# Patient Record
Sex: MALE | Race: WHITE | Employment: OTHER | URBAN - METROPOLITAN AREA
[De-identification: names, ages, dates, MRNs, and addresses within clinical notes are randomized per-mention and may not be internally consistent; named-entity substitution may affect disease eponyms.]

---

## 2017-01-05 ENCOUNTER — HOSPITAL ENCOUNTER (EMERGENCY)
Age: 77
Discharge: HOME | End: 2017-01-05
Attending: EMERGENCY MEDICINE
Payer: MEDICARE

## 2017-01-05 ENCOUNTER — APPOINTMENT (OUTPATIENT)
Dept: ULTRASOUND IMAGING | Age: 77
End: 2017-01-05
Attending: EMERGENCY MEDICINE
Payer: MEDICARE

## 2017-01-05 VITALS
DIASTOLIC BLOOD PRESSURE: 61 MMHG | BODY MASS INDEX: 33.42 KG/M2 | WEIGHT: 233.47 LBS | RESPIRATION RATE: 18 BRPM | TEMPERATURE: 97.8 F | SYSTOLIC BLOOD PRESSURE: 152 MMHG | HEART RATE: 79 BPM | HEIGHT: 70 IN | OXYGEN SATURATION: 97 %

## 2017-01-05 DIAGNOSIS — E86.0 DEHYDRATION: ICD-10-CM

## 2017-01-05 DIAGNOSIS — K80.20 CALCULUS OF GALLBLADDER WITHOUT CHOLECYSTITIS WITHOUT OBSTRUCTION: Primary | ICD-10-CM

## 2017-01-05 LAB
ALBUMIN SERPL BCP-MCNC: 3.5 G/DL (ref 3.5–5)
ALBUMIN/GLOB SERPL: 0.8 {RATIO} (ref 1.1–2.2)
ALP SERPL-CCNC: 78 U/L (ref 45–117)
ALT SERPL-CCNC: 46 U/L (ref 12–78)
ANION GAP BLD CALC-SCNC: 5 MMOL/L (ref 5–15)
AST SERPL W P-5'-P-CCNC: 35 U/L (ref 15–37)
ATRIAL RATE: 70 BPM
BASOPHILS # BLD AUTO: 0 K/UL (ref 0–0.1)
BASOPHILS # BLD: 1 % (ref 0–1)
BILIRUB SERPL-MCNC: 0.8 MG/DL (ref 0.2–1)
BUN SERPL-MCNC: 14 MG/DL (ref 6–20)
BUN/CREAT SERPL: 15 (ref 12–20)
CALCIUM SERPL-MCNC: 8.8 MG/DL (ref 8.5–10.1)
CALCULATED P AXIS, ECG09: 33 DEGREES
CALCULATED R AXIS, ECG10: -3 DEGREES
CALCULATED T AXIS, ECG11: 39 DEGREES
CHLORIDE SERPL-SCNC: 104 MMOL/L (ref 97–108)
CO2 SERPL-SCNC: 33 MMOL/L (ref 21–32)
CREAT SERPL-MCNC: 0.91 MG/DL (ref 0.7–1.3)
DIAGNOSIS, 93000: NORMAL
EOSINOPHIL # BLD: 0.2 K/UL (ref 0–0.4)
EOSINOPHIL NFR BLD: 2 % (ref 0–7)
ERYTHROCYTE [DISTWIDTH] IN BLOOD BY AUTOMATED COUNT: 17.7 % (ref 11.5–14.5)
GLOBULIN SER CALC-MCNC: 4.6 G/DL (ref 2–4)
GLUCOSE SERPL-MCNC: 98 MG/DL (ref 65–100)
HCT VFR BLD AUTO: 36.9 % (ref 36.6–50.3)
HGB BLD-MCNC: 11.9 G/DL (ref 12.1–17)
LACTATE SERPL-SCNC: 1.5 MMOL/L (ref 0.4–2)
LACTATE SERPL-SCNC: 2.8 MMOL/L (ref 0.4–2)
LIPASE SERPL-CCNC: 65 U/L (ref 73–393)
LYMPHOCYTES # BLD AUTO: 28 % (ref 12–49)
LYMPHOCYTES # BLD: 2.4 K/UL (ref 0.8–3.5)
MCH RBC QN AUTO: 32.8 PG (ref 26–34)
MCHC RBC AUTO-ENTMCNC: 32.2 G/DL (ref 30–36.5)
MCV RBC AUTO: 101.7 FL (ref 80–99)
MONOCYTES # BLD: 0.8 K/UL (ref 0–1)
MONOCYTES NFR BLD AUTO: 9 % (ref 5–13)
NEUTS SEG # BLD: 5.1 K/UL (ref 1.8–8)
NEUTS SEG NFR BLD AUTO: 60 % (ref 32–75)
NRBC # BLD: 0.04 K/UL (ref 0–0.01)
NRBC BLD-RTO: 0.5 PER 100 WBC
P-R INTERVAL, ECG05: 170 MS
PLATELET # BLD AUTO: 305 K/UL (ref 150–400)
POTASSIUM SERPL-SCNC: 4.2 MMOL/L (ref 3.5–5.1)
PROT SERPL-MCNC: 8.1 G/DL (ref 6.4–8.2)
Q-T INTERVAL, ECG07: 412 MS
QRS DURATION, ECG06: 92 MS
QTC CALCULATION (BEZET), ECG08: 444 MS
RBC # BLD AUTO: 3.63 M/UL (ref 4.1–5.7)
SODIUM SERPL-SCNC: 142 MMOL/L (ref 136–145)
VENTRICULAR RATE, ECG03: 70 BPM
WBC # BLD AUTO: 8.8 K/UL (ref 4.1–11.1)
WBC NRBC COR # BLD: ABNORMAL 10*3/UL

## 2017-01-05 PROCEDURE — 99284 EMERGENCY DEPT VISIT MOD MDM: CPT

## 2017-01-05 PROCEDURE — 83690 ASSAY OF LIPASE: CPT | Performed by: EMERGENCY MEDICINE

## 2017-01-05 PROCEDURE — 76705 ECHO EXAM OF ABDOMEN: CPT

## 2017-01-05 PROCEDURE — 96360 HYDRATION IV INFUSION INIT: CPT

## 2017-01-05 PROCEDURE — 96361 HYDRATE IV INFUSION ADD-ON: CPT

## 2017-01-05 PROCEDURE — 85025 COMPLETE CBC W/AUTO DIFF WBC: CPT | Performed by: EMERGENCY MEDICINE

## 2017-01-05 PROCEDURE — 74011250636 HC RX REV CODE- 250/636

## 2017-01-05 PROCEDURE — 83605 ASSAY OF LACTIC ACID: CPT | Performed by: EMERGENCY MEDICINE

## 2017-01-05 PROCEDURE — 36415 COLL VENOUS BLD VENIPUNCTURE: CPT | Performed by: EMERGENCY MEDICINE

## 2017-01-05 PROCEDURE — 93005 ELECTROCARDIOGRAM TRACING: CPT

## 2017-01-05 PROCEDURE — 74011250636 HC RX REV CODE- 250/636: Performed by: EMERGENCY MEDICINE

## 2017-01-05 PROCEDURE — 80053 COMPREHEN METABOLIC PANEL: CPT | Performed by: EMERGENCY MEDICINE

## 2017-01-05 RX ORDER — SODIUM CHLORIDE 9 MG/ML
2000 INJECTION, SOLUTION INTRAVENOUS ONCE
Status: COMPLETED | OUTPATIENT
Start: 2017-01-05 | End: 2017-01-05

## 2017-01-05 RX ORDER — HYDROCODONE BITARTRATE AND ACETAMINOPHEN 5; 325 MG/1; MG/1
1 TABLET ORAL
Qty: 10 TAB | Refills: 0 | Status: ON HOLD | OUTPATIENT
Start: 2017-01-05 | End: 2017-01-11

## 2017-01-05 RX ORDER — ONDANSETRON 4 MG/1
4 TABLET, ORALLY DISINTEGRATING ORAL
Qty: 6 TAB | Refills: 0 | Status: SHIPPED | OUTPATIENT
Start: 2017-01-05 | End: 2017-01-07

## 2017-01-05 RX ADMIN — SODIUM CHLORIDE 2000 ML: 900 INJECTION, SOLUTION INTRAVENOUS at 13:58

## 2017-01-05 NOTE — ED NOTES
Bedside and Verbal shift change report given to Sarthak Atkinson (oncoming nurse) by Maria L Barrow (offgoing nurse). Report included the following information SBAR and ED Summary.

## 2017-01-05 NOTE — ED NOTES
Unable to obtain vital signs prior to discharge. Dr. Helen Arenas has reviewed discharge instructions with the patient. The patient verbalized understanding. Patient ambulatory to car with family.

## 2017-01-05 NOTE — ED NOTES
Assumed care of pt from triage. Pt c/o RUQ abdominal pain onset last night. Pt denies n/v/d and fever. Pt placed on monitor x2. VSS. Pt in position of comfort with call bell within reach. VSS.

## 2017-01-05 NOTE — ED PROVIDER NOTES
HPI Comments: Malinda Cullen, 78 y.o. Male with PMHx of HTN and GERD presents ambulatory to ED [facility] with cc of intermittent severe flank pain radiating to RUQ and LUQ x 1 month with 5 episodes. Pt was seen by Dr. Arjun Alejandra this morning and the PCP believes he has gallstones and instructed him to be seen at the ED. Pt vomited with relief after eating last night and notes he has vomited 4 times total since onset of pain last night. The episodes of pain last 3-4 hours and begin 4 hours after eating. Pt reports normal BM's. Pt also c/o of cough and \"lung congestion\" which is normal. He denies a hx of abdominal surgeries or cardiac issues. He denies any diarrhea, melena, BIS, CP, fevers, chills, dysuria or hematuria. PCP: Dread Lucero MD    Social history significant for: - Tobacco, - EtOH, - Illicit Drug Use    There are no other complaints, changes, or physical findings at this time. Written by KAEL Rivera, as dictated by Howard Clark. Maury Mock MD.      The history is provided by the patient. No  was used. Past Medical History:   Diagnosis Date    Basal cell cancer     Benign prostatic hypertrophy     Frequency of urination     GERD (gastroesophageal reflux disease)     Hypertension        Past Surgical History:   Procedure Laterality Date    Hx other surgical       jaw surgery; \"shortened it\" per pt    Hx shoulder arthroscopy       Rt shoulder ligament repair    Hx mohs procedure  7/11     Rt    Hx heent       tonsillectomy    Hx cataract removal      Hx other surgical  12/08/14     basal cell removed from left side of forehead         History reviewed. No pertinent family history. Social History     Social History    Marital status: SINGLE     Spouse name: N/A    Number of children: N/A    Years of education: N/A     Occupational History    Not on file.      Social History Main Topics    Smoking status: Never Smoker    Smokeless tobacco: Never Used   Bernard Benitez Alcohol use No    Drug use: No    Sexual activity: Not on file     Other Topics Concern    Not on file     Social History Narrative         ALLERGIES: Review of patient's allergies indicates no known allergies. Review of Systems   Constitutional: Negative for chills and fever. HENT: Negative for congestion. Eyes: Negative for visual disturbance. Respiratory: Positive for cough. Negative for chest tightness (\"congestion\"). Cardiovascular: Negative for chest pain and leg swelling. Gastrointestinal: Positive for abdominal pain, nausea and vomiting. Negative for blood in stool and diarrhea. Endocrine: Negative for polyuria. Genitourinary: Negative for dysuria, flank pain, frequency and hematuria. Musculoskeletal: Negative for myalgias. Skin: Negative for color change. Allergic/Immunologic: Negative for immunocompromised state. Neurological: Negative for numbness. All other systems reviewed and are negative. Patient Vitals for the past 12 hrs:   Temp Pulse Resp BP SpO2   01/05/17 1516 - - - - 97 %   01/05/17 1513 - 79 18 152/61 96 %   01/05/17 1512 - - - 152/61 -   01/05/17 1359 - 72 18 140/66 96 %   01/05/17 1200 - - - (!) 161/102 95 %   01/05/17 1102 - - - - 95 %   01/05/17 1100 - - - 156/89 93 %   01/05/17 1047 - 71 18 (!) 151/92 95 %   01/05/17 0915 97.8 °F (36.6 °C) 70 18 (!) 156/96 100 %         Physical Exam   Nursing note and vitals reviewed.   General appearance: non-toxic, NAD  Eyes: PERRL, EOMI, conjunctiva normal, anicteric sclera  HENT: mucous membranes moist, oropharynx is clear  Pulmonary: clear to auscultation bilaterally  Cardiac: normal rate and regular rhythm, no murmurs, gallops, or rubs, 2+ DP and radial pulses  Abdomen: soft, nondistended, bowel sounds present, TTP mostly in RUQ suggestive of Bryson's sign   : no CVA tenderness bilaterally  MSK: trace pretibial edema  Neuro: Alert, answers questions appropriately      MDM  Number of Diagnoses or Management Options  Calculus of gallbladder without cholecystitis without obstruction:   Dehydration:   Diagnosis management comments: DDx: cholecystitis, cholelithiasis, gastritis, hepatitis. Low suspicion of ACS. US shows cholelithiasis. Labs reassuring. Denies any abdominal pain. Suspect colicky pain 2/2 gallstones. Low suspicion for other intra-abdominal pathology. No CT indicated at this time. EKG non-ischemic. F/u with PMD & general surgery. Amount and/or Complexity of Data Reviewed  Clinical lab tests: ordered and reviewed  Tests in the radiology section of CPT®: ordered and reviewed  Tests in the medicine section of CPT®: ordered and reviewed  Review and summarize past medical records: yes  Independent visualization of images, tracings, or specimens: yes    Patient Progress  Patient progress: stable    ED Course       Procedures    EKG interpretation: (Preliminary) 11:05  Rhythm: sinus rhythm with premature supraventricular complexes; and regular . Rate (approx.): 70; Axis: normal; NC interval: 170; QRS interval: 923; ST/T wave: normal; Other findings: normal.  Written by KAEL Daly, as dictated by Greyson Jules. Carie Eubanks MD.    Progress Note  4:10 PM  Pt is in no pain (no pain during entire ED course) and was able to tolerate PO without difficulty, nausea, or vomiting. I have updated his family on results and will have him follow up with general surgery  Written by KAEL Daly, as dictated by Greyson Jules.  Carie Eubanks MD.    LABORATORY TESTS:  Recent Results (from the past 12 hour(s))   CBC WITH AUTOMATED DIFF    Collection Time: 01/05/17 10:36 AM   Result Value Ref Range    WBC 8.8 4.1 - 11.1 K/uL    RBC 3.63 (L) 4.10 - 5.70 M/uL    HGB 11.9 (L) 12.1 - 17.0 g/dL    HCT 36.9 36.6 - 50.3 %    .7 (H) 80.0 - 99.0 FL    MCH 32.8 26.0 - 34.0 PG    MCHC 32.2 30.0 - 36.5 g/dL    RDW 17.7 (H) 11.5 - 14.5 %    PLATELET 468 515 - 749 K/uL    NEUTROPHILS 60 32 - 75 %    LYMPHOCYTES 28 12 - 49 % MONOCYTES 9 5 - 13 %    EOSINOPHILS 2 0 - 7 %    BASOPHILS 1 0 - 1 %    ABS. NEUTROPHILS 5.1 1.8 - 8.0 K/UL    ABS. LYMPHOCYTES 2.4 0.8 - 3.5 K/UL    ABS. MONOCYTES 0.8 0.0 - 1.0 K/UL    ABS. EOSINOPHILS 0.2 0.0 - 0.4 K/UL    ABS. BASOPHILS 0.0 0.0 - 0.1 K/UL   METABOLIC PANEL, COMPREHENSIVE    Collection Time: 01/05/17 10:36 AM   Result Value Ref Range    Sodium 142 136 - 145 mmol/L    Potassium 4.2 3.5 - 5.1 mmol/L    Chloride 104 97 - 108 mmol/L    CO2 33 (H) 21 - 32 mmol/L    Anion gap 5 5 - 15 mmol/L    Glucose 98 65 - 100 mg/dL    BUN 14 6 - 20 MG/DL    Creatinine 0.91 0.70 - 1.30 MG/DL    BUN/Creatinine ratio 15 12 - 20      GFR est AA >60 >60 ml/min/1.73m2    GFR est non-AA >60 >60 ml/min/1.73m2    Calcium 8.8 8.5 - 10.1 MG/DL    Bilirubin, total 0.8 0.2 - 1.0 MG/DL    ALT 46 12 - 78 U/L    AST 35 15 - 37 U/L    Alk.  phosphatase 78 45 - 117 U/L    Protein, total 8.1 6.4 - 8.2 g/dL    Albumin 3.5 3.5 - 5.0 g/dL    Globulin 4.6 (H) 2.0 - 4.0 g/dL    A-G Ratio 0.8 (L) 1.1 - 2.2     LACTIC ACID, PLASMA    Collection Time: 01/05/17 10:36 AM   Result Value Ref Range    Lactic acid 2.8 (HH) 0.4 - 2.0 MMOL/L   LIPASE    Collection Time: 01/05/17 10:36 AM   Result Value Ref Range    Lipase 65 (L) 73 - 393 U/L   NUCLEATED RBC    Collection Time: 01/05/17 10:36 AM   Result Value Ref Range    NRBC 0.5 (H) 0  WBC    ABSOLUTE NRBC 0.04 (H) 0.00 - 0.01 K/uL    WBC CORRECTED FOR NR ADJUSTED FOR NUCLEATED RBC'S     EKG, 12 LEAD, INITIAL    Collection Time: 01/05/17 11:05 AM   Result Value Ref Range    Ventricular Rate 70 BPM    Atrial Rate 70 BPM    P-R Interval 170 ms    QRS Duration 92 ms    Q-T Interval 412 ms    QTC Calculation (Bezet) 444 ms    Calculated P Axis 33 degrees    Calculated R Axis -3 degrees    Calculated T Axis 39 degrees    Diagnosis       Sinus rhythm with premature supraventricular complexes  When compared with ECG of 19-MAR-2014 07:51,  premature supraventricular complexes are now present LACTIC ACID, PLASMA    Collection Time: 01/05/17  1:46 PM   Result Value Ref Range    Lactic acid 1.5 0.4 - 2.0 MMOL/L       IMAGING RESULTS:  US ABD LTD   Final Result   Indication: Abdominal pain     Comparison to 8/6/2014     Real-time sonographic imaging of the right upper quadrant was performed. The  liver is normal in appearance without evidence of mass lesion or biliary  dilatation. Gallstones are noted. There is no gallbladder wall thickening or  sonographic Bryson sign. Common bile duct is normal in size. The right kidney  is normal in size and appearance without evidence of mass lesion,  hydronephrosis, or calcification. The pancreas is not well-visualized due to  bowel gas. No free fluid.     IMPRESSION  Impression: Cholelithiasis without evidence to suggest acute cholecystitis. MEDICATIONS GIVEN:  Medications   0.9% sodium chloride infusion 2,000 mL (0 mL IntraVENous IV Completed 1/5/17 4177)       IMPRESSION:  1. Calculus of gallbladder without cholecystitis without obstruction    2. Dehydration        PLAN:  1. Discharge Medication List as of 1/5/2017  4:23 PM      START taking these medications    Details   ondansetron (ZOFRAN ODT) 4 mg disintegrating tablet Take 1 Tab by mouth every eight (8) hours as needed for Nausea (or vomiting) for up to 2 days. , Normal, Disp-6 Tab, R-0      HYDROcodone-acetaminophen (LORTAB 5-325) 5-325 mg per tablet Take 1 Tab by mouth every six (6) hours as needed for Pain. Max Daily Amount: 4 Tabs. Indications: causes drowsiness;do not drink,drive, or use machinery while taking; take with a stool softener, Print, Disp-10 Tab, R-0         CONTINUE these medications which have NOT CHANGED    Details   Glucosamine &Chondroit-MV-Min3 [id number] mg tab Take  by mouth two (2) times a day., Historical Med      finasteride (PROSCAR) 5 mg tablet Take 5 mg by mouth every morning.  Indications: BENIGN PROSTATIC HYPERTROPHY, Historical Med      lisinopril (PRINIVIL, ZESTRIL) 40 mg tablet Take 40 mg by mouth every morning. Indications: HYPERTENSION, Historical Med      Omeprazole delayed release (PRILOSEC D/R) 20 mg tablet Take 20 mg by mouth every morning., Historical Med      Hydrochlorothiazide 12.5 mg tablet Take 12.5 mg by mouth every morning. Indications: HYPERTENSION, Historical Med           2. Follow-up Information     Follow up With Details Comments Contact Info    Letitia Davies MD Schedule an appointment as soon as possible for a visit in 1 day FOR RE-EVALUATION, REFERRAL TO 1300  [address]  [phone]      [facility] EMERGENCY DEPT Go in 1 day If symptoms worsen [facility] [address] Severo Sloan, MD Schedule an appointment as soon as possible for a visit in 4 days [address] [place] [address] [address]  [facility]  [phone]          Return to ED if worse     Discharge Note:  4:58 PM  The pt is ready for discharge. The pt's signs, symptoms, diagnosis, and discharge instructions have been discussed and pt has conveyed their understanding. The pt is to follow up as recommended or return to ER should their symptoms worsen. Plan has been discussed and pt is in agreement. This note is prepared by Cale Ma, acting as a Scribe for [employer]. Pallavi Vail MD.    [employer]. Pallavi Vail MD: The scribe's documentation has been prepared under my direction and personally reviewed by me in its entirety. I confirm that the notes above accurately reflects all work, treatment, procedures, and medical decision making performed by me.

## 2017-01-05 NOTE — ED NOTES
Bedside and Verbal shift change report given to Genna Valle RN (oncoming nurse) by Katia Veras RN (offgoing nurse). Report included the following information SBAR, ED Summary, MAR and Recent Results.

## 2017-01-05 NOTE — DISCHARGE INSTRUCTIONS
Dehydration: Care Instructions  Your Care Instructions  Dehydration happens when your body loses too much fluid. This might happen when you do not drink enough water or you lose large amounts of fluids from your body because of diarrhea, vomiting, or sweating. Severe dehydration can be life-threatening. Water and minerals called electrolytes help put your body fluids back in balance. Learn the early signs of fluid loss, and drink more fluids to prevent dehydration. Follow-up care is a key part of your treatment and safety. Be sure to make and go to all appointments, and call your doctor if you are having problems. It's also a good idea to know your test results and keep a list of the medicines you take. How can you care for yourself at home? · To prevent dehydration, drink plenty of fluids, enough so that your urine is light yellow or clear like water. Choose water and other caffeine-free clear liquids until you feel better. If you have kidney, heart, or liver disease and have to limit fluids, talk with your doctor before you increase the amount of fluids you drink. · If you do not feel like eating or drinking, try taking small sips of water, sports drinks, or other rehydration drinks. · Get plenty of rest.  To prevent dehydration  · Add more fluids to your diet and daily routine, unless your doctor has told you not to. · During hot weather, drink more fluids. Drink even more fluids if you exercise a lot. Stay away from drinks with alcohol or caffeine. · Watch for the symptoms of dehydration. These include:  ¨ A dry, sticky mouth. ¨ Dark yellow urine, and not much of it. ¨ Dry and sunken eyes. ¨ Feeling very tired. · Learn what problems can lead to dehydration. These include:  ¨ Diarrhea, fever, and vomiting. ¨ Any illness with a fever, such as pneumonia or the flu. ¨ Activities that cause heavy sweating, such as endurance races and heavy outdoor work in hot or humid weather.   ¨ Alcohol or drug abuse or withdrawal.  ¨ Certain medicines, such as cold and allergy pills (antihistamines), diet pills (diuretics), and laxatives. ¨ Certain diseases, such as diabetes, cancer, and heart or kidney disease. When should you call for help? Call 911 anytime you think you may need emergency care. For example, call if:  · You passed out (lost consciousness). Call your doctor now or seek immediate medical care if:  · You are confused and cannot think clearly. · You are dizzy or lightheaded, or you feel like you may faint. · You have signs of needing more fluids. You have sunken eyes and a dry mouth, and you pass only a little dark urine. · You cannot keep fluids down. Watch closely for changes in your health, and be sure to contact your doctor if:  · You are not making tears. · Your skin is very dry and sags slowly back into place after you pinch it. · Your mouth and eyes are very dry. Where can you learn more? Go to http://floyd-lisa.info/. Enter J448 in the search box to learn more about \"Dehydration: Care Instructions. \"  Current as of: May 27, 2016  Content Version: 11.1  © 9558-3891 [employer]. Care instructions adapted under license by [employer] (which disclaims liability or warranty for this information). If you have questions about a medical condition or this instruction, always ask your healthcare professional. [place] [address] any warranty or liability for your use of this information. Low-Fat Diet for Gallbladder Disease: Care Instructions  Your Care Instructions  When you eat, the gallbladder releases bile, which helps you digest the fat in food. If you have an inflamed gallbladder, this may cause pain. A low-fat diet may give your gallbladder a rest so you can start to heal. Your doctor and dietitian can help you make an eating plan that does not irritate your digestive system.  Always talk with your doctor or dietitian before you make changes in your diet. Follow-up care is a key part of your treatment and safety. Be sure to make and go to all appointments, and call your doctor if you are having problems. It's also a good idea to know your test results and keep a list of the medicines you take. How can you care for yourself at home? · Eat many small meals and snacks each day instead of three large meals. · Choose lean meats. ¨ Eat no more than 5 to 6½ ounces of meat a day. ¨ Cut off all fat you can see. ¨ Eat chicken and turkey without the skin. ¨ Many types of fish, such as salmon, lake trout, tuna, and herring, provide healthy omega-3 fat. But, avoid fish canned in oil, such as sardines in olive oil. ¨ Bake, broil, or grill meats, poultry, or fish instead of frying them in butter or fat. · Drink or eat nonfat or low-fat milk, yogurt, cheese, or other milk products each day. ¨ Read the labels on cheeses, and choose those with less than 5 grams of fat an ounce. ¨ Try fat-free sour cream, cream cheese, or yogurt. ¨ Avoid cream soups and cream sauces on pasta. ¨ Eat low-fat ice cream, frozen yogurt, or sorbet. Avoid regular ice cream.  · Eat whole-grain cereals, breads, crackers, rice, or pasta. Avoid high-fat foods such as croissants, scones, biscuits, waffles, doughnuts, muffins, granola, and high-fat breads. · Flavor your foods with herbs and spices (such as basil, tarragon, or mint), fat-free sauces, or lemon juice instead of butter. You can also use butter substitutes, fat-free mayonnaise, or fat-free dressing. · Try applesauce, prune puree, or mashed bananas to replace some or all of the fat when you bake. · Limit fats and oils, such as butter, margarine, mayonnaise, and salad dressing, to no more than 1 tablespoon a meal.  · Avoid high-fat foods, such as:  ¨ Chocolate, whole milk, ice cream, and processed cheese. ¨ Fried or buttered foods. ¨ Sausage, salami, and houser.   ¨ Cinnamon rolls, cakes, pies, cookies, and other pastries. ¨ Prepared snack foods, such as potato chips, nut and granola bars, and mixed nuts. ¨ Coconut and avocado. · Learn how to read food labels for serving sizes and ingredients. Fast-food and convenience-food meals often have lots of fat. Where can you learn more? Go to http://floyd-lisa.info/. Enter E958 in the search box to learn more about \"Low-Fat Diet for Gallbladder Disease: Care Instructions. \"  Current as of: July 26, 2016  Content Version: 11.1  © 2280-4826 [employer]. Care instructions adapted under license by [employer] (which disclaims liability or warranty for this information). If you have questions about a medical condition or this instruction, always ask your healthcare professional. [place] [address] any warranty or liability for your use of this information. Biliary Colic: Care Instructions  Your Care Instructions    Biliary (say \"BILL-ee-air-ee\") colic is belly pain caused by gallbladder problems. It is usually caused by a gallstone moving through or blocking the common bile duct or cystic duct. Gallstones are stones that form in the gallbladder. They are made of cholesterol and other substances. The gallbladder is a small sac located just under the liver. It stores bile released by the liver. Bile helps you digest fats. Gallstones also can form in the common bile duct or cystic duct. These ducts carry bile from the gallbladder and the liver to the small intestine. Gallstones may be as small as a grain of sand or as large as a golf ball. Gallstones that cause severe symptoms usually are treated with surgery to remove the gallbladder. If the first attack of biliary colic is mild, it is often safe to wait until you have had another attack before you think about having surgery. The doctor has checked you carefully, but problems can develop later.  If you notice any problems or new symptoms, get medical treatment right away. Follow-up care is a key part of your treatment and safety. Be sure to make and go to all appointments, and call your doctor if you are having problems. It's also a good idea to know your test results and keep a list of the medicines you take. How can you care for yourself at home? · Take pain medicines exactly as directed. ¨ If the doctor gave you a prescription medicine for pain, take it as prescribed. ¨ If you are not taking a prescription pain medicine, ask your doctor if you can take an over-the-counter medicine. Read and follow all instructions on the label. · Avoid foods that cause symptoms, especially fatty foods. These can cause biliary colic. · You may need more tests to look at your gallbladder. When should you call for help? Call your doctor now or seek immediate medical care if:  · You have a fever. · You have new belly pain, or your pain gets worse. · There is a new or increasing yellow tint to your skin or the whites of your eyes. · Your urine is dark yellow-brown, or your stools are light-colored or white. · You cannot keep down fluids. Watch closely for changes in your health, and be sure to contact your doctor if:  · You do not get better as expected. · You are not getting better after 1 day (24 hours). Where can you learn more? Go to http://floyd-lisa.info/. Enter X248 in the search box to learn more about \"Biliary Colic: Care Instructions. \"  Current as of: August 9, 2016  Content Version: 11.1  © 5392-6963 [employer]. Care instructions adapted under license by [employer] (which disclaims liability or warranty for this information). If you have questions about a medical condition or this instruction, always ask your healthcare professional. [address] any warranty or liability for your use of this information.

## 2017-01-09 ENCOUNTER — OFFICE VISIT (OUTPATIENT)
Dept: SURGERY | Age: 77
End: 2017-01-09

## 2017-01-09 VITALS
WEIGHT: 234.2 LBS | DIASTOLIC BLOOD PRESSURE: 90 MMHG | BODY MASS INDEX: 33.53 KG/M2 | HEART RATE: 66 BPM | SYSTOLIC BLOOD PRESSURE: 168 MMHG | OXYGEN SATURATION: 96 % | HEIGHT: 70 IN | RESPIRATION RATE: 24 BRPM

## 2017-01-09 DIAGNOSIS — K80.20 GALLSTONES: Primary | ICD-10-CM

## 2017-01-09 NOTE — PERIOP NOTES
[facility]  Preoperative Instructions        Surgery Date 1/10/17         Time of Arrival 11:00am    1. On the day of your surgery, please report to the Surgical Services Registration Desk and sign in at your designated time. The Surgery Center is located to the right of the Emergency Room. 2. You must have someone with you to drive you home. You should not drive a car for 24 hours following surgery. Please make arrangements for a friend or family member to stay with you for the first 24 hours after your surgery. 3. Do not have anything to eat or drink (including water, gum, mints, coffee, juice) after midnight 1/9/17             . This may not apply to medications prescribed by your physician. Please note special instructions, if applicable. If you are currently taking Plavix, Coumadin, or other blood-thinning agents, contact your surgeon for instructions. 4. We recommend you do not drink any alcoholic beverages for 24 hours before and after your surgery. 5. Have a list of all current medications, including vitamins, herbal supplements and any other over the counter medications. Stop all Aspirin and non-steroidal anti-inflammatory drugs (I.e. Advil, Aleve), as directed by your surgeon's office. Stop all vitamins and herbal supplements seven days prior to your surgery. 6. Wear comfortable clothes. Wear glasses instead of contacts. Do not bring any money or jewelry. Please bring picture ID, insurance card, and any prearranged co-payment or hospital payment. Do not wear make-up, particularly mascara the morning of your surgery. Do not wear nail polish, particularly if you are having foot /hand surgery. Wear your hair loose or down, no ponytails, buns, monty pins or clips. All body piercings must be removed.   Please shower with antibacterial soap for three consecutive days before and on the morning of surgery, but do not apply any lotions, powders or deodorants after the shower on the day of surgery. Please use a fresh towels after each shower. Please sleep in clean clothes and change bed linens the night before surgery. Please do not shave for 48 hours prior to surgery. Shaving of the face is acceptable. 7. You should understand that if you do not follow these instructions your surgery may be cancelled. If your physical condition changes (I.e. fever, cold or flu) please contact your surgeon as soon as possible. 8. It is important that you be on time. If a situation occurs where you may be late, please call [phone] (OR Holding Area). 9. If you have any questions and or problems, please call [phone] (Pre-admission Testing). 10. Your surgery time may be subject to change. You will receive a phone call the evening prior if your time changes. 11.  If having outpatient surgery, you must have someone to drive you here, stay with you during the duration of your stay, and to drive you home at time of discharge. Special Instructions:    MEDICATIONS TO TAKE THE MORNING OF SURGERY WITH A SIP OF WATER:Proscar, Prilosec if needed    I understand a pre-operative phone call will be made to verify my surgery time. In the event that I am not available, I give permission for a message to be left on my answering service and/or with another person?  Yes          ___________________      __________   _________    (Signature of Patient)             (Witness)                (Date and Time)

## 2017-01-09 NOTE — MR AVS SNAPSHOT
Visit Information Date & Time Provider Department Dept. Phone Encounter #  
 1/9/2017 10:20 AM Wan Erazo MD [facility] [address] [facility] [phone] [id number] Your Appointments 1/9/2017 10:20 AM  
New Patient with Wan Erazo MD  
[facility] [address] [place] ([address]) Appt Note: eval gallstones/ref by [facility] ER/Notes and U/s in CC/Medicare and Medicare Supp/CP$0/Pt arriving early for paperwork [address] [facility] [address], [address] [place] [address]  
[address] [facility], [address] [place] [address] [place] [phone] 4/17/2017  9:00 AM  
Any with Kalyani Franklin MD  
[address] Oncology at [facility]) Appt Note: follow up  
 [address] [facility] [address] [place] [address]  
[address] [place] [address] [address] [facility] [address] John Taylor Upcoming Health Maintenance Date Due DTaP/Tdap/Td series (1 - Tdap) 8/23/1963 FOBT Q 1 YEAR AGE 50-75 8/23/1992 ZOSTER VACCINE AGE 60> 8/23/2002 GLAUCOMA SCREENING Q2Y 8/23/2007 Pneumococcal 65+ High/Highest Risk (1 of 2 - PCV13) 8/23/2007 MEDICARE YEARLY EXAM 8/23/2007 INFLUENZA AGE 9 TO ADULT 8/1/2016 Allergies as of 1/9/2017  Review Complete On: 1/9/2017 By: Willian Bennett No Known Allergies Current Immunizations  Reviewed on 12/19/2016 Name Date Influenza Vaccine 10/1/2013 Not reviewed this visit Vitals BP Pulse Resp Height(growth percentile) Weight(growth percentile) SpO2  
 168/90 (BP 1 Location: Right arm, BP Patient Position: Sitting) 66 24 5' 10\" (1.778 m) 234 lb 3.2 oz (106.2 kg) 96% BMI Smoking Status 33.6 kg/m2 Never Smoker BMI and BSA Data Body Mass Index Body Surface Area  
 33.6 kg/m 2 2.29 m 2 Preferred Pharmacy Pharmacy Name Phone [facility] [address] [place], [place] - [address] [place] [address] Milady Bowman [phone] Your Updated Medication List  
  
   
This list is accurate as of: 1/9/17 10:12 AM.  Always use your most recent med list.  
  
  
  
  
 finasteride 5 mg tablet Commonly known as:  PROSCAR Take 5 mg by mouth every morning. Indications: BENIGN PROSTATIC HYPERTROPHY Glucosamine &Chondroit-MV-Min3 460-875-84-0.5 mg Tab Take  by mouth two (2) times a day. hydroCHLOROthiazide 12.5 mg tablet Commonly known as:  HYDRODIURIL Take 12.5 mg by mouth every morning. Indications: HYPERTENSION  
  
 HYDROcodone-acetaminophen 5-325 mg per tablet Commonly known as:  LORTAB 5-325 Take 1 Tab by mouth every six (6) hours as needed for Pain. Max Daily Amount: 4 Tabs. Indications: causes drowsiness;do not drink,drive, or use machinery while taking; take with a stool softener  
  
 lisinopril 40 mg tablet Commonly known as:  Ellen Reasons Take 40 mg by mouth every morning. Indications: HYPERTENSION Omeprazole delayed release 20 mg tablet Commonly known as:  PRILOSEC D/R Take 20 mg by mouth every morning. To-Do List   
 02/13/2017 9:00 AM  
  [facility] ([phone])  
  
 04/10/2017 9:00 AM  
  Appointment with [place] FAST TRACK/FLOAT NURSE at [facility] ([phone]) Introducing [facility] & HEALTH SERVICES! Yusuf Worthy introduces Per Vices patient portal. Now you can access parts of your medical record, email your doctor's office, and request medication refills online. 1. In your internet browser, go to [email or web address] Art 2. Click on the First Time User? Click Here link in the Sign In box. You will see the New Member Sign Up page. 3. Enter your Per Vices Access Code exactly as it appears below. You will not need to use this code after youve completed the sign-up process.  If you do not sign up before the expiration date, you must request a new code. · LightCyber Access Code: [email or web address] Expires: 1/18/2017  1:25 PM 
 
4. Enter the last four digits of your Social Security Number (xxxx) and Date of Birth (mm/dd/yyyy) as indicated and click Submit. You will be taken to the next sign-up page. 5. Create a LightCyber ID. This will be your LightCyber login ID and cannot be changed, so think of one that is secure and easy to remember. 6. Create a LightCyber password. You can change your password at any time. 7. Enter your Password Reset Question and Answer. This can be used at a later time if you forget your password. 8. Enter your e-mail address. You will receive e-mail notification when new information is available in [address] 9. Click Sign Up. You can now view and download portions of your medical record. 10. Click the Download Summary menu link to download a portable copy of your medical information. If you have questions, please visit the Frequently Asked Questions section of the LightCyber website. Remember, LightCyber is NOT to be used for urgent needs. For medical emergencies, dial 911. Now available from your iPhone and Android! Please provide this summary of care documentation to your next provider. Your primary care clinician is listed as Cynthia Ordaz. If you have any questions after today's visit, please call [phone].

## 2017-01-09 NOTE — PROGRESS NOTES
History of Present Illness: The patient is a 78 y.o. man referred by Na Shelton MD and referred from the emergency department regarding right upper quadrant pain. The patient reports that for about a month he has had episodes of epigastric and right upper quadrant pain with radiation through to the back. At times this seems related to eating, but not always. He did have episodes of nausea and vomiting associated with some of the earlier attacks, but none over the last week or so. He went to the emergency department with these symptoms and had seen Dr. Pj Pruitt last week. Evaluation there included ultrasound showing gallstones. The patient reports that since that time he has had several mild episodes, but none as severe. He is able to tolerate solid food although his appetite is decreased. He has had no problem with bowel movements. The patient has no history of previous abdominal surgery. He reports a history of gastroesophageal reflux disease but no other GI illness. The patient has a history of hypertension, BPH, myelodysplastic syndrome. He is followed by Dr. Anthony Matthews. The patient has no history of diabetes mellitus. The patient denies history of heart disease, anginal chest pain, MI or coronary intervention. The patient denies shortness of breath at rest or with exertion. He reports he can walk 1/4 mile without stopping. Physical Examination:   GENERAL:  Alert, no acute distress. VITAL SIGNS:  BP:  168/90. P:  66.  O2 saturation:  96% on room air. WT:  234 lb. HEAD/NECK:  No jaundice, mass or thyromegaly. LUNGS:  Clear bilaterally without wheeze, rhonchi or rales. Cheryl Benjamin HEART:  Regular without murmur, gallop or rub. ABDOMEN: Soft. There is mild tenderness in the right upper quadrant. No mass is palpated. No hernia is noted. EXTREMITIES:  No edema. NEURO:  Patient is alert and oriented x 3 and moves all extremities equally.   Facial movement is symmetrical. Speech was normal.      Review Data:  I reviewed the CBC and complete metabolic profile done in the emergency department. Hemoglobin was 11.9 on 1/5/17. Liver function tests were normal.      Ultrasound images were reviewed. He had evidence of gallstones without inflammatory signs. Impression:  The patient's symptoms do sound consistent with gallbladder disease. I have recommended  laparoscopic cholecystectomy, possible open cholecystectomy, possible cholangiogram.  Risks versus benefits were reviewed with the patient. Risks would include bleeding, infection, injury to abdominal organs, injury to common bile duct, bile leak, failure to resolve symptoms, potential need for open surgery, risk of general anesthesia, etc.  The patient understands and wishes to proceed. Final Diagnosis:  Gallstones. MedDATA/onesimo     cc:  Nickie Graves MD

## 2017-01-10 ENCOUNTER — APPOINTMENT (OUTPATIENT)
Dept: GENERAL RADIOLOGY | Age: 77
End: 2017-01-10
Attending: SURGERY
Payer: MEDICARE

## 2017-01-10 ENCOUNTER — SURGERY (OUTPATIENT)
Age: 77
End: 2017-01-10

## 2017-01-10 ENCOUNTER — ANESTHESIA EVENT (OUTPATIENT)
Dept: SURGERY | Age: 77
End: 2017-01-10
Payer: MEDICARE

## 2017-01-10 ENCOUNTER — HOSPITAL ENCOUNTER (OUTPATIENT)
Age: 77
Setting detail: OBSERVATION
Discharge: HOME | End: 2017-01-11
Attending: SURGERY | Admitting: SURGERY
Payer: MEDICARE

## 2017-01-10 ENCOUNTER — ANESTHESIA (OUTPATIENT)
Dept: SURGERY | Age: 77
End: 2017-01-10
Payer: MEDICARE

## 2017-01-10 PROCEDURE — 77030010837 HC CATH CHOL INTRO TELE -B: Performed by: SURGERY

## 2017-01-10 PROCEDURE — 76060000035 HC ANESTHESIA 2 TO 2.5 HR: Performed by: SURGERY

## 2017-01-10 PROCEDURE — 74011250636 HC RX REV CODE- 250/636

## 2017-01-10 PROCEDURE — 77030008566 HC TBNG SUC IRR J&J -B: Performed by: SURGERY

## 2017-01-10 PROCEDURE — 77030031139 HC SUT VCRL2 J&J -A: Performed by: SURGERY

## 2017-01-10 PROCEDURE — 77030008771 HC TU NG SALEM SUMP -A: Performed by: NURSE ANESTHETIST, CERTIFIED REGISTERED

## 2017-01-10 PROCEDURE — 77030032490 HC SLV COMPR SCD KNE COVD -B: Performed by: SURGERY

## 2017-01-10 PROCEDURE — 74011636320 HC RX REV CODE- 636/320: Performed by: SURGERY

## 2017-01-10 PROCEDURE — 77030010507 HC ADH SKN DERMBND J&J -B: Performed by: SURGERY

## 2017-01-10 PROCEDURE — 74011250636 HC RX REV CODE- 250/636: Performed by: SURGERY

## 2017-01-10 PROCEDURE — 77030035045 HC TRCR ENDOSC VRSPRT BLDLSS COVD -B: Performed by: SURGERY

## 2017-01-10 PROCEDURE — 76210000016 HC OR PH I REC 1 TO 1.5 HR: Performed by: SURGERY

## 2017-01-10 PROCEDURE — 74011000250 HC RX REV CODE- 250: Performed by: SURGERY

## 2017-01-10 PROCEDURE — 77030011241 HC DRN WND FLL CARD -B: Performed by: SURGERY

## 2017-01-10 PROCEDURE — 77030018836 HC SOL IRR NACL ICUM -A: Performed by: SURGERY

## 2017-01-10 PROCEDURE — P9045 ALBUMIN (HUMAN), 5%, 250 ML: HCPCS

## 2017-01-10 PROCEDURE — 99218 HC RM OBSERVATION: CPT

## 2017-01-10 PROCEDURE — 77030002916 HC SUT ETHLN J&J -A: Performed by: SURGERY

## 2017-01-10 PROCEDURE — 77030012022 HC APPL CLP ENDOSC COVD -C: Performed by: SURGERY

## 2017-01-10 PROCEDURE — 74011000250 HC RX REV CODE- 250

## 2017-01-10 PROCEDURE — 77030009932 HC PRB FT CTRL J&J -B: Performed by: SURGERY

## 2017-01-10 PROCEDURE — 77030002967 HC SUT PDS J&J -B: Performed by: SURGERY

## 2017-01-10 PROCEDURE — 77030035048 HC TRCR ENDOSC OPTCL COVD -B: Performed by: SURGERY

## 2017-01-10 PROCEDURE — 77030035051: Performed by: SURGERY

## 2017-01-10 PROCEDURE — 77030009402: Performed by: SURGERY

## 2017-01-10 PROCEDURE — 74300 X-RAY BILE DUCTS/PANCREAS: CPT

## 2017-01-10 PROCEDURE — 77030011640 HC PAD GRND REM COVD -A: Performed by: SURGERY

## 2017-01-10 PROCEDURE — 77030013567 HC DRN WND RESERV BARD -A: Performed by: SURGERY

## 2017-01-10 PROCEDURE — 76010000131 HC OR TIME 2 TO 2.5 HR: Performed by: SURGERY

## 2017-01-10 PROCEDURE — 77030020053 HC ELECTRD LAPSCP COVD -B: Performed by: SURGERY

## 2017-01-10 PROCEDURE — 74011250637 HC RX REV CODE- 250/637: Performed by: SURGERY

## 2017-01-10 PROCEDURE — 88304 TISSUE EXAM BY PATHOLOGIST: CPT | Performed by: SURGERY

## 2017-01-10 PROCEDURE — 77030008684 HC TU ET CUF COVD -B: Performed by: NURSE ANESTHETIST, CERTIFIED REGISTERED

## 2017-01-10 PROCEDURE — 77030009852 HC PCH RTVR ENDOSC COVD -B: Performed by: SURGERY

## 2017-01-10 PROCEDURE — 77030020782 HC GWN BAIR PAWS FLX 3M -B

## 2017-01-10 PROCEDURE — 77010033678 HC OXYGEN DAILY

## 2017-01-10 PROCEDURE — 74011250636 HC RX REV CODE- 250/636: Performed by: ANESTHESIOLOGY

## 2017-01-10 PROCEDURE — 77030020263 HC SOL INJ SOD CL0.9% LFCR 1000ML: Performed by: SURGERY

## 2017-01-10 RX ORDER — FENTANYL CITRATE 50 UG/ML
INJECTION, SOLUTION INTRAMUSCULAR; INTRAVENOUS
Status: COMPLETED
Start: 2017-01-10 | End: 2017-01-10

## 2017-01-10 RX ORDER — SODIUM CHLORIDE, SODIUM LACTATE, POTASSIUM CHLORIDE, CALCIUM CHLORIDE 600; 310; 30; 20 MG/100ML; MG/100ML; MG/100ML; MG/100ML
25 INJECTION, SOLUTION INTRAVENOUS CONTINUOUS
Status: DISCONTINUED | OUTPATIENT
Start: 2017-01-10 | End: 2017-01-10

## 2017-01-10 RX ORDER — FINASTERIDE 5 MG/1
5 TABLET, FILM COATED ORAL
Status: DISCONTINUED | OUTPATIENT
Start: 2017-01-11 | End: 2017-01-11 | Stop reason: HOSPADM

## 2017-01-10 RX ORDER — SODIUM CHLORIDE, SODIUM LACTATE, POTASSIUM CHLORIDE, CALCIUM CHLORIDE 600; 310; 30; 20 MG/100ML; MG/100ML; MG/100ML; MG/100ML
INJECTION, SOLUTION INTRAVENOUS
Status: DISCONTINUED | OUTPATIENT
Start: 2017-01-10 | End: 2017-01-10 | Stop reason: HOSPADM

## 2017-01-10 RX ORDER — FAMOTIDINE 20 MG/1
20 TABLET, FILM COATED ORAL 2 TIMES DAILY
Status: DISCONTINUED | OUTPATIENT
Start: 2017-01-10 | End: 2017-01-11 | Stop reason: HOSPADM

## 2017-01-10 RX ORDER — SODIUM CHLORIDE 0.9 % (FLUSH) 0.9 %
5-10 SYRINGE (ML) INJECTION EVERY 8 HOURS
Status: DISCONTINUED | OUTPATIENT
Start: 2017-01-10 | End: 2017-01-10 | Stop reason: HOSPADM

## 2017-01-10 RX ORDER — HYDROMORPHONE HYDROCHLORIDE 1 MG/ML
.3-.5 INJECTION, SOLUTION INTRAMUSCULAR; INTRAVENOUS; SUBCUTANEOUS
Status: DISCONTINUED | OUTPATIENT
Start: 2017-01-10 | End: 2017-01-11 | Stop reason: HOSPADM

## 2017-01-10 RX ORDER — ALBUMIN HUMAN 50 G/1000ML
SOLUTION INTRAVENOUS AS NEEDED
Status: DISCONTINUED | OUTPATIENT
Start: 2017-01-10 | End: 2017-01-10 | Stop reason: HOSPADM

## 2017-01-10 RX ORDER — LIDOCAINE HYDROCHLORIDE 10 MG/ML
0.1 INJECTION, SOLUTION EPIDURAL; INFILTRATION; INTRACAUDAL; PERINEURAL AS NEEDED
Status: DISCONTINUED | OUTPATIENT
Start: 2017-01-10 | End: 2017-01-10 | Stop reason: HOSPADM

## 2017-01-10 RX ORDER — HYDROMORPHONE HYDROCHLORIDE 1 MG/ML
0.2 INJECTION, SOLUTION INTRAMUSCULAR; INTRAVENOUS; SUBCUTANEOUS
Status: DISCONTINUED | OUTPATIENT
Start: 2017-01-10 | End: 2017-01-10 | Stop reason: HOSPADM

## 2017-01-10 RX ORDER — DIPHENHYDRAMINE HYDROCHLORIDE 50 MG/ML
12.5 INJECTION, SOLUTION INTRAMUSCULAR; INTRAVENOUS AS NEEDED
Status: DISCONTINUED | OUTPATIENT
Start: 2017-01-10 | End: 2017-01-10 | Stop reason: HOSPADM

## 2017-01-10 RX ORDER — NALOXONE HYDROCHLORIDE 0.4 MG/ML
0.4 INJECTION, SOLUTION INTRAMUSCULAR; INTRAVENOUS; SUBCUTANEOUS
Status: DISCONTINUED | OUTPATIENT
Start: 2017-01-10 | End: 2017-01-11 | Stop reason: HOSPADM

## 2017-01-10 RX ORDER — CEFAZOLIN SODIUM IN 0.9 % NACL 2 G/100 ML
2 PLASTIC BAG, INJECTION (ML) INTRAVENOUS ONCE
Status: COMPLETED | OUTPATIENT
Start: 2017-01-10 | End: 2017-01-10

## 2017-01-10 RX ORDER — SODIUM CHLORIDE, SODIUM LACTATE, POTASSIUM CHLORIDE, CALCIUM CHLORIDE 600; 310; 30; 20 MG/100ML; MG/100ML; MG/100ML; MG/100ML
25 INJECTION, SOLUTION INTRAVENOUS CONTINUOUS
Status: DISCONTINUED | OUTPATIENT
Start: 2017-01-10 | End: 2017-01-10 | Stop reason: HOSPADM

## 2017-01-10 RX ORDER — SUCCINYLCHOLINE CHLORIDE 20 MG/ML
INJECTION INTRAMUSCULAR; INTRAVENOUS AS NEEDED
Status: DISCONTINUED | OUTPATIENT
Start: 2017-01-10 | End: 2017-01-10 | Stop reason: HOSPADM

## 2017-01-10 RX ORDER — IPRATROPIUM BROMIDE AND ALBUTEROL SULFATE 2.5; .5 MG/3ML; MG/3ML
3 SOLUTION RESPIRATORY (INHALATION)
Status: DISCONTINUED | OUTPATIENT
Start: 2017-01-10 | End: 2017-01-11 | Stop reason: HOSPADM

## 2017-01-10 RX ORDER — FENTANYL CITRATE 50 UG/ML
INJECTION, SOLUTION INTRAMUSCULAR; INTRAVENOUS AS NEEDED
Status: DISCONTINUED | OUTPATIENT
Start: 2017-01-10 | End: 2017-01-10 | Stop reason: HOSPADM

## 2017-01-10 RX ORDER — ONDANSETRON 2 MG/ML
4 INJECTION INTRAMUSCULAR; INTRAVENOUS
Status: DISCONTINUED | OUTPATIENT
Start: 2017-01-10 | End: 2017-01-11 | Stop reason: HOSPADM

## 2017-01-10 RX ORDER — SODIUM CHLORIDE 0.9 % (FLUSH) 0.9 %
5-10 SYRINGE (ML) INJECTION AS NEEDED
Status: DISCONTINUED | OUTPATIENT
Start: 2017-01-10 | End: 2017-01-10 | Stop reason: HOSPADM

## 2017-01-10 RX ORDER — ROCURONIUM BROMIDE 10 MG/ML
INJECTION, SOLUTION INTRAVENOUS AS NEEDED
Status: DISCONTINUED | OUTPATIENT
Start: 2017-01-10 | End: 2017-01-10 | Stop reason: HOSPADM

## 2017-01-10 RX ORDER — BUPIVACAINE HYDROCHLORIDE AND EPINEPHRINE 2.5; 5 MG/ML; UG/ML
INJECTION, SOLUTION EPIDURAL; INFILTRATION; INTRACAUDAL; PERINEURAL AS NEEDED
Status: DISCONTINUED | OUTPATIENT
Start: 2017-01-10 | End: 2017-01-10 | Stop reason: HOSPADM

## 2017-01-10 RX ORDER — ONDANSETRON 2 MG/ML
INJECTION INTRAMUSCULAR; INTRAVENOUS AS NEEDED
Status: DISCONTINUED | OUTPATIENT
Start: 2017-01-10 | End: 2017-01-10 | Stop reason: HOSPADM

## 2017-01-10 RX ORDER — HYDROCHLOROTHIAZIDE 25 MG/1
12.5 TABLET ORAL
Status: DISCONTINUED | OUTPATIENT
Start: 2017-01-11 | End: 2017-01-11 | Stop reason: HOSPADM

## 2017-01-10 RX ORDER — ONDANSETRON 4 MG/1
4 TABLET, ORALLY DISINTEGRATING ORAL
COMMUNITY
End: 2017-11-08

## 2017-01-10 RX ORDER — SODIUM CHLORIDE 0.9 % (FLUSH) 0.9 %
5-10 SYRINGE (ML) INJECTION AS NEEDED
Status: DISCONTINUED | OUTPATIENT
Start: 2017-01-10 | End: 2017-01-11 | Stop reason: HOSPADM

## 2017-01-10 RX ORDER — LIDOCAINE HYDROCHLORIDE 20 MG/ML
INJECTION, SOLUTION EPIDURAL; INFILTRATION; INTRACAUDAL; PERINEURAL AS NEEDED
Status: DISCONTINUED | OUTPATIENT
Start: 2017-01-10 | End: 2017-01-10 | Stop reason: HOSPADM

## 2017-01-10 RX ORDER — PHENYLEPHRINE HCL IN 0.9% NACL 0.4MG/10ML
SYRINGE (ML) INTRAVENOUS AS NEEDED
Status: DISCONTINUED | OUTPATIENT
Start: 2017-01-10 | End: 2017-01-10 | Stop reason: HOSPADM

## 2017-01-10 RX ORDER — MIDAZOLAM HYDROCHLORIDE 1 MG/ML
INJECTION, SOLUTION INTRAMUSCULAR; INTRAVENOUS AS NEEDED
Status: DISCONTINUED | OUTPATIENT
Start: 2017-01-10 | End: 2017-01-10 | Stop reason: HOSPADM

## 2017-01-10 RX ORDER — PROPOFOL 10 MG/ML
INJECTION, EMULSION INTRAVENOUS AS NEEDED
Status: DISCONTINUED | OUTPATIENT
Start: 2017-01-10 | End: 2017-01-10 | Stop reason: HOSPADM

## 2017-01-10 RX ORDER — SODIUM CHLORIDE 0.9 % (FLUSH) 0.9 %
5-10 SYRINGE (ML) INJECTION EVERY 8 HOURS
Status: DISCONTINUED | OUTPATIENT
Start: 2017-01-10 | End: 2017-01-11 | Stop reason: HOSPADM

## 2017-01-10 RX ORDER — DEXAMETHASONE SODIUM PHOSPHATE 4 MG/ML
INJECTION, SOLUTION INTRA-ARTICULAR; INTRALESIONAL; INTRAMUSCULAR; INTRAVENOUS; SOFT TISSUE AS NEEDED
Status: DISCONTINUED | OUTPATIENT
Start: 2017-01-10 | End: 2017-01-10 | Stop reason: HOSPADM

## 2017-01-10 RX ORDER — SODIUM CHLORIDE 9 MG/ML
50 INJECTION, SOLUTION INTRAVENOUS CONTINUOUS
Status: DISCONTINUED | OUTPATIENT
Start: 2017-01-10 | End: 2017-01-11

## 2017-01-10 RX ORDER — HEPARIN SODIUM 5000 [USP'U]/ML
5000 INJECTION, SOLUTION INTRAVENOUS; SUBCUTANEOUS EVERY 8 HOURS
Status: DISCONTINUED | OUTPATIENT
Start: 2017-01-10 | End: 2017-01-11

## 2017-01-10 RX ORDER — DIPHENHYDRAMINE HYDROCHLORIDE 50 MG/ML
12.5 INJECTION, SOLUTION INTRAMUSCULAR; INTRAVENOUS
Status: DISCONTINUED | OUTPATIENT
Start: 2017-01-10 | End: 2017-01-11 | Stop reason: HOSPADM

## 2017-01-10 RX ORDER — LISINOPRIL 20 MG/1
40 TABLET ORAL
Status: DISCONTINUED | OUTPATIENT
Start: 2017-01-11 | End: 2017-01-11 | Stop reason: HOSPADM

## 2017-01-10 RX ORDER — OXYCODONE AND ACETAMINOPHEN 5; 325 MG/1; MG/1
1-2 TABLET ORAL
Status: DISCONTINUED | OUTPATIENT
Start: 2017-01-10 | End: 2017-01-11 | Stop reason: HOSPADM

## 2017-01-10 RX ORDER — FENTANYL CITRATE 50 UG/ML
25 INJECTION, SOLUTION INTRAMUSCULAR; INTRAVENOUS
Status: DISCONTINUED | OUTPATIENT
Start: 2017-01-10 | End: 2017-01-10 | Stop reason: HOSPADM

## 2017-01-10 RX ADMIN — ALBUMIN HUMAN 250 ML: 50 SOLUTION INTRAVENOUS at 11:42

## 2017-01-10 RX ADMIN — Medication 80 MCG: at 12:10

## 2017-01-10 RX ADMIN — FENTANYL CITRATE 25 MCG: 50 INJECTION, SOLUTION INTRAMUSCULAR; INTRAVENOUS at 13:55

## 2017-01-10 RX ADMIN — CEFAZOLIN 2 G: 10 INJECTION, POWDER, FOR SOLUTION INTRAVENOUS; PARENTERAL at 11:05

## 2017-01-10 RX ADMIN — Medication 80 MCG: at 12:40

## 2017-01-10 RX ADMIN — Medication 80 MCG: at 11:15

## 2017-01-10 RX ADMIN — SODIUM CHLORIDE, SODIUM LACTATE, POTASSIUM CHLORIDE, CALCIUM CHLORIDE: 600; 310; 30; 20 INJECTION, SOLUTION INTRAVENOUS at 10:54

## 2017-01-10 RX ADMIN — Medication 80 MCG: at 12:50

## 2017-01-10 RX ADMIN — FAMOTIDINE 20 MG: 20 TABLET ORAL at 18:34

## 2017-01-10 RX ADMIN — LIDOCAINE HYDROCHLORIDE 100 MG: 20 INJECTION, SOLUTION EPIDURAL; INFILTRATION; INTRACAUDAL; PERINEURAL at 10:59

## 2017-01-10 RX ADMIN — Medication 80 MCG: at 12:25

## 2017-01-10 RX ADMIN — ROCURONIUM BROMIDE 10 MG: 10 INJECTION, SOLUTION INTRAVENOUS at 10:59

## 2017-01-10 RX ADMIN — ROCURONIUM BROMIDE 10 MG: 10 INJECTION, SOLUTION INTRAVENOUS at 11:28

## 2017-01-10 RX ADMIN — ROCURONIUM BROMIDE 20 MG: 10 INJECTION, SOLUTION INTRAVENOUS at 11:05

## 2017-01-10 RX ADMIN — ONDANSETRON 4 MG: 2 INJECTION INTRAMUSCULAR; INTRAVENOUS at 11:05

## 2017-01-10 RX ADMIN — MIDAZOLAM HYDROCHLORIDE 2 MG: 1 INJECTION, SOLUTION INTRAMUSCULAR; INTRAVENOUS at 10:54

## 2017-01-10 RX ADMIN — HYDROMORPHONE HYDROCHLORIDE 0.5 MG: 1 INJECTION, SOLUTION INTRAMUSCULAR; INTRAVENOUS; SUBCUTANEOUS at 22:53

## 2017-01-10 RX ADMIN — HYDROMORPHONE HYDROCHLORIDE 0.5 MG: 1 INJECTION, SOLUTION INTRAMUSCULAR; INTRAVENOUS; SUBCUTANEOUS at 15:43

## 2017-01-10 RX ADMIN — HEPARIN SODIUM 5000 UNITS: 5000 INJECTION, SOLUTION INTRAVENOUS; SUBCUTANEOUS at 22:53

## 2017-01-10 RX ADMIN — SUCCINYLCHOLINE CHLORIDE 160 MG: 20 INJECTION INTRAMUSCULAR; INTRAVENOUS at 10:59

## 2017-01-10 RX ADMIN — IOPAMIDOL 25 ML: 755 INJECTION, SOLUTION INTRAVENOUS at 12:13

## 2017-01-10 RX ADMIN — FENTANYL CITRATE 100 MCG: 50 INJECTION, SOLUTION INTRAMUSCULAR; INTRAVENOUS at 10:59

## 2017-01-10 RX ADMIN — PROPOFOL 150 MG: 10 INJECTION, EMULSION INTRAVENOUS at 10:59

## 2017-01-10 RX ADMIN — DEXAMETHASONE SODIUM PHOSPHATE 8 MG: 4 INJECTION, SOLUTION INTRA-ARTICULAR; INTRALESIONAL; INTRAMUSCULAR; INTRAVENOUS; SOFT TISSUE at 11:05

## 2017-01-10 RX ADMIN — SODIUM CHLORIDE, SODIUM LACTATE, POTASSIUM CHLORIDE, AND CALCIUM CHLORIDE 25 ML/HR: 600; 310; 30; 20 INJECTION, SOLUTION INTRAVENOUS at 09:19

## 2017-01-10 RX ADMIN — Medication 80 MCG: at 11:10

## 2017-01-10 RX ADMIN — Medication 80 MCG: at 11:50

## 2017-01-10 RX ADMIN — BUPIVACAINE HYDROCHLORIDE AND EPINEPHRINE 8 ML: 2.5; 5 INJECTION, SOLUTION EPIDURAL; INFILTRATION; INTRACAUDAL; PERINEURAL at 12:54

## 2017-01-10 RX ADMIN — HYDROMORPHONE HYDROCHLORIDE 0.5 MG: 1 INJECTION, SOLUTION INTRAMUSCULAR; INTRAVENOUS; SUBCUTANEOUS at 18:33

## 2017-01-10 RX ADMIN — Medication 10 ML: at 15:46

## 2017-01-10 NOTE — IP AVS SNAPSHOT
[address] [facility] 
[phone] Patient: Jose Gardner MRN: [id number] UFP:3/67/2023 You are allergic to the following No active allergies Recent Documentation Height Weight BMI Smoking Status 1.778 m 105.2 kg 33.28 kg/m2 Never Smoker Emergency Contacts Name Discharge Info Relation Home Work Mobile [place]  Spouse [3] [phone] About your hospitalization You were admitted on:  January 10, 2017 You last received care in the:  [facility] 2 GENERAL SURGERY You were discharged on:  January 11, 2017 Unit phone number:  [phone] Why you were hospitalized Your primary diagnosis was:  Gallstones Providers Seen During Your Hospitalizations Provider Role Specialty Primary office phone Alejandro Brooks MD Attending Provider General Surgery [phone] Your Primary Care Physician (PCP) Primary Care Physician Office Phone Office Fax David Etta [phone] Follow-up Information Follow up With Details Comments Contact Info Benito Reynolds MD   [address] [place] [address] [phone] Your Appointments Wednesday January 25, 2017  8:40 AM EST  
POST OP with Alejandro Brooks MD  
[facility] [address] [facility] ([facility]) [address] [place] [address], [address] [facility]  
[phone] Monday February 13, 2017  9:00 AM EST INFUSION 30 with [place] FAST TRACK/FLOAT NURSE  
[facility] [address] ([address]) [address]  
[phone] Go to [facility], [address] [facility], [place], [address] [place] Current Discharge Medication List  
  
CONTINUE these medications which have CHANGED Dose & Instructions Dispensing Information Comments Morning Noon Evening Bedtime HYDROcodone-acetaminophen 5-325 mg per tablet Commonly known as:  LORTAB 5-325 What changed:   
- how much to take - when to take this Your next dose is: Today, Tomorrow Other:  _________ Dose:  1-2 Tab Take 1-2 Tabs by mouth every four (4) hours as needed for Pain. Max Daily Amount: 12 Tabs. Indications: causes drowsiness;do not drink,drive, or use machinery while taking; take with a stool softener Quantity:  30 Tab Refills:  0 CONTINUE these medications which have NOT CHANGED Dose & Instructions Dispensing Information Comments Morning Noon Evening Bedtime  
 finasteride 5 mg tablet Commonly known as:  PROSCAR Your next dose is: Today, Tomorrow Other:  _________ Dose:  5 mg Take 5 mg by mouth every morning. Indications: BENIGN PROSTATIC HYPERTROPHY Refills:  0 Glucosamine &Chondroit-MV-Min3 078-539-75-0.5 mg Tab Your next dose is: Today, Tomorrow Other:  _________ Take  by mouth two (2) times a day. Refills:  0  
     
   
   
   
  
 hydroCHLOROthiazide 12.5 mg tablet Commonly known as:  HYDRODIURIL Your next dose is: Today, Tomorrow Other:  _________ Dose:  12.5 mg Take 12.5 mg by mouth every morning. Indications: HYPERTENSION Refills:  0  
     
   
   
   
  
 lisinopril 40 mg tablet Commonly known as:  Denys Casanova Your next dose is: Today, Tomorrow Other:  _________ Dose:  40 mg Take 40 mg by mouth every morning. Indications: HYPERTENSION Refills:  0 Omeprazole delayed release 20 mg tablet Commonly known as:  PRILOSEC D/R Your next dose is: Today, Tomorrow Other:  _________ Dose:  20 mg Take 20 mg by mouth every morning. Refills:  0 ondansetron 4 mg disintegrating tablet Commonly known as:  ZOFRAN ODT Your next dose is: Today, Tomorrow Other:  _________ Dose:  4 mg Take 4 mg by mouth every eight (8) hours as needed for Nausea. Indications: nausea Refills:  0 Where to Get Your Medications Information on where to get these meds will be given to you by the nurse or doctor. ! Ask your nurse or doctor about these medications HYDROcodone-acetaminophen 5-325 mg per tablet Discharge Instructions Discharge Instructions After Removal of Gallbladder It is normal to tire easily for a while. No lifting over 15 pounds for one month. It is OK to walk up stairs or bend over as needed. Do not drive for 3 days and do not drive while taking narcotic pain medication. Remove any gauze dressings tomorrow. Let Dermabond (plastic coating on incisions) wear off on its own. It is OK to shower. Follow your normal diet. You may use stool softeners such as colace or a laxative such as Miralax as needed for constipation. Take pain medicines as prescribed as needed. Call for follow up appointment in 2 weeks  - [phone] If you have any problems, call Dr Helen Zelaya at [phone] or [phone] (after hours) G. Chyrel Goltz, MD 
 
 
 
 
 
 
 
Discharge Orders None Introducing [facility] SERVICES! Radha Escamilla introduces OzVision patient portal. Now you can access parts of your medical record, email your doctor's office, and request medication refills online. 1. In your internet browser, go to [email or web address] 2. Click on the First Time User? Click Here link in the Sign In box. You will see the New Member Sign Up page. 3. Enter your OzVision Access Code exactly as it appears below. You will not need to use this code after youve completed the sign-up process.  If you do not sign up before the expiration date, you must request a new code. · MagTag Access Code: [email or web address] Expires: 1/18/2017  1:25 PM 
 
4. Enter the last four digits of your Social Security Number (xxxx) and Date of Birth (mm/dd/yyyy) as indicated and click Submit. You will be taken to the next sign-up page. 5. Create a MagTag ID. This will be your MagTag login ID and cannot be changed, so think of one that is secure and easy to remember. 6. Create a MagTag password. You can change your password at any time. 7. Enter your Password Reset Question and Answer. This can be used at a later time if you forget your password. 8. Enter your e-mail address. You will receive e-mail notification when new information is available in [address] 9. Click Sign Up. You can now view and download portions of your medical record. 10. Click the Download Summary menu link to download a portable copy of your medical information. If you have questions, please visit the Frequently Asked Questions section of the MagTag website. Remember, MagTag is NOT to be used for urgent needs. For medical emergencies, dial 911. Now available from your iPhone and Android! General Information Please provide this summary of care documentation to your next provider. Patient Signature:  ____________________________________________________________ Date:  ____________________________________________________________  
  
Joseph Valente Provider Signature:  ____________________________________________________________ Date:  ____________________________________________________________

## 2017-01-10 NOTE — PROGRESS NOTES
TRANSFER - IN REPORT:    Verbal report received from Laney(name) on Rubia Sawant  being received from [facility]) for routine post - op      Report consisted of patients Situation, Background, Assessment and   Recommendations(SBAR). Information from the following report(s) SBAR, ED Summary, Intake/Output, MAR and Recent Results was reviewed with the receiving nurse. Opportunity for questions and clarification was provided. Assessment completed upon patients arrival to unit and care assumed.

## 2017-01-10 NOTE — BRIEF OP NOTE
BRIEF OPERATIVE NOTE    Date of Procedure: 1/10/2017   Preoperative Diagnosis: GALLSTONES  Postoperative Diagnosis: GALLSTONES    Procedure(s):  LAPAROSCOPIC CHOLECYSTECTOMY, cholangiogram  Surgeon(s) and Role:     * Robby Pardo MD - Primary            Surgical Staff:  Circ-1: Negrita Rivera RN  Circ-Relief: Jorge Quintero RN  Circ-Intern: Merlin Hendrix RN  Radiology Technician: Susanna Gaxiola, RT  Scrub Tech-1: Jesse Jarquin  Scrub RN-Relief: Javier Soriano RN  Surg Asst-1: Awilda Wills  Surg Asst-Relief: Yvonne Elizalde RN  Event Time In   Incision Start 1119   Incision Close      Anesthesia: General   Estimated Blood Loss: 75 ml  Specimens:   ID Type Source Tests Collected by Time Destination   1 : Gall Bladder Preservative Gallbladder  Robby Pardo MD 1/10/2017 1238 Pathology      Findings: Distended thickened gallbladder, thickened cystic duct.   Cholangiogram normal.   Complications: none  Implants: * No implants in log *   Drain - RUCHI

## 2017-01-10 NOTE — IP AVS SNAPSHOT
[address] [facility]. 
[phone] Patient: Augustine Salinas MRN: [id number] GEX:[id number] You are allergic to the following No active allergies Recent Documentation Height Weight BMI Smoking Status 1.778 m 105.2 kg 33.28 kg/m2 Never Smoker Emergency Contacts Name Discharge Info Relation Home Work Mobile [place]  Spouse [3] [phone] About your hospitalization You were admitted on:  January 10, 2017 You last received care in the:  [facility] 2 GENERAL SURGERY You were discharged on:  January 11, 2017 Why you were hospitalized Your primary diagnosis was:  Gallstones Providers Seen During Your Hospitalizations Provider Role Specialty Primary office phone Dawn Wright MD Attending Provider General Surgery [phone] Your Primary Care Physician (PCP) Primary Care Physician Office Phone Office Fax Jaclyn Pizarro [phone] Follow-up Information Follow up With Details Comments Contact Info Daron Ellis MD   [address] [address] [phone] Your Appointments Wednesday January 25, 2017  8:40 AM EST  
POST OP with Dawn Wright MD  
[facility] [address] ([address]) [address] [place] [address], [address] [facility]  
[phone] Monday February 13, 2017  9:00 AM EST INFUSION 30 with ANTOINETTE FAST TRACK/FLOAT NURSE  
[facility] ANTOINETTE ([address]) [address]  
[phone] Go to [facility], [facility], [address] [facility], [place] [address] [address] [facility] Current Discharge Medication List  
  
CONTINUE these medications which have CHANGED Dose & Instructions Dispensing Information Comments Morning Noon Evening Bedtime HYDROcodone-acetaminophen 5-325 mg per tablet Commonly known as:  LORTAB 5-325 What changed:   
- how much to take - when to take this Your next dose is: Today, Tomorrow Other:  _________ Dose:  1-2 Tab Take 1-2 Tabs by mouth every four (4) hours as needed for Pain. Max Daily Amount: 12 Tabs. Indications: causes drowsiness;do not drink,drive, or use machinery while taking; take with a stool softener Quantity:  30 Tab Refills:  0 CONTINUE these medications which have NOT CHANGED Dose & Instructions Dispensing Information Comments Morning Noon Evening Bedtime  
 finasteride 5 mg tablet Commonly known as:  PROSCAR Your next dose is: Today, Tomorrow Other:  _________ Dose:  5 mg Take 5 mg by mouth every morning. Indications: BENIGN PROSTATIC HYPERTROPHY Refills:  0 Glucosamine &Chondroit-MV-Min3 411-598-99-0.5 mg Tab Your next dose is: Today, Tomorrow Other:  _________ Take  by mouth two (2) times a day. Refills:  0  
     
   
   
   
  
 hydroCHLOROthiazide 12.5 mg tablet Commonly known as:  HYDRODIURIL Your next dose is: Today, Tomorrow Other:  _________ Dose:  12.5 mg Take 12.5 mg by mouth every morning. Indications: HYPERTENSION Refills:  0  
     
   
   
   
  
 lisinopril 40 mg tablet Commonly known as:  Robertha Roup Your next dose is: Today, Tomorrow Other:  _________ Dose:  40 mg Take 40 mg by mouth every morning. Indications: HYPERTENSION Refills:  0 Omeprazole delayed release 20 mg tablet Commonly known as:  PRILOSEC D/R Your next dose is: Today, Tomorrow Other:  _________ Dose:  20 mg Take 20 mg by mouth every morning. Refills:  0 ondansetron 4 mg disintegrating tablet Commonly known as:  ZOFRAN ODT Your next dose is: Today, Tomorrow Other:  _________ Dose:  4 mg Take 4 mg by mouth every eight (8) hours as needed for Nausea. Indications: nausea Refills:  0 Where to Get Your Medications Information on where to get these meds will be given to you by the nurse or doctor. ! Ask your nurse or doctor about these medications HYDROcodone-acetaminophen 5-325 mg per tablet Discharge Instructions Discharge Instructions After Removal of Gallbladder It is normal to tire easily for a while. No lifting over 15 pounds for one month. It is OK to walk up stairs or bend over as needed. Do not drive for 3 days and do not drive while taking narcotic pain medication. Remove any gauze dressings tomorrow. Let Dermabond (plastic coating on incisions) wear off on its own. It is OK to shower. Follow your normal diet. You may use stool softeners such as colace or a laxative such as Miralax as needed for constipation. Take pain medicines as prescribed as needed. Call for follow up appointment in 2 weeks  - [phone] If you have any problems, call Dr Ja Baker at [phone] or [phone] (after hours) CHARLIE Henriquez MD 
 
 
 
 
 
 
 
Discharge Orders None General Information Please provide this summary of care documentation to your next provider. Introducing [facility]! Christa Mcginnis introduces [employer] patient portal. Now you can access parts of your medical record, email your doctor's office, and request medication refills online. 1. In your internet browser, go to [email or web address]. [email or web address] 2. Click on the First Time User? Click Here link in the Sign In box. You will see the New Member Sign Up page. 3. Enter your [employer] Access Code exactly as it appears below.  You will not need to use this code after youve completed the sign-up process. If you do not sign up before the expiration date, you must request a new code. · [employer] Access Code: [email or web address] Expires: 1/18/2017  1:25 PM 
 
4. Enter the last four digits of your Social Security Number (xxxx) and Date of Birth (mm/dd/yyyy) as indicated and click Submit. You will be taken to the next sign-up page. 5. Create a [employer] ID. This will be your [employer] login ID and cannot be changed, so think of one that is secure and easy to remember. 6. Create a [employer] password. You can change your password at any time. 7. Enter your Password Reset Question and Answer. This can be used at a later time if you forget your password. 8. Enter your e-mail address. You will receive e-mail notification when new information is available in [address] 9. Click Sign Up. You can now view and download portions of your medical record. 10. Click the Download Summary menu link to download a portable copy of your medical information. If you have questions, please visit the Frequently Asked Questions section of the [employer] website. Remember, [employer] is NOT to be used for urgent needs. For medical emergencies, dial 911. Now available from your iPhone and Android! Patient Signature:  ____________________________________________________________ Date:  ____________________________________________________________  
  
Estela Charter Provider Signature:  ____________________________________________________________ Date:  ____________________________________________________________

## 2017-01-10 NOTE — OP NOTES
Operative Report        Laparoscopic Cholecystectomy With Cholangiogram    CPT 07069    Patient:  Bo Robles    Date of Surgery:  1/10/2017    Preoperative diagnosis: Gallstones    Postoperative Diagnosis: Same    Anesthesia:  General    Surgeon:  Jose Snyder     Procedure :  Laparoscopic Cholecystectomy with Cholangiogram     Operative Summary:  The patient was taken to the operating room and placed on the operating table in the supine position. General anesthesia was induced. The patient's abdomen was prepared and draped. An incision was made in the skin at the superior border of the umbilicus and carried down through the fascia and peritoneum. The peritoneal cavity was entered. A blunt tipped 12 mm trochar and sheath were introduced and the abdomen was inflated with CO2. The laparoscope was introduced. The abdominal contents deep to the entry point appeared normal.  The liver appeared normal.  The gallbladder was distended and thickened. Under visualization, a 5mm port was placed high in the midline epigastrium and two 5mm ports were placed in the right upper quadrant. An additional 5 mm port was placed in the LUQ for retraction. The gallbladder was punctured and drained of 40 ml of pale bile. The fundus of the gallbladder was grasped and lifted upward. Adhesions to the gallbladder were extensive and were stripped away. The infundibulum of the gallbladder was grasped and lifted upward and to the right. The peritoneum at the lower end of the gallbladder was stripped away and the cystic duct was exposed for about 2.5 centimeters. The cystic duct was quite thickened. The cystic duct was observed without tension to avoid tenting of the common bile duct. A grasper was placed on the lower gallbladder next to the origin of the cystic duct. An incision was made there. A balloon tipped cholangiogram catheter was inserted into the cystic duct and inflated.   Using fluoroscopy and still images, with supervision of radiologic personel and interpretation of images, contrast was injected for an operative cholangiogram. This showed no evidence of CBD stones. The catheter was then removed. The cystic duct was controlled with a PDS endoloop. The cystic artery was dissected out  for about 2.5 centimeters and was seen going up onto the wall of the gallbladder. The cystic artery was doubly clipped on the patient's side and singly clipped on the gallbladder side and divided. Attachments of the infundibulum to the liver were stripped away. Small vessels were clipped and divided. The body of the gallbladder was then  from its bed on the liver with electrocautery. There were a good deal of inflamatory changes in the gallbladder bed. Once the gallbladder was freed, it was placed in a polyurethane sack and brought out through the umbilical port site. The umbilical port was then replaced and the abdomen reinflated. The operative site was inspected and there was good hemostasis. There was no evidence of bile leak. Under visualization a 7 mm Reji-Padron drain was placed in the bed of the mobilized gallbladder and brought out through one of the right upper quadrant port sites. The upper ports were removed under laparoscopic visualization and the abdomen deflated. The umbilical port was removed. The fascia at the umbilicus was closed with 0 Vicryl. The skin at all the sites was closed with interuppted inverted intracuticular 4-0 Vicryl. The drain was sewn in place with 3-0 nylon. Dermabond was then applied to the skin incisions. The patient tolerated the surgery well and was taken to the PACU in stable condition.     Specimen - Gallbladder     Drain - Reji-Padron    Estimate Blood Loss - 75 ml     Complications - none      EUGENE Alfred MD

## 2017-01-10 NOTE — PERIOP NOTES
Uziel Esqueda RN on ortho floor aware no IV pump available in PACU & will start [email or web address] when pt arrives.

## 2017-01-10 NOTE — ANESTHESIA POSTPROCEDURE EVALUATION
Post-Anesthesia Evaluation and Assessment    Patient: Usha Palomino MRN: [id number]  SSN: [id number]    YOB: 1940  Age: 78 y.o. Sex: male       Cardiovascular Function/Vital Signs  Visit Vitals    /62    Pulse 78    Temp 37.1 °C (98.7 °F)    Resp 20    Ht 5' 10\" (1.778 m)    Wt 105.2 kg (231 lb 14.8 oz)    SpO2 95%    BMI 33.28 kg/m2       Patient is status post general anesthesia for Procedure(s):  LAPAROSCOPIC CHOLECYSTECTOMY POSSIBLE OPEN POSSIBLE GRAMS. Nausea/Vomiting: None    Postoperative hydration reviewed and adequate. Pain:  Pain Scale 1: Numeric (0 - 10) (01/10/17 1400)  Pain Intensity 1: 5 (01/10/17 1400)   Managed    Neurological Status:   Neuro (WDL): Exceptions to WDL (01/10/17 1319)  Neuro  Neurologic State: Drowsy; Eyes open to stimulus (01/10/17 1319)  Orientation Level: Oriented to person;Oriented to place;Oriented to situation (01/10/17 1319)  Cognition: Follows commands (01/10/17 1355)  Speech: Clear (01/10/17 1319)  LUE Motor Response: Purposeful (01/10/17 1319)  LLE Motor Response: Purposeful (01/10/17 1319)  RUE Motor Response: Purposeful (01/10/17 1319)  RLE Motor Response: Purposeful (01/10/17 1319)   At baseline    Mental Status and Level of Consciousness: Arousable    Pulmonary Status:   O2 Device: Nasal cannula (01/10/17 1319)   Adequate oxygenation and airway patent    Complications related to anesthesia: None    Post-anesthesia assessment completed.  No concerns    Signed By: Reina Lee MD     January 10, 2017

## 2017-01-10 NOTE — PROGRESS NOTES
Pt up to BR with 1 assist, steady gait. BM x 1 and voided. Assisted back to bed. Medicated with 0.5 mg IV Dilaudid for surgical site pain. Pt noted to have slight exp wheeze and coarse breath sounds, clears with cough. Sat on RA 90%. Placed on 2 L NC. Paged Dr. Joaquin Unger to request PRN neb in case pt needs later. Pt states he has had a cough for the past few days and can \"feel it in my chest\".

## 2017-01-10 NOTE — IP AVS SNAPSHOT
Current Discharge Medication List  
  
Take these medications at their scheduled times Dose & Instructions Dispensing Information Comments Morning Noon Evening Bedtime  
 finasteride 5 mg tablet Commonly known as:  PROSCAR Your next dose is: Today, Tomorrow Other:  ____________ Dose:  5 mg Take 5 mg by mouth every morning. Indications: BENIGN PROSTATIC HYPERTROPHY Refills:  0 Glucosamine &Chondroit-MV-Min3 741-016-22-0.5 mg Tab Your next dose is: Today, Tomorrow Other:  ____________ Take  by mouth two (2) times a day. Refills:  0  
     
   
   
   
  
 hydroCHLOROthiazide 12.5 mg tablet Commonly known as:  HYDRODIURIL Your next dose is: Today, Tomorrow Other:  ____________ Dose:  12.5 mg Take 12.5 mg by mouth every morning. Indications: HYPERTENSION Refills:  0  
     
   
   
   
  
 lisinopril 40 mg tablet Commonly known as:  Ekta Shames Your next dose is: Today, Tomorrow Other:  ____________ Dose:  40 mg Take 40 mg by mouth every morning. Indications: HYPERTENSION Refills:  0 Omeprazole delayed release 20 mg tablet Commonly known as:  PRILOSEC D/R Your next dose is: Today, Tomorrow Other:  ____________ Dose:  20 mg Take 20 mg by mouth every morning. Refills:  0 Take these medications as needed Dose & Instructions Dispensing Information Comments Morning Noon Evening Bedtime HYDROcodone-acetaminophen 5-325 mg per tablet Commonly known as:  LORTAB 5-325 Your next dose is: Today, Tomorrow Other:  ____________ Dose:  1-2 Tab Take 1-2 Tabs by mouth every four (4) hours as needed for Pain. Max Daily Amount: 12 Tabs. Indications: causes drowsiness;do not drink,drive, or use machinery while taking; take with a stool softener Quantity:  30 Tab Refills:  0  
     
   
   
   
  
 ondansetron 4 mg disintegrating tablet Commonly known as:  ZOFRAN ODT Your next dose is: Today, Tomorrow Other:  ____________ Dose:  4 mg Take 4 mg by mouth every eight (8) hours as needed for Nausea. Indications: nausea Refills:  0 Where to Get Your Medications Information about where to get these medications is not yet available ! Ask your nurse or doctor about these medications HYDROcodone-acetaminophen 5-325 mg per tablet

## 2017-01-10 NOTE — PERIOP NOTES
TRANSFER - OUT REPORT:    Verbal report given to Worthy Hashimoto RN(name) on [facility]  being transferred to [facility](unit) for routine post - op       Report consisted of patients Situation, Background, Assessment and   Recommendations(SBAR). Information from the following report(s) OR Summary, Intake/Output and MAR was reviewed with the receiving nurse. Opportunity for questions and clarification was provided.       Patient transported with:   O2 @ 4 liters  Tech

## 2017-01-10 NOTE — PERIOP NOTES
Handoff Report from Operating Room to PACU    Report received from gagan Fountain Chi, RN and HAYDEN Chaves CRNA regarding [address]. Surgeon(s):  Lanning Krabbe, MD  And Procedure(s) (LRB):  LAPAROSCOPIC CHOLECYSTECTOMY POSSIBLE OPEN POSSIBLE GRAMS (N/A)  confirmed   with allergies and dressings discussed. Anesthesia type, drugs, patient history, complications, estimated blood loss, vital signs, intake and output, and last pain medication, lines and temperature were reviewed.

## 2017-01-10 NOTE — ANESTHESIA PREPROCEDURE EVALUATION
Anesthetic History   No history of anesthetic complications            Review of Systems / Medical History  Patient summary reviewed, nursing notes reviewed and pertinent labs reviewed    Pulmonary  Within defined limits                 Neuro/Psych   Within defined limits           Cardiovascular    Hypertension              Exercise tolerance: >4 METS     GI/Hepatic/Renal     GERD           Endo/Other  Within defined limits           Other Findings              Physical Exam    Airway  Mallampati: III  TM Distance: 4 - 6 cm  Neck ROM: normal range of motion   Mouth opening: Normal     Cardiovascular  Regular rate and rhythm,  S1 and S2 normal,  no murmur, click, rub, or gallop             Dental  No notable dental hx       Pulmonary  Breath sounds clear to auscultation               Abdominal  GI exam deferred       Other Findings            Anesthetic Plan    ASA: 2  Anesthesia type: general    Monitoring Plan: BIS      Induction: Intravenous  Anesthetic plan and risks discussed with: Patient

## 2017-01-10 NOTE — PROGRESS NOTES
End of Shift Nursing Note    Bedside shift change report given to Chad  (oncoming nurse) by Jen Franks (offgoing nurse). Report included the following information SBAR, Kardex, Intake/Output, MAR and Recent Results. Zone Phone:   0    Significant changes during shift:    0   Non-emergent issues for physician to address:   0     Number times ambulated in hallway past shift: 0      Number of times OOB to chair past shift: 0    POD #: 0     Vital Signs:    Temp: 98.5 °F (36.9 °C)     Pulse (Heart Rate): 90     BP: 152/70     Resp Rate: 16     O2 Sat (%): 96 %    Lines & Drains:     Urinary Catheter? NO   Placement Date: 0   Medical Necessity: 0  Central Line? NO   Placement Date: 0   Medical Necessity: 0  PICC Line? NO   Placement Date: 0   Medical Necessity: 0    NG tube [] in [] removed [x] not applicable   Drains [] in [] removed [x] not applicable     Skin Integrity:      Wounds: no   Dressings Present: no    Wound Concerns: no      GI:    Current diet:  DIET CLEAR LIQUID  DIET GI LITE (POST SURGICAL)    Nausea: NO  Vomiting: NO  Bowel Sounds: YES  Flatus: NO  Last Bowel Movement: yesterday   Appearance: 0    Respiratory:  Supplemental O2: YES      Device: 0   via 0 Liters/min     Incentive Spirometer: YES  Volume: 0  Coughing and Deep Breathing: YES  Oral Care: YES  Understanding (patient/family education): YES   Getting out of bed: YES  Head of bed elevation: YES    Patient Safety:    Falls Score: 1  Mobility Score: 1  Bed Alarm On? NO  Sitter? NO      Opportunity for questions and clarification was given to oncoming nurse. Patient bed is in low position, side rails are up x 2, door & observation blinds open as needed, call bell within reach and patient not in distress.     Kisha Mccauley RN

## 2017-01-10 NOTE — H&P
Surgery History and Physical    Subjective:      Doyal Sedonia Pallas is a 78 y.o. male with episodes of RUQ pain. Recent evaluation with US shows gallstones. No prior abdominal surgery. No history of anginal chest pain or dyspnea. Past Medical History   Diagnosis Date    Basal cell cancer     Benign prostatic hypertrophy     Frequency of urination     GERD (gastroesophageal reflux disease)     Hypertension      Past Surgical History   Procedure Laterality Date    Hx mohs procedure  7/11     Rt    Hx heent       tonsillectomy    Hx cataract removal      Hx other surgical       jaw surgery; \"shortened it\" per pt    Hx other surgical  12/08/14     basal cell removed from left side of forehead    Hx shoulder arthroscopy       Rt shoulder ligament repair      Family History   Problem Relation Age of Onset    Hypertension Father      Social History   Substance Use Topics    Smoking status: Never Smoker    Smokeless tobacco: Never Used    Alcohol use No      Prior to Admission medications    Medication Sig Start Date End Date Taking? Authorizing Provider   Glucosamine &Chondroit-MV-Min3 886-727-46-0.5 mg tab Take  by mouth two (2) times a day. Yes Historical Provider   finasteride (PROSCAR) 5 mg tablet Take 5 mg by mouth every morning. Indications: BENIGN PROSTATIC HYPERTROPHY   Yes Historical Provider   lisinopril (PRINIVIL, ZESTRIL) 40 mg tablet Take 40 mg by mouth every morning. Indications: HYPERTENSION   Yes Historical Provider   Omeprazole delayed release (PRILOSEC D/R) 20 mg tablet Take 20 mg by mouth every morning. Yes Historical Provider   Hydrochlorothiazide 12.5 mg tablet Take 12.5 mg by mouth every morning. Indications: HYPERTENSION   Yes Historical Provider   ondansetron (ZOFRAN ODT) 4 mg disintegrating tablet Take 4 mg by mouth every eight (8) hours as needed for Nausea.  Indications: nausea    Historical Provider   HYDROcodone-acetaminophen (LORTAB 5-325) 5-325 mg per tablet Take 1 Tab by mouth every six (6) hours as needed for Pain. Max Daily Amount: 4 Tabs. Indications: causes drowsiness;do not drink,drive, or use machinery while taking; take with a stool softener 17   Antonio Tamayo. Keny Angela MD      No Known Allergies    Review of Systems:  Pertinent items are noted in the History of Present Illness. Objective:      Patient Vitals for the past 8 hrs:   BP Temp Pulse Resp SpO2 Height Weight   01/10/17 0906 (!) 195/97 97.8 °F (36.6 °C) 78 22 97 % - -   01/10/17 0844 - - - - - 5' 10\" (1.778 m) 105.2 kg (231 lb 14.8 oz)       Temp (24hrs), Av.8 °F (36.6 °C), Min:97.8 °F (36.6 °C), Max:97.8 °F (36.6 °C)      Physical Exam:  WD man,  NAD  HEENT - No jaundice. Lungs - Clear right and left  Heart - Regular without murmur  Abd - Soft, tender RUQ, no mass. No hernia. Ext - no edema. Neuro - Alert, O x 3, moves all ext, speech normal        Assessment:     Gallstones    Plan:     Laparoscopic cholecystectomy, possible open cholecystectomy, possible cholangiogram    I reviewed in the office yesterday with the patient the purpose, alternatives, risks, and benefits of the planned surgery (laparoscopic cholecystectomy, possible open cholecystectomy, possible cholangiogram). Risks include bleeding, infection, injury to abdominal organs, injury to the common bile duct, bile leak, need for open surgery, retained common bile duct stone, failure to resolve symptoms, risks of general anesthesia, etc.  The patient understands and wishes to proceed.      Signed By: Isabel Leon MD     January 10, 2017

## 2017-01-11 VITALS
HEIGHT: 70 IN | HEART RATE: 65 BPM | TEMPERATURE: 98.6 F | DIASTOLIC BLOOD PRESSURE: 78 MMHG | BODY MASS INDEX: 33.2 KG/M2 | WEIGHT: 231.92 LBS | OXYGEN SATURATION: 95 % | RESPIRATION RATE: 18 BRPM | SYSTOLIC BLOOD PRESSURE: 152 MMHG

## 2017-01-11 PROBLEM — K80.20 GALLSTONES: Status: ACTIVE | Noted: 2017-01-11

## 2017-01-11 LAB
ALBUMIN SERPL BCP-MCNC: 3.3 G/DL (ref 3.5–5)
ALBUMIN/GLOB SERPL: 0.8 {RATIO} (ref 1.1–2.2)
ALP SERPL-CCNC: 70 U/L (ref 45–117)
ALT SERPL-CCNC: 29 U/L (ref 12–78)
ANION GAP BLD CALC-SCNC: 10 MMOL/L (ref 5–15)
AST SERPL W P-5'-P-CCNC: 20 U/L (ref 15–37)
BASOPHILS # BLD AUTO: 0 K/UL (ref 0–0.1)
BASOPHILS # BLD: 0 % (ref 0–1)
BILIRUB SERPL-MCNC: 0.6 MG/DL (ref 0.2–1)
BUN SERPL-MCNC: 14 MG/DL (ref 6–20)
BUN/CREAT SERPL: 15 (ref 12–20)
CALCIUM SERPL-MCNC: 8 MG/DL (ref 8.5–10.1)
CHLORIDE SERPL-SCNC: 103 MMOL/L (ref 97–108)
CO2 SERPL-SCNC: 27 MMOL/L (ref 21–32)
CREAT SERPL-MCNC: 0.93 MG/DL (ref 0.7–1.3)
EOSINOPHIL # BLD: 0 K/UL (ref 0–0.4)
EOSINOPHIL NFR BLD: 0 % (ref 0–7)
ERYTHROCYTE [DISTWIDTH] IN BLOOD BY AUTOMATED COUNT: 17.8 % (ref 11.5–14.5)
GLOBULIN SER CALC-MCNC: 4.2 G/DL (ref 2–4)
GLUCOSE SERPL-MCNC: 131 MG/DL (ref 65–100)
HCT VFR BLD AUTO: 34.1 % (ref 36.6–50.3)
HGB BLD-MCNC: 10.9 G/DL (ref 12.1–17)
LYMPHOCYTES # BLD AUTO: 32 % (ref 12–49)
LYMPHOCYTES # BLD: 1.6 K/UL (ref 0.8–3.5)
MCH RBC QN AUTO: 33.2 PG (ref 26–34)
MCHC RBC AUTO-ENTMCNC: 32 G/DL (ref 30–36.5)
MCV RBC AUTO: 104 FL (ref 80–99)
MONOCYTES # BLD: 0.5 K/UL (ref 0–1)
MONOCYTES NFR BLD AUTO: 11 % (ref 5–13)
NEUTS SEG # BLD: 2.7 K/UL (ref 1.8–8)
NEUTS SEG NFR BLD AUTO: 57 % (ref 32–75)
PLATELET # BLD AUTO: 292 K/UL (ref 150–400)
POTASSIUM SERPL-SCNC: 3.8 MMOL/L (ref 3.5–5.1)
PROT SERPL-MCNC: 7.5 G/DL (ref 6.4–8.2)
RBC # BLD AUTO: 3.28 M/UL (ref 4.1–5.7)
SODIUM SERPL-SCNC: 140 MMOL/L (ref 136–145)
WBC # BLD AUTO: 4.8 K/UL (ref 4.1–11.1)

## 2017-01-11 PROCEDURE — 99218 HC RM OBSERVATION: CPT

## 2017-01-11 PROCEDURE — 85025 COMPLETE CBC W/AUTO DIFF WBC: CPT | Performed by: SURGERY

## 2017-01-11 PROCEDURE — 36415 COLL VENOUS BLD VENIPUNCTURE: CPT | Performed by: SURGERY

## 2017-01-11 PROCEDURE — 80053 COMPREHEN METABOLIC PANEL: CPT | Performed by: SURGERY

## 2017-01-11 PROCEDURE — 77010033678 HC OXYGEN DAILY

## 2017-01-11 PROCEDURE — 74011250637 HC RX REV CODE- 250/637: Performed by: SURGERY

## 2017-01-11 RX ORDER — HYDROCODONE BITARTRATE AND ACETAMINOPHEN 5; 325 MG/1; MG/1
1-2 TABLET ORAL
Qty: 30 TAB | Refills: 0 | Status: SHIPPED | OUTPATIENT
Start: 2017-01-11 | End: 2017-11-08

## 2017-01-11 RX ADMIN — FAMOTIDINE 20 MG: 20 TABLET ORAL at 09:48

## 2017-01-11 RX ADMIN — HYDROCHLOROTHIAZIDE 12.5 MG: 25 TABLET ORAL at 09:48

## 2017-01-11 RX ADMIN — LISINOPRIL 40 MG: 20 TABLET ORAL at 09:48

## 2017-01-11 RX ADMIN — FINASTERIDE 5 MG: 5 TABLET, FILM COATED ORAL at 09:48

## 2017-01-11 NOTE — PROGRESS NOTES
Surgery    No complaint. Afebrile, VSS    Alert. Abdomen soft. Incisions OK. RUCHI output serosanguinous. AM lab OK. Cholangiogram report no evidence of CBD stones. OK for discharge. CARLINE HOPPER drain.     Pankaj Yates MD

## 2017-01-11 NOTE — PROGRESS NOTES
Bedside shift change report given to Woodrow Anna RN  (oncoming nurse) by Linh Schwartz RN  (offgoing nurse). Report included the following information SBAR, Kardex, OR Summary, Procedure Summary, Intake/Output, MAR and Recent Results.

## 2017-01-11 NOTE — PROGRESS NOTES
Pt discharged to home, instructions reviewed with pt and copies given along with prescription for pain medication. Pt's IV and RUCHI drain removed, clean, dry dressing placed. Opportunity for questions provided.

## 2017-01-11 NOTE — DISCHARGE INSTRUCTIONS
Discharge Instructions After Removal of Gallbladder          It is normal to tire easily for a while. No lifting over 15 pounds for one month. It is OK to walk up stairs or bend over as needed. Do not drive for 3 days and do not drive while taking narcotic pain medication. Remove any gauze dressings tomorrow. Let Dermabond (plastic coating on incisions) wear off on its own. It is OK to shower. Follow your normal diet. You may use stool softeners such as colace or a laxative such as Miralax as needed for constipation. Take pain medicines as prescribed as needed. Call for follow up appointment in 2 weeks  - [phone]    If you have any problems, call Dr Katie Croft at [phone] or [phone] (after hours)        CHARLIE Benites MD

## 2017-01-25 ENCOUNTER — OFFICE VISIT (OUTPATIENT)
Dept: SURGERY | Age: 77
End: 2017-01-25

## 2017-01-25 VITALS
HEIGHT: 70 IN | OXYGEN SATURATION: 96 % | SYSTOLIC BLOOD PRESSURE: 169 MMHG | DIASTOLIC BLOOD PRESSURE: 98 MMHG | HEART RATE: 78 BPM | BODY MASS INDEX: 33.36 KG/M2 | WEIGHT: 233 LBS

## 2017-01-25 DIAGNOSIS — Z09 POSTOPERATIVE EXAMINATION: Primary | ICD-10-CM

## 2017-01-25 NOTE — MR AVS SNAPSHOT
Visit Information Date & Time Provider Department Dept. Phone Encounter #  
 1/25/2017  8:40 AM Vic Rebollar MD [facility]  [address] [phone] [id number] Your Appointments 4/17/2017  9:00 AM  
Any with Pushpa Fisher MD  
[address] Oncology at [facility]) Appt Note: follow up  
 [address] [place] [address] [phone]  
[address] [place] [address] 600 [facility] [address] John Taylor Upcoming Health Maintenance Date Due DTaP/Tdap/Td series (1 - Tdap) 8/23/1963 FOBT Q 1 YEAR AGE 50-75 8/23/1992 ZOSTER VACCINE AGE 60> 8/23/2002 GLAUCOMA SCREENING Q2Y 8/23/2007 Pneumococcal 65+ High/Highest Risk (1 of 2 - PCV13) 8/23/2007 MEDICARE YEARLY EXAM 8/23/2007 INFLUENZA AGE 9 TO ADULT 8/1/2016 Allergies as of 1/25/2017  Review Complete On: 1/25/2017 By: Vic Rebollar MD  
 No Known Allergies Current Immunizations  Reviewed on 12/19/2016 Name Date Influenza Vaccine 10/1/2013 Not reviewed this visit You Were Diagnosed With   
  
 Codes Comments Postoperative examination    -  Primary ICD-10-CM: L39 ICD-9-CM: V67.00 Vitals BP Pulse Height(growth percentile) Weight(growth percentile) SpO2 BMI  
 (!) 169/98 78 5' 10\" (1.778 m) 233 lb (105.7 kg) 96% 33.43 kg/m2 Smoking Status Never Smoker BMI and BSA Data Body Mass Index Body Surface Area  
 33.43 kg/m 2 2.28 m 2 Preferred Pharmacy Pharmacy Name Phone [facility] [address] [phone] Your Updated Medication List  
  
   
This list is accurate as of: 1/25/17 10:14 AM.  Always use your most recent med list.  
  
  
  
  
 finasteride 5 mg tablet Commonly known as:  PROSCAR Take 5 mg by mouth every morning. Indications: BENIGN PROSTATIC HYPERTROPHY Glucosamine &Chondroit-MV-Min3 091-458-01-0.5 mg Tab Take  by mouth two (2) times a day. hydroCHLOROthiazide 12.5 mg tablet Commonly known as:  HYDRODIURIL Take 12.5 mg by mouth every morning. Indications: HYPERTENSION  
  
 HYDROcodone-acetaminophen 5-325 mg per tablet Commonly known as:  LORTAB 5-325 Take 1-2 Tabs by mouth every four (4) hours as needed for Pain. Max Daily Amount: 12 Tabs. Indications: causes drowsiness;do not drink,drive, or use machinery while taking; take with a stool softener  
  
 lisinopril 40 mg tablet Commonly known as:  [place]ora Escort Take 40 mg by mouth every morning. Indications: HYPERTENSION Omeprazole delayed release 20 mg tablet Commonly known as:  PRILOSEC D/R Take 20 mg by mouth every morning. ondansetron 4 mg disintegrating tablet Commonly known as:  ZOFRAN ODT Take 4 mg by mouth every eight (8) hours as needed for Nausea. Indications: nausea To-Do List   
 02/13/2017 9:00 AM  
  Appointment with Sheridan FAST TRACK/FLOAT NURSE at [facility] ([phone])  
  
 04/10/2017 9:00 AM  
  Appointment with Sheridan FAST TRACK/FLOAT NURSE at [facility] ([phone]) Introducing [facility] SERVICES! Romayne Duster introduces [employer] patient portal. Now you can access parts of your medical record, email your doctor's office, and request medication refills online. 1. In your internet browser, go to [email or web address] 2. Click on the First Time User? Click Here link in the Sign In box. You will see the New Member Sign Up page. 3. Enter your [employer] Access Code exactly as it appears below. You will not need to use this code after youve completed the sign-up process. If you do not sign up before the expiration date, you must request a new code. · [employer] Access Code: [email or web address] Expires: 4/25/2017  8:23 AM 
 
4.  Enter the last four digits of your Social Security Number (xxxx) and Date of Birth (mm/dd/yyyy) as indicated and click Submit. You will be taken to the next sign-up page. 5. Create a [employer] ID. This will be your [employer] login ID and cannot be changed, so think of one that is secure and easy to remember. 6. Create a [employer] password. You can change your password at any time. 7. Enter your Password Reset Question and Answer. This can be used at a later time if you forget your password. 8. Enter your e-mail address. You will receive e-mail notification when new information is available in [address] 9. Click Sign Up. You can now view and download portions of your medical record. 10. Click the Download Summary menu link to download a portable copy of your medical information. If you have questions, please visit the Frequently Asked Questions section of the [employer] website. Remember, [employer] is NOT to be used for urgent needs. For medical emergencies, dial 911. Now available from your iPhone and Android! Please provide this summary of care documentation to your next provider. Your primary care clinician is listed as Kailey Dodge. If you have any questions after today's visit, please call [phone].

## 2017-01-25 NOTE — PROGRESS NOTES
Surgery    The patient is s/p laparoscopic cholecystectomy and cholangiogram on 1/10/2017. The patient reports doing well. On examination, the patient is in no distress. The patient's abdomen is soft. Incision sites are healing well. The pathology report showed gallstones. The patient is doing well and can follow up prn.     Lupe Belle MD    CC:  Stephani Camp MD

## 2017-02-13 ENCOUNTER — HOSPITAL ENCOUNTER (OUTPATIENT)
Dept: INFUSION THERAPY | Age: 77
Discharge: HOME | End: 2017-02-13
Payer: MEDICARE

## 2017-02-13 VITALS
SYSTOLIC BLOOD PRESSURE: 145 MMHG | HEART RATE: 68 BPM | RESPIRATION RATE: 18 BRPM | TEMPERATURE: 98.9 F | OXYGEN SATURATION: 96 % | DIASTOLIC BLOOD PRESSURE: 74 MMHG

## 2017-02-13 LAB
BASOPHILS # BLD AUTO: 0.1 K/UL (ref 0–0.1)
BASOPHILS # BLD: 1 % (ref 0–1)
EOSINOPHIL # BLD: 0.3 K/UL (ref 0–0.4)
EOSINOPHIL NFR BLD: 4 % (ref 0–7)
ERYTHROCYTE [DISTWIDTH] IN BLOOD BY AUTOMATED COUNT: 17.9 % (ref 11.5–14.5)
HCT VFR BLD AUTO: 34.5 % (ref 36.6–50.3)
HGB BLD-MCNC: 11.2 G/DL (ref 12.1–17)
LYMPHOCYTES # BLD AUTO: 24 % (ref 12–49)
LYMPHOCYTES # BLD: 2.1 K/UL (ref 0.8–3.5)
MCH RBC QN AUTO: 32.7 PG (ref 26–34)
MCHC RBC AUTO-ENTMCNC: 32.5 G/DL (ref 30–36.5)
MCV RBC AUTO: 100.9 FL (ref 80–99)
MONOCYTES # BLD: 0.7 K/UL (ref 0–1)
MONOCYTES NFR BLD AUTO: 8 % (ref 5–13)
NEUTS SEG # BLD: 5.4 K/UL (ref 1.8–8)
NEUTS SEG NFR BLD AUTO: 63 % (ref 32–75)
NRBC # BLD: 0.03 K/UL (ref 0–0.01)
NRBC BLD-RTO: 0.4 PER 100 WBC
PLATELET # BLD AUTO: 294 K/UL (ref 150–400)
RBC # BLD AUTO: 3.42 M/UL (ref 4.1–5.7)
WBC # BLD AUTO: 8.6 K/UL (ref 4.1–11.1)
WBC NRBC COR # BLD: ABNORMAL 10*3/UL

## 2017-02-13 PROCEDURE — 85025 COMPLETE CBC W/AUTO DIFF WBC: CPT | Performed by: INTERNAL MEDICINE

## 2017-02-13 PROCEDURE — 36415 COLL VENOUS BLD VENIPUNCTURE: CPT | Performed by: INTERNAL MEDICINE

## 2017-02-13 NOTE — PROGRESS NOTES
3851 Pt arrived at [facility] ambulatory and in no distress for cbc/aranesp. Aranesp HELD. Assessment completed, no new complaints voiced. Pt reports he had his gall bladder out a few weeks ago, and is having no pain since procedure. Labs obtained by Virgilio KERR via left arm    Visit Vitals    /74 (BP 1 Location: Left arm, BP Patient Position: Sitting)    Pulse 68    Temp 98.9 °F (37.2 °C)    Resp 18    SpO2 96%     Aranesp HELD per order, hgb 11.2    0905 Tolerated treatment well, no adverse reaction noted. D/Cd from [facility] ambulatory and in no distress. Next appt 8 weeks, pt has card    Recent Results (from the past 8 hour(s))   CBC WITH AUTOMATED DIFF    Collection Time: 02/13/17  8:43 AM   Result Value Ref Range    WBC 8.6 4.1 - 11.1 K/uL    RBC 3.42 (L) 4.10 - 5.70 M/uL    HGB 11.2 (L) 12.1 - 17.0 g/dL    HCT 34.5 (L) 36.6 - 50.3 %    .9 (H) 80.0 - 99.0 FL    MCH 32.7 26.0 - 34.0 PG    MCHC 32.5 30.0 - 36.5 g/dL    RDW 17.9 (H) 11.5 - 14.5 %    PLATELET 335 433 - 885 K/uL    NEUTROPHILS 63 32 - 75 %    LYMPHOCYTES 24 12 - 49 %    MONOCYTES 8 5 - 13 %    EOSINOPHILS 4 0 - 7 %    BASOPHILS 1 0 - 1 %    ABS. NEUTROPHILS 5.4 1.8 - 8.0 K/UL    ABS. LYMPHOCYTES 2.1 0.8 - 3.5 K/UL    ABS. MONOCYTES 0.7 0.0 - 1.0 K/UL    ABS. EOSINOPHILS 0.3 0.0 - 0.4 K/UL    ABS.  BASOPHILS 0.1 0.0 - 0.1 K/UL   NUCLEATED RBC    Collection Time: 02/13/17  8:43 AM   Result Value Ref Range    NRBC 0.4 (H) 0  WBC    ABSOLUTE NRBC 0.03 (H) 0.00 - 0.01 K/uL    WBC CORRECTED FOR NR ADJUSTED FOR NUCLEATED RBC'S

## 2017-04-10 ENCOUNTER — HOSPITAL ENCOUNTER (OUTPATIENT)
Dept: INFUSION THERAPY | Age: 77
Discharge: HOME | End: 2017-04-10
Payer: MEDICARE

## 2017-04-10 ENCOUNTER — OFFICE VISIT (OUTPATIENT)
Dept: ONCOLOGY | Age: 77
End: 2017-04-10

## 2017-04-10 VITALS
SYSTOLIC BLOOD PRESSURE: 154 MMHG | WEIGHT: 242 LBS | HEIGHT: 70 IN | DIASTOLIC BLOOD PRESSURE: 69 MMHG | RESPIRATION RATE: 18 BRPM | HEART RATE: 73 BPM | TEMPERATURE: 98.4 F | BODY MASS INDEX: 34.65 KG/M2 | OXYGEN SATURATION: 94 %

## 2017-04-10 VITALS
TEMPERATURE: 98 F | RESPIRATION RATE: 18 BRPM | DIASTOLIC BLOOD PRESSURE: 75 MMHG | SYSTOLIC BLOOD PRESSURE: 141 MMHG | HEART RATE: 63 BPM

## 2017-04-10 DIAGNOSIS — D46.20 MYELODYSPLASTIC SYNDROME, LOW GRADE (HCC): Primary | ICD-10-CM

## 2017-04-10 LAB
BASOPHILS # BLD AUTO: 0 K/UL (ref 0–0.1)
BASOPHILS # BLD: 0 % (ref 0–1)
EOSINOPHIL # BLD: 0.2 K/UL (ref 0–0.4)
EOSINOPHIL NFR BLD: 3 % (ref 0–7)
ERYTHROCYTE [DISTWIDTH] IN BLOOD BY AUTOMATED COUNT: 18.5 % (ref 11.5–14.5)
HCT VFR BLD AUTO: 33.2 % (ref 36.6–50.3)
HGB BLD-MCNC: 11 G/DL (ref 12.1–17)
LYMPHOCYTES # BLD AUTO: 23 % (ref 12–49)
LYMPHOCYTES # BLD: 1.6 K/UL (ref 0.8–3.5)
MCH RBC QN AUTO: 33.7 PG (ref 26–34)
MCHC RBC AUTO-ENTMCNC: 33.1 G/DL (ref 30–36.5)
MCV RBC AUTO: 101.8 FL (ref 80–99)
MONOCYTES # BLD: 0.6 K/UL (ref 0–1)
MONOCYTES NFR BLD AUTO: 8 % (ref 5–13)
NEUTS SEG # BLD: 4.7 K/UL (ref 1.8–8)
NEUTS SEG NFR BLD AUTO: 66 % (ref 32–75)
PLATELET # BLD AUTO: 287 K/UL (ref 150–400)
RBC # BLD AUTO: 3.26 M/UL (ref 4.1–5.7)
WBC # BLD AUTO: 7.2 K/UL (ref 4.1–11.1)

## 2017-04-10 PROCEDURE — 85025 COMPLETE CBC W/AUTO DIFF WBC: CPT | Performed by: INTERNAL MEDICINE

## 2017-04-10 PROCEDURE — 36415 COLL VENOUS BLD VENIPUNCTURE: CPT | Performed by: INTERNAL MEDICINE

## 2017-04-10 NOTE — PROGRESS NOTES
Follow up Note        Patient: Curtis Watkins MRN: [id number]  SSN: [id number]    YOB: 1940  Age: 78 y.o. Sex: male      Subjective:      Curtis Watkins is a 78 y.o. 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 He receiv Aranesp up until March 2016. His hemoglobin remains normal and his energy has improved. He recently had a cholecystectomy and has recovered without any difficulty. Review of Systems:    Constitutional: negative  Eyes: negative  Ears, Nose, Mouth, Throat, and Face: negative  Respiratory: negative  Cardiovascular: negative  Gastrointestinal: negative  Integument/Breast: negative  Hematologic/Lymphatic: negative  Musculoskeletal:negative  Neurological: negative        Past Medical History:   Diagnosis Date    Basal cell cancer     Benign prostatic hypertrophy     Frequency of urination     GERD (gastroesophageal reflux disease)     Hypertension      Past Surgical History:   Procedure Laterality Date    HX CATARACT REMOVAL      HX CHOLECYSTECTOMY      HX HEENT      tonsillectomy    HX MOHS PROCEDURES  7/11    Rt    HX OTHER SURGICAL      jaw surgery; \"shortened it\" per pt    HX OTHER SURGICAL  12/08/14    basal cell removed from left side of forehead    HX SHOULDER ARTHROSCOPY      Rt shoulder ligament repair      Family History   Problem Relation Age of Onset    Hypertension Father      Social History   Substance Use Topics    Smoking status: Never Smoker    Smokeless tobacco: Never Used    Alcohol use No      Prior to Admission medications    Medication Sig Start Date End Date Taking? Authorizing Provider   HYDROcodone-acetaminophen (LORTAB 5-325) 5-325 mg per tablet Take 1-2 Tabs by mouth every four (4) hours as needed for Pain. Max Daily Amount: 12 Tabs.  Indications: causes drowsiness;do not drink,drive, or use machinery while taking; take with a stool softener 1/11/17  Yes Robyn Greenberg Checo De Los Santos MD   ondansetron (ZOFRAN ODT) 4 mg disintegrating tablet Take 4 mg by mouth every eight (8) hours as needed for Nausea. Indications: nausea   Yes Historical Provider   Glucosamine &Chondroit-MV-Min3 597-192-91-0.5 mg tab Take  by mouth two (2) times a day. Yes Historical Provider   finasteride (PROSCAR) 5 mg tablet Take 5 mg by mouth every morning. Indications: BENIGN PROSTATIC HYPERTROPHY   Yes Historical Provider   lisinopril (PRINIVIL, ZESTRIL) 40 mg tablet Take 40 mg by mouth every morning. Indications: HYPERTENSION   Yes Historical Provider   Omeprazole delayed release (PRILOSEC D/R) 20 mg tablet Take 20 mg by mouth every morning. Yes Historical Provider   Hydrochlorothiazide 12.5 mg tablet Take 12.5 mg by mouth every morning. Indications: HYPERTENSION   Yes Historical Provider          No Known Allergies        Objective:     Vitals:    04/10/17 0912   BP: 154/69   Pulse: 73   Resp: 18   Temp: 98.4 °F (36.9 °C)   SpO2: 94%   Weight: 242 lb (109.8 kg)   Height: 5' 10\" (1.778 m)        Physical Exam:    GENERAL: alert, cooperative, obese  EYE: negative  LYMPHATIC: Cervical, supraclavicular, and axillary nodes normal.   THROAT & NECK: normal and no erythema or exudates noted. LUNG: clear to auscultation bilaterally  HEART: regular rate and rhythm  ABDOMEN: soft, non-tender  EXTREMITIES: no cyanosis or edema  SKIN: Normal.  NEUROLOGIC: negative        LABS:     Hgb: 11.0      Assessment:     1. Myelodysplastic Syndrome   Refractory anemia with ringed sideroblasts   IPSS-R score = 2, low risk   IPSS-R, age adjusted, score = 2.08.  Low risk      ECOG PS 0  Intent of therapy - palliative  Median survival - 5.3 years    > Received Aranesp between 10/2014 to 03/2016.   > Hgb remains normal without TANGELA support  > Feels well.   > Continue CBC in 2 months and 6 months  > Aranesp when Hgb < 11.         Plan:       > Continue Observation  > F/U in 6 months        Signed by: Jaiden Westfall MD April 10, 2017        CC.  Vinh Monterroso MD

## 2017-04-10 NOTE — PROGRESS NOTES
Pt arrived to [facility] ambulatory in no acute distress at 0815 for Aranesp.  Assessment unremarkable- no new complaints voiced. Labs obtained- CBC w/ diff. Visit Vitals    /75 (BP 1 Location: Left arm, BP Patient Position: Sitting)    Pulse 63    Temp 98 °F (36.7 °C)    Resp 18       Recent Results (from the past 12 hour(s))   CBC WITH AUTOMATED DIFF    Collection Time: 04/10/17  8:22 AM   Result Value Ref Range    WBC 7.2 4.1 - 11.1 K/uL    RBC 3.26 (L) 4.10 - 5.70 M/uL    HGB 11.0 (L) 12.1 - 17.0 g/dL    HCT 33.2 (L) 36.6 - 50.3 %    .8 (H) 80.0 - 99.0 FL    MCH 33.7 26.0 - 34.0 PG    MCHC 33.1 30.0 - 36.5 g/dL    RDW 18.5 (H) 11.5 - 14.5 %    PLATELET 859 411 - 841 K/uL    NEUTROPHILS 66 32 - 75 %    LYMPHOCYTES 23 12 - 49 %    MONOCYTES 8 5 - 13 %    EOSINOPHILS 3 0 - 7 %    BASOPHILS 0 0 - 1 %    ABS. NEUTROPHILS 4.7 1.8 - 8.0 K/UL    ABS. LYMPHOCYTES 1.6 0.8 - 3.5 K/UL    ABS. MONOCYTES 0.6 0.0 - 1.0 K/UL    ABS. EOSINOPHILS 0.2 0.0 - 0.4 K/UL    ABS. BASOPHILS 0.0 0.0 - 0.1 K/UL     Aranesp HELD--pt's hgb 11.0    Pt discharged ambulatory in no acute distress at 0850. Pt over to MD office for follow up appointment.  Next appointment 6/5 at 9 AM.

## 2017-04-10 NOTE — PROGRESS NOTES
Huber Wang is a 78 y.o. male here for follow up had concerns including Follow-up. HIPAA verified by two patient identifiers. Mr. Bharathi Benitez has a reminder for a \"due or due soon\" health maintenance. I have asked that he contact his primary care provider for follow-up on this health maintenance.

## 2017-06-05 ENCOUNTER — HOSPITAL ENCOUNTER (OUTPATIENT)
Dept: INFUSION THERAPY | Age: 77
Discharge: HOME | End: 2017-06-05
Payer: MEDICARE

## 2017-06-05 VITALS
DIASTOLIC BLOOD PRESSURE: 82 MMHG | RESPIRATION RATE: 18 BRPM | HEART RATE: 71 BPM | TEMPERATURE: 98.6 F | SYSTOLIC BLOOD PRESSURE: 146 MMHG

## 2017-06-05 LAB
BASOPHILS # BLD AUTO: 0.1 K/UL (ref 0–0.1)
BASOPHILS # BLD: 1 % (ref 0–1)
EOSINOPHIL # BLD: 0.2 K/UL (ref 0–0.4)
EOSINOPHIL NFR BLD: 3 % (ref 0–7)
ERYTHROCYTE [DISTWIDTH] IN BLOOD BY AUTOMATED COUNT: 18.4 % (ref 11.5–14.5)
HCT VFR BLD AUTO: 35.6 % (ref 36.6–50.3)
HGB BLD-MCNC: 11.4 G/DL (ref 12.1–17)
LYMPHOCYTES # BLD AUTO: 24 % (ref 12–49)
LYMPHOCYTES # BLD: 1.8 K/UL (ref 0.8–3.5)
MCH RBC QN AUTO: 31.9 PG (ref 26–34)
MCHC RBC AUTO-ENTMCNC: 32 G/DL (ref 30–36.5)
MCV RBC AUTO: 99.7 FL (ref 80–99)
MONOCYTES # BLD: 0.6 K/UL (ref 0–1)
MONOCYTES NFR BLD AUTO: 7 % (ref 5–13)
NEUTS SEG # BLD: 4.9 K/UL (ref 1.8–8)
NEUTS SEG NFR BLD AUTO: 65 % (ref 32–75)
PLATELET # BLD AUTO: 264 K/UL (ref 150–400)
RBC # BLD AUTO: 3.57 M/UL (ref 4.1–5.7)
WBC # BLD AUTO: 7.5 K/UL (ref 4.1–11.1)

## 2017-06-05 PROCEDURE — 85025 COMPLETE CBC W/AUTO DIFF WBC: CPT | Performed by: INTERNAL MEDICINE

## 2017-06-05 PROCEDURE — 36415 COLL VENOUS BLD VENIPUNCTURE: CPT | Performed by: INTERNAL MEDICINE

## 2017-06-05 NOTE — PROGRESS NOTES
0855 Pt arrived at [facility] ambulatory and in no distress for Aranesp. Assessment completed, no new complaints voiced. CBC drawn. Visit Vitals    /82    Pulse 71    Temp 98.6 °F (37 °C)    Resp 18     Recent Results (from the past 12 hour(s))   CBC WITH AUTOMATED DIFF    Collection Time: 06/05/17  9:04 AM   Result Value Ref Range    WBC 7.5 4.1 - 11.1 K/uL    RBC 3.57 (L) 4.10 - 5.70 M/uL    HGB 11.4 (L) 12.1 - 17.0 g/dL    HCT 35.6 (L) 36.6 - 50.3 %    MCV 99.7 (H) 80.0 - 99.0 FL    MCH 31.9 26.0 - 34.0 PG    MCHC 32.0 30.0 - 36.5 g/dL    RDW 18.4 (H) 11.5 - 14.5 %    PLATELET 135 353 - 518 K/uL    NEUTROPHILS 65 32 - 75 %    LYMPHOCYTES 24 12 - 49 %    MONOCYTES 7 5 - 13 %    EOSINOPHILS 3 0 - 7 %    BASOPHILS 1 0 - 1 %    ABS. NEUTROPHILS 4.9 1.8 - 8.0 K/UL    ABS. LYMPHOCYTES 1.8 0.8 - 3.5 K/UL    ABS. MONOCYTES 0.6 0.0 - 1.0 K/UL    ABS. EOSINOPHILS 0.2 0.0 - 0.4 K/UL    ABS. BASOPHILS 0.1 0.0 - 0.1 K/UL       Medications received:  Aranesp held    0925 Tolerated treatment well, no adverse reaction noted. D/Cd from [facility] ambulatory and in no distress accompanied by self. Next appt 7/31.

## 2017-07-31 ENCOUNTER — HOSPITAL ENCOUNTER (OUTPATIENT)
Dept: INFUSION THERAPY | Age: 77
Discharge: HOME | End: 2017-07-31
Payer: MEDICARE

## 2017-07-31 VITALS
SYSTOLIC BLOOD PRESSURE: 143 MMHG | HEART RATE: 68 BPM | OXYGEN SATURATION: 97 % | TEMPERATURE: 97.8 F | RESPIRATION RATE: 18 BRPM | DIASTOLIC BLOOD PRESSURE: 74 MMHG

## 2017-07-31 LAB
BASOPHILS # BLD AUTO: 0 K/UL (ref 0–0.1)
BASOPHILS # BLD: 1 % (ref 0–1)
EOSINOPHIL # BLD: 0.2 K/UL (ref 0–0.4)
EOSINOPHIL NFR BLD: 3 % (ref 0–7)
ERYTHROCYTE [DISTWIDTH] IN BLOOD BY AUTOMATED COUNT: 18.2 % (ref 11.5–14.5)
HCT VFR BLD AUTO: 33.8 % (ref 36.6–50.3)
HGB BLD-MCNC: 11.3 G/DL (ref 12.1–17)
LYMPHOCYTES # BLD AUTO: 22 % (ref 12–49)
LYMPHOCYTES # BLD: 1.4 K/UL (ref 0.8–3.5)
MCH RBC QN AUTO: 33.6 PG (ref 26–34)
MCHC RBC AUTO-ENTMCNC: 33.4 G/DL (ref 30–36.5)
MCV RBC AUTO: 100.6 FL (ref 80–99)
MONOCYTES # BLD: 0.6 K/UL (ref 0–1)
MONOCYTES NFR BLD AUTO: 9 % (ref 5–13)
NEUTS SEG # BLD: 4.1 K/UL (ref 1.8–8)
NEUTS SEG NFR BLD AUTO: 65 % (ref 32–75)
PLATELET # BLD AUTO: 267 K/UL (ref 150–400)
RBC # BLD AUTO: 3.36 M/UL (ref 4.1–5.7)
WBC # BLD AUTO: 6.4 K/UL (ref 4.1–11.1)

## 2017-07-31 PROCEDURE — 36415 COLL VENOUS BLD VENIPUNCTURE: CPT | Performed by: INTERNAL MEDICINE

## 2017-07-31 PROCEDURE — 85025 COMPLETE CBC W/AUTO DIFF WBC: CPT | Performed by: INTERNAL MEDICINE

## 2017-07-31 NOTE — PROGRESS NOTES
0830 Pt arrived at [facility] ambulatory and in no distress for Aranesp. Assessment completed, no new complaints voiced. Visit Vitals    /74 (BP 1 Location: Left arm, BP Patient Position: Sitting)    Pulse 68    Temp 97.8 °F (36.6 °C)    Resp 18    SpO2 97%     Aranesp held per order parameters for Hgb 11.3    Recent Results (from the past 12 hour(s))   CBC WITH AUTOMATED DIFF    Collection Time: 07/31/17  8:35 AM   Result Value Ref Range    WBC 6.4 4.1 - 11.1 K/uL    RBC 3.36 (L) 4.10 - 5.70 M/uL    HGB 11.3 (L) 12.1 - 17.0 g/dL    HCT 33.8 (L) 36.6 - 50.3 %    .6 (H) 80.0 - 99.0 FL    MCH 33.6 26.0 - 34.0 PG    MCHC 33.4 30.0 - 36.5 g/dL    RDW 18.2 (H) 11.5 - 14.5 %    PLATELET 326 248 - 224 K/uL    NEUTROPHILS 65 32 - 75 %    LYMPHOCYTES 22 12 - 49 %    MONOCYTES 9 5 - 13 %    EOSINOPHILS 3 0 - 7 %    BASOPHILS 1 0 - 1 %    ABS. NEUTROPHILS 4.1 1.8 - 8.0 K/UL    ABS. LYMPHOCYTES 1.4 0.8 - 3.5 K/UL    ABS. MONOCYTES 0.6 0.0 - 1.0 K/UL    ABS. EOSINOPHILS 0.2 0.0 - 0.4 K/UL    ABS. BASOPHILS 0.0 0.0 - 0.1 K/UL       0900 Tolerated treatment well, no adverse reaction noted. D/Cd from [facility] ambulatory and in no distress accompanied by self. Next appt 10/11/17 @ 0800.

## 2017-09-25 ENCOUNTER — APPOINTMENT (OUTPATIENT)
Dept: INFUSION THERAPY | Age: 77
End: 2017-09-25
Payer: MEDICARE

## 2017-10-11 ENCOUNTER — OFFICE VISIT (OUTPATIENT)
Dept: ONCOLOGY | Age: 77
End: 2017-10-11

## 2017-10-11 ENCOUNTER — HOSPITAL ENCOUNTER (OUTPATIENT)
Dept: INFUSION THERAPY | Age: 77
Discharge: HOME | End: 2017-10-11
Payer: MEDICARE

## 2017-10-11 VITALS
RESPIRATION RATE: 18 BRPM | TEMPERATURE: 98.1 F | DIASTOLIC BLOOD PRESSURE: 63 MMHG | HEART RATE: 75 BPM | OXYGEN SATURATION: 95 % | SYSTOLIC BLOOD PRESSURE: 128 MMHG

## 2017-10-11 VITALS
HEIGHT: 70 IN | BODY MASS INDEX: 32.81 KG/M2 | TEMPERATURE: 98.1 F | SYSTOLIC BLOOD PRESSURE: 137 MMHG | WEIGHT: 229.2 LBS | DIASTOLIC BLOOD PRESSURE: 78 MMHG | OXYGEN SATURATION: 96 % | HEART RATE: 71 BPM

## 2017-10-11 DIAGNOSIS — D46.20 MYELODYSPLASTIC SYNDROME, LOW GRADE (HCC): Primary | ICD-10-CM

## 2017-10-11 LAB
ERYTHROCYTE [DISTWIDTH] IN BLOOD BY AUTOMATED COUNT: 18.8 % (ref 11.5–14.5)
HCT VFR BLD AUTO: 33.9 % (ref 36.6–50.3)
HGB BLD-MCNC: 10.9 G/DL (ref 12.1–17)
MCH RBC QN AUTO: 32.5 PG (ref 26–34)
MCHC RBC AUTO-ENTMCNC: 32.2 G/DL (ref 30–36.5)
MCV RBC AUTO: 101.2 FL (ref 80–99)
PLATELET # BLD AUTO: 302 K/UL (ref 150–400)
RBC # BLD AUTO: 3.35 M/UL (ref 4.1–5.7)
WBC # BLD AUTO: 8.2 K/UL (ref 4.1–11.1)

## 2017-10-11 PROCEDURE — 96372 THER/PROPH/DIAG INJ SC/IM: CPT

## 2017-10-11 PROCEDURE — 36415 COLL VENOUS BLD VENIPUNCTURE: CPT | Performed by: INTERNAL MEDICINE

## 2017-10-11 PROCEDURE — 74011250636 HC RX REV CODE- 250/636: Performed by: INTERNAL MEDICINE

## 2017-10-11 PROCEDURE — 85027 COMPLETE CBC AUTOMATED: CPT | Performed by: INTERNAL MEDICINE

## 2017-10-11 RX ADMIN — DARBEPOETIN ALFA 500 MCG: 500 INJECTION, SOLUTION INTRAVENOUS; SUBCUTANEOUS at 08:41

## 2017-10-11 NOTE — PROGRESS NOTES
Follow up Note        Patient: Aristeo Hebert MRN: [id number]  SSN: [id number]    YOB: 1940  Age: 78 y.o. Sex: male      Subjective:      Aristeo Hebert is a 78 y.o. 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 He received Aranesp up until March 2016. His hemoglobin remains normal and his energy has improved. Today however his Hgb has dropped below 11 and he will receive the injection. He continues to feel well and does not verbalize any new complaints. Review of Systems:    Constitutional: negative  Eyes: negative  Ears, Nose, Mouth, Throat, and Face: negative  Respiratory: negative  Cardiovascular: negative  Gastrointestinal: negative  Integument/Breast: negative  Hematologic/Lymphatic: negative  Musculoskeletal:negative  Neurological: negative        Past Medical History:   Diagnosis Date    Basal cell cancer     Benign prostatic hypertrophy     Frequency of urination     GERD (gastroesophageal reflux disease)     Hypertension      Past Surgical History:   Procedure Laterality Date    HX CATARACT REMOVAL      HX CHOLECYSTECTOMY      HX HEENT      tonsillectomy    HX MOHS PROCEDURES  7/11    Rt    HX OTHER SURGICAL      jaw surgery; \"shortened it\" per pt    HX OTHER SURGICAL  12/08/14    basal cell removed from left side of forehead    HX SHOULDER ARTHROSCOPY      Rt shoulder ligament repair      Family History   Problem Relation Age of Onset    Hypertension Father      Social History   Substance Use Topics    Smoking status: Never Smoker    Smokeless tobacco: Never Used    Alcohol use No      Prior to Admission medications    Medication Sig Start Date End Date Taking? Authorizing Provider   Glucosamine &Chondroit-MV-Min3 455-922-92-0.5 mg tab Take  by mouth two (2) times a day. Yes Historical Provider   finasteride (PROSCAR) 5 mg tablet Take 5 mg by mouth every morning.  Indications: BENIGN PROSTATIC HYPERTROPHY   Yes Historical Provider   lisinopril (PRINIVIL, ZESTRIL) 40 mg tablet Take 40 mg by mouth every morning. Indications: HYPERTENSION   Yes Historical Provider   Omeprazole delayed release (PRILOSEC D/R) 20 mg tablet Take 20 mg by mouth every morning. Yes Historical Provider   Hydrochlorothiazide 12.5 mg tablet Take 12.5 mg by mouth every morning. Indications: HYPERTENSION   Yes Historical Provider   HYDROcodone-acetaminophen (LORTAB 5-325) 5-325 mg per tablet Take 1-2 Tabs by mouth every four (4) hours as needed for Pain. Max Daily Amount: 12 Tabs. Indications: causes drowsiness;do not drink,drive, or use machinery while taking; take with a stool softener 1/11/17   Arely Castellon MD   ondansetron [facility] ODT) 4 mg disintegrating tablet Take 4 mg by mouth every eight (8) hours as needed for Nausea. Indications: nausea    Historical Provider          No Known Allergies        Objective:     Vitals:    10/11/17 0902   BP: 137/78   Pulse: 71   Temp: 98.1 °F (36.7 °C)   SpO2: 96%   Weight: 229 lb 3.2 oz (104 kg)   Height: 5' 10\" (1.778 m)        Physical Exam:    GENERAL: alert, cooperative, obese  EYE: negative  LYMPHATIC: Cervical, supraclavicular, and axillary nodes normal.   THROAT & NECK: normal and no erythema or exudates noted. LUNG: clear to auscultation bilaterally  HEART: regular rate and rhythm  ABDOMEN: soft, non-tender  EXTREMITIES: no cyanosis or edema  SKIN: Normal.  NEUROLOGIC: negative        LABS:     Hgb: 10.9      Assessment:     1. Myelodysplastic Syndrome   Refractory anemia with ringed sideroblasts   IPSS-R score = 2, low risk   IPSS-R, age adjusted, score = 2.08. Low risk      ECOG PS 0  Intent of therapy - palliative  Median survival - 5.3 years    > Received Aranesp regularly between 10/2014 to 3/2016.   > Last Aranesp in March 2016.  Dropped to 10.9 today and will receive Aranesp  > Feels well.   > Continue CBC q4mo  > Aranesp when Hgb < 11.         Plan:       > Aranesp today  > F/U in 6 months      I saw the patient in conjunction with Burnard Pallas, FNP. Signed by: Cesia Angel MD                     October 11, 2017          CC.  Dread Lucero MD

## 2017-10-11 NOTE — PROGRESS NOTES
Pt is here for a routine follow up for MDS, Pt reports feeling well, no pain, no N/V/D, feels great. Pt had [facility] appointment this am, hgb 10.9 this am and did receive his Aranesp.

## 2017-11-08 ENCOUNTER — HOSPITAL ENCOUNTER (EMERGENCY)
Age: 77
Discharge: HOME | End: 2017-11-08
Attending: EMERGENCY MEDICINE
Payer: MEDICARE

## 2017-11-08 ENCOUNTER — APPOINTMENT (OUTPATIENT)
Dept: GENERAL RADIOLOGY | Age: 77
End: 2017-11-08
Attending: PHYSICIAN ASSISTANT
Payer: MEDICARE

## 2017-11-08 ENCOUNTER — APPOINTMENT (OUTPATIENT)
Dept: CT IMAGING | Age: 77
End: 2017-11-08
Attending: PHYSICIAN ASSISTANT
Payer: MEDICARE

## 2017-11-08 VITALS
WEIGHT: 228.62 LBS | SYSTOLIC BLOOD PRESSURE: 148 MMHG | BODY MASS INDEX: 32.73 KG/M2 | TEMPERATURE: 98.6 F | DIASTOLIC BLOOD PRESSURE: 73 MMHG | RESPIRATION RATE: 16 BRPM | OXYGEN SATURATION: 94 % | HEIGHT: 70 IN | HEART RATE: 70 BPM

## 2017-11-08 DIAGNOSIS — N20.0 RENAL STONES: ICD-10-CM

## 2017-11-08 DIAGNOSIS — R10.9 LEFT FLANK PAIN: Primary | ICD-10-CM

## 2017-11-08 DIAGNOSIS — R16.1 SPLENOMEGALY: ICD-10-CM

## 2017-11-08 LAB
ALBUMIN SERPL-MCNC: 3.4 G/DL (ref 3.5–5)
ALBUMIN/GLOB SERPL: 0.8 {RATIO} (ref 1.1–2.2)
ALP SERPL-CCNC: 81 U/L (ref 45–117)
ALT SERPL-CCNC: 22 U/L (ref 12–78)
ANION GAP SERPL CALC-SCNC: 5 MMOL/L (ref 5–15)
APPEARANCE UR: CLEAR
AST SERPL-CCNC: 10 U/L (ref 15–37)
BACTERIA URNS QL MICRO: NEGATIVE /HPF
BASOPHILS # BLD: 0 K/UL (ref 0–0.1)
BASOPHILS NFR BLD: 0 % (ref 0–1)
BILIRUB SERPL-MCNC: 0.9 MG/DL (ref 0.2–1)
BILIRUB UR QL: NEGATIVE
BUN SERPL-MCNC: 9 MG/DL (ref 6–20)
BUN/CREAT SERPL: 12 (ref 12–20)
CALCIUM SERPL-MCNC: 8.6 MG/DL (ref 8.5–10.1)
CHLORIDE SERPL-SCNC: 104 MMOL/L (ref 97–108)
CO2 SERPL-SCNC: 29 MMOL/L (ref 21–32)
COLOR UR: ABNORMAL
CREAT SERPL-MCNC: 0.76 MG/DL (ref 0.7–1.3)
EOSINOPHIL # BLD: 0.1 K/UL (ref 0–0.4)
EOSINOPHIL NFR BLD: 1 % (ref 0–7)
EPITH CASTS URNS QL MICRO: ABNORMAL /LPF
ERYTHROCYTE [DISTWIDTH] IN BLOOD BY AUTOMATED COUNT: 18.4 % (ref 11.5–14.5)
GLOBULIN SER CALC-MCNC: 4.5 G/DL (ref 2–4)
GLUCOSE SERPL-MCNC: 95 MG/DL (ref 65–100)
GLUCOSE UR STRIP.AUTO-MCNC: NEGATIVE MG/DL
HCT VFR BLD AUTO: 37.1 % (ref 36.6–50.3)
HGB BLD-MCNC: 11.7 G/DL (ref 12.1–17)
HGB UR QL STRIP: NEGATIVE
HYALINE CASTS URNS QL MICRO: ABNORMAL /LPF (ref 0–5)
KETONES UR QL STRIP.AUTO: NEGATIVE MG/DL
LEUKOCYTE ESTERASE UR QL STRIP.AUTO: NEGATIVE
LYMPHOCYTES # BLD: 1.8 K/UL (ref 0.8–3.5)
LYMPHOCYTES NFR BLD: 18 % (ref 12–49)
MCH RBC QN AUTO: 31.3 PG (ref 26–34)
MCHC RBC AUTO-ENTMCNC: 31.5 G/DL (ref 30–36.5)
MCV RBC AUTO: 99.2 FL (ref 80–99)
MONOCYTES # BLD: 0.9 K/UL (ref 0–1)
MONOCYTES NFR BLD: 9 % (ref 5–13)
NEUTS SEG # BLD: 6.9 K/UL (ref 1.8–8)
NEUTS SEG NFR BLD: 72 % (ref 32–75)
NITRITE UR QL STRIP.AUTO: NEGATIVE
PH UR STRIP: 6.5 [PH] (ref 5–8)
PLATELET # BLD AUTO: 272 K/UL (ref 150–400)
POTASSIUM SERPL-SCNC: 3.8 MMOL/L (ref 3.5–5.1)
PROT SERPL-MCNC: 7.9 G/DL (ref 6.4–8.2)
PROT UR STRIP-MCNC: ABNORMAL MG/DL
RBC # BLD AUTO: 3.74 M/UL (ref 4.1–5.7)
RBC #/AREA URNS HPF: ABNORMAL /HPF (ref 0–5)
SODIUM SERPL-SCNC: 138 MMOL/L (ref 136–145)
SP GR UR REFRACTOMETRY: 1.02 (ref 1–1.03)
UA: UC IF INDICATED,UAUC: ABNORMAL
UROBILINOGEN UR QL STRIP.AUTO: 1 EU/DL (ref 0.2–1)
WBC # BLD AUTO: 9.7 K/UL (ref 4.1–11.1)
WBC URNS QL MICRO: ABNORMAL /HPF (ref 0–4)

## 2017-11-08 PROCEDURE — 96361 HYDRATE IV INFUSION ADD-ON: CPT

## 2017-11-08 PROCEDURE — 80053 COMPREHEN METABOLIC PANEL: CPT

## 2017-11-08 PROCEDURE — 99283 EMERGENCY DEPT VISIT LOW MDM: CPT

## 2017-11-08 PROCEDURE — 71010 XR CHEST PORT: CPT

## 2017-11-08 PROCEDURE — 36415 COLL VENOUS BLD VENIPUNCTURE: CPT

## 2017-11-08 PROCEDURE — 74011250636 HC RX REV CODE- 250/636: Performed by: PHYSICIAN ASSISTANT

## 2017-11-08 PROCEDURE — 81001 URINALYSIS AUTO W/SCOPE: CPT

## 2017-11-08 PROCEDURE — 85025 COMPLETE CBC W/AUTO DIFF WBC: CPT

## 2017-11-08 PROCEDURE — 96374 THER/PROPH/DIAG INJ IV PUSH: CPT

## 2017-11-08 PROCEDURE — 74176 CT ABD & PELVIS W/O CONTRAST: CPT

## 2017-11-08 RX ORDER — DOXAZOSIN 1 MG/1
1 TABLET ORAL DAILY
COMMUNITY

## 2017-11-08 RX ORDER — PROPRANOLOL HYDROCHLORIDE 20 MG/1
20 TABLET ORAL DAILY
COMMUNITY

## 2017-11-08 RX ORDER — NAPROXEN 500 MG/1
500 TABLET ORAL 2 TIMES DAILY WITH MEALS
Qty: 20 TAB | Refills: 0 | Status: SHIPPED | OUTPATIENT
Start: 2017-11-08 | End: 2017-11-18

## 2017-11-08 RX ORDER — KETOROLAC TROMETHAMINE 30 MG/ML
15 INJECTION, SOLUTION INTRAMUSCULAR; INTRAVENOUS
Status: COMPLETED | OUTPATIENT
Start: 2017-11-08 | End: 2017-11-08

## 2017-11-08 RX ADMIN — KETOROLAC TROMETHAMINE 15 MG: 30 INJECTION, SOLUTION INTRAMUSCULAR at 10:19

## 2017-11-08 RX ADMIN — SODIUM CHLORIDE 1000 ML: 900 INJECTION, SOLUTION INTRAVENOUS at 10:18

## 2017-11-08 NOTE — ED NOTES
Patient discharged by PA [address]. Patient provided with discharge instructions Rx and instructions on follow up care. Patient out of ED ambulatory accompanied by self.

## 2017-11-08 NOTE — DISCHARGE INSTRUCTIONS
Thank you for allowing us to provide you with excellent care today. We hope we addressed all of your concerns and needs. We strive to provide excellent quality care in the Emergency Department. Please rate us as excellent, as anything less than excellent does not meet our expectations. If you feel that you have not received excellent quality care or timely care, please ask to speak to the nurse manager. Please choose us in the future for your continued health care needs. The exam and treatment you received in the Emergency Department were for an urgent problem and are not intended as complete care. It is important that you follow-up with a doctor, nurse practitioner, or physician assistant to:  (1) confirm your diagnosis,  (2) re-evaluation of changes in your illness and treatment, and  (3) for ongoing care. If your symptoms become worse or you do not improve as expected and you are unable to reach your usual health care provider, you should return to the Emergency Department. We are available 24 hours a day. Take this sheet with you when you go to your follow-up visit. If you have any problem arranging the follow-up visit, contact [address] [place] [address] 21 [phone])    Make an appointment with your Primary Care doctor for follow up of this visit. Return to the ER if you are unable to be seen in the time recommended on your discharge instructions.

## 2017-11-08 NOTE — ED PROVIDER NOTES
[facility] 78.  EMERGENCY DEPARTMENT HISTORY AND PHYSICAL EXAM         Date of Service: 11/8/2017   Patient Name: Daniel Merrill   YOB: 1940  Medical Record Number: [id number]    History of Presenting Illness     Chief Complaint   Patient presents with    Flank Pain        History Provided By:  patient    Additional History:   Daniel Merrill is a 78 y.o. male with a PMH of HTN, GERD, BPH presenting ambulatory to the ED from home C/O two days of constant L flank pain, rated moderately severe. Patient states his pain initially began last night in the left lower back, and states his pain moved to the L flank this morning. He describes his pain as \"like a knife\" with certain and fast movements, and exacerbated with deep inspiration. He has not tried any additional medications for relief. He also c/o associated urinary frequency, stating he voided 5 more times than his usual amount last night, and stooling in larger volumes. Patient denies any radiation of pain to his groin, dysuria, hematuria, changes in urine appearance, F/C, cough, cold sxs, CP, SOB, N/V/D,  blood in stool, or changes in rx. His PMHx is significant for cholecystectomy. He denies a h/o similar pain or h/o kidney stone. Patient additionally denies any leg swelling, recent travel or PMHx of DVT/PE. There are no other complaints, changes or physical findings at this time.   Social Hx: - Tobacco, - EtOH, - Illicit Drugs    Primary Care Provider: Elisabeth Garza MD   Specialist:    Past History     Past Medical History:   Past Medical History:   Diagnosis Date    Basal cell cancer     Benign prostatic hypertrophy     Frequency of urination     GERD (gastroesophageal reflux disease)     Hypertension         Past Surgical History:   Past Surgical History:   Procedure Laterality Date    HX CATARACT REMOVAL      HX CHOLECYSTECTOMY      HX HEENT      tonsillectomy    HX MOHS PROCEDURES  7/11    Rt  HX OTHER SURGICAL      jaw surgery; \"shortened it\" per pt    HX OTHER SURGICAL  12/08/14    basal cell removed from left side of forehead    HX SHOULDER ARTHROSCOPY      Rt shoulder ligament repair        Family History:   Family History   Problem Relation Age of Onset    Hypertension Father         Social History:   Social History   Substance Use Topics    Smoking status: Never Smoker    Smokeless tobacco: Never Used    Alcohol use No        Allergies:   No Known Allergies     Review of Systems   Review of Systems   Constitutional: Negative for chills, diaphoresis and fever. HENT: Negative for congestion, rhinorrhea and sore throat. Eyes: Negative for pain. Respiratory: Negative for cough, shortness of breath, wheezing and stridor. Cardiovascular: Negative for chest pain and leg swelling. Gastrointestinal: Negative for abdominal pain, blood in stool, constipation, diarrhea, nausea and vomiting. Genitourinary: Positive for flank pain and frequency. Negative for difficulty urinating, dysuria and hematuria. Musculoskeletal: Negative for back pain and neck stiffness. Skin: Negative for rash. Neurological: Negative for dizziness, seizures, syncope, weakness, light-headedness and headaches. Psychiatric/Behavioral: Negative for behavioral problems and confusion. Physical Exam  Physical Exam   Constitutional: He is oriented to person, place, and time. He appears well-developed and well-nourished. No distress. HENT:   Head: Normocephalic and atraumatic. Right Ear: External ear normal.   Left Ear: External ear normal.   Nose: Nose normal.   Eyes: Conjunctivae and EOM are normal. Right eye exhibits no discharge. Left eye exhibits no discharge. No scleral icterus. Neck: Normal range of motion. Neck supple. Cardiovascular: Normal rate, regular rhythm and normal heart sounds. No murmur heard. Pulmonary/Chest: Effort normal and breath sounds normal. No stridor.  No respiratory distress. He has no decreased breath sounds. He has no wheezes. He exhibits no tenderness. no TTP along the chest or ribcage   Abdominal: Soft. Bowel sounds are normal. He exhibits no distension. There is no tenderness. There is no rebound and no CVA tenderness (b/l). Musculoskeletal: Normal range of motion. He exhibits no edema or tenderness. Neurological: He is alert and oriented to person, place, and time. Skin: Skin is warm and dry. No rash noted. He is not diaphoretic. Psychiatric: He has a normal mood and affect. Judgment normal.   Nursing note and vitals reviewed. Medical Decision Making   I am the first provider for this patient. I reviewed the vital signs, available nursing notes, past medical history, past surgical history, family history and social history. Old Medical Records: Old medical records. Nursing notes. Provider Notes:   DDx: pyelonephritis, nephrolithiasis, cystitis, urinary tract infection, urinary retention, renal cell carcinoma, constipation, obstruction, ureter spasm, muscle strain, cystitis, gastroenteritis. Patient presents with R sided flank pain that began 2 days ago. Will assess with basic labs, urine, imaging and pain/nausea control. Labs and imaging without acute abnormality. Pain improved with NSAID. Will recommend close f/u with PCP for recheck. ED Course:  8:52 AM   Initial assessment performed. The patients presenting problems have been discussed, and they are in agreement with the care plan formulated and outlined with them. I have encouraged them to ask questions as they arise throughout their visit. Progress Notes:   8:54 AM  Patient ambulating in department without any difficulty. 11:10 AM  I have just reevaluated the patient. I have reviewed his vital signs and determined there is currently no worsening in their condition or physical exam. Results have been reviewed with them and their questions have been answered.  Patient reports resolution of his pain. 11:25 AM  I have reviewed discharge instructions with the patient and explained medications that he is being discharged with. The patient verbalized understanding and agrees with plan. Procedures: none    Diagnostic Study Results   Labs -      Recent Results (from the past 12 hour(s))   URINALYSIS W/ REFLEX CULTURE    Collection Time: 11/08/17  8:55 AM   Result Value Ref Range    Color DARK YELLOW      Appearance CLEAR CLEAR      Specific gravity 1.020 1.003 - 1.030      pH (UA) 6.5 5.0 - 8.0      Protein TRACE (A) NEG mg/dL    Glucose NEGATIVE  NEG mg/dL    Ketone NEGATIVE  NEG mg/dL    Bilirubin NEGATIVE  NEG      Blood NEGATIVE  NEG      Urobilinogen 1.0 0.2 - 1.0 EU/dL    Nitrites NEGATIVE  NEG      Leukocyte Esterase NEGATIVE  NEG      WBC 0-4 0 - 4 /hpf    RBC 0-5 0 - 5 /hpf    Epithelial cells FEW FEW /lpf    Bacteria NEGATIVE  NEG /hpf    UA:UC IF INDICATED CULTURE NOT INDICATED BY UA RESULT CNI      Hyaline cast 0-2 0 - 5 /lpf   CBC WITH AUTOMATED DIFF    Collection Time: 11/08/17 10:22 AM   Result Value Ref Range    WBC 9.7 4.1 - 11.1 K/uL    RBC 3.74 (L) 4.10 - 5.70 M/uL    HGB 11.7 (L) 12.1 - 17.0 g/dL    HCT 37.1 36.6 - 50.3 %    MCV 99.2 (H) 80.0 - 99.0 FL    MCH 31.3 26.0 - 34.0 PG    MCHC 31.5 30.0 - 36.5 g/dL    RDW 18.4 (H) 11.5 - 14.5 %    PLATELET 532 355 - 184 K/uL    NEUTROPHILS 72 32 - 75 %    LYMPHOCYTES 18 12 - 49 %    MONOCYTES 9 5 - 13 %    EOSINOPHILS 1 0 - 7 %    BASOPHILS 0 0 - 1 %    ABS. NEUTROPHILS 6.9 1.8 - 8.0 K/UL    ABS. LYMPHOCYTES 1.8 0.8 - 3.5 K/UL    ABS. MONOCYTES 0.9 0.0 - 1.0 K/UL    ABS. EOSINOPHILS 0.1 0.0 - 0.4 K/UL    ABS.  BASOPHILS 0.0 0.0 - 0.1 K/UL   METABOLIC PANEL, COMPREHENSIVE    Collection Time: 11/08/17 10:22 AM   Result Value Ref Range    Sodium 138 136 - 145 mmol/L    Potassium 3.8 3.5 - 5.1 mmol/L    Chloride 104 97 - 108 mmol/L    CO2 29 21 - 32 mmol/L    Anion gap 5 5 - 15 mmol/L    Glucose 95 65 - 100 mg/dL    BUN 9 6 - 20 MG/DL    Creatinine 0.76 0.70 - 1.30 MG/DL    BUN/Creatinine ratio 12 12 - 20      GFR est AA >60 >60 ml/min/1.73m2    GFR est non-AA >60 >60 ml/min/1.73m2    Calcium 8.6 8.5 - 10.1 MG/DL    Bilirubin, total 0.9 0.2 - 1.0 MG/DL    ALT (SGPT) 22 12 - 78 U/L    AST (SGOT) 10 (L) 15 - 37 U/L    Alk. phosphatase 81 45 - 117 U/L    Protein, total 7.9 6.4 - 8.2 g/dL    Albumin 3.4 (L) 3.5 - 5.0 g/dL    Globulin 4.5 (H) 2.0 - 4.0 g/dL    A-G Ratio 0.8 (L) 1.1 - 2.2         Radiologic Studies -  The following have been ordered and reviewed:    CT Results  (Last 48 hours)               11/08/17 0945  CT ABD PELV WO CONT Final result    Impression:  IMPRESSION:   1. No evidence of acute intra-abdominal process to explain the patient's left   flank pain. No left-sided nephrolithiasis. 2.  Right renal calcifications likely mostly represent vascular calcifications   although there may be a few small nonobstructing nephroliths on the right. 3.  Colonic diverticulosis without diverticulitis. 4.  Mild splenomegaly. 5.  Likely calcifications of the aortic valve which is incompletely imaged. Correlate for history and/or symptoms of aortic stenosis. Narrative:  EXAM:  CT ABD PELV WO CONT       INDICATION: left flank pain       COMPARISON: Abdominal ultrasound 8/6/2014 and 1/5/2017       CONTRAST:  None. TECHNIQUE:    Thin axial images were obtained through the abdomen and pelvis. Coronal and   sagittal reconstructions were generated. Oral contrast was not administered. CT   dose reduction was achieved through use of a standardized protocol tailored for   this examination and automatic exposure control for dose modulation. The absence of intravenous contrast material reduces the sensitivity for   evaluation of the solid parenchymal organs of the abdomen. FINDINGS:    LUNG BASES: Minimal atelectasis.    INCIDENTALLY IMAGED HEART AND MEDIASTINUM: Likely calcifications of the aortic   valve, incompletely imaged. LIVER: No mass or biliary dilatation. GALLBLADDER: Surgically absent. SPLEEN: No mass. Mildly enlarged measuring 15 cm in the craniocaudal dimension. PANCREAS: No mass or ductal dilatation. ADRENALS: Unremarkable. KIDNEYS/URETERS: Multiple right renal calcifications, majority of which are   favored to be vascular. There may be a few small nonobstructing nephroliths, the   largest of which measures 3 mm. Exophytic low-density 2.0 cm right renal mass   incompletely characterized on this exam. This likely correlates to a simple cyst   seen on prior ultrasound in 2014. No left-sided mass, calculus, or   hydronephrosis. STOMACH: Unremarkable. SMALL BOWEL: No dilatation or wall thickening. COLON: No dilatation or wall thickening. Stents in the sigmoid and descending   colon diverticulosis without diverticulitis. APPENDIX: Unremarkable. PERITONEUM: No ascites or pneumoperitoneum. RETROPERITONEUM: No lymphadenopathy or aortic aneurysm. Densely calcified   abdominal aorta. REPRODUCTIVE ORGANS: Remarkable. URINARY BLADDER: No mass or calculus. Left-sided diverticula. BONES: No destructive bone lesion. ADDITIONAL COMMENTS: N/A               CXR Results  (Last 48 hours)               11/08/17 1012  XR CHEST PORT Final result    Impression:  Impression: No acute process. Narrative: Indication: Left chest wall pain       Comparison: None       Portable exam of the chest obtained at 959 demonstrates normal heart size. There   is no acute process in the lung fields. The osseous structures are unremarkable. Vital Signs-Reviewed the patient's vital signs.    Patient Vitals for the past 12 hrs:   Temp Pulse Resp BP SpO2   11/08/17 1110 - 70 16 148/73 94 %   11/08/17 0841 98.6 °F (37 °C) 72 14 (!) 175/97 96 %       Medications Given in the ED:  Medications   sodium chloride 0.9 % bolus infusion 1,000 mL (0 mL IntraVENous IV Completed 11/8/17 1130) ketorolac (TORADOL) injection 15 mg (15 mg IntraVENous Given 11/8/17 1019)         Diagnosis:  Clinical Impression:   1. Left flank pain    2. Renal stones    3. Splenomegaly         Plan:  1: Discharge home  2. Medications as directed  3. Schedule f/u with PCP in 2-3 days  4. Return precautions reviewed    Follow-up Information     Follow up With Details Comments Contact Info    Stephani Camp MD Schedule an appointment as soon as possible for a visit in 3 days  [address] [place] [id number] [phone]      [facility] EMERGENCY DEPT  As needed, If symptoms worsen [address] [place] [address]  [phone]          2:   Discharge Medication List as of 11/8/2017 11:08 AM      START taking these medications    Details   naproxen (NAPROSYN) 500 mg tablet Take 1 Tab by mouth two (2) times daily (with meals) for 10 days. , Normal, Disp-20 Tab, R-0         CONTINUE these medications which have NOT CHANGED    Details   propranolol (INDERAL) 20 mg tablet Take 20 mg by mouth daily. , Historical Med      doxazosin (CARDURA) 1 mg tablet Take 1 mg by mouth daily. , Historical Med      Glucosamine &Chondroit-MV-Min3 710-889-27-0.5 mg tab Take  by mouth two (2) times a day., Historical Med      finasteride (PROSCAR) 5 mg tablet Take 5 mg by mouth every morning. Indications: BENIGN PROSTATIC HYPERTROPHY, Historical Med      lisinopril (PRINIVIL, ZESTRIL) 40 mg tablet Take 40 mg by mouth every morning. Indications: HYPERTENSION, Historical Med      Omeprazole delayed release (PRILOSEC D/R) 20 mg tablet Take 20 mg by mouth every morning., Historical Med      Hydrochlorothiazide 12.5 mg tablet Take 12.5 mg by mouth every morning. Indications: HYPERTENSION, Historical Med           Return to ED if worse. Discharge Note:  11:25 AM  The care plan has been outline with the patient and/or family, who verbally conveyed understanding and agreement. Available results have been reviewed. Patient and/or family understand the follow up plan as outlined and discharge instructions. Should their condition deterioration at any time after discharge the patient agrees to return, follow up sooner than outlined or seek medical assistance at the closest Emergency Room as soon as possible. Questions have been answered. Discharge instructions and educational information regarding the patient's diagnosis as well a list of reasons why the patient would want to seek immediate medical attention, should their condition change, were reviewed directly with the patient/family. _______________________________   Attestations: This note is prepared by Ben Pete, acting as Scribe for Hiral Mayfield PA-C. The scribe's documentation has been prepared under my direction and personally reviewed by me in its entirety. I confirm that the note above accurately reflects all work, treatment, procedures, and medical decision making performed by me.    6:32 AM  I was personally available for consultation in the emergency department. I have reviewed the chart and agree with the documentation recorded by the [facility], including the assessment, treatment plan, and disposition.   David Mayfield MD    _______________________________

## 2017-11-08 NOTE — ED TRIAGE NOTES
Patient presents to ED with c/o left flank pain that radiates to the ribs and abd. He reports that breathing increases the pain. He had a large BM yesterday that alleviated some of the pain, however it returned this morning. Patient denies urinary sx at this time.

## 2017-11-08 NOTE — ROUTINE PROCESS
[employer] PA reviewed discharge instructions with the patient. The patient verbalized understanding. Alert and stable to walk at discharge.

## 2017-12-05 ENCOUNTER — HOSPITAL ENCOUNTER (OUTPATIENT)
Dept: INFUSION THERAPY | Age: 77
Discharge: HOME | End: 2017-12-05
Payer: MEDICARE

## 2017-12-05 VITALS
SYSTOLIC BLOOD PRESSURE: 132 MMHG | TEMPERATURE: 97.4 F | RESPIRATION RATE: 18 BRPM | HEART RATE: 72 BPM | DIASTOLIC BLOOD PRESSURE: 75 MMHG | OXYGEN SATURATION: 95 %

## 2017-12-05 LAB
BASOPHILS # BLD: 0 K/UL (ref 0–0.1)
BASOPHILS NFR BLD: 1 % (ref 0–1)
EOSINOPHIL # BLD: 0.2 K/UL (ref 0–0.4)
EOSINOPHIL NFR BLD: 3 % (ref 0–7)
ERYTHROCYTE [DISTWIDTH] IN BLOOD BY AUTOMATED COUNT: 18.9 % (ref 11.5–14.5)
HCT VFR BLD AUTO: 34 % (ref 36.6–50.3)
HGB BLD-MCNC: 11.1 G/DL (ref 12.1–17)
LYMPHOCYTES # BLD: 1.5 K/UL (ref 0.8–3.5)
LYMPHOCYTES NFR BLD: 21 % (ref 12–49)
MCH RBC QN AUTO: 32.7 PG (ref 26–34)
MCHC RBC AUTO-ENTMCNC: 32.6 G/DL (ref 30–36.5)
MCV RBC AUTO: 100.3 FL (ref 80–99)
MONOCYTES # BLD: 0.6 K/UL (ref 0–1)
MONOCYTES NFR BLD: 8 % (ref 5–13)
NEUTS SEG # BLD: 4.9 K/UL (ref 1.8–8)
NEUTS SEG NFR BLD: 67 % (ref 32–75)
PLATELET # BLD AUTO: 298 K/UL (ref 150–400)
RBC # BLD AUTO: 3.39 M/UL (ref 4.1–5.7)
WBC # BLD AUTO: 7.3 K/UL (ref 4.1–11.1)

## 2017-12-05 PROCEDURE — 36415 COLL VENOUS BLD VENIPUNCTURE: CPT | Performed by: INTERNAL MEDICINE

## 2017-12-05 PROCEDURE — 85025 COMPLETE CBC W/AUTO DIFF WBC: CPT | Performed by: INTERNAL MEDICINE

## 2017-12-05 NOTE — PROGRESS NOTES
0810 Pt arrived at [facility] ambulatory and in no distress for cbc/aransep. Aranesp HELD. Assessment completed, no new complaints voiced. Denies fatigue/ SOB. CBC obtained via left arm. Visit Vitals    /75 (BP 1 Location: Left arm, BP Patient Position: Sitting)    Pulse 72    Temp 97.4 °F (36.3 °C)    Resp 18    SpO2 95%     Aranesp held, hgb 11.1     0835 Tolerated treatment well, no adverse reaction noted. D/Cd from [facility] ambulatory and in no distress. Next appt 1/30/18    Recent Results (from the past 8 hour(s))   CBC WITH AUTOMATED DIFF    Collection Time: 12/05/17  8:16 AM   Result Value Ref Range    WBC 7.3 4.1 - 11.1 K/uL    RBC 3.39 (L) 4.10 - 5.70 M/uL    HGB 11.1 (L) 12.1 - 17.0 g/dL    HCT 34.0 (L) 36.6 - 50.3 %    .3 (H) 80.0 - 99.0 FL    MCH 32.7 26.0 - 34.0 PG    MCHC 32.6 30.0 - 36.5 g/dL    RDW 18.9 (H) 11.5 - 14.5 %    PLATELET 951 551 - 213 K/uL    NEUTROPHILS 67 32 - 75 %    LYMPHOCYTES 21 12 - 49 %    MONOCYTES 8 5 - 13 %    EOSINOPHILS 3 0 - 7 %    BASOPHILS 1 0 - 1 %    ABS. NEUTROPHILS 4.9 1.8 - 8.0 K/UL    ABS. LYMPHOCYTES 1.5 0.8 - 3.5 K/UL    ABS. MONOCYTES 0.6 0.0 - 1.0 K/UL    ABS. EOSINOPHILS 0.2 0.0 - 0.4 K/UL    ABS.  BASOPHILS 0.0 0.0 - 0.1 K/UL

## 2018-01-30 ENCOUNTER — HOSPITAL ENCOUNTER (OUTPATIENT)
Dept: INFUSION THERAPY | Age: 78
Discharge: HOME | End: 2018-01-30
Payer: MEDICARE

## 2018-01-30 VITALS
HEART RATE: 64 BPM | SYSTOLIC BLOOD PRESSURE: 156 MMHG | OXYGEN SATURATION: 95 % | TEMPERATURE: 97.9 F | DIASTOLIC BLOOD PRESSURE: 75 MMHG | RESPIRATION RATE: 18 BRPM

## 2018-01-30 LAB
BASOPHILS # BLD: 0.1 K/UL (ref 0–0.1)
BASOPHILS NFR BLD: 1 % (ref 0–1)
DIFFERENTIAL METHOD BLD: ABNORMAL
EOSINOPHIL # BLD: 0.2 K/UL (ref 0–0.4)
EOSINOPHIL NFR BLD: 4 % (ref 0–7)
ERYTHROCYTE [DISTWIDTH] IN BLOOD BY AUTOMATED COUNT: 18.7 % (ref 11.5–14.5)
HCT VFR BLD AUTO: 36.4 % (ref 36.6–50.3)
HGB BLD-MCNC: 11.8 G/DL (ref 12.1–17)
IMM GRANULOCYTES # BLD: 0 K/UL (ref 0–0.04)
IMM GRANULOCYTES NFR BLD AUTO: 0 % (ref 0–0.5)
LYMPHOCYTES # BLD: 1.9 K/UL (ref 0.8–3.5)
LYMPHOCYTES NFR BLD: 29 % (ref 12–49)
MCH RBC QN AUTO: 32.2 PG (ref 26–34)
MCHC RBC AUTO-ENTMCNC: 32.4 G/DL (ref 30–36.5)
MCV RBC AUTO: 99.2 FL (ref 80–99)
MONOCYTES # BLD: 0.6 K/UL (ref 0–1)
MONOCYTES NFR BLD: 9 % (ref 5–13)
NEUTS SEG # BLD: 3.9 K/UL (ref 1.8–8)
NEUTS SEG NFR BLD: 58 % (ref 32–75)
NRBC # BLD: 0.03 K/UL (ref 0–0.01)
NRBC BLD-RTO: 0.4 PER 100 WBC
PLATELET # BLD AUTO: 303 K/UL (ref 150–400)
PMV BLD AUTO: 10.6 FL (ref 8.9–12.9)
RBC # BLD AUTO: 3.67 M/UL (ref 4.1–5.7)
WBC # BLD AUTO: 6.7 K/UL (ref 4.1–11.1)

## 2018-01-30 PROCEDURE — 36415 COLL VENOUS BLD VENIPUNCTURE: CPT | Performed by: INTERNAL MEDICINE

## 2018-01-30 PROCEDURE — 85025 COMPLETE CBC W/AUTO DIFF WBC: CPT | Performed by: INTERNAL MEDICINE

## 2018-01-30 NOTE — PROGRESS NOTES
Outpatient Infusion Center Progress Note    2181- Pt admit to [facility] for Aranesp ambulatory in stable condition. Assessment completed. No new concerns voiced. CBC with diff drawn and sent to lab. Visit Vitals    /75 (BP 1 Location: Left arm, BP Patient Position: Sitting)    Pulse 64    Temp 97.9 °F (36.6 °C)    Resp 18    SpO2 95%     Medications:  Aranesp held per order parameters for Hgb 11.8    Recent Results (from the past 12 hour(s))   CBC WITH AUTOMATED DIFF    Collection Time: 01/30/18  8:57 AM   Result Value Ref Range    WBC 6.7 4.1 - 11.1 K/uL    RBC 3.67 (L) 4.10 - 5.70 M/uL    HGB 11.8 (L) 12.1 - 17.0 g/dL    HCT 36.4 (L) 36.6 - 50.3 %    MCV 99.2 (H) 80.0 - 99.0 FL    MCH 32.2 26.0 - 34.0 PG    MCHC 32.4 30.0 - 36.5 g/dL    RDW 18.7 (H) 11.5 - 14.5 %    PLATELET 509 832 - 489 K/uL    MPV 10.6 8.9 - 12.9 FL    NRBC 0.4 (H) 0  WBC    ABSOLUTE NRBC 0.03 (H) 0.00 - 0.01 K/uL    NEUTROPHILS 58 32 - 75 %    LYMPHOCYTES 29 12 - 49 %    MONOCYTES 9 5 - 13 %    EOSINOPHILS 4 0 - 7 %    BASOPHILS 1 0 - 1 %    IMMATURE GRANULOCYTES 0 0.0 - 0.5 %    ABS. NEUTROPHILS 3.9 1.8 - 8.0 K/UL    ABS. LYMPHOCYTES 1.9 0.8 - 3.5 K/UL    ABS. MONOCYTES 0.6 0.0 - 1.0 K/UL    ABS. EOSINOPHILS 0.2 0.0 - 0.4 K/UL    ABS. BASOPHILS 0.1 0.0 - 0.1 K/UL    ABS. IMM. GRANS. 0.0 0.00 - 0.04 K/UL    DF AUTOMATED         0930- Pt tolerated treatment well. D/c home ambulatory in no distress.  Pt aware of next appointment scheduled for 3/27 at 9 AM.

## 2018-03-27 ENCOUNTER — HOSPITAL ENCOUNTER (OUTPATIENT)
Dept: INFUSION THERAPY | Age: 78
Discharge: HOME | End: 2018-03-27
Payer: MEDICARE

## 2018-03-27 VITALS
SYSTOLIC BLOOD PRESSURE: 138 MMHG | HEART RATE: 72 BPM | OXYGEN SATURATION: 96 % | DIASTOLIC BLOOD PRESSURE: 81 MMHG | TEMPERATURE: 97.9 F | RESPIRATION RATE: 18 BRPM

## 2018-03-27 LAB
BASOPHILS # BLD: 0.1 K/UL (ref 0–0.1)
BASOPHILS NFR BLD: 1 % (ref 0–1)
DIFFERENTIAL METHOD BLD: ABNORMAL
EOSINOPHIL # BLD: 0.3 K/UL (ref 0–0.4)
EOSINOPHIL NFR BLD: 4 % (ref 0–7)
ERYTHROCYTE [DISTWIDTH] IN BLOOD BY AUTOMATED COUNT: 18.8 % (ref 11.5–14.5)
HCT VFR BLD AUTO: 37.1 % (ref 36.6–50.3)
HGB BLD-MCNC: 12 G/DL (ref 12.1–17)
IMM GRANULOCYTES # BLD: 0 K/UL (ref 0–0.04)
IMM GRANULOCYTES NFR BLD AUTO: 0 % (ref 0–0.5)
LYMPHOCYTES # BLD: 1.9 K/UL (ref 0.8–3.5)
LYMPHOCYTES NFR BLD: 25 % (ref 12–49)
MCH RBC QN AUTO: 33.2 PG (ref 26–34)
MCHC RBC AUTO-ENTMCNC: 32.3 G/DL (ref 30–36.5)
MCV RBC AUTO: 102.8 FL (ref 80–99)
MONOCYTES # BLD: 0.6 K/UL (ref 0–1)
MONOCYTES NFR BLD: 8 % (ref 5–13)
NEUTS SEG # BLD: 4.7 K/UL (ref 1.8–8)
NEUTS SEG NFR BLD: 62 % (ref 32–75)
NRBC # BLD: 0.02 K/UL (ref 0–0.01)
NRBC BLD-RTO: 0.3 PER 100 WBC
PLATELET # BLD AUTO: 312 K/UL (ref 150–400)
PMV BLD AUTO: 11.3 FL (ref 8.9–12.9)
RBC # BLD AUTO: 3.61 M/UL (ref 4.1–5.7)
WBC # BLD AUTO: 7.5 K/UL (ref 4.1–11.1)

## 2018-03-27 PROCEDURE — 36415 COLL VENOUS BLD VENIPUNCTURE: CPT | Performed by: INTERNAL MEDICINE

## 2018-03-27 PROCEDURE — 85025 COMPLETE CBC W/AUTO DIFF WBC: CPT | Performed by: INTERNAL MEDICINE

## 2018-03-27 NOTE — PROGRESS NOTES
Outpatient Infusion Center Progress Note    6601- Pt admit to [address] [place] [address] for Aranesp ambulatory in stable condition. Assessment completed. No new concerns voiced. CBC w/ diff drawn and sent to lab for processing. Visit Vitals    /81 (BP 1 Location: Left arm, BP Patient Position: Sitting)    Pulse 72    Temp 97.9 °F (36.6 °C)    Resp 18    SpO2 96%     Recent Results (from the past 12 hour(s))   CBC WITH AUTOMATED DIFF    Collection Time: 03/27/18  8:38 AM   Result Value Ref Range    WBC 7.5 4.1 - 11.1 K/uL    RBC 3.61 (L) 4.10 - 5.70 M/uL    HGB 12.0 (L) 12.1 - 17.0 g/dL    HCT 37.1 36.6 - 50.3 %    .8 (H) 80.0 - 99.0 FL    MCH 33.2 26.0 - 34.0 PG    MCHC 32.3 30.0 - 36.5 g/dL    RDW 18.8 (H) 11.5 - 14.5 %    PLATELET 131 117 - 737 K/uL    MPV 11.3 8.9 - 12.9 FL    NRBC 0.3 (H) 0  WBC    ABSOLUTE NRBC 0.02 (H) 0.00 - 0.01 K/uL    NEUTROPHILS 62 32 - 75 %    LYMPHOCYTES 25 12 - 49 %    MONOCYTES 8 5 - 13 %    EOSINOPHILS 4 0 - 7 %    BASOPHILS 1 0 - 1 %    IMMATURE GRANULOCYTES 0 0.0 - 0.5 %    ABS. NEUTROPHILS 4.7 1.8 - 8.0 K/UL    ABS. LYMPHOCYTES 1.9 0.8 - 3.5 K/UL    ABS. MONOCYTES 0.6 0.0 - 1.0 K/UL    ABS. EOSINOPHILS 0.3 0.0 - 0.4 K/UL    ABS. BASOPHILS 0.1 0.0 - 0.1 K/UL    ABS. IMM. GRANS. 0.0 0.00 - 0.04 K/UL    DF AUTOMATED         Medications:  None- Aranesp held per order    0910- Pt tolerated treatment well. D/c home ambulatory in no distress.  Pt aware of next appointment scheduled for 5/22 at 9 AM.

## 2018-04-11 ENCOUNTER — OFFICE VISIT (OUTPATIENT)
Dept: ONCOLOGY | Age: 78
End: 2018-04-11

## 2018-04-11 VITALS
DIASTOLIC BLOOD PRESSURE: 80 MMHG | RESPIRATION RATE: 16 BRPM | BODY MASS INDEX: 34.93 KG/M2 | TEMPERATURE: 98.5 F | WEIGHT: 244 LBS | SYSTOLIC BLOOD PRESSURE: 167 MMHG | HEART RATE: 63 BPM | HEIGHT: 70 IN | OXYGEN SATURATION: 94 %

## 2018-04-11 DIAGNOSIS — D46.20 MYELODYSPLASTIC SYNDROME, LOW GRADE (HCC): Primary | ICD-10-CM

## 2018-04-11 PROBLEM — E66.01 SEVERE OBESITY (BMI 35.0-39.9) WITH COMORBIDITY (HCC): Status: ACTIVE | Noted: 2018-04-11

## 2018-04-11 NOTE — PROGRESS NOTES
Mario Clay is a 78 y.o. male here today for MDS f/u. On Aranesp; last dose 10/2017. VS stable. Patient denies pain. No complaints voiced. Visit Vitals    /80 (BP 1 Location: Left arm, BP Patient Position: Sitting)    Pulse 63    Temp 98.5 °F (36.9 °C) (Oral)    Resp 16    Ht 5' 10\" (1.778 m)    Wt 244 lb (110.7 kg)    SpO2 94%    BMI 35.01 kg/m2     Health Maintenance Review: Patient reminded of \"due or due soon\" health maintenance. I have asked the patient to contact his/her primary care provider (PCP) for follow-up on his/her health maintenance.

## 2018-04-11 NOTE — PROGRESS NOTES
Follow up Note        Patient: Stevan Valero MRN: [id number]  SSN: [id number]    YOB: 1940  Age: 78 y.o. Sex: male      Subjective:      Stevan Valero is a 78 y.o. 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 He received Aranesp regularly up until March 2016. His hemoglobin remains normal and his energy has improved. He continues to feel well and does not verbalize any new complaints. Review of Systems:    Constitutional: negative  Eyes: negative  Ears, Nose, Mouth, Throat, and Face: negative  Respiratory: negative  Cardiovascular: negative  Gastrointestinal: negative  Integument/Breast: negative  Hematologic/Lymphatic: negative  Musculoskeletal:negative  Neurological: negative      Past Medical History:   Diagnosis Date    Basal cell cancer     Benign prostatic hypertrophy     Frequency of urination     GERD (gastroesophageal reflux disease)     Hypertension      Past Surgical History:   Procedure Laterality Date    HX CATARACT REMOVAL      HX CHOLECYSTECTOMY      HX HEENT      tonsillectomy    HX MOHS PROCEDURES  7/11    Rt    HX OTHER SURGICAL      jaw surgery; \"shortened it\" per pt    HX OTHER SURGICAL  12/08/14    basal cell removed from left side of forehead    HX SHOULDER ARTHROSCOPY      Rt shoulder ligament repair      Family History   Problem Relation Age of Onset    Hypertension Father      Social History   Substance Use Topics    Smoking status: Never Smoker    Smokeless tobacco: Never Used    Alcohol use No      Prior to Admission medications    Medication Sig Start Date End Date Taking? Authorizing Provider   propranolol (INDERAL) 20 mg tablet Take 20 mg by mouth daily. Yes Aubrie Falcon MD   doxazosin (CARDURA) 1 mg tablet Take 1 mg by mouth daily. Yes Aubrie Falcon MD   Glucosamine &Chondroit-MV-Min3 249-900-27-0.5 mg tab Take  by mouth two (2) times a day.    Yes Historical Provider finasteride (PROSCAR) 5 mg tablet Take 5 mg by mouth every morning. Indications: BENIGN PROSTATIC HYPERTROPHY   Yes Historical Provider   lisinopril (PRINIVIL, ZESTRIL) 40 mg tablet Take 40 mg by mouth every morning. Indications: HYPERTENSION   Yes Historical Provider   Omeprazole delayed release (PRILOSEC D/R) 20 mg tablet Take 20 mg by mouth every morning. Yes Historical Provider   Hydrochlorothiazide 12.5 mg tablet Take 12.5 mg by mouth every morning. Indications: HYPERTENSION   Yes Historical Provider          No Known Allergies        Objective:     Vitals:    04/11/18 0917   BP: 167/80   Pulse: 63   Resp: 16   Temp: 98.5 °F (36.9 °C)   TempSrc: Oral   SpO2: 94%   Weight: 244 lb (110.7 kg)   Height: 5' 10\" (1.778 m)        Physical Exam:    GENERAL: alert, cooperative, obese  EYE: negative  LYMPHATIC: Cervical, supraclavicular, and axillary nodes normal.   THROAT & NECK: normal and no erythema or exudates noted. LUNG: clear to auscultation bilaterally  HEART: regular rate and rhythm  ABDOMEN: soft, non-tender  EXTREMITIES: no cyanosis or edema  SKIN: Normal.  NEUROLOGIC: negative      LABS:     Hgb: 12.0      Assessment:     1. Myelodysplastic Syndrome   Refractory anemia with ringed sideroblasts   IPSS-R score = 2, low risk   IPSS-R, age adjusted, score = 2.08. Low risk      ECOG PS 0  Intent of therapy - palliative    > Received Aranesp regularly between 10/2014 to 3/2016.   > Last Aranesp in Oct 2017  > Feels well.   > Hgb is normal.   > Aranesp when Hgb < 11.         Plan:       > Labs every 3 months  > F/U in 6 months      I saw the patient in conjunction with JERMAN Corea. Signed by: Mir Woodard MD                     April 11, 2018          CC.  Daniel Trent MD

## 2018-05-22 ENCOUNTER — HOSPITAL ENCOUNTER (OUTPATIENT)
Dept: INFUSION THERAPY | Age: 78
End: 2018-05-22

## 2018-06-19 ENCOUNTER — HOSPITAL ENCOUNTER (OUTPATIENT)
Dept: INFUSION THERAPY | Age: 78
Discharge: HOME | End: 2018-06-19
Payer: MEDICARE

## 2018-06-19 VITALS
DIASTOLIC BLOOD PRESSURE: 74 MMHG | TEMPERATURE: 98 F | RESPIRATION RATE: 18 BRPM | HEART RATE: 70 BPM | SYSTOLIC BLOOD PRESSURE: 147 MMHG | OXYGEN SATURATION: 96 %

## 2018-06-19 LAB
BASOPHILS # BLD: 0.1 K/UL (ref 0–0.1)
BASOPHILS NFR BLD: 1 % (ref 0–1)
DIFFERENTIAL METHOD BLD: ABNORMAL
EOSINOPHIL # BLD: 0.2 K/UL (ref 0–0.4)
EOSINOPHIL NFR BLD: 3 % (ref 0–7)
ERYTHROCYTE [DISTWIDTH] IN BLOOD BY AUTOMATED COUNT: 17.6 % (ref 11.5–14.5)
HCT VFR BLD AUTO: 34 % (ref 36.6–50.3)
HGB BLD-MCNC: 11.1 G/DL (ref 12.1–17)
IMM GRANULOCYTES # BLD: 0 K/UL (ref 0–0.04)
IMM GRANULOCYTES NFR BLD AUTO: 0 % (ref 0–0.5)
LYMPHOCYTES # BLD: 1.7 K/UL (ref 0.8–3.5)
LYMPHOCYTES NFR BLD: 25 % (ref 12–49)
MCH RBC QN AUTO: 33.6 PG (ref 26–34)
MCHC RBC AUTO-ENTMCNC: 32.6 G/DL (ref 30–36.5)
MCV RBC AUTO: 103 FL (ref 80–99)
MONOCYTES # BLD: 0.5 K/UL (ref 0–1)
MONOCYTES NFR BLD: 7 % (ref 5–13)
NEUTS SEG # BLD: 4.4 K/UL (ref 1.8–8)
NEUTS SEG NFR BLD: 64 % (ref 32–75)
NRBC # BLD: 0.03 K/UL (ref 0–0.01)
NRBC BLD-RTO: 0.4 PER 100 WBC
PLATELET # BLD AUTO: 301 K/UL (ref 150–400)
PMV BLD AUTO: 10.3 FL (ref 8.9–12.9)
RBC # BLD AUTO: 3.3 M/UL (ref 4.1–5.7)
WBC # BLD AUTO: 6.9 K/UL (ref 4.1–11.1)

## 2018-06-19 PROCEDURE — 36415 COLL VENOUS BLD VENIPUNCTURE: CPT | Performed by: INTERNAL MEDICINE

## 2018-06-19 PROCEDURE — 85025 COMPLETE CBC W/AUTO DIFF WBC: CPT | Performed by: INTERNAL MEDICINE

## 2018-06-19 NOTE — PROGRESS NOTES
0855 Pt arrived at [facility] ambulatory and in no distress for Aranesp. Assessment completed, no new complaints voiced. Visit Vitals    /74 (BP 1 Location: Left arm, BP Patient Position: Sitting)    Pulse 70    Temp 98 °F (36.7 °C)    Resp 18    SpO2 96%     CBC with diff drawn from left hand. Recent Results (from the past 12 hour(s))   CBC WITH AUTOMATED DIFF    Collection Time: 06/19/18  9:02 AM   Result Value Ref Range    WBC 6.9 4.1 - 11.1 K/uL    RBC 3.30 (L) 4.10 - 5.70 M/uL    HGB 11.1 (L) 12.1 - 17.0 g/dL    HCT 34.0 (L) 36.6 - 50.3 %    .0 (H) 80.0 - 99.0 FL    MCH 33.6 26.0 - 34.0 PG    MCHC 32.6 30.0 - 36.5 g/dL    RDW 17.6 (H) 11.5 - 14.5 %    PLATELET 766 461 - 776 K/uL    MPV 10.3 8.9 - 12.9 FL    NRBC 0.4 (H) 0  WBC    ABSOLUTE NRBC 0.03 (H) 0.00 - 0.01 K/uL    NEUTROPHILS 64 32 - 75 %    LYMPHOCYTES 25 12 - 49 %    MONOCYTES 7 5 - 13 %    EOSINOPHILS 3 0 - 7 %    BASOPHILS 1 0 - 1 %    IMMATURE GRANULOCYTES 0 0.0 - 0.5 %    ABS. NEUTROPHILS 4.4 1.8 - 8.0 K/UL    ABS. LYMPHOCYTES 1.7 0.8 - 3.5 K/UL    ABS. MONOCYTES 0.5 0.0 - 1.0 K/UL    ABS. EOSINOPHILS 0.2 0.0 - 0.4 K/UL    ABS. BASOPHILS 0.1 0.0 - 0.1 K/UL    ABS. IMM. GRANS. 0.0 0.00 - 0.04 K/UL    DF AUTOMATED         Aranesp held for Hgb 11.1    0925 Tolerated treatment well, no adverse reaction noted. D/Cd from [facility] ambulatory and in no distress accompanied by self. Next appt 9/11/18 @ 0900.

## 2018-07-17 ENCOUNTER — APPOINTMENT (OUTPATIENT)
Dept: INFUSION THERAPY | Age: 78
End: 2018-07-17
Payer: MEDICARE

## 2018-08-14 ENCOUNTER — APPOINTMENT (OUTPATIENT)
Dept: INFUSION THERAPY | Age: 78
End: 2018-08-14
Payer: MEDICARE

## 2018-09-11 ENCOUNTER — APPOINTMENT (OUTPATIENT)
Dept: INFUSION THERAPY | Age: 78
End: 2018-09-11
Payer: MEDICARE

## 2018-09-11 ENCOUNTER — HOSPITAL ENCOUNTER (OUTPATIENT)
Dept: INFUSION THERAPY | Age: 78
Discharge: HOME | End: 2018-09-11
Payer: MEDICARE

## 2018-09-11 VITALS
DIASTOLIC BLOOD PRESSURE: 74 MMHG | SYSTOLIC BLOOD PRESSURE: 120 MMHG | HEART RATE: 83 BPM | OXYGEN SATURATION: 98 % | RESPIRATION RATE: 18 BRPM | TEMPERATURE: 98 F

## 2018-09-11 LAB
BASOPHILS # BLD: 0.1 K/UL (ref 0–0.1)
BASOPHILS NFR BLD: 1 % (ref 0–1)
DIFFERENTIAL METHOD BLD: ABNORMAL
EOSINOPHIL # BLD: 0.3 K/UL (ref 0–0.4)
EOSINOPHIL NFR BLD: 3 % (ref 0–7)
ERYTHROCYTE [DISTWIDTH] IN BLOOD BY AUTOMATED COUNT: 17.2 % (ref 11.5–14.5)
HCT VFR BLD AUTO: 35.7 % (ref 36.6–50.3)
HGB BLD-MCNC: 11.6 G/DL (ref 12.1–17)
IMM GRANULOCYTES # BLD: 0 K/UL (ref 0–0.04)
IMM GRANULOCYTES NFR BLD AUTO: 0 % (ref 0–0.5)
LYMPHOCYTES # BLD: 1.8 K/UL (ref 0.8–3.5)
LYMPHOCYTES NFR BLD: 21 % (ref 12–49)
MCH RBC QN AUTO: 33 PG (ref 26–34)
MCHC RBC AUTO-ENTMCNC: 32.5 G/DL (ref 30–36.5)
MCV RBC AUTO: 101.4 FL (ref 80–99)
MONOCYTES # BLD: 0.6 K/UL (ref 0–1)
MONOCYTES NFR BLD: 7 % (ref 5–13)
NEUTS SEG # BLD: 5.7 K/UL (ref 1.8–8)
NEUTS SEG NFR BLD: 67 % (ref 32–75)
NRBC # BLD: 0.05 K/UL (ref 0–0.01)
NRBC BLD-RTO: 0.6 PER 100 WBC
PLATELET # BLD AUTO: 336 K/UL (ref 150–400)
PMV BLD AUTO: 11.1 FL (ref 8.9–12.9)
RBC # BLD AUTO: 3.52 M/UL (ref 4.1–5.7)
WBC # BLD AUTO: 8.5 K/UL (ref 4.1–11.1)

## 2018-09-11 PROCEDURE — 85025 COMPLETE CBC W/AUTO DIFF WBC: CPT | Performed by: NURSE PRACTITIONER

## 2018-09-11 PROCEDURE — 36415 COLL VENOUS BLD VENIPUNCTURE: CPT | Performed by: NURSE PRACTITIONER

## 2018-09-11 NOTE — PROGRESS NOTES
Problem: Patient Education:  Go to Education Activity Goal: Patient/Family Education Outcome: Progressing Towards Goal 
Pt aware to ask questions when they arise

## 2018-09-11 NOTE — PROGRESS NOTES
0850 Pt arrived to [facility] ambulatory. Assessment completed. Pt denies any distress or discomfort at this time. Visit Vitals  /74 (BP 1 Location: Left arm, BP Patient Position: At rest)  Pulse 83  Temp 98 °F (36.7 °C)  Resp 18  SpO2 98%  
 
 
  
  
Cbc with diff drawn from left ac and sent. Labs resulted. HGB 11.6 HCT 35.7 
  
Held  Aranesp 500 mcg  
  
0940 Pt tolerated well. Pt denies any distress or discomfort at this time. Pt discharged home ambulatory with next apt

## 2018-10-15 ENCOUNTER — OFFICE VISIT (OUTPATIENT)
Dept: ONCOLOGY | Age: 78
End: 2018-10-15

## 2018-10-15 VITALS
TEMPERATURE: 98.9 F | DIASTOLIC BLOOD PRESSURE: 78 MMHG | SYSTOLIC BLOOD PRESSURE: 147 MMHG | OXYGEN SATURATION: 93 % | BODY MASS INDEX: 35.62 KG/M2 | HEIGHT: 70 IN | WEIGHT: 248.8 LBS | RESPIRATION RATE: 18 BRPM | HEART RATE: 74 BPM

## 2018-10-15 DIAGNOSIS — E66.01 SEVERE OBESITY (BMI 35.0-39.9) WITH COMORBIDITY (HCC): ICD-10-CM

## 2018-10-15 DIAGNOSIS — D46.20 MYELODYSPLASTIC SYNDROME, LOW GRADE (HCC): Primary | ICD-10-CM

## 2018-10-15 RX ORDER — OMEPRAZOLE 20 MG/1
CAPSULE, DELAYED RELEASE ORAL
COMMUNITY
Start: 2018-09-19

## 2018-10-15 NOTE — PROGRESS NOTES
Follow up Note        Patient: Igor Clark MRN: [id number]  SSN: [id number]    YOB: 1940  Age: 70 y.o. Sex: male      Subjective:      Igor Clark is a 70 y.o. 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 He received Aranesp regularly up until March 2016. His hemoglobin remains normal and his energy has improved. He continues to feel well and does not verbalize any new complaints. Review of Systems:    Constitutional: negative  Eyes: negative  Ears, Nose, Mouth, Throat, and Face: negative  Respiratory: negative  Cardiovascular: negative  Gastrointestinal: negative  Integument/Breast: negative  Hematologic/Lymphatic: negative  Musculoskeletal:negative  Neurological: negative      Past Medical History:   Diagnosis Date    Basal cell cancer     Benign prostatic hypertrophy     Frequency of urination     GERD (gastroesophageal reflux disease)     Hypertension      Past Surgical History:   Procedure Laterality Date    HX CATARACT REMOVAL      HX CHOLECYSTECTOMY      HX HEENT      tonsillectomy    HX MOHS PROCEDURES  7/11    Rt    HX OTHER SURGICAL      jaw surgery; \"shortened it\" per pt    HX OTHER SURGICAL  12/08/14    basal cell removed from left side of forehead    HX SHOULDER ARTHROSCOPY      Rt shoulder ligament repair      Family History   Problem Relation Age of Onset    Hypertension Father      Social History   Substance Use Topics    Smoking status: Never Smoker    Smokeless tobacco: Never Used    Alcohol use No      Prior to Admission medications    Medication Sig Start Date End Date Taking? Authorizing Provider   omeprazole (PRILOSEC) 20 mg capsule  9/19/18  Yes Historical Provider   propranolol (INDERAL) 20 mg tablet Take 20 mg by mouth daily. Yes Aubrie Falcon MD   doxazosin (CARDURA) 1 mg tablet Take 1 mg by mouth daily.    Yes Phys MD Terrie   Glucosamine &Chondroit-MV-Min3 908-664-30-0.5 mg tab Take  by mouth two (2) times a day. Yes Historical Provider   finasteride (PROSCAR) 5 mg tablet Take 5 mg by mouth every morning. Indications: BENIGN PROSTATIC HYPERTROPHY   Yes Historical Provider   lisinopril (PRINIVIL, ZESTRIL) 40 mg tablet Take 40 mg by mouth every morning. Indications: HYPERTENSION   Yes Historical Provider   Omeprazole delayed release (PRILOSEC D/R) 20 mg tablet Take 20 mg by mouth every morning. Yes Historical Provider   Hydrochlorothiazide 12.5 mg tablet Take 12.5 mg by mouth every morning. Indications: HYPERTENSION   Yes Historical Provider          No Known Allergies        Objective:     Vitals:    10/15/18 0916   BP: 147/78   Pulse: 74   Resp: 18   Temp: 98.9 °F (37.2 °C)   TempSrc: Oral   SpO2: 93%   Weight: 248 lb 12.8 oz (112.9 kg)   Height: 5' 10\" (1.778 m)        Physical Exam:    GENERAL: alert, cooperative, obese  EYE: negative  LYMPHATIC: Cervical, supraclavicular, and axillary nodes normal.   THROAT & NECK: normal and no erythema or exudates noted. LUNG: clear to auscultation bilaterally  HEART: regular rate and rhythm  ABDOMEN: soft, non-tender  EXTREMITIES: no cyanosis or edema  SKIN: Normal.  NEUROLOGIC: negative      LABS:     Lab Results   Component Value Date/Time    WBC 8.5 09/11/2018 08:57 AM    Hemoglobin (POC) 10.4 (A) 03/14/2016 08:15 AM    HGB 11.6 (L) 09/11/2018 08:57 AM    HCT 35.7 (L) 09/11/2018 08:57 AM    PLATELET 731 58/32/9091 08:57 AM    .4 (H) 09/11/2018 08:57 AM           Assessment:     1. Myelodysplastic Syndrome   Refractory anemia with ringed sideroblasts   IPSS-R score = 2, low risk   IPSS-R, age adjusted, score = 2.08. Low risk    ECOG PS 0  Intent of therapy - palliative    > Received Aranesp regularly between 10/2014 to 3/2016.   > Last Aranesp in Oct 2017  > Feels well.   > Hgb is normal.   > Aranesp when Hgb < 11.       2. Severe Obesity    Encouraged him to loose weight.        Plan:       > Labs every 3 months  > F/U in 6 months      Signed by: Milla Handy MD                     October 15, 2018        CC.  Hari Odom MD

## 2018-10-15 NOTE — PROGRESS NOTES
Lulú Saldana is a 70 y.o. male here today for Myelodysplastic syndrome, subtype Refractory Anemia with Ringed Sideroblasts f/u. VS stable. Patient denies pain. Good appetite. Patient denies N/V/D and constipation. Patient denies numbness and tingling. Patient denies mouth ulcers. Patient denies cough. Patient denies SOB.     Visit Vitals    /78 (BP 1 Location: Left arm, BP Patient Position: Sitting)    Pulse 74    Temp 98.9 °F (37.2 °C) (Oral)    Resp 18    Ht 5' 10\" (1.778 m)    Wt 248 lb 12.8 oz (112.9 kg)    SpO2 93%    BMI 35.7 kg/m2

## 2018-11-06 ENCOUNTER — APPOINTMENT (OUTPATIENT)
Dept: INFUSION THERAPY | Age: 78
End: 2018-11-06
Payer: MEDICARE

## 2018-12-04 ENCOUNTER — HOSPITAL ENCOUNTER (OUTPATIENT)
Dept: INFUSION THERAPY | Age: 78
Discharge: HOME | End: 2018-12-04
Payer: MEDICARE

## 2018-12-04 VITALS — OXYGEN SATURATION: 95 %

## 2018-12-04 DIAGNOSIS — D46.20 MYELODYSPLASTIC SYNDROME, LOW GRADE (HCC): Primary | ICD-10-CM

## 2018-12-04 LAB
BASOPHILS # BLD: 0.1 K/UL (ref 0–0.1)
BASOPHILS NFR BLD: 1 % (ref 0–1)
DIFFERENTIAL METHOD BLD: ABNORMAL
EOSINOPHIL # BLD: 0.5 K/UL (ref 0–0.4)
EOSINOPHIL NFR BLD: 5 % (ref 0–7)
ERYTHROCYTE [DISTWIDTH] IN BLOOD BY AUTOMATED COUNT: 17.4 % (ref 11.5–14.5)
HCT VFR BLD AUTO: 34.7 % (ref 36.6–50.3)
HGB BLD-MCNC: 11.4 G/DL (ref 12.1–17)
IMM GRANULOCYTES # BLD: 0 K/UL (ref 0–0.04)
IMM GRANULOCYTES NFR BLD AUTO: 0 % (ref 0–0.5)
LYMPHOCYTES # BLD: 2.5 K/UL (ref 0.8–3.5)
LYMPHOCYTES NFR BLD: 28 % (ref 12–49)
MCH RBC QN AUTO: 33.4 PG (ref 26–34)
MCHC RBC AUTO-ENTMCNC: 32.9 G/DL (ref 30–36.5)
MCV RBC AUTO: 101.8 FL (ref 80–99)
MONOCYTES # BLD: 0.7 K/UL (ref 0–1)
MONOCYTES NFR BLD: 8 % (ref 5–13)
NEUTS SEG # BLD: 5.2 K/UL (ref 1.8–8)
NEUTS SEG NFR BLD: 58 % (ref 32–75)
NRBC # BLD: 0.03 K/UL (ref 0–0.01)
NRBC BLD-RTO: 0.3 PER 100 WBC
PLATELET # BLD AUTO: 322 K/UL (ref 150–400)
PMV BLD AUTO: 10.8 FL (ref 8.9–12.9)
RBC # BLD AUTO: 3.41 M/UL (ref 4.1–5.7)
WBC # BLD AUTO: 8.9 K/UL (ref 4.1–11.1)

## 2018-12-04 PROCEDURE — 85025 COMPLETE CBC W/AUTO DIFF WBC: CPT

## 2018-12-04 PROCEDURE — 36415 COLL VENOUS BLD VENIPUNCTURE: CPT

## 2018-12-04 NOTE — PROGRESS NOTES
[facility] [place] 
Ar[place] [address] [facility] Labs obtained: CBC Visit Vitals BP (P) 114/68 (BP 1 Location: Left arm, BP Patient Position: Sitting) Pulse (P) 75 Temp (P) 98.3 °F (36.8 °C) Resp (P) 18 SpO2 95% Recent Results (from the past 12 hour(s)) CBC WITH AUTOMATED DIFF Collection Time: 12/04/18  8:45 AM  
Result Value Ref Range WBC 8.9 4.1 - 11.1 K/uL  
 RBC 3.41 (L) 4.10 - 5.70 M/uL  
 HGB 11.4 (L) 12.1 - 17.0 g/dL HCT 34.7 (L) 36.6 - 50.3 % .8 (H) 80.0 - 99.0 FL  
 MCH 33.4 26.0 - 34.0 PG  
 MCHC 32.9 30.0 - 36.5 g/dL  
 RDW 17.4 (H) 11.5 - 14.5 % PLATELET 115 335 - 744 K/uL MPV 10.8 8.9 - 12.9 FL  
 NRBC 0.3 (H) 0  WBC ABSOLUTE NRBC 0.03 (H) 0.00 - 0.01 K/uL NEUTROPHILS 58 32 - 75 % LYMPHOCYTES 28 12 - 49 % MONOCYTES 8 5 - 13 % EOSINOPHILS 5 0 - 7 % BASOPHILS 1 0 - 1 % IMMATURE GRANULOCYTES 0 0.0 - 0.5 % ABS. NEUTROPHILS 5.2 1.8 - 8.0 K/UL  
 ABS. LYMPHOCYTES 2.5 0.8 - 3.5 K/UL  
 ABS. MONOCYTES 0.7 0.0 - 1.0 K/UL  
 ABS. EOSINOPHILS 0.5 (H) 0.0 - 0.4 K/UL  
 ABS. BASOPHILS 0.1 0.0 - 0.1 K/UL  
 ABS. IMM. GRANS. 0.0 0.00 - 0.04 K/UL  
 DF AUTOMATED Assessment - unchanged, except for R hip pain. Pt states he's in need of hip replacement ARANESP HELD PER ORDER 
 
0910 - Pt denies any acute problems/changes. Discharged from [facility] ambulatory. No distress. Next appt: 2/26/19 at 0900

## 2018-12-12 ENCOUNTER — HOSPITAL ENCOUNTER (OUTPATIENT)
Dept: GENERAL RADIOLOGY | Age: 78
Discharge: HOME | End: 2018-12-12
Attending: ORTHOPAEDIC SURGERY
Payer: MEDICARE

## 2018-12-12 DIAGNOSIS — M16.11 PRIMARY OSTEOARTHRITIS OF RIGHT HIP: ICD-10-CM

## 2018-12-12 PROCEDURE — 74011250636 HC RX REV CODE- 250/636: Performed by: RADIOLOGY

## 2018-12-12 PROCEDURE — 77030014113 XR INJ ASP LARGE JOINT / BURSA

## 2018-12-12 PROCEDURE — 74011000250 HC RX REV CODE- 250: Performed by: RADIOLOGY

## 2018-12-12 PROCEDURE — 74011636320 HC RX REV CODE- 636/320: Performed by: RADIOLOGY

## 2018-12-12 RX ORDER — BUPIVACAINE HYDROCHLORIDE 5 MG/ML
3 INJECTION, SOLUTION EPIDURAL; INTRACAUDAL
Status: COMPLETED | OUTPATIENT
Start: 2018-12-12 | End: 2018-12-12

## 2018-12-12 RX ORDER — TRIAMCINOLONE ACETONIDE 40 MG/ML
40 INJECTION, SUSPENSION INTRA-ARTICULAR; INTRAMUSCULAR
Status: COMPLETED | OUTPATIENT
Start: 2018-12-12 | End: 2018-12-12

## 2018-12-12 RX ORDER — LIDOCAINE HYDROCHLORIDE 10 MG/ML
2 INJECTION, SOLUTION EPIDURAL; INFILTRATION; INTRACAUDAL; PERINEURAL
Status: COMPLETED | OUTPATIENT
Start: 2018-12-12 | End: 2018-12-12

## 2018-12-12 RX ADMIN — BUPIVACAINE HYDROCHLORIDE 15 MG: 5 INJECTION, SOLUTION EPIDURAL; INTRACAUDAL; PERINEURAL at 11:00

## 2018-12-12 RX ADMIN — TRIAMCINOLONE ACETONIDE 40 MG: 40 INJECTION, SUSPENSION INTRA-ARTICULAR; INTRAMUSCULAR at 11:00

## 2018-12-12 RX ADMIN — IOHEXOL 3 ML: 180 INJECTION INTRAVENOUS at 11:00

## 2018-12-12 RX ADMIN — LIDOCAINE HYDROCHLORIDE 2 ML: 10 INJECTION, SOLUTION EPIDURAL; INFILTRATION; INTRACAUDAL; PERINEURAL at 11:00

## 2019-02-26 ENCOUNTER — HOSPITAL ENCOUNTER (OUTPATIENT)
Dept: INFUSION THERAPY | Age: 79
Discharge: HOME | End: 2019-02-26
Payer: MEDICARE

## 2019-02-26 VITALS
SYSTOLIC BLOOD PRESSURE: 151 MMHG | RESPIRATION RATE: 18 BRPM | HEART RATE: 86 BPM | OXYGEN SATURATION: 96 % | TEMPERATURE: 97.8 F | DIASTOLIC BLOOD PRESSURE: 88 MMHG

## 2019-02-26 DIAGNOSIS — D46.20 MYELODYSPLASTIC SYNDROME, LOW GRADE (HCC): Primary | ICD-10-CM

## 2019-02-26 LAB
BASOPHILS # BLD: 0.1 K/UL (ref 0–0.1)
BASOPHILS NFR BLD: 1 % (ref 0–1)
DIFFERENTIAL METHOD BLD: ABNORMAL
EOSINOPHIL # BLD: 0.3 K/UL (ref 0–0.4)
EOSINOPHIL NFR BLD: 3 % (ref 0–7)
ERYTHROCYTE [DISTWIDTH] IN BLOOD BY AUTOMATED COUNT: 17.4 % (ref 11.5–14.5)
HCT VFR BLD AUTO: 34.8 % (ref 36.6–50.3)
HGB BLD-MCNC: 11.4 G/DL (ref 12.1–17)
IMM GRANULOCYTES # BLD AUTO: 0 K/UL (ref 0–0.04)
IMM GRANULOCYTES NFR BLD AUTO: 0 % (ref 0–0.5)
LYMPHOCYTES # BLD: 1.9 K/UL (ref 0.8–3.5)
LYMPHOCYTES NFR BLD: 22 % (ref 12–49)
MCH RBC QN AUTO: 33.1 PG (ref 26–34)
MCHC RBC AUTO-ENTMCNC: 32.8 G/DL (ref 30–36.5)
MCV RBC AUTO: 101.2 FL (ref 80–99)
MONOCYTES # BLD: 0.5 K/UL (ref 0–1)
MONOCYTES NFR BLD: 6 % (ref 5–13)
NEUTS SEG # BLD: 5.9 K/UL (ref 1.8–8)
NEUTS SEG NFR BLD: 68 % (ref 32–75)
NRBC # BLD: 0.02 K/UL (ref 0–0.01)
NRBC BLD-RTO: 0.2 PER 100 WBC
PLATELET # BLD AUTO: 351 K/UL (ref 150–400)
PMV BLD AUTO: 11 FL (ref 8.9–12.9)
RBC # BLD AUTO: 3.44 M/UL (ref 4.1–5.7)
WBC # BLD AUTO: 8.7 K/UL (ref 4.1–11.1)

## 2019-02-26 PROCEDURE — 36415 COLL VENOUS BLD VENIPUNCTURE: CPT

## 2019-02-26 PROCEDURE — 85025 COMPLETE CBC W/AUTO DIFF WBC: CPT

## 2019-05-08 ENCOUNTER — HOSPITAL ENCOUNTER (OUTPATIENT)
Dept: GENERAL RADIOLOGY | Age: 79
Discharge: HOME | End: 2019-05-08
Payer: MEDICARE

## 2019-05-08 DIAGNOSIS — R05.9 COUGH: ICD-10-CM

## 2019-05-08 PROCEDURE — 71046 X-RAY EXAM CHEST 2 VIEWS: CPT

## 2019-05-17 ENCOUNTER — HOSPITAL ENCOUNTER (OUTPATIENT)
Dept: GENERAL RADIOLOGY | Age: 79
Discharge: HOME | End: 2019-05-17
Payer: MEDICARE

## 2019-05-17 DIAGNOSIS — R05.9 COUGH: ICD-10-CM

## 2019-05-17 DIAGNOSIS — R09.89 CHEST CONGESTION: ICD-10-CM

## 2019-05-17 PROCEDURE — 71046 X-RAY EXAM CHEST 2 VIEWS: CPT

## 2019-05-21 ENCOUNTER — APPOINTMENT (OUTPATIENT)
Dept: INFUSION THERAPY | Age: 79
End: 2019-05-21

## 2019-05-22 ENCOUNTER — OFFICE VISIT (OUTPATIENT)
Dept: ONCOLOGY | Age: 79
End: 2019-05-22

## 2019-05-22 ENCOUNTER — HOSPITAL ENCOUNTER (OUTPATIENT)
Dept: INFUSION THERAPY | Age: 79
Discharge: HOME | End: 2019-05-22
Payer: MEDICARE

## 2019-05-22 VITALS
DIASTOLIC BLOOD PRESSURE: 82 MMHG | RESPIRATION RATE: 18 BRPM | TEMPERATURE: 98 F | HEIGHT: 70 IN | BODY MASS INDEX: 35.79 KG/M2 | SYSTOLIC BLOOD PRESSURE: 130 MMHG | OXYGEN SATURATION: 96 % | HEART RATE: 77 BPM | WEIGHT: 250 LBS

## 2019-05-22 VITALS
DIASTOLIC BLOOD PRESSURE: 84 MMHG | HEART RATE: 68 BPM | SYSTOLIC BLOOD PRESSURE: 142 MMHG | RESPIRATION RATE: 18 BRPM | TEMPERATURE: 98 F | OXYGEN SATURATION: 97 %

## 2019-05-22 DIAGNOSIS — D46.20 MYELODYSPLASTIC SYNDROME, LOW GRADE (HCC): Primary | ICD-10-CM

## 2019-05-22 LAB
BASOPHILS # BLD: 0.1 K/UL (ref 0–0.1)
BASOPHILS NFR BLD: 1 % (ref 0–1)
DIFFERENTIAL METHOD BLD: ABNORMAL
EOSINOPHIL # BLD: 0.2 K/UL (ref 0–0.4)
EOSINOPHIL NFR BLD: 3 % (ref 0–7)
ERYTHROCYTE [DISTWIDTH] IN BLOOD BY AUTOMATED COUNT: 18.1 % (ref 11.5–14.5)
HCT VFR BLD AUTO: 34 % (ref 36.6–50.3)
HGB BLD-MCNC: 11 G/DL (ref 12.1–17)
IMM GRANULOCYTES # BLD AUTO: 0 K/UL (ref 0–0.04)
IMM GRANULOCYTES NFR BLD AUTO: 1 % (ref 0–0.5)
LYMPHOCYTES # BLD: 1.8 K/UL (ref 0.8–3.5)
LYMPHOCYTES NFR BLD: 28 % (ref 12–49)
MCH RBC QN AUTO: 33.1 PG (ref 26–34)
MCHC RBC AUTO-ENTMCNC: 32.4 G/DL (ref 30–36.5)
MCV RBC AUTO: 102.4 FL (ref 80–99)
MONOCYTES # BLD: 0.4 K/UL (ref 0–1)
MONOCYTES NFR BLD: 7 % (ref 5–13)
NEUTS SEG # BLD: 3.9 K/UL (ref 1.8–8)
NEUTS SEG NFR BLD: 60 % (ref 32–75)
NRBC # BLD: 0.03 K/UL (ref 0–0.01)
NRBC BLD-RTO: 0.5 PER 100 WBC
PLATELET # BLD AUTO: 277 K/UL (ref 150–400)
PMV BLD AUTO: 11.4 FL (ref 8.9–12.9)
RBC # BLD AUTO: 3.32 M/UL (ref 4.1–5.7)
WBC # BLD AUTO: 6.5 K/UL (ref 4.1–11.1)

## 2019-05-22 PROCEDURE — 36415 COLL VENOUS BLD VENIPUNCTURE: CPT

## 2019-05-22 PROCEDURE — 85025 COMPLETE CBC W/AUTO DIFF WBC: CPT

## 2019-05-22 NOTE — PROGRESS NOTES
Follow up Note        Patient: Andrew Ledbetter MRN: [id number]  SSN: [id number]    YOB: 1940  Age: 70 y.o. Sex: male      Subjective:      Andrew Ledbetter is a 70 y.o. 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 He received Aranesp regularly up until March 2016. His hemoglobin remains normal and his energy has improved. He continues to feel well and does not verbalize any new complaints. Last Aranesp was in October 2017. Review of Systems:    Constitutional: negative  Eyes: negative  Ears, Nose, Mouth, Throat, and Face: negative  Respiratory: negative  Cardiovascular: negative  Gastrointestinal: negative  Integument/Breast: negative  Hematologic/Lymphatic: negative  Musculoskeletal:negative  Neurological: negative      Past Medical History:   Diagnosis Date    Basal cell cancer     Benign prostatic hypertrophy     Frequency of urination     GERD (gastroesophageal reflux disease)     Hypertension      Past Surgical History:   Procedure Laterality Date    HX CATARACT REMOVAL      HX CHOLECYSTECTOMY      HX HEENT      tonsillectomy    HX MOHS PROCEDURES  7/11    Rt    HX OTHER SURGICAL      jaw surgery; \"shortened it\" per pt    HX OTHER SURGICAL  12/08/14    basal cell removed from left side of forehead    HX SHOULDER ARTHROSCOPY      Rt shoulder ligament repair      Family History   Problem Relation Age of Onset    Hypertension Father      Social History     Tobacco Use    Smoking status: Never Smoker    Smokeless tobacco: Never Used   Substance Use Topics    Alcohol use: No      Prior to Admission medications    Medication Sig Start Date End Date Taking? Authorizing Provider   omeprazole (PRILOSEC) 20 mg capsule  9/19/18  Yes Provider, Historical   propranolol (INDERAL) 20 mg tablet Take 20 mg by mouth daily.    Yes Other, MD Aubrie   Glucosamine &Chondroit-MV-Min3 797-927-20-0.5 mg tab Take  by mouth two (2) times a day. Yes Provider, Historical   finasteride (PROSCAR) 5 mg tablet Take 5 mg by mouth every morning. Indications: BENIGN PROSTATIC HYPERTROPHY   Yes Provider, Historical   lisinopril (PRINIVIL, ZESTRIL) 40 mg tablet Take 40 mg by mouth every morning. Indications: HYPERTENSION   Yes Provider, Historical   Omeprazole delayed release (PRILOSEC D/R) 20 mg tablet Take 20 mg by mouth every morning. Yes Provider, Historical   Hydrochlorothiazide 12.5 mg tablet Take 12.5 mg by mouth every morning. Indications: HYPERTENSION   Yes Provider, Historical   doxazosin (CARDURA) 1 mg tablet Take 1 mg by mouth daily. Other, MD Aubrie          No Known Allergies        Objective:     Visit Vitals  /82 (BP 1 Location: Right arm, BP Patient Position: Sitting)   Pulse 77   Temp 98 °F (36.7 °C) (Oral)   Resp 18   Ht 5' 10\" (1.778 m)   Wt 250 lb (113.4 kg)   SpO2 96%   BMI 35.87 kg/m²       Pain Scale: 0 - No pain/10  Pain Location:       Physical Exam:    GENERAL: alert, cooperative, obese  EYE: negative  LYMPHATIC: Cervical, supraclavicular, and axillary nodes normal.   THROAT & NECK: normal and no erythema or exudates noted. LUNG: clear to auscultation bilaterally  HEART: regular rate and rhythm  ABDOMEN: soft, non-tender  EXTREMITIES: no cyanosis or edema  SKIN: Normal.  NEUROLOGIC: negative      LABS:     Lab Results   Component Value Date/Time    WBC 6.5 05/22/2019 08:31 AM    Hemoglobin (POC) 10.4 (A) 03/14/2016 08:15 AM    HGB 11.0 (L) 05/22/2019 08:31 AM    HCT 34.0 (L) 05/22/2019 08:31 AM    PLATELET 266 40/02/2567 08:31 AM    .4 (H) 05/22/2019 08:31 AM           Assessment:     1. Myelodysplastic Syndrome   Refractory anemia with ringed sideroblasts   IPSS-R score = 2, low risk   IPSS-R, age adjusted, score = 2.08.  Low risk    ECOG PS 0  Intent of therapy - palliative    > Received Aranesp regularly between 10/2014 to 3/2016.   > Last Aranesp in Oct 2017  > Feels well.   > Hgb is normal.   > Aranesp when Hgb < 11. Symptom management form reviewed with patient. 2. Severe Obesity    Encouraged him to loose weight. Plan:       > Labs every 3 months  > Repeat bone marrow in the coming months  > Follow-up in 6 months        Signed by: Kamar Sanchez MD                     May 22, 2019        CC.  German Peña MD

## 2019-05-22 NOTE — PROGRESS NOTES
Identified pt with two pt identifiers(name and ). Reviewed record in preparation for visit and have obtained necessary documentation. Chief Complaint   Patient presents with    Anemia     Macrocytic Anemia 4 month follow up      Visit Vitals  /82 (BP 1 Location: Right arm, BP Patient Position: Sitting)   Pulse 77   Temp 98 °F (36.7 °C) (Oral)   Resp 18   Ht 5' 10\" (1.778 m)   Wt 250 lb (113.4 kg)   SpO2 96%   BMI 35.87 kg/m²       Health Maintenance Due   Topic    DTaP/Tdap/Td series (1 - Tdap)    Shingrix Vaccine Age 50> (1 of 2)    GLAUCOMA SCREENING Q2Y     Pneumococcal 65+ years (1 of 2 - PCV13)    MEDICARE YEARLY EXAM        Coordination of Care Questionnaire:  :   1) Have you been to an emergency room, urgent care, or hospitalized since your last visit? If yes, where when, and reason for visit? no       2. Have seen or consulted any other health care provider since your last visit? If yes, where when, and reason for visit? NO      3) Do you have an Advanced Directive/ Living Will in place? NO  If yes, do we have a copy on file NO  If no, would you like information NO    Patient is accompanied by self I have received verbal consent from Anai Berrios to discuss any/all medical information while they are present in the room.

## 2019-05-22 NOTE — PROGRESS NOTES
0825 Pt arrived at [facility] ambulatory and in no distress for Aranesp. Assessment completed, no new complaints voiced. Visit Vitals  /84 (BP 1 Location: Left arm, BP Patient Position: Sitting)   Pulse 68   Temp 98 °F (36.7 °C)   Resp 18   SpO2 97%     CBC with diff drawn from left AC. Recent Results (from the past 12 hour(s))   CBC WITH AUTOMATED DIFF    Collection Time: 05/22/19  8:31 AM   Result Value Ref Range    WBC 6.5 4.1 - 11.1 K/uL    RBC 3.32 (L) 4.10 - 5.70 M/uL    HGB 11.0 (L) 12.1 - 17.0 g/dL    HCT 34.0 (L) 36.6 - 50.3 %    .4 (H) 80.0 - 99.0 FL    MCH 33.1 26.0 - 34.0 PG    MCHC 32.4 30.0 - 36.5 g/dL    RDW 18.1 (H) 11.5 - 14.5 %    PLATELET 615 380 - 049 K/uL    MPV 11.4 8.9 - 12.9 FL    NRBC 0.5 (H) 0  WBC    ABSOLUTE NRBC 0.03 (H) 0.00 - 0.01 K/uL    NEUTROPHILS 60 32 - 75 %    LYMPHOCYTES 28 12 - 49 %    MONOCYTES 7 5 - 13 %    EOSINOPHILS 3 0 - 7 %    BASOPHILS 1 0 - 1 %    IMMATURE GRANULOCYTES 1 (H) 0.0 - 0.5 %    ABS. NEUTROPHILS 3.9 1.8 - 8.0 K/UL    ABS. LYMPHOCYTES 1.8 0.8 - 3.5 K/UL    ABS. MONOCYTES 0.4 0.0 - 1.0 K/UL    ABS. EOSINOPHILS 0.2 0.0 - 0.4 K/UL    ABS. BASOPHILS 0.1 0.0 - 0.1 K/UL    ABS. IMM. GRANS. 0.0 0.00 - 0.04 K/UL    DF AUTOMATED       Hgb 11.0, Aranesp held per order parameters. 0850 Tolerated treatment well, no adverse reaction noted. D/Cd from [facility] ambulatory and in no distress accompanied by self. Next appt 8/14/19 @ 3113.

## 2019-06-12 ENCOUNTER — TELEPHONE (OUTPATIENT)
Dept: ONCOLOGY | Age: 79
End: 2019-06-12

## 2019-06-12 NOTE — TELEPHONE ENCOUNTER
Patient called and stated that he had no idea about his bone marrow biopsy and he needs to know what time it is.      # [phone]

## 2019-06-14 ENCOUNTER — DOCUMENTATION ONLY (OUTPATIENT)
Dept: ONCOLOGY | Age: 79
End: 2019-06-14

## 2019-06-14 NOTE — PROGRESS NOTES
canceled BM Bx on 6/18  d/t pt seeing a cardiologist at [place] [facility] for cardiac issues. Patient needs a cardiac clearance before pt can be rescheduled for Bx.

## 2019-08-15 ENCOUNTER — HOSPITAL ENCOUNTER (OUTPATIENT)
Dept: INFUSION THERAPY | Age: 79
Discharge: HOME | End: 2019-08-15
Payer: MEDICARE

## 2019-08-15 VITALS
TEMPERATURE: 98.1 F | SYSTOLIC BLOOD PRESSURE: 157 MMHG | HEART RATE: 77 BPM | OXYGEN SATURATION: 94 % | DIASTOLIC BLOOD PRESSURE: 109 MMHG | RESPIRATION RATE: 18 BRPM

## 2019-08-15 LAB
BASOPHILS # BLD: 0.1 K/UL (ref 0–0.1)
BASOPHILS NFR BLD: 1 % (ref 0–1)
DIFFERENTIAL METHOD BLD: ABNORMAL
EOSINOPHIL # BLD: 0.2 K/UL (ref 0–0.4)
EOSINOPHIL NFR BLD: 2 % (ref 0–7)
ERYTHROCYTE [DISTWIDTH] IN BLOOD BY AUTOMATED COUNT: 19.7 % (ref 11.5–14.5)
HCT VFR BLD AUTO: 37.4 % (ref 36.6–50.3)
HGB BLD-MCNC: 11.8 G/DL (ref 12.1–17)
IMM GRANULOCYTES # BLD AUTO: 0.1 K/UL (ref 0–0.04)
IMM GRANULOCYTES NFR BLD AUTO: 1 % (ref 0–0.5)
LYMPHOCYTES # BLD: 2.4 K/UL (ref 0.8–3.5)
LYMPHOCYTES NFR BLD: 27 % (ref 12–49)
MCH RBC QN AUTO: 32.2 PG (ref 26–34)
MCHC RBC AUTO-ENTMCNC: 31.6 G/DL (ref 30–36.5)
MCV RBC AUTO: 102.2 FL (ref 80–99)
MONOCYTES # BLD: 0.7 K/UL (ref 0–1)
MONOCYTES NFR BLD: 8 % (ref 5–13)
NEUTS SEG # BLD: 5.5 K/UL (ref 1.8–8)
NEUTS SEG NFR BLD: 61 % (ref 32–75)
NRBC # BLD: 0.02 K/UL (ref 0–0.01)
NRBC BLD-RTO: 0.2 PER 100 WBC
PLATELET # BLD AUTO: 308 K/UL (ref 150–400)
PMV BLD AUTO: 11.6 FL (ref 8.9–12.9)
RBC # BLD AUTO: 3.66 M/UL (ref 4.1–5.7)
WBC # BLD AUTO: 9 K/UL (ref 4.1–11.1)

## 2019-08-15 PROCEDURE — 85025 COMPLETE CBC W/AUTO DIFF WBC: CPT

## 2019-08-15 PROCEDURE — 36415 COLL VENOUS BLD VENIPUNCTURE: CPT

## 2019-08-15 NOTE — PROGRESS NOTES
[address] [facility] Stay Note:  Arrived - [facility]  Labs obtained: CBC    Visit Vitals  BP (!) 157/109 (BP 1 Location: Left arm, BP Patient Position: Sitting)   Pulse 77   Temp 98.1 °F (36.7 °C)   Resp 18   SpO2 94%     Recent Results (from the past 12 hour(s))   CBC WITH AUTOMATED DIFF    Collection Time: 08/15/19  8:17 AM   Result Value Ref Range    WBC 9.0 4.1 - 11.1 K/uL    RBC 3.66 (L) 4.10 - 5.70 M/uL    HGB 11.8 (L) 12.1 - 17.0 g/dL    HCT 37.4 36.6 - 50.3 %    .2 (H) 80.0 - 99.0 FL    MCH 32.2 26.0 - 34.0 PG    MCHC 31.6 30.0 - 36.5 g/dL    RDW 19.7 (H) 11.5 - 14.5 %    PLATELET 101 890 - 682 K/uL    MPV 11.6 8.9 - 12.9 FL    NRBC 0.2 (H) 0  WBC    ABSOLUTE NRBC 0.02 (H) 0.00 - 0.01 K/uL    NEUTROPHILS 61 32 - 75 %    LYMPHOCYTES 27 12 - 49 %    MONOCYTES 8 5 - 13 %    EOSINOPHILS 2 0 - 7 %    BASOPHILS 1 0 - 1 %    IMMATURE GRANULOCYTES 1 (H) 0.0 - 0.5 %    ABS. NEUTROPHILS 5.5 1.8 - 8.0 K/UL    ABS. LYMPHOCYTES 2.4 0.8 - 3.5 K/UL    ABS. MONOCYTES 0.7 0.0 - 1.0 K/UL    ABS. EOSINOPHILS 0.2 0.0 - 0.4 K/UL    ABS. BASOPHILS 0.1 0.0 - 0.1 K/UL    ABS. IMM. GRANS. 0.1 (H) 0.00 - 0.04 K/UL    DF AUTOMATED       Assessment - unchanged. Retacrit HELD per order    9833 - Tolerated well. Pt denies any acute problems/changes. Discharged from [facility] ambulatory. No distress.  Next appt: 11/6/19 at 0830

## 2019-11-06 ENCOUNTER — HOSPITAL ENCOUNTER (OUTPATIENT)
Dept: INFUSION THERAPY | Age: 79
Discharge: HOME | End: 2019-11-06
Payer: MEDICARE

## 2019-11-06 VITALS
DIASTOLIC BLOOD PRESSURE: 72 MMHG | SYSTOLIC BLOOD PRESSURE: 130 MMHG | RESPIRATION RATE: 18 BRPM | TEMPERATURE: 97.9 F | WEIGHT: 238.2 LBS | HEART RATE: 68 BPM | BODY MASS INDEX: 34.18 KG/M2 | OXYGEN SATURATION: 96 %

## 2019-11-06 DIAGNOSIS — D46.20 MYELODYSPLASTIC SYNDROME, LOW GRADE (HCC): Primary | ICD-10-CM

## 2019-11-06 LAB
BASOPHILS # BLD: 0.1 K/UL (ref 0–0.1)
BASOPHILS NFR BLD: 1 % (ref 0–1)
DIFFERENTIAL METHOD BLD: ABNORMAL
EOSINOPHIL # BLD: 0.2 K/UL (ref 0–0.4)
EOSINOPHIL NFR BLD: 2 % (ref 0–7)
ERYTHROCYTE [DISTWIDTH] IN BLOOD BY AUTOMATED COUNT: 18.6 % (ref 11.5–14.5)
HCT VFR BLD AUTO: 36.2 % (ref 36.6–50.3)
HGB BLD-MCNC: 11.4 G/DL (ref 12.1–17)
IMM GRANULOCYTES # BLD AUTO: 0 K/UL (ref 0–0.04)
IMM GRANULOCYTES NFR BLD AUTO: 0 % (ref 0–0.5)
LYMPHOCYTES # BLD: 2.3 K/UL (ref 0.8–3.5)
LYMPHOCYTES NFR BLD: 27 % (ref 12–49)
MCH RBC QN AUTO: 32.3 PG (ref 26–34)
MCHC RBC AUTO-ENTMCNC: 31.5 G/DL (ref 30–36.5)
MCV RBC AUTO: 102.5 FL (ref 80–99)
MONOCYTES # BLD: 0.5 K/UL (ref 0–1)
MONOCYTES NFR BLD: 6 % (ref 5–13)
NEUTS SEG # BLD: 5.6 K/UL (ref 1.8–8)
NEUTS SEG NFR BLD: 64 % (ref 32–75)
NRBC # BLD: 0.03 K/UL (ref 0–0.01)
NRBC BLD-RTO: 0.3 PER 100 WBC
PLATELET # BLD AUTO: 312 K/UL (ref 150–400)
PMV BLD AUTO: 11.9 FL (ref 8.9–12.9)
RBC # BLD AUTO: 3.53 M/UL (ref 4.1–5.7)
WBC # BLD AUTO: 8.7 K/UL (ref 4.1–11.1)

## 2019-11-06 PROCEDURE — 85025 COMPLETE CBC W/AUTO DIFF WBC: CPT

## 2019-11-06 PROCEDURE — 36415 COLL VENOUS BLD VENIPUNCTURE: CPT

## 2019-11-06 NOTE — PROGRESS NOTES
[address] [facility] [place] Visit Note    8857 Pt arrived at [facility] ambulatory and in no distress for Aranesp. Assessment completed, no new complaints voiced. Visit Vitals  /72 (BP 1 Location: Left arm, BP Patient Position: Sitting)   Pulse 68   Temp 97.9 °F (36.6 °C)   Resp 18   Wt 108 kg (238 lb 3.2 oz)   SpO2 96%   BMI 34.18 kg/m²     Labs obtained- CBC with diff. Recent Results (from the past 12 hour(s))   CBC WITH AUTOMATED DIFF    Collection Time: 11/06/19  8:38 AM   Result Value Ref Range    WBC 8.7 4.1 - 11.1 K/uL    RBC 3.53 (L) 4.10 - 5.70 M/uL    HGB 11.4 (L) 12.1 - 17.0 g/dL    HCT 36.2 (L) 36.6 - 50.3 %    .5 (H) 80.0 - 99.0 FL    MCH 32.3 26.0 - 34.0 PG    MCHC 31.5 30.0 - 36.5 g/dL    RDW 18.6 (H) 11.5 - 14.5 %    PLATELET 333 416 - 327 K/uL    MPV 11.9 8.9 - 12.9 FL    NRBC 0.3 (H) 0  WBC    ABSOLUTE NRBC 0.03 (H) 0.00 - 0.01 K/uL    NEUTROPHILS 64 32 - 75 %    LYMPHOCYTES 27 12 - 49 %    MONOCYTES 6 5 - 13 %    EOSINOPHILS 2 0 - 7 %    BASOPHILS 1 0 - 1 %    IMMATURE GRANULOCYTES 0 0.0 - 0.5 %    ABS. NEUTROPHILS 5.6 1.8 - 8.0 K/UL    ABS. LYMPHOCYTES 2.3 0.8 - 3.5 K/UL    ABS. MONOCYTES 0.5 0.0 - 1.0 K/UL    ABS. EOSINOPHILS 0.2 0.0 - 0.4 K/UL    ABS. BASOPHILS 0.1 0.0 - 0.1 K/UL    ABS. IMM. GRANS. 0.0 0.00 - 0.04 K/UL    DF AUTOMATED       Hgb 11.4    Aranesp held per order parameters. 8771 Tolerated treatment well, no adverse reaction noted. D/Cd from [facility] ambulatory and in no distress accompanied by self. Next appt 1/29/20 @ 1117.

## 2019-11-20 ENCOUNTER — OFFICE VISIT (OUTPATIENT)
Dept: ONCOLOGY | Age: 79
End: 2019-11-20

## 2019-11-20 VITALS
HEART RATE: 82 BPM | RESPIRATION RATE: 16 BRPM | HEIGHT: 70 IN | SYSTOLIC BLOOD PRESSURE: 112 MMHG | TEMPERATURE: 97.7 F | OXYGEN SATURATION: 98 % | BODY MASS INDEX: 34.39 KG/M2 | DIASTOLIC BLOOD PRESSURE: 72 MMHG | WEIGHT: 240.2 LBS

## 2019-11-20 DIAGNOSIS — D46.20 MYELODYSPLASTIC SYNDROME, LOW GRADE (HCC): Primary | ICD-10-CM

## 2019-11-20 NOTE — PROGRESS NOTES
Follow up Note        Patient: Ortiz Hernandez MRN: [id number]  SSN: [id number]    YOB: 1940  Age: 70 y.o. Sex: male      Subjective:      Ortiz Hernandez is a 70 y.o. 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 He received Aranesp regularly up until March 2016. His hemoglobin remains normal and his energy has improved. He continues to feel well and does not verbalize any new complaints. Last Aranesp was in October 2017. Review of Systems:    Constitutional: negative  Eyes: negative  Ears, Nose, Mouth, Throat, and Face: negative  Respiratory: negative  Cardiovascular: negative  Gastrointestinal: negative  Integument/Breast: negative  Hematologic/Lymphatic: negative  Musculoskeletal:negative  Neurological: negative      Past Medical History:   Diagnosis Date    [facility]    At risk for sleep apnea     SCORE 5    Basal cell cancer     Benign prostatic hypertrophy     Frequency of urination     GERD (gastroesophageal reflux disease)     Hypertension     Myelodysplasia (myelodysplastic syndrome) (HCC)      Past Surgical History:   Procedure Laterality Date    HX CATARACT REMOVAL Bilateral     HX CHOLECYSTECTOMY      HX MOHS PROCEDURES  7/11    Rt    HX OTHER SURGICAL  1990    jaw surgery; \"shortened it\" per pt    HX OTHER SURGICAL  12/08/14    basal cell removed from left side of forehead    HX SHOULDER ARTHROSCOPY      Rt shoulder ligament repair    HX TONSILLECTOMY        Family History   Problem Relation Age of Onset    Hypertension Father      Social History     Tobacco Use    Smoking status: Never Smoker    Smokeless tobacco: Never Used   Substance Use Topics    Alcohol use: No      Prior to Admission medications    Medication Sig Start Date End Date Taking? Authorizing Provider   tamsulosin (FLOMAX) 0.4 mg capsule Take 0.4 mg by mouth daily.     Provider, Historical   omeprazole (PRILOSEC) 20 mg capsule  9/19/18   Provider, Historical   propranolol (INDERAL) 20 mg tablet Take 20 mg by mouth daily. Other, MD Aubrie   doxazosin (CARDURA) 1 mg tablet Take 1 mg by mouth daily. Other, MD Aubrie   Glucosamine &Chondroit-MV-Min3 156-289-17-0.5 mg tab Take  by mouth two (2) times a day. Provider, Historical   lisinopril (PRINIVIL, ZESTRIL) 40 mg tablet Take 40 mg by mouth every morning. Indications: HYPERTENSION    Provider, Historical          No Known Allergies        Objective:     Visit Vitals  /72 (BP 1 Location: Left arm, BP Patient Position: At rest)   Pulse 82   Temp 97.7 °F (36.5 °C) (Oral)   Resp 16   Ht 5' 10\" (1.778 m)   Wt 240 lb 3.2 oz (109 kg)   SpO2 98% Comment: RA   BMI 34.47 kg/m²       Pain Scale: 0 - No pain/10  Pain Location:       Physical Exam:    GENERAL: alert, cooperative, obese  EYE: negative  LYMPHATIC: Cervical, supraclavicular, and axillary nodes normal.   THROAT & NECK: normal and no erythema or exudates noted. LUNG: clear to auscultation bilaterally  HEART: regular rate and rhythm  ABDOMEN: soft, non-tender  EXTREMITIES: no cyanosis or edema  SKIN: Normal.  NEUROLOGIC: negative      LABS:     Lab Results   Component Value Date/Time    WBC 8.7 11/06/2019 08:38 AM    Hemoglobin (POC) 10.4 (A) 03/14/2016 08:15 AM    HGB 11.4 (L) 11/06/2019 08:38 AM    HCT 36.2 (L) 11/06/2019 08:38 AM    PLATELET 624 54/53/4624 08:38 AM    .5 (H) 11/06/2019 08:38 AM           Assessment:     1. Myelodysplastic Syndrome   Refractory anemia with ringed sideroblasts   IPSS-R score = 2, low risk   IPSS-R, age adjusted, score = 2.08. Low risk    ECOG PS 0  Intent of therapy - palliative    > Received Aranesp regularly between 10/2014 to 3/2016.   > Last Aranesp in Oct 2017  > Feels well.   > Hgb is normal.   > Aranesp when Hgb < 11. Symptom management form reviewed with patient. 2. Severe Obesity    Encouraged him to loose weight.        Plan:       > Labs every 3 months  > Follow-up in 1 yr      Signed by: Hugo Guzmán MD                     November 20, 2019        CC.  Lexi Almendarez MD

## 2019-11-20 NOTE — PROGRESS NOTES
69 y/o cauc male here for f/u appt for MDS. Pt gets labs checked for aranesp in [facility]. He has not needed in a while. 1. Have you been to the ER, urgent care clinic since your last visit? Hospitalized since your last visit? Yes pt had a cardiac version done in August at [facility]    2. Have you seen or consulted any other health care providers outside of the [address] [place] [address] since your last visit? Include any pap smears or colon screening.  PCP last week

## 2020-01-29 ENCOUNTER — APPOINTMENT (OUTPATIENT)
Dept: INFUSION THERAPY | Age: 80
End: 2020-01-29

## 2020-04-22 ENCOUNTER — HOSPITAL ENCOUNTER (OUTPATIENT)
Dept: INFUSION THERAPY | Age: 80
End: 2020-04-22

## 2020-11-17 ENCOUNTER — TELEPHONE (OUTPATIENT)
Dept: ONCOLOGY | Age: 80
End: 2020-11-17

## 2020-11-17 NOTE — TELEPHONE ENCOUNTER
Called to R/S but patient wasn't sure why he was coming here. I mentioned he needed labs but has not had any done. Marlyn Werner he was going to see Dr Claudeen Console and I suggested he have them done when he goes there. Marlyn Werner he has had a lot going on with his wife so he has been focused on this. Did not see any active labs for hi.

## 2021-03-16 NOTE — TELEPHONE ENCOUNTER
Called pt. He stated his PCP draws blood work everytime he sees him. He stated if he is needed to be seen by us his PCP will send a msg.

## 2021-10-29 VITALS — WEIGHT: 240 LBS | HEIGHT: 70 IN | BODY MASS INDEX: 34.36 KG/M2

## 2021-10-29 PROBLEM — M16.11 OSTEOARTHRITIS OF RIGHT HIP: Status: ACTIVE | Noted: 2021-10-29

## 2021-11-16 ENCOUNTER — OFFICE VISIT (OUTPATIENT)
Dept: ORTHOPEDIC SURGERY | Age: 81
End: 2021-11-16
Payer: MEDICARE

## 2021-11-16 VITALS — BODY MASS INDEX: 34.07 KG/M2 | WEIGHT: 230 LBS | HEIGHT: 69 IN

## 2021-11-16 DIAGNOSIS — M54.50 LOW BACK PAIN, UNSPECIFIED BACK PAIN LATERALITY, UNSPECIFIED CHRONICITY, UNSPECIFIED WHETHER SCIATICA PRESENT: ICD-10-CM

## 2021-11-16 DIAGNOSIS — M51.36 DDD (DEGENERATIVE DISC DISEASE), LUMBAR: Primary | ICD-10-CM

## 2021-11-16 PROCEDURE — G8427 DOCREV CUR MEDS BY ELIG CLIN: HCPCS | Performed by: STUDENT IN AN ORGANIZED HEALTH CARE EDUCATION/TRAINING PROGRAM

## 2021-11-16 PROCEDURE — 1101F PT FALLS ASSESS-DOCD LE1/YR: CPT | Performed by: STUDENT IN AN ORGANIZED HEALTH CARE EDUCATION/TRAINING PROGRAM

## 2021-11-16 PROCEDURE — 99214 OFFICE O/P EST MOD 30 MIN: CPT | Performed by: STUDENT IN AN ORGANIZED HEALTH CARE EDUCATION/TRAINING PROGRAM

## 2021-11-16 PROCEDURE — G8417 CALC BMI ABV UP PARAM F/U: HCPCS | Performed by: STUDENT IN AN ORGANIZED HEALTH CARE EDUCATION/TRAINING PROGRAM

## 2021-11-16 PROCEDURE — G8432 DEP SCR NOT DOC, RNG: HCPCS | Performed by: STUDENT IN AN ORGANIZED HEALTH CARE EDUCATION/TRAINING PROGRAM

## 2021-11-16 PROCEDURE — G8536 NO DOC ELDER MAL SCRN: HCPCS | Performed by: STUDENT IN AN ORGANIZED HEALTH CARE EDUCATION/TRAINING PROGRAM

## 2021-11-16 NOTE — PROGRESS NOTES
Cindy Son (: 1940) is a 80 y.o. male, patient, here for evaluation of the following chief complaint(s):  LOW BACK PAIN       ASSESSMENT/PLAN:  Below is the assessment and plan developed based on review of pertinent history, physical exam, labs, studies, and medications. Patient presents today for evaluation of chronic low back pain, which is worsened over the last year. Radiologic findings reviewed in detail with the patient. Symptoms are mainly exacerbated when going from sitting to standing, or standing to sitting. Denies lower extremity radiculopathy. Denies acute changes in bowel or bladder control. Mild weakness noted on right iliopsoas on exam, limited by low back pain. After discussion of treatment options with the patient, I would like for him to begin with conservative management. Referral provided to physical therapy today. He does not wish to start any new medications at this time. He will follow up in 6 weeks, at which time we will consider a lumbar MRI if his symptoms have not improved. 1. DDD (degenerative disc disease), lumbar  -     XR SPINE LUMB MIN 4 V; Future  -     REFERRAL TO PHYSICAL THERAPY; Future  2. Low back pain, unspecified back pain laterality, unspecified chronicity, unspecified whether sciatica present  -     REFERRAL TO PHYSICAL THERAPY; Future      Return in about 6 weeks (around 2021) for physical therapy follow up . SUBJECTIVE/OBJECTIVE:  Cindy Son (: 1940) is a 80 y.o. male. Pain Assessment  2021   Severity of Pain 6        Mr Heywood Habermann presents today for evaluation of bilateral low back pain, which he states has been going on for years but has worsened over the last year. He states his symptoms are mainly significantly aggravated when he stands up and when he sits down. He does not report any other exacerbating factors. His symptoms are relieved with rest.  He denies lower extremity radiculopathy.   Denies lower extremity weakness. Denies acute changes in bowel or bladder control. He states he is only taking Tylenol for pain. He has not had any other formal work-up. He has not had a recent MRI. Imaging:    XR Results (most recent):  Results from Appointment encounter on 11/16/21    XR SPINE LUMB MIN 4 V    Narrative  Four views of the lumbar spine were reviewed today. There is a mild degenerative lumbar scoliosis noted. No instability on flexion-extension. No fractures or lytic lesions noted. MRI Results (most recent):  No results found for this or any previous visit. No Known Allergies    Current Outpatient Medications   Medication Sig    tamsulosin (FLOMAX) 0.4 mg capsule Take 0.4 mg by mouth daily.  omeprazole (PRILOSEC) 20 mg capsule     propranolol (INDERAL) 20 mg tablet Take 20 mg by mouth daily.  doxazosin (CARDURA) 1 mg tablet Take 1 mg by mouth daily.  Glucosamine &Chondroit-MV-Min3 879-471-00-0.5 mg tab Take  by mouth two (2) times a day.  lisinopril (PRINIVIL, ZESTRIL) 40 mg tablet Take 40 mg by mouth every morning. Indications: HYPERTENSION     No current facility-administered medications for this visit.        Past Medical History:   Diagnosis Date    A-fib ([facility] [address] 06/12/2019    At risk for sleep apnea     SCORE 5    Basal cell cancer     Benign prostatic hypertrophy     Frequency of urination     GERD (gastroesophageal reflux disease)     Hypertension     Myelodysplasia (myelodysplastic syndrome) (HCC)     Osteoarthritis of right hip 10/29/2021        Past Surgical History:   Procedure Laterality Date    HX CATARACT REMOVAL Bilateral     HX CHOLECYSTECTOMY      HX MOHS PROCEDURES  7/11    Rt    HX OTHER SURGICAL  1990    jaw surgery; \"shortened it\" per pt    HX OTHER SURGICAL  12/08/14    basal cell removed from left side of forehead    HX SHOULDER ARTHROSCOPY      Rt shoulder ligament repair    HX TONSILLECTOMY         Family History   Problem Relation Age of Onset    Hypertension Father         Social History     Tobacco Use    Smoking status: Never Smoker    Smokeless tobacco: Never Used   Substance Use Topics    Alcohol use: No    Drug use: No        Review of Systems   Constitutional: Negative for chills, fatigue and fever. Respiratory: Negative for cough and shortness of breath. Cardiovascular: Negative for chest pain. Gastrointestinal: Negative for nausea and vomiting. Musculoskeletal: Positive for back pain. Negative for neck pain. Skin: Negative for pallor and wound. Neurological: Negative for dizziness, weakness and numbness. Vitals:  Ht 5' 9\" (1.753 m)   Wt 230 lb (104.3 kg)   BMI 33.97 kg/m²    Body mass index is 33.97 kg/m². Physical Exam  Neurologic  Sensory  Light Touch - Intact - Globally. Overall Assessment of Muscle Strength and Tone reveals  Lower Extremities - Right Iliopsoas - 4/5; limited by low back pain. Left Iliopsoas - 5/5. Right Tibialis Anterior - 5/5. Left Tibialis Anterior - 5/5. Right Gastroc-Soleus - 5/5. Left Gastroc-Soleus - 5/5. Right EHL - 5/5. Left EHL - 5/5. General Assessment of Reflexes  Right Ankle - Clonus is not present. Left Ankle - Clonus is not present. Reflexes (Dermatomes)  2/2 Normal - Left Achilles (L5-S2), Left Knee (L2-4), Right Achilles (L5-S2) and Right Knee (L2-4). Musculoskeletal  Global Assessment  Examination of related systems reveals - well-developed, well-nourished, in no acute distress, alert and oriented x 3. Gait and Station - normal gait and station and normal posture. Right Lower Extremity - normal strength and tone, normal range of motion without pain and no instability, subluxation or laxity. Left Lower Extremity - normal strength and tone, normal range of motion without pain and no instability, subluxation or laxity.   Spine/Ribs/Pelvis  Cervical Spine - Examination of the cervical spine reveals - no tenderness to palpation, no pain, no swelling, edema or erythema, normal cervical spine movements and normal sensation. Thoracic (Dorsal) Spine - Examination of the thoracic spine reveals - no tenderness over thoracic vertebrae, no pain, normal sensation and normal thoracic spine movements. Lumbosacral Spine - Examination of the lumbosacral spine reveals - no known fractures or deformities. Inspection and Palpation - Tenderness - moderate. Assessment of pain reveals the following findings - The pain is characterized as - moderate. Location - pain refers to lower back bilaterally. ROJM - Trunk Extension - 15 degrees. Lumbar Spine Flexion - 35 °. Lumbosacral Spine - Functional Testing - Babinski Test negative, Prone Knee Bending Test negative, Slump Test negative, Straight Leg Raising Test negative. Dr. Jesse Fu was available for immediate consult during this encounter. An electronic signature was used to authenticate this note.   -- MARLY Gonzalez

## 2022-03-19 PROBLEM — E66.01 SEVERE OBESITY (BMI 35.0-39.9) WITH COMORBIDITY (HCC): Status: ACTIVE | Noted: 2018-04-11

## 2022-03-19 PROBLEM — M16.11 OSTEOARTHRITIS OF RIGHT HIP: Status: ACTIVE | Noted: 2021-10-29

## 2022-03-20 PROBLEM — K80.20 GALLSTONES: Status: ACTIVE | Noted: 2017-01-11

## 2023-01-01 ENCOUNTER — HOSPITAL ENCOUNTER (OUTPATIENT)
Facility: HOSPITAL | Age: 83
Setting detail: INFUSION SERIES
End: 2023-01-01

## 2023-01-01 DIAGNOSIS — C61 PROSTATE CANCER (HCC): Primary | ICD-10-CM

## 2023-01-01 DIAGNOSIS — D46.Z MYELODYSPLASTIC SYNDROME, LOW GRADE (HCC): ICD-10-CM

## 2023-01-01 RX ORDER — DIPHENHYDRAMINE HYDROCHLORIDE 50 MG/ML
50 INJECTION INTRAMUSCULAR; INTRAVENOUS
Status: CANCELLED | OUTPATIENT
Start: 2023-01-01

## 2023-01-01 RX ORDER — ONDANSETRON 2 MG/ML
8 INJECTION INTRAMUSCULAR; INTRAVENOUS
Status: CANCELLED | OUTPATIENT
Start: 2023-01-01

## 2023-01-01 RX ORDER — ACETAMINOPHEN 325 MG/1
650 TABLET ORAL
Status: CANCELLED | OUTPATIENT
Start: 2023-01-01

## 2023-01-01 RX ORDER — MEPERIDINE HYDROCHLORIDE 25 MG/ML
12.5 INJECTION INTRAMUSCULAR; INTRAVENOUS; SUBCUTANEOUS PRN
Status: CANCELLED | OUTPATIENT
Start: 2023-01-01

## 2023-01-01 RX ORDER — ALBUTEROL SULFATE 90 UG/1
4 AEROSOL, METERED RESPIRATORY (INHALATION) PRN
Status: CANCELLED | OUTPATIENT
Start: 2023-01-01

## 2023-01-01 RX ORDER — SODIUM CHLORIDE 9 MG/ML
INJECTION, SOLUTION INTRAVENOUS CONTINUOUS
Status: CANCELLED | OUTPATIENT
Start: 2023-01-01

## 2023-01-01 RX ORDER — EPINEPHRINE 1 MG/ML
0.3 INJECTION, SOLUTION INTRAMUSCULAR; SUBCUTANEOUS PRN
Status: CANCELLED | OUTPATIENT
Start: 2023-01-01

## 2023-05-12 RX ORDER — TAMSULOSIN HYDROCHLORIDE 0.4 MG/1
0.4 CAPSULE ORAL DAILY
COMMUNITY

## 2023-05-12 RX ORDER — OMEPRAZOLE 20 MG/1
CAPSULE, DELAYED RELEASE ORAL
COMMUNITY
Start: 2018-09-19

## 2023-05-12 RX ORDER — PROPRANOLOL HYDROCHLORIDE 20 MG/1
20 TABLET ORAL DAILY
COMMUNITY

## 2023-05-12 RX ORDER — DOXAZOSIN MESYLATE 1 MG/1
1 TABLET ORAL DAILY
COMMUNITY

## 2023-05-12 RX ORDER — LISINOPRIL 40 MG/1
TABLET ORAL
COMMUNITY

## 2023-08-14 ENCOUNTER — TELEPHONE (OUTPATIENT)
Age: 83
End: 2023-08-14

## 2023-08-14 NOTE — TELEPHONE ENCOUNTER
Called pt. Wife answered and said he was sleeping. HIPAA verified by two patient identifiers. She said he has had most of his scans done at [facility] on [address] and he doesn't have any films burned. We will request the films and notes. He will come in this wed at 315pm, I explained where we are located. ----- Message from Eri Yañez MD sent at 8/14/2023 10:29 AM EDT -----  Regarding: FW: advise on scheduling    ----- Message -----  From: Shanice Downing  Sent: 8/14/2023   9:59 AM EDT  To: Eri Yañez MD, Jeovany Lopez, #  Subject: advise on scheduling                             Patient calling to get scheduled with Dr Shanda Hopson his PCP believes he has bone cancer. Dr. Shanda Hopson saw the patient back in 2019. Patient went to [address] [place] [address]. Please advise on scheduling.

## 2023-08-15 NOTE — PROGRESS NOTES
Robin Mott is a 82 y.o. male here for new diagnosis of Osseous metastatic disease. Pt last seen in 2019. Pt has severe back pain. Columba Pelayo Has been to ER 4 times due to pain. He has lost about 35 lbs since his back surgery in May. [facility]. Dr Lora Hunt. Pt here with wife and daughter. 1. Have you been to the ER, urgent care clinic since your last visit? Hospitalized since your last visit? Re-establish    2. Have you seen or consulted any other health care providers outside of the [address] [place] [address] Avenue since your last visit? Include any pap smears or colon screening.    Re-establish

## 2023-08-16 ENCOUNTER — OFFICE VISIT (OUTPATIENT)
Age: 83
End: 2023-08-16
Payer: MEDICARE

## 2023-08-16 ENCOUNTER — CLINICAL DOCUMENTATION (OUTPATIENT)
Age: 83
End: 2023-08-16

## 2023-08-16 VITALS
BODY MASS INDEX: 31.7 KG/M2 | DIASTOLIC BLOOD PRESSURE: 64 MMHG | OXYGEN SATURATION: 95 % | HEIGHT: 69 IN | TEMPERATURE: 98.1 F | WEIGHT: 214 LBS | HEART RATE: 102 BPM | SYSTOLIC BLOOD PRESSURE: 114 MMHG

## 2023-08-16 DIAGNOSIS — G89.3 CANCER RELATED PAIN: ICD-10-CM

## 2023-08-16 DIAGNOSIS — C79.51 METASTATIC CANCER TO BONE (HCC): Primary | ICD-10-CM

## 2023-08-16 DIAGNOSIS — C79.51 METASTATIC CANCER TO BONE (HCC): ICD-10-CM

## 2023-08-16 PROCEDURE — G8427 DOCREV CUR MEDS BY ELIG CLIN: HCPCS | Performed by: INTERNAL MEDICINE

## 2023-08-16 PROCEDURE — 99205 OFFICE O/P NEW HI 60 MIN: CPT | Performed by: INTERNAL MEDICINE

## 2023-08-16 PROCEDURE — 1036F TOBACCO NON-USER: CPT | Performed by: INTERNAL MEDICINE

## 2023-08-16 PROCEDURE — 1123F ACP DISCUSS/DSCN MKR DOCD: CPT | Performed by: INTERNAL MEDICINE

## 2023-08-16 PROCEDURE — G8417 CALC BMI ABV UP PARAM F/U: HCPCS | Performed by: INTERNAL MEDICINE

## 2023-08-16 RX ORDER — BUMETANIDE 0.5 MG/1
TABLET ORAL
COMMUNITY
Start: 2023-06-05

## 2023-08-16 RX ORDER — ONDANSETRON 4 MG/1
TABLET, ORALLY DISINTEGRATING ORAL
COMMUNITY
Start: 2023-08-01

## 2023-08-16 RX ORDER — EZETIMIBE 10 MG/1
10 TABLET ORAL DAILY
COMMUNITY
Start: 2023-06-05

## 2023-08-16 RX ORDER — POTASSIUM CHLORIDE 20 MEQ/1
20 TABLET, EXTENDED RELEASE ORAL DAILY
COMMUNITY
Start: 2023-03-20

## 2023-08-16 RX ORDER — DOCUSATE SODIUM 100 MG/1
100 CAPSULE, LIQUID FILLED ORAL DAILY
COMMUNITY
Start: 2023-08-01

## 2023-08-16 RX ORDER — DOCUSATE SODIUM 100 MG/1
100 CAPSULE, LIQUID FILLED ORAL 2 TIMES DAILY
Qty: 60 CAPSULE | Refills: 0 | Status: SHIPPED | OUTPATIENT
Start: 2023-08-16 | End: 2023-09-15

## 2023-08-16 RX ORDER — APIXABAN 5 MG/1
5 TABLET, FILM COATED ORAL 2 TIMES DAILY
COMMUNITY
Start: 2023-07-16

## 2023-08-16 RX ORDER — OXYCODONE HYDROCHLORIDE 5 MG/1
5 TABLET ORAL EVERY 6 HOURS PRN
Qty: 120 TABLET | Refills: 0 | Status: SHIPPED | OUTPATIENT
Start: 2023-08-16 | End: 2023-09-15

## 2023-08-16 NOTE — PROGRESS NOTES
Oncology Social Work  Psychosocial Assessment    Reason for Assessment:      [] Social Work Referral [] Initial Assessment [x] New Diagnosis [x] Other    Advance Care Planning:  Demographics 8/22/2023   Marital Status        Sources of Information:    [x]Patient  [x]Family  []Staff  []Medical Record    Mental Status:    [x]Alert  []Lethargic  []Unresponsive   [] Unable to assess   Oriented to:  [x]Person  [x]Place  [x]Time  [x]Situation      Relationship Status:  []Single  [x]  []Significant Other/Life Partner  []  []  []    Living Circumstances:  []Lives Alone  [x]Family/Significant Other in Household  []Roommates  []Children in the Home  []Paid Caregivers  []Assisted Living Facility/Group [address] [place] [address]  []Homeless  []Incarcerated  []Environmental/Care Concerns  []Other:    Employment Status:  []Employed Full-time []Employed Part-time []Homemaker  [] Retired [x] Short-Term Disability [] Long-Term Disability  [] Unemployed   [] [id number] [facility]   [] SSDI  [] Self-Employment    Barriers to Learning:    []Language  []Developmental  []Cognitive  []Altered Mental Status  []Visual/Hearing Impairment  []Unable to Read/Write  []Motivational  [] Challenges Understanding Medical Jargon [x]No Barriers Identified      Financial/Legal Concerns:    []Uninsured  []Limited Income/Resources  []Non-Citizen  []Food Insecurity [x]No Concerns Identified   []Other:    Scientology/Spiritual/Existential:  Does patient have any spiritual or Anglican beliefs? [x] Yes [] No  Is the patient involved in a spiritual, earline or Anglican community?  [] Yes [x] No  Patient expressing spiritual/existential angst? [] Yes [] No  Notes:    Support System:    Identified Support Person/Group:  []Strong  []Fair  []Limited    Coping with Illness:   []  Coping Well  [] Challenges Coping with Serious Illness [] Situational Depression [] Situational Anxiety [] Anticipatory Grief  [] Recent Loss [] Caregiver Roxana

## 2023-08-16 NOTE — PROGRESS NOTES
Analy  at [facility], [address], [facility], [address] [place] [address]  [phone]      Oncology Consultation Note         Patient: Jagdeep [place] MRN: [id number]  SSN: [id number]    YOB: 1940  Age: 82 y.o. Sex: male      Subjective:      Jagdeep [place] is a 82 y.o. male who I am seeing for metastatic cancer. He is experiencing increasing back pain. He rates it as 8/10. The pain is constant with exacerbations. He started experiencing pain in the back for the last 6 months. He fell in May, had a fracture of the vertebral body. She underwent vertebral augmentation at [facility]. Since then he has visited ED four more times with worsening pain. CT of the lumbar spine shows widespread metastatic cancer.         Review of Systems:    Constitutional: negative  Eyes: negative  Ears, Nose, Mouth, Throat, and Face: negative  Respiratory: negative  Cardiovascular: negative  Gastrointestinal: negative  Genitourinary:negative  Integument/Breast: negative  Hematologic/Lymphatic: negative  Musculoskeletal:negative  Neurological: negative    Past Medical History:   Diagnosis Date    A-fib ([address]) 06/12/2019    At risk for sleep apnea     SCORE 5    Basal cell cancer     Benign prostatic hypertrophy     Frequency of urination     GERD (gastroesophageal reflux disease)     Hypertension     Myelodysplasia (myelodysplastic syndrome) (HCC)     Osteoarthritis of right hip 10/29/2021     Past Surgical History:   Procedure Laterality Date    CATARACT REMOVAL Bilateral     CHOLECYSTECTOMY      MOHS SURGERY  7/11    Rt    OTHER SURGICAL HISTORY  1990    jaw surgery; \"shortened it\" per pt    OTHER SURGICAL HISTORY  12/08/14    basal cell removed from left side of forehead    SHOULDER ARTHROSCOPY      Rt shoulder ligament repair    TONSILLECTOMY        Family History   Problem Relation Age of Onset    Hypertension Father      Social History     Tobacco

## 2023-08-16 NOTE — PROGRESS NOTES
[place] FROM DR Cali Fernandez CT BONE BX OF RIGHT ILIAC WING. IMAGES HAVE BEEN PUSHED INTO OUR SYSTEM FROM [facility].

## 2023-08-17 LAB
ALBUMIN SERPL-MCNC: 3.6 G/DL (ref 3.5–5)
ALBUMIN/GLOB SERPL: 0.9 (ref 1.1–2.2)
ALP SERPL-CCNC: 532 U/L (ref 45–117)
ALT SERPL-CCNC: 15 U/L (ref 12–78)
ANION GAP SERPL CALC-SCNC: 6 MMOL/L (ref 5–15)
AST SERPL-CCNC: 12 U/L (ref 15–37)
BASOPHILS # BLD: 0.2 K/UL (ref 0–0.1)
BASOPHILS NFR BLD: 1 % (ref 0–1)
BILIRUB SERPL-MCNC: 1.1 MG/DL (ref 0.2–1)
BUN SERPL-MCNC: 15 MG/DL (ref 6–20)
BUN/CREAT SERPL: 15 (ref 12–20)
CALCIUM SERPL-MCNC: 9.4 MG/DL (ref 8.5–10.1)
CHLORIDE SERPL-SCNC: 109 MMOL/L (ref 97–108)
CO2 SERPL-SCNC: 27 MMOL/L (ref 21–32)
CREAT SERPL-MCNC: 1.01 MG/DL (ref 0.7–1.3)
DIFFERENTIAL METHOD BLD: ABNORMAL
EOSINOPHIL # BLD: 0.1 K/UL (ref 0–0.4)
EOSINOPHIL NFR BLD: 1 % (ref 0–7)
ERYTHROCYTE [DISTWIDTH] IN BLOOD BY AUTOMATED COUNT: 19.5 % (ref 11.5–14.5)
GLOBULIN SER CALC-MCNC: 3.8 G/DL (ref 2–4)
GLUCOSE SERPL-MCNC: 100 MG/DL (ref 65–100)
HCT VFR BLD AUTO: 40.5 % (ref 36.6–50.3)
HGB BLD-MCNC: 12 G/DL (ref 12.1–17)
IMM GRANULOCYTES # BLD AUTO: 0.1 K/UL (ref 0–0.04)
IMM GRANULOCYTES NFR BLD AUTO: 1 % (ref 0–0.5)
LYMPHOCYTES # BLD: 3 K/UL (ref 0.8–3.5)
LYMPHOCYTES NFR BLD: 23 % (ref 12–49)
MCH RBC QN AUTO: 31.3 PG (ref 26–34)
MCHC RBC AUTO-ENTMCNC: 29.6 G/DL (ref 30–36.5)
MCV RBC AUTO: 105.5 FL (ref 80–99)
MONOCYTES # BLD: 0.8 K/UL (ref 0–1)
MONOCYTES NFR BLD: 6 % (ref 5–13)
NEUTS SEG # BLD: 8.9 K/UL (ref 1.8–8)
NEUTS SEG NFR BLD: 68 % (ref 32–75)
NRBC # BLD: 0 K/UL (ref 0–0.01)
NRBC BLD-RTO: 0 PER 100 WBC
PLATELET # BLD AUTO: 378 K/UL (ref 150–400)
PMV BLD AUTO: 12.4 FL (ref 8.9–12.9)
POTASSIUM SERPL-SCNC: 5.1 MMOL/L (ref 3.5–5.1)
PROT SERPL-MCNC: 7.4 G/DL (ref 6.4–8.2)
PSA SERPL-MCNC: 9 NG/ML (ref 0.01–4)
RBC # BLD AUTO: 3.84 M/UL (ref 4.1–5.7)
SODIUM SERPL-SCNC: 142 MMOL/L (ref 136–145)
WBC # BLD AUTO: 13.1 K/UL (ref 4.1–11.1)

## 2023-08-21 ENCOUNTER — TELEPHONE (OUTPATIENT)
Age: 83
End: 2023-08-21

## 2023-08-21 NOTE — TELEPHONE ENCOUNTER
Returned call to pt daughter after discussing with MD. Josue Bennett for atleast 3 days. HIPAA verified by two patient identifiers. Pt daughter aware to hold it. She will call scheduling back today and hope to get it for Friday and take last dose tonight and resume day after bx as long as no abnormal bleeding.

## 2023-08-21 NOTE — TELEPHONE ENCOUNTER
Pts daughter called because they tried to schedule appt for biopsy.  advised that pt has to be off blood thinners for at least 5 days to schedule appt.  They are calling to see if pt can stop taking blood thinners or biopsy

## 2023-08-25 ENCOUNTER — HOSPITAL ENCOUNTER (OUTPATIENT)
Facility: HOSPITAL | Age: 83
End: 2023-08-25
Attending: INTERNAL MEDICINE
Payer: MEDICARE

## 2023-08-25 VITALS
OXYGEN SATURATION: 98 % | BODY MASS INDEX: 30.35 KG/M2 | HEIGHT: 70 IN | WEIGHT: 212 LBS | RESPIRATION RATE: 16 BRPM | SYSTOLIC BLOOD PRESSURE: 112 MMHG | DIASTOLIC BLOOD PRESSURE: 89 MMHG | TEMPERATURE: 97.8 F | HEART RATE: 115 BPM

## 2023-08-25 DIAGNOSIS — C79.51 METASTATIC CANCER TO BONE (HCC): ICD-10-CM

## 2023-08-25 DIAGNOSIS — G89.3 CANCER RELATED PAIN: ICD-10-CM

## 2023-08-25 PROCEDURE — 6360000002 HC RX W HCPCS

## 2023-08-25 PROCEDURE — 99156 MOD SED OTH PHYS/QHP 5/>YRS: CPT

## 2023-08-25 PROCEDURE — 88342 IMHCHEM/IMCYTCHM 1ST ANTB: CPT

## 2023-08-25 PROCEDURE — 88307 TISSUE EXAM BY PATHOLOGIST: CPT

## 2023-08-25 PROCEDURE — 88333 PATH CONSLTJ SURG CYTO XM 1: CPT

## 2023-08-25 PROCEDURE — 88341 IMHCHEM/IMCYTCHM EA ADD ANTB: CPT

## 2023-08-25 RX ORDER — MIDAZOLAM HYDROCHLORIDE 1 MG/ML
INJECTION, SOLUTION INTRAMUSCULAR; INTRAVENOUS PRN
Status: COMPLETED | OUTPATIENT
Start: 2023-08-25 | End: 2023-08-25

## 2023-08-25 RX ORDER — FENTANYL CITRATE 50 UG/ML
100 INJECTION, SOLUTION INTRAMUSCULAR; INTRAVENOUS AS NEEDED
Status: DISCONTINUED | OUTPATIENT
Start: 2023-08-25 | End: 2023-08-29 | Stop reason: HOSPADM

## 2023-08-25 RX ORDER — MIDAZOLAM HYDROCHLORIDE 1 MG/ML
5 INJECTION, SOLUTION INTRAMUSCULAR; INTRAVENOUS AS NEEDED
Status: DISCONTINUED | OUTPATIENT
Start: 2023-08-25 | End: 2023-08-29 | Stop reason: HOSPADM

## 2023-08-25 RX ORDER — FENTANYL CITRATE 50 UG/ML
INJECTION, SOLUTION INTRAMUSCULAR; INTRAVENOUS PRN
Status: COMPLETED | OUTPATIENT
Start: 2023-08-25 | End: 2023-08-25

## 2023-08-25 RX ADMIN — MIDAZOLAM HYDROCHLORIDE 1 MG: 1 INJECTION, SOLUTION INTRAMUSCULAR; INTRAVENOUS at 12:37

## 2023-08-25 RX ADMIN — MIDAZOLAM HYDROCHLORIDE 2 MG: 1 INJECTION, SOLUTION INTRAMUSCULAR; INTRAVENOUS at 12:35

## 2023-08-25 RX ADMIN — FENTANYL CITRATE 25 MCG: 50 INJECTION, SOLUTION INTRAMUSCULAR; INTRAVENOUS at 12:37

## 2023-08-25 RX ADMIN — FENTANYL CITRATE 50 MCG: 50 INJECTION, SOLUTION INTRAMUSCULAR; INTRAVENOUS at 12:35

## 2023-08-25 ASSESSMENT — PAIN - FUNCTIONAL ASSESSMENT: PAIN_FUNCTIONAL_ASSESSMENT: NONE - DENIES PAIN

## 2023-08-25 NOTE — PROGRESS NOTES
Patient arrived to IR via wheelchair AxO4. VSS and in no apparent distress at this time. Patient is sinus tach. Brady Caldera NP aware. Patient states he did not take nay of his medications this morning. Informed consent obtained by Brady Caldera. Patient given the opportunity to ask questions and all concerns were addressed at this time. Patient states there is no history of MRSA,C. Diff,VRE, and TB     1250- Name of Procedure: Iliac bone biopsy     Sedation medications given:      Versed: 3 mg     Fentanyl:  75 mcg     Sedation Tolerated: Well     Total Sedation Time: 15 minutes     Sedation Start: 2212  Sedation End: 1250     Vital Signs:  VSS     Samples sent to lab: Yes with Patho     Any complications related to procedure: none identified at this time       This patient is at an increased risk of falling because they have received sedating medications. Please evaluate and implement fall precautions/fall prevention practices as appropriate. 1415- Discharge instructions reviewed with patient. Patient verbalizes understanding. Discharged from IR via wheelchair in stable condition. Pt released with family member via wheelchair.

## 2023-08-25 NOTE — DISCHARGE INSTRUCTIONS
[facility]  Radiology Department  [phone]    Radiologist: Keren Graham    Date: August 25, 2023       Bone Biopsy Discharge Instructions      Go home and rest  and restrict your activity the next 24 hours. You have been given sedating medications, so do not drive or make important decisions today. Resume your previous diet and prescribed medications. You may shower in 24 hours. Do not soak or swim until the biopsy site has healed completely to minimize any risk of infection. Watch site for redness, drainage, pus, foul odor, increasing pain and fevers. Should this occur call you doctor immediatly. You may take Tylenol if allowed, as directed on the label, for pain or discomfort. Avoid Ibuprofen (Advil, Motrin etc.) and Aspirin today as they may increase your risk of bleeding. Be sure to follow up with your physician, and let him know how you are progressing and to receive your results. If you have any questions about your procedure today please call and ask to speak to a radiology nurse.        I

## 2023-08-29 ENCOUNTER — TELEPHONE (OUTPATIENT)
Age: 83
End: 2023-08-29

## 2023-08-29 DIAGNOSIS — G89.3 CANCER RELATED PAIN: ICD-10-CM

## 2023-08-29 DIAGNOSIS — C79.51 METASTATIC CANCER TO BONE (HCC): Primary | ICD-10-CM

## 2023-08-29 NOTE — TELEPHONE ENCOUNTER
Patient called our  08/29 asking for his bx results. I see he is scheduled 8/30 for a follow up. Unable to get through phone numbers listed. [phone] just rings busy.  Lm on the 449#

## 2023-08-29 NOTE — TELEPHONE ENCOUNTER
Returned call to pt wife. HIPAA verified by two patient identifiers. He will come in tom. At 1030. He has been taking oxycodone 5mg every 6 hours. I asked him to increase to 2 tabs every 6 hours until we see him tomorrow and to stay on top of his bowel regimen medications. So far his BM's are fine. Per  we will need to refer to palliative as well.

## 2023-08-30 ENCOUNTER — OFFICE VISIT (OUTPATIENT)
Age: 83
End: 2023-08-30
Payer: MEDICARE

## 2023-08-30 VITALS
DIASTOLIC BLOOD PRESSURE: 71 MMHG | TEMPERATURE: 98.1 F | WEIGHT: 216 LBS | OXYGEN SATURATION: 95 % | BODY MASS INDEX: 30.92 KG/M2 | HEART RATE: 110 BPM | HEIGHT: 70 IN | SYSTOLIC BLOOD PRESSURE: 102 MMHG

## 2023-08-30 DIAGNOSIS — C61 PROSTATE CANCER METASTATIC TO BONE (HCC): Primary | ICD-10-CM

## 2023-08-30 DIAGNOSIS — C61 PROSTATE CANCER (HCC): Primary | ICD-10-CM

## 2023-08-30 DIAGNOSIS — G89.3 CANCER RELATED PAIN: ICD-10-CM

## 2023-08-30 DIAGNOSIS — C79.51 METASTATIC CANCER TO BONE (HCC): ICD-10-CM

## 2023-08-30 DIAGNOSIS — C79.51 PROSTATE CANCER METASTATIC TO BONE (HCC): Primary | ICD-10-CM

## 2023-08-30 PROCEDURE — 1036F TOBACCO NON-USER: CPT | Performed by: INTERNAL MEDICINE

## 2023-08-30 PROCEDURE — 99215 OFFICE O/P EST HI 40 MIN: CPT | Performed by: INTERNAL MEDICINE

## 2023-08-30 PROCEDURE — G8427 DOCREV CUR MEDS BY ELIG CLIN: HCPCS | Performed by: INTERNAL MEDICINE

## 2023-08-30 PROCEDURE — 1123F ACP DISCUSS/DSCN MKR DOCD: CPT | Performed by: INTERNAL MEDICINE

## 2023-08-30 PROCEDURE — G8417 CALC BMI ABV UP PARAM F/U: HCPCS | Performed by: INTERNAL MEDICINE

## 2023-08-30 RX ORDER — ABIRATERONE ACETATE 250 MG/1
1000 TABLET ORAL DAILY
Qty: 120 TABLET | Refills: 5 | Status: SHIPPED | OUTPATIENT
Start: 2023-08-30

## 2023-08-30 RX ORDER — PREDNISONE 5 MG/1
5 TABLET ORAL 2 TIMES DAILY
Qty: 60 TABLET | Refills: 5 | Status: SHIPPED | OUTPATIENT
Start: 2023-08-30

## 2023-08-30 NOTE — PROGRESS NOTES
Pharmacy Note- Chemotherapy Education    Pepe Kinsey is a  82 y.o.male  diagnosed with prostate cancer here today for chemotherapy counseling and consent. Mr. Marti Ballard is being treated with Degarlix once monthly and Zytiga with Prednisone. Side effects of chemotherapy reviewed included s/s infection, anemia, appetite changes, thrombocytopenia, fatigue, hair loss/alopecia, bone pain, skin and nail changes, and diarrhea/constipation. Patient given ways to manage these side effects and when to contact office. [employer] Handout of medications provided to patient. Mr. Marti Ballard verbalized understanding of the information presented, consent signed, and all of the patient's questions were answered.     Isidro PhillipsD, [facility]    Program: Medical Group  CPA in place:  Yes  Time Spent (min): 45

## 2023-08-30 NOTE — PROGRESS NOTES
Vanda Burnham is a 82 y.o. male here for follow up for Osseous metastatic disease. Pt here to discuss recent biopsy. Pt having a lot of pain . Has been taking 2 Oxycodone last couple days. His voice has been changing. Wife states his mind is not working right. 1. Have you been to the ER, urgent care clinic since your last visit? Hospitalized since your last visit?   no    2. Have you seen or consulted any other health care providers outside of the [address] [place] [address] since your last visit? Include any pap smears or colon screening.    no
nodules. No avid axillary, mediastinal or hilar lymph nodes. Note: There are enlarged mediastinal nodes, right pleural effusion and right  lung/pleura nodules. These are not Pylarify avid but might represent non  prostatic neoplasia. ABDOMEN/PELVIS:  Focal uptake in the right side of the prostate, nonspecific but compatible with  neoplasia. No avid nodes. Liver, spleen, urinary tract, bowel and ganglia demonstrate physiologic uptake. SKELETAL:  There are innumerable bone metastases. Impression  1. Prostate uptake compatible with neoplasia. 2. Innumerable prostatic bone metastases. 3. Non prostatic intrathoracic neoplasia questioned, as discussed. Assessment:     1. 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 ECOG PS 1  Intent of therapy - palliative  Prognosis: Guarded    I spent 65 minute with the patient in a face-to-face encounter. I explained him the stage of the disease, pathophysiology of the disease and the treatment approaches. I answered all his questions. More than 50% of the time was utilized in education, counseling and co-ordination of care. The standard of care for the treatment of mHSPC is ADT + NHA +/- systemic chemotherapy. I offered him ADT to start with either Orgovyx or Degarilex. Addition of Abraterone has shown to improve OS and rPFS in mHSCPC. He is too frail to receive systemic chemotherapy. Thus I did not offer him Taxotere. I educated him about the side effects of the treatment including hot flushes, edema etc. He understands and is willing to receive the treatment. Start Degarilix or Orgovyx  Start Abiraterone/Prednisone      2.  Cancer related pain    Oxycodone for pain control  Bowel regimen  Palliative car consult      Plan:       > Start Degarilix or Orgovyx  > Start Abiraterone/Prednisone  > Oxycodone PRN  > Bowel regimen  > Return in 3 weeks  > Palliative care consult      Signed By: Jayashree Price MD     August 30, 2023

## 2023-09-07 ENCOUNTER — TELEPHONE (OUTPATIENT)
Age: 83
End: 2023-09-07

## 2023-09-07 NOTE — TELEPHONE ENCOUNTER
[address] [facility] Palliative Medicine Office  Nursing Note  [phone]  Fax [phone]      Name:  Lorrie Tillman  YOB: 1940    Received outpatient Palliative Medicine referral from Dr. Augusto Mendez to see patient for symptom management and supportive care. Chart  reviewed. Lorrie Tillman is a 82 y.o. male with metastatic cancer. He is experiencing pain from mets in the lumbar spine. Patient's most recent office visit with Dr. Lionel Noble was on 8/31/23. See his note. ACP:     No ACP documents on file    Nurse called patient his daughter Jerry Haas answered the phone. She says that she schedules her father's appts. This nurse explained [facility] outpatient Palliative Medicine services. Appointment scheduled for 9/19/23 at 10:30am with Dr. Storm Benitez. This nurse instructed patient to arrive 15 minutes early in order to complete new patient paperwork.     Dion Valdez RN, Gerontological Nursing-BC, [facility]

## 2023-09-08 ENCOUNTER — HOSPITAL ENCOUNTER (OUTPATIENT)
Facility: HOSPITAL | Age: 83
Discharge: HOME | End: 2023-09-08
Payer: MEDICARE

## 2023-09-08 ENCOUNTER — HOSPITAL ENCOUNTER (OUTPATIENT)
Facility: HOSPITAL | Age: 83
End: 2023-09-08
Payer: MEDICARE

## 2023-09-08 DIAGNOSIS — C61 PROSTATE CANCER (HCC): Primary | ICD-10-CM

## 2023-09-08 DIAGNOSIS — D46.Z MYELODYSPLASTIC SYNDROME, LOW GRADE (HCC): ICD-10-CM

## 2023-09-08 DIAGNOSIS — C79.51 METASTATIC CANCER TO BONE (HCC): ICD-10-CM

## 2023-09-08 PROCEDURE — A9595 HC RX DIAGNOSTIC RADIOPHARMACEUTICAL: HCPCS | Performed by: INTERNAL MEDICINE

## 2023-09-08 PROCEDURE — 78815 PET IMAGE W/CT SKULL-THIGH: CPT

## 2023-09-08 PROCEDURE — 3430000000 HC RX DIAGNOSTIC RADIOPHARMACEUTICAL: Performed by: INTERNAL MEDICINE

## 2023-09-08 RX ORDER — MEPERIDINE HYDROCHLORIDE 50 MG/ML
12.5 INJECTION INTRAMUSCULAR; INTRAVENOUS; SUBCUTANEOUS PRN
OUTPATIENT
Start: 2023-09-11

## 2023-09-08 RX ORDER — DIPHENHYDRAMINE HYDROCHLORIDE 50 MG/ML
50 INJECTION INTRAMUSCULAR; INTRAVENOUS
OUTPATIENT
Start: 2023-09-11

## 2023-09-08 RX ORDER — ACETAMINOPHEN 325 MG/1
650 TABLET ORAL
OUTPATIENT
Start: 2023-09-11

## 2023-09-08 RX ORDER — SODIUM CHLORIDE 9 MG/ML
INJECTION, SOLUTION INTRAVENOUS CONTINUOUS
OUTPATIENT
Start: 2023-09-11

## 2023-09-08 RX ORDER — FAMOTIDINE 10 MG/ML
20 INJECTION, SOLUTION INTRAVENOUS
OUTPATIENT
Start: 2023-09-11

## 2023-09-08 RX ORDER — ONDANSETRON 2 MG/ML
8 INJECTION INTRAMUSCULAR; INTRAVENOUS
OUTPATIENT
Start: 2023-09-11

## 2023-09-08 RX ORDER — ALBUTEROL SULFATE 90 UG/1
4 AEROSOL, METERED RESPIRATORY (INHALATION) PRN
OUTPATIENT
Start: 2023-09-11

## 2023-09-08 RX ORDER — EPINEPHRINE 1 MG/ML
0.3 INJECTION, SOLUTION, CONCENTRATE INTRAVENOUS PRN
OUTPATIENT
Start: 2023-09-11

## 2023-09-08 RX ADMIN — PIFLUFOLASTAT F-18 9 MILLICURIE: 80 INJECTION INTRAVENOUS at 12:48

## 2023-09-11 ENCOUNTER — OFFICE VISIT (OUTPATIENT)
Age: 83
End: 2023-09-11
Payer: MEDICARE

## 2023-09-11 ENCOUNTER — HOSPITAL ENCOUNTER (OUTPATIENT)
Facility: HOSPITAL | Age: 83
Setting detail: INFUSION SERIES
End: 2023-09-11
Payer: MEDICARE

## 2023-09-11 VITALS
BODY MASS INDEX: 32.91 KG/M2 | HEART RATE: 118 BPM | SYSTOLIC BLOOD PRESSURE: 107 MMHG | DIASTOLIC BLOOD PRESSURE: 76 MMHG | HEIGHT: 69 IN | TEMPERATURE: 97.9 F | WEIGHT: 222.2 LBS | OXYGEN SATURATION: 96 %

## 2023-09-11 VITALS
OXYGEN SATURATION: 96 % | WEIGHT: 222.2 LBS | BODY MASS INDEX: 32.91 KG/M2 | TEMPERATURE: 97.9 F | DIASTOLIC BLOOD PRESSURE: 76 MMHG | HEART RATE: 118 BPM | SYSTOLIC BLOOD PRESSURE: 107 MMHG | HEIGHT: 69 IN

## 2023-09-11 DIAGNOSIS — C79.51 PROSTATE CANCER METASTATIC TO BONE (HCC): Primary | ICD-10-CM

## 2023-09-11 DIAGNOSIS — C61 PROSTATE CANCER (HCC): Primary | ICD-10-CM

## 2023-09-11 DIAGNOSIS — D46.Z MYELODYSPLASTIC SYNDROME, LOW GRADE (HCC): ICD-10-CM

## 2023-09-11 DIAGNOSIS — C61 PROSTATE CANCER METASTATIC TO BONE (HCC): Primary | ICD-10-CM

## 2023-09-11 DIAGNOSIS — F41.9 ANXIETY AND DEPRESSION: ICD-10-CM

## 2023-09-11 DIAGNOSIS — F32.A ANXIETY AND DEPRESSION: ICD-10-CM

## 2023-09-11 DIAGNOSIS — G89.3 CANCER RELATED PAIN: ICD-10-CM

## 2023-09-11 LAB
ALBUMIN SERPL-MCNC: 3.2 G/DL (ref 3.5–5)
ALBUMIN/GLOB SERPL: 0.7 (ref 1.1–2.2)
ALP SERPL-CCNC: 836 U/L (ref 45–117)
ALT SERPL-CCNC: 14 U/L (ref 12–78)
ANION GAP SERPL CALC-SCNC: 2 MMOL/L (ref 5–15)
AST SERPL-CCNC: 17 U/L (ref 15–37)
BASOPHILS # BLD: 0.1 K/UL (ref 0–0.1)
BASOPHILS NFR BLD: 1 % (ref 0–1)
BILIRUB SERPL-MCNC: 0.9 MG/DL (ref 0.2–1)
BUN SERPL-MCNC: 21 MG/DL (ref 6–20)
BUN/CREAT SERPL: 19 (ref 12–20)
CALCIUM SERPL-MCNC: 8.7 MG/DL (ref 8.5–10.1)
CHLORIDE SERPL-SCNC: 109 MMOL/L (ref 97–108)
CO2 SERPL-SCNC: 30 MMOL/L (ref 21–32)
CREAT SERPL-MCNC: 1.13 MG/DL (ref 0.7–1.3)
DIFFERENTIAL METHOD BLD: ABNORMAL
EOSINOPHIL # BLD: 0.1 K/UL (ref 0–0.4)
EOSINOPHIL NFR BLD: 1 % (ref 0–7)
ERYTHROCYTE [DISTWIDTH] IN BLOOD BY AUTOMATED COUNT: 19.5 % (ref 11.5–14.5)
GLOBULIN SER CALC-MCNC: 4.4 G/DL (ref 2–4)
GLUCOSE SERPL-MCNC: 124 MG/DL (ref 65–100)
HCT VFR BLD AUTO: 36.5 % (ref 36.6–50.3)
HGB BLD-MCNC: 11.2 G/DL (ref 12.1–17)
IMM GRANULOCYTES # BLD AUTO: 0.1 K/UL (ref 0–0.04)
IMM GRANULOCYTES NFR BLD AUTO: 1 % (ref 0–0.5)
LYMPHOCYTES # BLD: 1.9 K/UL (ref 0.8–3.5)
LYMPHOCYTES NFR BLD: 22 % (ref 12–49)
MCH RBC QN AUTO: 31.6 PG (ref 26–34)
MCHC RBC AUTO-ENTMCNC: 30.7 G/DL (ref 30–36.5)
MCV RBC AUTO: 103.1 FL (ref 80–99)
MONOCYTES # BLD: 0.5 K/UL (ref 0–1)
MONOCYTES NFR BLD: 6 % (ref 5–13)
NEUTS SEG # BLD: 6 K/UL (ref 1.8–8)
NEUTS SEG NFR BLD: 69 % (ref 32–75)
NRBC # BLD: 0 K/UL (ref 0–0.01)
NRBC BLD-RTO: 0 PER 100 WBC
PLATELET # BLD AUTO: 296 K/UL (ref 150–400)
PMV BLD AUTO: 11.2 FL (ref 8.9–12.9)
POTASSIUM SERPL-SCNC: 4.6 MMOL/L (ref 3.5–5.1)
PROT SERPL-MCNC: 7.6 G/DL (ref 6.4–8.2)
PSA SERPL-MCNC: 12.2 NG/ML (ref 0.01–4)
RBC # BLD AUTO: 3.54 M/UL (ref 4.1–5.7)
SODIUM SERPL-SCNC: 141 MMOL/L (ref 136–145)
WBC # BLD AUTO: 8.7 K/UL (ref 4.1–11.1)

## 2023-09-11 PROCEDURE — G8417 CALC BMI ABV UP PARAM F/U: HCPCS | Performed by: INTERNAL MEDICINE

## 2023-09-11 PROCEDURE — 99215 OFFICE O/P EST HI 40 MIN: CPT | Performed by: INTERNAL MEDICINE

## 2023-09-11 PROCEDURE — 36415 COLL VENOUS BLD VENIPUNCTURE: CPT

## 2023-09-11 PROCEDURE — 84402 ASSAY OF FREE TESTOSTERONE: CPT

## 2023-09-11 PROCEDURE — 84153 ASSAY OF PSA TOTAL: CPT

## 2023-09-11 PROCEDURE — 1036F TOBACCO NON-USER: CPT | Performed by: INTERNAL MEDICINE

## 2023-09-11 PROCEDURE — 1123F ACP DISCUSS/DSCN MKR DOCD: CPT | Performed by: INTERNAL MEDICINE

## 2023-09-11 PROCEDURE — G8427 DOCREV CUR MEDS BY ELIG CLIN: HCPCS | Performed by: INTERNAL MEDICINE

## 2023-09-11 PROCEDURE — 96402 CHEMO HORMON ANTINEOPL SQ/IM: CPT

## 2023-09-11 PROCEDURE — 6360000002 HC RX W HCPCS: Performed by: NURSE PRACTITIONER

## 2023-09-11 PROCEDURE — 85025 COMPLETE CBC W/AUTO DIFF WBC: CPT

## 2023-09-11 PROCEDURE — 80053 COMPREHEN METABOLIC PANEL: CPT

## 2023-09-11 RX ORDER — SERTRALINE HYDROCHLORIDE 25 MG/1
25 TABLET, FILM COATED ORAL DAILY
Qty: 30 TABLET | Refills: 3 | Status: SHIPPED | OUTPATIENT
Start: 2023-09-11

## 2023-09-11 RX ORDER — OXYCODONE HYDROCHLORIDE 10 MG/1
10 TABLET ORAL EVERY 4 HOURS PRN
Qty: 84 TABLET | Refills: 0 | Status: SHIPPED | OUTPATIENT
Start: 2023-09-11 | End: 2023-09-25

## 2023-09-11 RX ADMIN — DEGARELIX 240 MG: KIT at 10:20

## 2023-09-11 ASSESSMENT — PAIN SCALES - GENERAL: PAINLEVEL_OUTOF10: 8

## 2023-09-11 ASSESSMENT — PAIN DESCRIPTION - LOCATION: LOCATION: BACK

## 2023-09-11 ASSESSMENT — PAIN DESCRIPTION - ORIENTATION: ORIENTATION: LOWER

## 2023-09-13 ENCOUNTER — TELEPHONE (OUTPATIENT)
Age: 83
End: 2023-09-13

## 2023-09-13 NOTE — TELEPHONE ENCOUNTER
Patient's wife called to let us know that the aviraterone and preidsone has arrived. Please call with next steps.

## 2023-09-13 NOTE — TELEPHONE ENCOUNTER
Returned call to pt wife. HIPAA verified by two patient identifiers. She already gave it to him today. 9/13/23. She will move his time tomorrow and stick with that time the reamining days. She knows to wait an hour after taking zytiga before eating. Knows that if she gives him pain meds he should have something on stomach. Confirmed upcoming appt on 9/25 for labs.

## 2023-09-15 ENCOUNTER — TELEPHONE (OUTPATIENT)
Age: 83
End: 2023-09-15

## 2023-09-15 NOTE — TELEPHONE ENCOUNTER
Called patient to advise/confirm upcoming appt with Dr. Reggie Uriostegui on 9/19/23  at 10:30  at [address] [place] [address]. Spoke with daughter and confirmed appointment.

## 2023-09-17 LAB — TESTOST FREE SERPL-MCNC: 1.8 PG/ML (ref 6.6–18.1)

## 2023-09-19 ENCOUNTER — OFFICE VISIT (OUTPATIENT)
Age: 83
End: 2023-09-19
Payer: MEDICARE

## 2023-09-19 VITALS
WEIGHT: 219 LBS | BODY MASS INDEX: 32.44 KG/M2 | HEIGHT: 69 IN | TEMPERATURE: 95.9 F | OXYGEN SATURATION: 95 % | HEART RATE: 94 BPM | DIASTOLIC BLOOD PRESSURE: 71 MMHG | SYSTOLIC BLOOD PRESSURE: 109 MMHG | RESPIRATION RATE: 20 BRPM

## 2023-09-19 DIAGNOSIS — T40.2X5A CONSTIPATION DUE TO OPIOID THERAPY: ICD-10-CM

## 2023-09-19 DIAGNOSIS — C79.51 PAIN FROM BONE METASTASES (HCC): ICD-10-CM

## 2023-09-19 DIAGNOSIS — K59.03 CONSTIPATION DUE TO OPIOID THERAPY: ICD-10-CM

## 2023-09-19 DIAGNOSIS — C61 PROSTATE CANCER (HCC): Primary | ICD-10-CM

## 2023-09-19 DIAGNOSIS — G89.3 PAIN FROM BONE METASTASES (HCC): ICD-10-CM

## 2023-09-19 DIAGNOSIS — R53.0 NEOPLASTIC (MALIGNANT) RELATED FATIGUE: ICD-10-CM

## 2023-09-19 PROCEDURE — 1123F ACP DISCUSS/DSCN MKR DOCD: CPT | Performed by: INTERNAL MEDICINE

## 2023-09-19 PROCEDURE — G8417 CALC BMI ABV UP PARAM F/U: HCPCS | Performed by: INTERNAL MEDICINE

## 2023-09-19 PROCEDURE — 1036F TOBACCO NON-USER: CPT | Performed by: INTERNAL MEDICINE

## 2023-09-19 PROCEDURE — G8428 CUR MEDS NOT DOCUMENT: HCPCS | Performed by: INTERNAL MEDICINE

## 2023-09-19 PROCEDURE — 99205 OFFICE O/P NEW HI 60 MIN: CPT | Performed by: INTERNAL MEDICINE

## 2023-09-19 RX ORDER — PREDNISONE 20 MG/1
20 TABLET ORAL DAILY
Qty: 10 TABLET | Refills: 0 | Status: SHIPPED | OUTPATIENT
Start: 2023-09-19 | End: 2023-09-29

## 2023-09-19 RX ORDER — OXYCODONE HYDROCHLORIDE 15 MG/1
15 TABLET ORAL EVERY 4 HOURS PRN
Qty: 90 TABLET | Refills: 0 | Status: SHIPPED | OUTPATIENT
Start: 2023-09-19 | End: 2023-10-04

## 2023-09-19 ASSESSMENT — PATIENT HEALTH QUESTIONNAIRE - PHQ9
SUM OF ALL RESPONSES TO PHQ QUESTIONS 1-9: 0
SUM OF ALL RESPONSES TO PHQ QUESTIONS 1-9: 0
1. LITTLE INTEREST OR PLEASURE IN DOING THINGS: 0
2. FEELING DOWN, DEPRESSED OR HOPELESS: 0
SUM OF ALL RESPONSES TO PHQ9 QUESTIONS 1 & 2: 0
SUM OF ALL RESPONSES TO PHQ QUESTIONS 1-9: 0
SUM OF ALL RESPONSES TO PHQ QUESTIONS 1-9: 0

## 2023-09-19 NOTE — PATIENT INSTRUCTIONS
Dear Lucia Montiel ,    It was a pleasure seeing you today    We will see you again in  2 weeks    If labs or imaging tests have been ordered for you today, please call the office  at [phone] 48 hours after completion to obtain the results. This is the plan we talked about:    Intractable low back pain from bone mets  -You are having severe low back pain from bone mets, you are currently on oxycodone 10 mg every 4 hours without any significant relief. Your family is also able to attest to this.  -Let us increase oxycodone to 15 mg every 4 hours as needed, you can increase up to 20 mg every 4 hours. I am sending you a new prescription of oxycodone 15 mg tablet to your pharmacy.  -Let us increase your prednisone from 10 mg daily to 20 mg tablet once in the morning. I am providing you with a 10-day prescription, take this for 10 days and we will be in touch with you about how to wean off or return back to your current dose of 10 mg daily. This is to help with acute pain crisis.  -You started radiation to your low back yesterday, we are hoping that this will help with your pain in the next few days. Radiating pain-you do have radiating nerve pain for which I would like to start gabapentin but given your mild drowsiness and acute pain crisis, I would like to wait at least another week before starting gabapentin. Constipation  -Constipation is a common side effect of pain medications as they slow your bowels. -Please start Pericolace 2 tabs daily and increase to 2 tabs two times a day if needed. This is necessary to keep your bowels soft. - Add Miralax every other day as a laxative. - Goal is to have soft bowel movements every day or every other day. - Please call us if you do not have bowel movements for more than 3 days. Goals of care  -You and your family have good understanding of your medical condition. You want comfort oriented care towards end-of-life.   You are currently

## 2023-09-25 ENCOUNTER — HOSPITAL ENCOUNTER (OUTPATIENT)
Facility: HOSPITAL | Age: 83
Setting detail: INFUSION SERIES
End: 2023-09-25
Payer: MEDICARE

## 2023-09-25 VITALS
TEMPERATURE: 98.2 F | DIASTOLIC BLOOD PRESSURE: 82 MMHG | RESPIRATION RATE: 18 BRPM | OXYGEN SATURATION: 95 % | SYSTOLIC BLOOD PRESSURE: 122 MMHG | HEART RATE: 112 BPM

## 2023-09-25 DIAGNOSIS — D46.Z MYELODYSPLASTIC SYNDROME, LOW GRADE (HCC): ICD-10-CM

## 2023-09-25 DIAGNOSIS — C61 PROSTATE CANCER (HCC): ICD-10-CM

## 2023-09-25 LAB
ALBUMIN SERPL-MCNC: 3.5 G/DL (ref 3.5–5)
ALBUMIN/GLOB SERPL: 1 (ref 1.1–2.2)
ALP SERPL-CCNC: 965 U/L (ref 45–117)
ALT SERPL-CCNC: 18 U/L (ref 12–78)
ANION GAP SERPL CALC-SCNC: 6 MMOL/L (ref 5–15)
AST SERPL-CCNC: 21 U/L (ref 15–37)
BASOPHILS # BLD: 0.1 K/UL (ref 0–0.1)
BASOPHILS NFR BLD: 1 % (ref 0–1)
BILIRUB SERPL-MCNC: 1 MG/DL (ref 0.2–1)
BUN SERPL-MCNC: 36 MG/DL (ref 6–20)
BUN/CREAT SERPL: 27 (ref 12–20)
CALCIUM SERPL-MCNC: 8.4 MG/DL (ref 8.5–10.1)
CHLORIDE SERPL-SCNC: 108 MMOL/L (ref 97–108)
CO2 SERPL-SCNC: 25 MMOL/L (ref 21–32)
CREAT SERPL-MCNC: 1.31 MG/DL (ref 0.7–1.3)
DIFFERENTIAL METHOD BLD: ABNORMAL
EOSINOPHIL # BLD: 0.4 K/UL (ref 0–0.4)
EOSINOPHIL NFR BLD: 6 % (ref 0–7)
ERYTHROCYTE [DISTWIDTH] IN BLOOD BY AUTOMATED COUNT: 19.5 % (ref 11.5–14.5)
GLOBULIN SER CALC-MCNC: 3.6 G/DL (ref 2–4)
GLUCOSE SERPL-MCNC: 126 MG/DL (ref 65–100)
HCT VFR BLD AUTO: 35.9 % (ref 36.6–50.3)
HGB BLD-MCNC: 11 G/DL (ref 12.1–17)
IMM GRANULOCYTES # BLD AUTO: 0.1 K/UL (ref 0–0.04)
IMM GRANULOCYTES NFR BLD AUTO: 1 % (ref 0–0.5)
LYMPHOCYTES # BLD: 1 K/UL (ref 0.8–3.5)
LYMPHOCYTES NFR BLD: 14 % (ref 12–49)
MCH RBC QN AUTO: 31.1 PG (ref 26–34)
MCHC RBC AUTO-ENTMCNC: 30.6 G/DL (ref 30–36.5)
MCV RBC AUTO: 101.4 FL (ref 80–99)
MONOCYTES # BLD: 0.5 K/UL (ref 0–1)
MONOCYTES NFR BLD: 7 % (ref 5–13)
NEUTS SEG # BLD: 5 K/UL (ref 1.8–8)
NEUTS SEG NFR BLD: 71 % (ref 32–75)
NRBC # BLD: 0 K/UL (ref 0–0.01)
NRBC BLD-RTO: 0 PER 100 WBC
PLATELET # BLD AUTO: 261 K/UL (ref 150–400)
PMV BLD AUTO: 11.7 FL (ref 8.9–12.9)
POTASSIUM SERPL-SCNC: 5 MMOL/L (ref 3.5–5.1)
PROT SERPL-MCNC: 7.1 G/DL (ref 6.4–8.2)
RBC # BLD AUTO: 3.54 M/UL (ref 4.1–5.7)
SODIUM SERPL-SCNC: 139 MMOL/L (ref 136–145)
WBC # BLD AUTO: 7 K/UL (ref 4.1–11.1)

## 2023-09-25 PROCEDURE — 36415 COLL VENOUS BLD VENIPUNCTURE: CPT

## 2023-09-25 PROCEDURE — 80053 COMPREHEN METABOLIC PANEL: CPT

## 2023-09-25 PROCEDURE — 85025 COMPLETE CBC W/AUTO DIFF WBC: CPT

## 2023-09-28 ENCOUNTER — APPOINTMENT (OUTPATIENT)
Facility: HOSPITAL | Age: 83
DRG: 640 | End: 2023-09-28
Payer: MEDICARE

## 2023-09-28 ENCOUNTER — HOSPITAL ENCOUNTER (INPATIENT)
Facility: HOSPITAL | Age: 83
LOS: 9 days | Discharge: NURSING, REHAB OR LONG-TERM CARE | DRG: 640 | End: 2023-10-07
Attending: EMERGENCY MEDICINE | Admitting: INTERNAL MEDICINE
Payer: MEDICARE

## 2023-09-28 DIAGNOSIS — A41.9 SEVERE SEPSIS (HCC): ICD-10-CM

## 2023-09-28 DIAGNOSIS — M25.552 LEFT HIP PAIN: Primary | ICD-10-CM

## 2023-09-28 DIAGNOSIS — R65.20 SEVERE SEPSIS (HCC): ICD-10-CM

## 2023-09-28 DIAGNOSIS — J18.9 COMMUNITY ACQUIRED PNEUMONIA, UNSPECIFIED LATERALITY: ICD-10-CM

## 2023-09-28 LAB
ALBUMIN SERPL-MCNC: 3.3 G/DL (ref 3.5–5)
ALBUMIN/GLOB SERPL: 0.9 (ref 1.1–2.2)
ALP SERPL-CCNC: 924 U/L (ref 45–117)
ALT SERPL-CCNC: 22 U/L (ref 12–78)
ANION GAP SERPL CALC-SCNC: 6 MMOL/L (ref 5–15)
APPEARANCE UR: CLEAR
AST SERPL-CCNC: 31 U/L (ref 15–37)
BACTERIA URNS QL MICRO: ABNORMAL /HPF
BASOPHILS # BLD: 0 K/UL (ref 0–0.1)
BASOPHILS NFR BLD: 0 % (ref 0–1)
BILIRUB SERPL-MCNC: 1.1 MG/DL (ref 0.2–1)
BILIRUB UR QL: NEGATIVE
BUN SERPL-MCNC: 37 MG/DL (ref 6–20)
BUN/CREAT SERPL: 31 (ref 12–20)
CALCIUM SERPL-MCNC: 8 MG/DL (ref 8.5–10.1)
CHLORIDE SERPL-SCNC: 105 MMOL/L (ref 97–108)
CO2 SERPL-SCNC: 26 MMOL/L (ref 21–32)
COLOR UR: ABNORMAL
COMMENT:: NORMAL
CREAT SERPL-MCNC: 1.19 MG/DL (ref 0.7–1.3)
DIFFERENTIAL METHOD BLD: ABNORMAL
EKG ATRIAL RATE: 96 BPM
EKG DIAGNOSIS: NORMAL
EKG Q-T INTERVAL: 362 MS
EKG QRS DURATION: 94 MS
EKG QTC CALCULATION (BAZETT): 457 MS
EKG R AXIS: -16 DEGREES
EKG T AXIS: 11 DEGREES
EKG VENTRICULAR RATE: 96 BPM
EOSINOPHIL # BLD: 0.4 K/UL (ref 0–0.4)
EOSINOPHIL NFR BLD: 4 % (ref 0–7)
EPITH CASTS URNS QL MICRO: ABNORMAL /LPF
ERYTHROCYTE [DISTWIDTH] IN BLOOD BY AUTOMATED COUNT: 19.6 % (ref 11.5–14.5)
GLOBULIN SER CALC-MCNC: 3.6 G/DL (ref 2–4)
GLUCOSE BLD-MCNC: 96 MG/DL (ref 65–100)
GLUCOSE SERPL-MCNC: 92 MG/DL (ref 65–100)
GLUCOSE UR STRIP.AUTO-MCNC: NEGATIVE MG/DL
GRAN CASTS URNS QL MICRO: ABNORMAL /LPF
HCT VFR BLD AUTO: 34.5 % (ref 36.6–50.3)
HGB BLD-MCNC: 10.8 G/DL (ref 12.1–17)
HGB UR QL STRIP: NEGATIVE
HYALINE CASTS URNS QL MICRO: ABNORMAL /LPF (ref 0–5)
IMM GRANULOCYTES # BLD AUTO: 0.1 K/UL (ref 0–0.04)
IMM GRANULOCYTES NFR BLD AUTO: 1 % (ref 0–0.5)
INR PPP: 1.3 (ref 0.9–1.1)
KETONES UR QL STRIP.AUTO: NEGATIVE MG/DL
LEUKOCYTE ESTERASE UR QL STRIP.AUTO: NEGATIVE
LYMPHOCYTES # BLD: 0.7 K/UL (ref 0.8–3.5)
LYMPHOCYTES NFR BLD: 7 % (ref 12–49)
MCH RBC QN AUTO: 31.5 PG (ref 26–34)
MCHC RBC AUTO-ENTMCNC: 31.3 G/DL (ref 30–36.5)
MCV RBC AUTO: 100.6 FL (ref 80–99)
MONOCYTES # BLD: 0.5 K/UL (ref 0–1)
MONOCYTES NFR BLD: 5 % (ref 5–13)
MUCOUS THREADS URNS QL MICRO: ABNORMAL /LPF
NEUTS SEG # BLD: 7.7 K/UL (ref 1.8–8)
NEUTS SEG NFR BLD: 83 % (ref 32–75)
NITRITE UR QL STRIP.AUTO: NEGATIVE
NRBC # BLD: 0 K/UL (ref 0–0.01)
NRBC BLD-RTO: 0 PER 100 WBC
NT PRO BNP: ABNORMAL PG/ML
PH UR STRIP: 5.5 (ref 5–8)
PLATELET # BLD AUTO: 226 K/UL (ref 150–400)
PMV BLD AUTO: 11.4 FL (ref 8.9–12.9)
POTASSIUM SERPL-SCNC: 5.8 MMOL/L (ref 3.5–5.1)
PROCALCITONIN SERPL-MCNC: 0.26 NG/ML
PROT SERPL-MCNC: 6.9 G/DL (ref 6.4–8.2)
PROT UR STRIP-MCNC: NEGATIVE MG/DL
PROTHROMBIN TIME: 13 SEC (ref 9–11.1)
RBC # BLD AUTO: 3.43 M/UL (ref 4.1–5.7)
RBC #/AREA URNS HPF: ABNORMAL /HPF (ref 0–5)
RBC MORPH BLD: ABNORMAL
RBC MORPH BLD: ABNORMAL
SERVICE CMNT-IMP: NORMAL
SODIUM SERPL-SCNC: 137 MMOL/L (ref 136–145)
SP GR UR REFRACTOMETRY: 1.02 (ref 1–1.03)
SPECIMEN HOLD: NORMAL
TROPONIN I SERPL HS-MCNC: 28 NG/L (ref 0–76)
URINE CULTURE IF INDICATED: ABNORMAL
UROBILINOGEN UR QL STRIP.AUTO: 0.2 EU/DL (ref 0.2–1)
WBC # BLD AUTO: 9.4 K/UL (ref 4.1–11.1)
WBC URNS QL MICRO: ABNORMAL /HPF (ref 0–4)

## 2023-09-28 PROCEDURE — 6360000002 HC RX W HCPCS: Performed by: STUDENT IN AN ORGANIZED HEALTH CARE EDUCATION/TRAINING PROGRAM

## 2023-09-28 PROCEDURE — 87150 DNA/RNA AMPLIFIED PROBE: CPT

## 2023-09-28 PROCEDURE — 6370000000 HC RX 637 (ALT 250 FOR IP): Performed by: INTERNAL MEDICINE

## 2023-09-28 PROCEDURE — 73502 X-RAY EXAM HIP UNI 2-3 VIEWS: CPT

## 2023-09-28 PROCEDURE — 87040 BLOOD CULTURE FOR BACTERIA: CPT

## 2023-09-28 PROCEDURE — 85610 PROTHROMBIN TIME: CPT

## 2023-09-28 PROCEDURE — 84145 PROCALCITONIN (PCT): CPT

## 2023-09-28 PROCEDURE — 6370000000 HC RX 637 (ALT 250 FOR IP): Performed by: STUDENT IN AN ORGANIZED HEALTH CARE EDUCATION/TRAINING PROGRAM

## 2023-09-28 PROCEDURE — 71045 X-RAY EXAM CHEST 1 VIEW: CPT

## 2023-09-28 PROCEDURE — P9047 ALBUMIN (HUMAN), 25%, 50ML: HCPCS | Performed by: STUDENT IN AN ORGANIZED HEALTH CARE EDUCATION/TRAINING PROGRAM

## 2023-09-28 PROCEDURE — 86922 COMPATIBILITY TEST ANTIGLOB: CPT

## 2023-09-28 PROCEDURE — 6360000002 HC RX W HCPCS: Performed by: EMERGENCY MEDICINE

## 2023-09-28 PROCEDURE — 80053 COMPREHEN METABOLIC PANEL: CPT

## 2023-09-28 PROCEDURE — 2580000003 HC RX 258: Performed by: EMERGENCY MEDICINE

## 2023-09-28 PROCEDURE — 86860 RBC ANTIBODY ELUTION: CPT

## 2023-09-28 PROCEDURE — 86850 RBC ANTIBODY SCREEN: CPT

## 2023-09-28 PROCEDURE — 6360000002 HC RX W HCPCS: Performed by: INTERNAL MEDICINE

## 2023-09-28 PROCEDURE — 96374 THER/PROPH/DIAG INJ IV PUSH: CPT

## 2023-09-28 PROCEDURE — 85025 COMPLETE CBC W/AUTO DIFF WBC: CPT

## 2023-09-28 PROCEDURE — 83605 ASSAY OF LACTIC ACID: CPT

## 2023-09-28 PROCEDURE — 36415 COLL VENOUS BLD VENIPUNCTURE: CPT

## 2023-09-28 PROCEDURE — 86880 COOMBS TEST DIRECT: CPT

## 2023-09-28 PROCEDURE — 2580000003 HC RX 258: Performed by: INTERNAL MEDICINE

## 2023-09-28 PROCEDURE — 72192 CT PELVIS W/O DYE: CPT

## 2023-09-28 PROCEDURE — 83880 ASSAY OF NATRIURETIC PEPTIDE: CPT

## 2023-09-28 PROCEDURE — 86901 BLOOD TYPING SEROLOGIC RH(D): CPT

## 2023-09-28 PROCEDURE — 1100000003 HC PRIVATE W/ TELEMETRY

## 2023-09-28 PROCEDURE — 82962 GLUCOSE BLOOD TEST: CPT

## 2023-09-28 PROCEDURE — 99285 EMERGENCY DEPT VISIT HI MDM: CPT

## 2023-09-28 PROCEDURE — 81001 URINALYSIS AUTO W/SCOPE: CPT

## 2023-09-28 PROCEDURE — 86900 BLOOD TYPING SEROLOGIC ABO: CPT

## 2023-09-28 PROCEDURE — 84484 ASSAY OF TROPONIN QUANT: CPT

## 2023-09-28 PROCEDURE — 86920 COMPATIBILITY TEST SPIN: CPT

## 2023-09-28 PROCEDURE — 86921 COMPATIBILITY TEST INCUBATE: CPT

## 2023-09-28 RX ORDER — ACETAMINOPHEN 325 MG/1
650 TABLET ORAL EVERY 6 HOURS PRN
Status: DISCONTINUED | OUTPATIENT
Start: 2023-09-28 | End: 2023-10-07 | Stop reason: HOSPADM

## 2023-09-28 RX ORDER — ACETAMINOPHEN 650 MG/1
650 SUPPOSITORY RECTAL EVERY 6 HOURS PRN
Status: DISCONTINUED | OUTPATIENT
Start: 2023-09-28 | End: 2023-10-07 | Stop reason: HOSPADM

## 2023-09-28 RX ORDER — ONDANSETRON 4 MG/1
4 TABLET, ORALLY DISINTEGRATING ORAL EVERY 8 HOURS PRN
Status: DISCONTINUED | OUTPATIENT
Start: 2023-09-28 | End: 2023-10-07 | Stop reason: HOSPADM

## 2023-09-28 RX ORDER — ONDANSETRON 2 MG/ML
4 INJECTION INTRAMUSCULAR; INTRAVENOUS EVERY 6 HOURS PRN
Status: DISCONTINUED | OUTPATIENT
Start: 2023-09-28 | End: 2023-10-07 | Stop reason: HOSPADM

## 2023-09-28 RX ORDER — ABIRATERONE ACETATE 250 MG/1
1000 TABLET ORAL EVERY EVENING
Status: DISCONTINUED | OUTPATIENT
Start: 2023-09-29 | End: 2023-10-07 | Stop reason: HOSPADM

## 2023-09-28 RX ORDER — MIDODRINE HYDROCHLORIDE 5 MG/1
2.5 TABLET ORAL
Status: DISCONTINUED | OUTPATIENT
Start: 2023-09-28 | End: 2023-09-30

## 2023-09-28 RX ORDER — POLYETHYLENE GLYCOL 3350 17 G/17G
17 POWDER, FOR SOLUTION ORAL DAILY PRN
Status: DISCONTINUED | OUTPATIENT
Start: 2023-09-28 | End: 2023-10-07 | Stop reason: HOSPADM

## 2023-09-28 RX ORDER — 0.9 % SODIUM CHLORIDE 0.9 %
500 INTRAVENOUS SOLUTION INTRAVENOUS
Status: COMPLETED | OUTPATIENT
Start: 2023-09-28 | End: 2023-09-28

## 2023-09-28 RX ORDER — ALBUMIN (HUMAN) 12.5 G/50ML
25 SOLUTION INTRAVENOUS ONCE
Status: COMPLETED | OUTPATIENT
Start: 2023-09-28 | End: 2023-09-28

## 2023-09-28 RX ORDER — ABIRATERONE ACETATE 250 MG/1
1000 TABLET ORAL DAILY
Status: DISCONTINUED | OUTPATIENT
Start: 2023-09-28 | End: 2023-09-28

## 2023-09-28 RX ORDER — BUMETANIDE 1 MG/1
0.5 TABLET ORAL DAILY
Status: DISCONTINUED | OUTPATIENT
Start: 2023-09-28 | End: 2023-10-07 | Stop reason: HOSPADM

## 2023-09-28 RX ORDER — 0.9 % SODIUM CHLORIDE 0.9 %
1000 INTRAVENOUS SOLUTION INTRAVENOUS ONCE
Status: DISCONTINUED | OUTPATIENT
Start: 2023-09-28 | End: 2023-09-28

## 2023-09-28 RX ORDER — EZETIMIBE 10 MG/1
10 TABLET ORAL DAILY
Status: DISCONTINUED | OUTPATIENT
Start: 2023-09-29 | End: 2023-10-07 | Stop reason: HOSPADM

## 2023-09-28 RX ORDER — DOCUSATE SODIUM 100 MG/1
100 CAPSULE, LIQUID FILLED ORAL DAILY
Status: DISCONTINUED | OUTPATIENT
Start: 2023-09-29 | End: 2023-10-07 | Stop reason: HOSPADM

## 2023-09-28 RX ORDER — SODIUM CHLORIDE 9 MG/ML
INJECTION, SOLUTION INTRAVENOUS PRN
Status: DISCONTINUED | OUTPATIENT
Start: 2023-09-28 | End: 2023-10-07 | Stop reason: HOSPADM

## 2023-09-28 RX ORDER — SERTRALINE HYDROCHLORIDE 25 MG/1
25 TABLET, FILM COATED ORAL DAILY
Status: DISCONTINUED | OUTPATIENT
Start: 2023-09-29 | End: 2023-10-07 | Stop reason: HOSPADM

## 2023-09-28 RX ORDER — PANTOPRAZOLE SODIUM 40 MG/1
40 TABLET, DELAYED RELEASE ORAL
Status: DISCONTINUED | OUTPATIENT
Start: 2023-09-29 | End: 2023-10-07 | Stop reason: HOSPADM

## 2023-09-28 RX ORDER — PREDNISONE 5 MG/1
5 TABLET ORAL 2 TIMES DAILY
Status: DISCONTINUED | OUTPATIENT
Start: 2023-09-28 | End: 2023-09-29

## 2023-09-28 RX ORDER — ACETAMINOPHEN 325 MG/1
650 TABLET ORAL EVERY 6 HOURS PRN
COMMUNITY

## 2023-09-28 RX ORDER — SODIUM CHLORIDE 0.9 % (FLUSH) 0.9 %
5-40 SYRINGE (ML) INJECTION EVERY 12 HOURS SCHEDULED
Status: DISCONTINUED | OUTPATIENT
Start: 2023-09-28 | End: 2023-10-07 | Stop reason: HOSPADM

## 2023-09-28 RX ORDER — SODIUM CHLORIDE 0.9 % (FLUSH) 0.9 %
5-40 SYRINGE (ML) INJECTION PRN
Status: DISCONTINUED | OUTPATIENT
Start: 2023-09-28 | End: 2023-10-07 | Stop reason: HOSPADM

## 2023-09-28 RX ORDER — FENTANYL CITRATE 50 UG/ML
50 INJECTION, SOLUTION INTRAMUSCULAR; INTRAVENOUS
Status: COMPLETED | OUTPATIENT
Start: 2023-09-28 | End: 2023-09-28

## 2023-09-28 RX ORDER — OXYCODONE HYDROCHLORIDE 5 MG/1
10 TABLET ORAL EVERY 4 HOURS PRN
Status: DISCONTINUED | OUTPATIENT
Start: 2023-09-28 | End: 2023-10-07 | Stop reason: HOSPADM

## 2023-09-28 RX ADMIN — MIDODRINE HYDROCHLORIDE 2.5 MG: 5 TABLET ORAL at 20:55

## 2023-09-28 RX ADMIN — SODIUM CHLORIDE 1000 MG: 900 INJECTION INTRAVENOUS at 17:37

## 2023-09-28 RX ADMIN — AZITHROMYCIN MONOHYDRATE 500 MG: 500 INJECTION, POWDER, LYOPHILIZED, FOR SOLUTION INTRAVENOUS at 17:54

## 2023-09-28 RX ADMIN — SODIUM CHLORIDE, PRESERVATIVE FREE 5 ML: 5 INJECTION INTRAVENOUS at 21:46

## 2023-09-28 RX ADMIN — SODIUM CHLORIDE 500 ML: 9 INJECTION, SOLUTION INTRAVENOUS at 17:16

## 2023-09-28 RX ADMIN — ALBUMIN (HUMAN) 25 G: 0.25 INJECTION, SOLUTION INTRAVENOUS at 19:58

## 2023-09-28 RX ADMIN — PREDNISONE 5 MG: 10 TABLET ORAL at 20:42

## 2023-09-28 RX ADMIN — ABIRATERONE ACETATE 1000 MG: 250 TABLET ORAL at 18:23

## 2023-09-28 RX ADMIN — FENTANYL CITRATE 50 MCG: 0.05 INJECTION, SOLUTION INTRAMUSCULAR; INTRAVENOUS at 16:15

## 2023-09-28 RX ADMIN — OXYCODONE HYDROCHLORIDE 10 MG: 5 TABLET ORAL at 22:33

## 2023-09-28 ASSESSMENT — PAIN - FUNCTIONAL ASSESSMENT
PAIN_FUNCTIONAL_ASSESSMENT: PREVENTS OR INTERFERES SOME ACTIVE ACTIVITIES AND ADLS
PAIN_FUNCTIONAL_ASSESSMENT: 0-10
PAIN_FUNCTIONAL_ASSESSMENT: 0-10

## 2023-09-28 ASSESSMENT — PAIN DESCRIPTION - ORIENTATION
ORIENTATION: LEFT

## 2023-09-28 ASSESSMENT — PAIN DESCRIPTION - LOCATION
LOCATION: HIP
LOCATION: BACK
LOCATION: HIP

## 2023-09-28 ASSESSMENT — PAIN SCALES - GENERAL
PAINLEVEL_OUTOF10: 9
PAINLEVEL_OUTOF10: 9
PAINLEVEL_OUTOF10: 10
PAINLEVEL_OUTOF10: 8

## 2023-09-28 ASSESSMENT — PAIN DESCRIPTION - DESCRIPTORS: DESCRIPTORS: ACHING;SORE

## 2023-09-28 NOTE — H&P
Hospitalist Admission Note    NAME:   Cassia Rubalcava   : 1940   MRN: [id number]     Date/Time: 2023 5:37 PM    Patient PCP: Sukumar Harmon MD    ______________________________________________________________________  Given the patient's current clinical presentation, I have a high level of concern for decompensation if discharged from the emergency department. Complex decision making was performed, which includes reviewing the patient's available past medical records, laboratory results, and x-ray films. My assessment of this patient's clinical condition and my plan of care is as follows. Assessment / Plan:    Left hip pain-patient presents with complaints of left hip pain, likely musculoskeletal in etiology, initial hip x-rays are negative for any fractures however given the amount of pain patient is having patient currently getting CT pelvis  Follow-up CT pelvis to rule out fractures  Oxycodone as needed for pain relief  Continue to monitor patient's clinical status  Physical therapy Occupational Therapy consult in a.m.     SIRS-patient presents meeting SIRS criteria given the fact the patient was hypotensive as well as tachycardic upon presentation, patient does have a history of A-fib, chest x-ray shows possible pneumonia, however patient is afebrile, no white count  Follow blood cultures  Continue ceftriaxone azithromycin for now for coverage of possible pneumonia  Obtain serum procalcitonin, if normal, discontinue antibiotics  Patient cannot receive fluid resuscitation as per severe sepsis protocol given the fact the patient has a history of heart failure    History of heart failure-currently holding antihypertensive medications as well as diuretics given patient's clinical status  We will reintroduce sequentially once patient's blood pressure improves  Continue to monitor volume status    History of atrial fibrillation-resume home anticoagulation once CT pelvis rules out any

## 2023-09-28 NOTE — ED NOTES
Bedside shift change report given to RN Jayson Armstrong (oncoming nurse) by Inga Headley (offgoing nurse). Report included the following information Nurse Handoff Report, ED Encounter Summary, ED SBAR, and MAR.         Cathy Vasquez RN  09/28/23 7696

## 2023-09-28 NOTE — PROGRESS NOTES
Pharmacy Medication Reconciliation     The patient was interviewed regarding current PTA medication list, use and drug allergies;  patient's wife present in room and obtained permission from patient to discuss drug regimen with visitor(s) present. The patient was questioned regarding use of any other inhalers, topical products, over the counter medications, herbal medications, vitamin products or ophthalmic/nasal/otic medication use. Allergy Update: Patient has no known allergies. Recommendations/Findings: The following amendments were made to the patient's active medication list on file at [facility]:   Prospective    Pertinent Findings:   -patient has calcitonin nasal spray as directed at home, but wife hasn't started him on medication yet. Clarified PTA med list with rx query, med list, patient/patient's wife interview. PTA medication list was corrected to the following:     Prior to Admission Medications   Prescriptions Last Dose Informant   ELIQUIS 5 MG TABS tablet 9/28/2023 Spouse/Significant Other   Sig: Take 1 tablet by mouth 2 times daily   NONFORMULARY Past Week Spouse/Significant Other   Sig: Super Prostate   abiraterone acetate (ZYTIGA) 250 MG tablet 9/27/2023 at 1700 Spouse/Significant Other   Sig: Take 4 tablets by mouth daily   acetaminophen (TYLENOL) 325 MG tablet Past Week Spouse/Significant Other   Sig: Take 2 tablets by mouth every 6 hours as needed for Pain   bumetanide (BUMEX) 0.5 MG tablet 9/28/2023 Spouse/Significant Other   Sig: TAKE 1 TABLET BY MOUTH ONCE DAILY.  PT NEEDS AN APPOINTMENT FOR FURTHER REFILLS   docusate sodium (COLACE) 100 MG capsule 9/28/2023 Spouse/Significant Other   Sig: Take 1 capsule by mouth daily   ezetimibe (ZETIA) 10 MG tablet 9/28/2023 Spouse/Significant Other   Sig: Take 1 tablet by mouth daily   lisinopril (PRINIVIL;ZESTRIL) 40 MG tablet 9/28/2023 Spouse/Significant Other   Sig: Take 1 tablet by mouth daily   omeprazole (PRILOSEC) 20 MG delayed release

## 2023-09-28 NOTE — ED NOTES
Pt's shirt placed in a belongings bag. Pt provided with non slip socks.      Jermaine Trotter RN  09/28/23 7802

## 2023-09-28 NOTE — ED NOTES
Sent perfect serve message to Dr. Fabi Christopher stating that I canceled his order for a procal as the ED provider had ordered one.      Cole Reyes RN  09/28/23 7551

## 2023-09-29 ENCOUNTER — TELEPHONE (OUTPATIENT)
Age: 83
End: 2023-09-29

## 2023-09-29 ENCOUNTER — APPOINTMENT (OUTPATIENT)
Facility: HOSPITAL | Age: 83
DRG: 640 | End: 2023-09-29
Payer: MEDICARE

## 2023-09-29 ENCOUNTER — APPOINTMENT (OUTPATIENT)
Facility: HOSPITAL | Age: 83
DRG: 640 | End: 2023-09-29
Attending: INTERNAL MEDICINE
Payer: MEDICARE

## 2023-09-29 LAB
ANION GAP SERPL CALC-SCNC: 13 MMOL/L (ref 5–15)
ANION GAP SERPL CALC-SCNC: 5 MMOL/L (ref 5–15)
BASOPHILS # BLD: 0 K/UL (ref 0–0.1)
BASOPHILS NFR BLD: 0 % (ref 0–1)
BUN SERPL-MCNC: 35 MG/DL (ref 6–20)
BUN SERPL-MCNC: 39 MG/DL (ref 6–20)
BUN/CREAT SERPL: 26 (ref 12–20)
BUN/CREAT SERPL: 30 (ref 12–20)
CALCIUM SERPL-MCNC: 6.2 MG/DL (ref 8.5–10.1)
CALCIUM SERPL-MCNC: 8.3 MG/DL (ref 8.5–10.1)
CHLORIDE SERPL-SCNC: 101 MMOL/L (ref 97–108)
CHLORIDE SERPL-SCNC: 107 MMOL/L (ref 97–108)
CO2 SERPL-SCNC: 21 MMOL/L (ref 21–32)
CO2 SERPL-SCNC: 22 MMOL/L (ref 21–32)
CREAT SERPL-MCNC: 1.28 MG/DL (ref 0.7–1.3)
CREAT SERPL-MCNC: 1.33 MG/DL (ref 0.7–1.3)
DIFFERENTIAL METHOD BLD: ABNORMAL
EOSINOPHIL # BLD: 0.3 K/UL (ref 0–0.4)
EOSINOPHIL NFR BLD: 4 % (ref 0–7)
ERYTHROCYTE [DISTWIDTH] IN BLOOD BY AUTOMATED COUNT: 19.8 % (ref 11.5–14.5)
GLUCOSE SERPL-MCNC: 102 MG/DL (ref 65–100)
GLUCOSE SERPL-MCNC: 67 MG/DL (ref 65–100)
HCT VFR BLD AUTO: 30.5 % (ref 36.6–50.3)
HGB BLD-MCNC: 10 G/DL (ref 12.1–17)
IMM GRANULOCYTES # BLD AUTO: 0 K/UL (ref 0–0.04)
IMM GRANULOCYTES NFR BLD AUTO: 0 % (ref 0–0.5)
LACTATE BLD-SCNC: 1.12 MMOL/L (ref 0.4–2)
LYMPHOCYTES # BLD: 0.6 K/UL (ref 0.8–3.5)
LYMPHOCYTES NFR BLD: 9 % (ref 12–49)
MCH RBC QN AUTO: 32.4 PG (ref 26–34)
MCHC RBC AUTO-ENTMCNC: 32.8 G/DL (ref 30–36.5)
MCV RBC AUTO: 98.7 FL (ref 80–99)
MONOCYTES # BLD: 0.4 K/UL (ref 0–1)
MONOCYTES NFR BLD: 6 % (ref 5–13)
NEUTS SEG # BLD: 5.9 K/UL (ref 1.8–8)
NEUTS SEG NFR BLD: 81 % (ref 32–75)
NRBC # BLD: 0 K/UL (ref 0–0.01)
NRBC BLD-RTO: 0 PER 100 WBC
PLATELET # BLD AUTO: 216 K/UL (ref 150–400)
PMV BLD AUTO: 12 FL (ref 8.9–12.9)
POTASSIUM SERPL-SCNC: 3.7 MMOL/L (ref 3.5–5.1)
POTASSIUM SERPL-SCNC: 6.1 MMOL/L (ref 3.5–5.1)
RBC # BLD AUTO: 3.09 M/UL (ref 4.1–5.7)
SODIUM SERPL-SCNC: 134 MMOL/L (ref 136–145)
SODIUM SERPL-SCNC: 135 MMOL/L (ref 136–145)
TROPONIN I SERPL HS-MCNC: 21 NG/L (ref 0–76)
WBC # BLD AUTO: 7.2 K/UL (ref 4.1–11.1)

## 2023-09-29 PROCEDURE — 6370000000 HC RX 637 (ALT 250 FOR IP): Performed by: INTERNAL MEDICINE

## 2023-09-29 PROCEDURE — 2500000003 HC RX 250 WO HCPCS: Performed by: INTERNAL MEDICINE

## 2023-09-29 PROCEDURE — P9047 ALBUMIN (HUMAN), 25%, 50ML: HCPCS | Performed by: INTERNAL MEDICINE

## 2023-09-29 PROCEDURE — 2580000003 HC RX 258: Performed by: INTERNAL MEDICINE

## 2023-09-29 PROCEDURE — 2060000000 HC ICU INTERMEDIATE R&B

## 2023-09-29 PROCEDURE — 6360000002 HC RX W HCPCS: Performed by: INTERNAL MEDICINE

## 2023-09-29 PROCEDURE — 85025 COMPLETE CBC W/AUTO DIFF WBC: CPT

## 2023-09-29 PROCEDURE — 36415 COLL VENOUS BLD VENIPUNCTURE: CPT

## 2023-09-29 PROCEDURE — 80048 BASIC METABOLIC PNL TOTAL CA: CPT

## 2023-09-29 PROCEDURE — 76770 US EXAM ABDO BACK WALL COMP: CPT

## 2023-09-29 PROCEDURE — 6370000000 HC RX 637 (ALT 250 FOR IP): Performed by: STUDENT IN AN ORGANIZED HEALTH CARE EDUCATION/TRAINING PROGRAM

## 2023-09-29 PROCEDURE — 84484 ASSAY OF TROPONIN QUANT: CPT

## 2023-09-29 RX ORDER — CALCIUM GLUCONATE 20 MG/ML
1000 INJECTION, SOLUTION INTRAVENOUS ONCE
Status: COMPLETED | OUTPATIENT
Start: 2023-09-29 | End: 2023-09-29

## 2023-09-29 RX ORDER — DEXTROSE MONOHYDRATE 25 G/50ML
25 INJECTION, SOLUTION INTRAVENOUS ONCE
Status: COMPLETED | OUTPATIENT
Start: 2023-09-29 | End: 2023-09-29

## 2023-09-29 RX ORDER — ALBUMIN (HUMAN) 12.5 G/50ML
25 SOLUTION INTRAVENOUS ONCE
Status: COMPLETED | OUTPATIENT
Start: 2023-09-29 | End: 2023-09-29

## 2023-09-29 RX ORDER — MIDODRINE HYDROCHLORIDE 5 MG/1
10 TABLET ORAL ONCE
Status: COMPLETED | OUTPATIENT
Start: 2023-09-29 | End: 2023-09-29

## 2023-09-29 RX ORDER — 0.9 % SODIUM CHLORIDE 0.9 %
500 INTRAVENOUS SOLUTION INTRAVENOUS ONCE
Status: COMPLETED | OUTPATIENT
Start: 2023-09-29 | End: 2023-09-29

## 2023-09-29 RX ORDER — SODIUM CHLORIDE 9 MG/ML
INJECTION, SOLUTION INTRAVENOUS CONTINUOUS
Status: ACTIVE | OUTPATIENT
Start: 2023-09-29 | End: 2023-09-30

## 2023-09-29 RX ORDER — PREDNISONE 20 MG/1
20 TABLET ORAL DAILY
Status: DISCONTINUED | OUTPATIENT
Start: 2023-09-29 | End: 2023-09-29

## 2023-09-29 RX ADMIN — PREDNISONE 20 MG: 20 TABLET ORAL at 13:52

## 2023-09-29 RX ADMIN — MIDODRINE HYDROCHLORIDE 10 MG: 5 TABLET ORAL at 16:32

## 2023-09-29 RX ADMIN — MIDODRINE HYDROCHLORIDE 2.5 MG: 5 TABLET ORAL at 15:37

## 2023-09-29 RX ADMIN — SODIUM CHLORIDE 1000 MG: 900 INJECTION INTRAVENOUS at 13:09

## 2023-09-29 RX ADMIN — CALCIUM GLUCONATE 1000 MG: 20 INJECTION, SOLUTION INTRAVENOUS at 10:54

## 2023-09-29 RX ADMIN — EZETIMIBE 10 MG: 10 TABLET ORAL at 13:53

## 2023-09-29 RX ADMIN — INSULIN HUMAN 10 UNITS: 100 INJECTION, SOLUTION PARENTERAL at 12:51

## 2023-09-29 RX ADMIN — OXYCODONE HYDROCHLORIDE 10 MG: 5 TABLET ORAL at 03:35

## 2023-09-29 RX ADMIN — SODIUM ZIRCONIUM CYCLOSILICATE 10 G: 10 POWDER, FOR SUSPENSION ORAL at 11:57

## 2023-09-29 RX ADMIN — SERTRALINE HYDROCHLORIDE 25 MG: 25 TABLET ORAL at 13:52

## 2023-09-29 RX ADMIN — SODIUM CHLORIDE, PRESERVATIVE FREE 10 ML: 5 INJECTION INTRAVENOUS at 13:55

## 2023-09-29 RX ADMIN — PANTOPRAZOLE SODIUM 40 MG: 40 TABLET, DELAYED RELEASE ORAL at 13:50

## 2023-09-29 RX ADMIN — OXYCODONE HYDROCHLORIDE 10 MG: 5 TABLET ORAL at 13:52

## 2023-09-29 RX ADMIN — MIDODRINE HYDROCHLORIDE 2.5 MG: 5 TABLET ORAL at 13:51

## 2023-09-29 RX ADMIN — DOCUSATE SODIUM 100 MG: 100 CAPSULE, LIQUID FILLED ORAL at 13:50

## 2023-09-29 RX ADMIN — HYDROCORTISONE SODIUM SUCCINATE 50 MG: 100 INJECTION, POWDER, FOR SOLUTION INTRAMUSCULAR; INTRAVENOUS at 23:55

## 2023-09-29 RX ADMIN — ACETAMINOPHEN 650 MG: 325 TABLET ORAL at 02:01

## 2023-09-29 RX ADMIN — SODIUM CHLORIDE, PRESERVATIVE FREE 10 ML: 5 INJECTION INTRAVENOUS at 20:48

## 2023-09-29 RX ADMIN — HUMAN INSULIN 10 UNITS: 100 INJECTION, SOLUTION SUBCUTANEOUS at 13:55

## 2023-09-29 RX ADMIN — ABIRATERONE ACETATE 1000 MG: 250 TABLET ORAL at 17:53

## 2023-09-29 RX ADMIN — HYDROCORTISONE SODIUM SUCCINATE 50 MG: 100 INJECTION, POWDER, FOR SOLUTION INTRAMUSCULAR; INTRAVENOUS at 16:25

## 2023-09-29 RX ADMIN — SODIUM CHLORIDE 500 ML: 9 INJECTION, SOLUTION INTRAVENOUS at 16:26

## 2023-09-29 RX ADMIN — APIXABAN 5 MG: 5 TABLET, FILM COATED ORAL at 20:48

## 2023-09-29 RX ADMIN — DEXTROSE MONOHYDRATE 25 G: 25 INJECTION, SOLUTION INTRAVENOUS at 12:51

## 2023-09-29 RX ADMIN — ALBUMIN (HUMAN) 25 G: 0.25 INJECTION, SOLUTION INTRAVENOUS at 16:00

## 2023-09-29 RX ADMIN — OXYCODONE HYDROCHLORIDE 10 MG: 5 TABLET ORAL at 20:48

## 2023-09-29 RX ADMIN — AZITHROMYCIN MONOHYDRATE 500 MG: 500 INJECTION, POWDER, LYOPHILIZED, FOR SOLUTION INTRAVENOUS at 12:53

## 2023-09-29 ASSESSMENT — PAIN DESCRIPTION - ORIENTATION
ORIENTATION: LEFT

## 2023-09-29 ASSESSMENT — PAIN SCALES - GENERAL
PAINLEVEL_OUTOF10: 9
PAINLEVEL_OUTOF10: 6
PAINLEVEL_OUTOF10: 7
PAINLEVEL_OUTOF10: 7
PAINLEVEL_OUTOF10: 0
PAINLEVEL_OUTOF10: 0

## 2023-09-29 ASSESSMENT — PAIN - FUNCTIONAL ASSESSMENT
PAIN_FUNCTIONAL_ASSESSMENT: PREVENTS OR INTERFERES SOME ACTIVE ACTIVITIES AND ADLS

## 2023-09-29 ASSESSMENT — PAIN DESCRIPTION - LOCATION
LOCATION: HIP;BACK
LOCATION: HIP
LOCATION: BACK;HIP;LEG
LOCATION: BACK;HIP

## 2023-09-29 ASSESSMENT — PAIN DESCRIPTION - DESCRIPTORS
DESCRIPTORS: ACHING

## 2023-09-29 NOTE — PROGRESS NOTES
Pharmacy Medication Reconciliation     The patient was interviewed regarding current PTA medication list, use and drug allergies;  patient's wife present in room and obtained permission from patient to discuss drug regimen with visitor(s) present. The patient was questioned regarding use of any other inhalers, topical products, over the counter medications, herbal medications, vitamin products or ophthalmic/nasal/otic medication use. Allergy Update: Patient has no known allergies. Recommendations/Findings: The following amendments were made to the patient's active medication list on file at [facility]:   Prospective    Pertinent Findings:   -patient has calcitonin nasal spray as directed at home, but wife hasn't started him on medication yet. Clarified PTA med list with rx query, med list, patient/patient's wife interview. PTA medication list was corrected to the following:     Prior to Admission Medications   Prescriptions Last Dose Informant   ELIQUIS 5 MG TABS tablet 9/28/2023 Spouse/Significant Other   Sig: Take 1 tablet by mouth 2 times daily   Nutritional Supplements (PROSTATE PO)     Sig: Take 1 capsule by mouth daily OTC: Super Prostate cap   abiraterone acetate (ZYTIGA) 250 MG tablet 9/27/2023 at 1700 Spouse/Significant Other   Sig: Take 4 tablets by mouth daily   acetaminophen (TYLENOL) 325 MG tablet Past Week Spouse/Significant Other   Sig: Take 2 tablets by mouth every 6 hours as needed for Pain   bumetanide (BUMEX) 0.5 MG tablet 9/28/2023 Spouse/Significant Other   Sig: TAKE 1 TABLET BY MOUTH ONCE DAILY.  PT NEEDS AN APPOINTMENT FOR FURTHER REFILLS   docusate sodium (COLACE) 100 MG capsule 9/28/2023 Spouse/Significant Other   Sig: Take 1 capsule by mouth daily   ezetimibe (ZETIA) 10 MG tablet 9/28/2023 Spouse/Significant Other   Sig: Take 1 tablet by mouth daily   lisinopril (PRINIVIL;ZESTRIL) 40 MG tablet 9/28/2023 Spouse/Significant Other   Sig: Take 1 tablet by mouth daily   omeprazole

## 2023-09-29 NOTE — PROGRESS NOTES
PT order acknowledged, chart reviewed and discussed in Rounds. Dr. Stephie Ness recommends holding PT evaluation until tomorrow due to high K+ levels. Will defer and see tomorrow.     Robert Ch, PT

## 2023-09-29 NOTE — PROGRESS NOTES
Pt transferred from [facility] for hypotension. 88/62, asymptomatic. NS bolus currently infusing. Labs drawn on [facility], awaiting results.

## 2023-09-29 NOTE — PROGRESS NOTES
Bedside shift change report given to [facility] LPN (oncoming nurse) by Alvina Patton RN (offgoing nurse). Report included the following information Nurse Handoff Report, Adult Overview, Surgery Report, Intake/Output, MAR, Recent Results, Med Rec Status, and Cardiac Rhythm (Afib / Sinus tachy) . Pt had intermittent pain on his left hip and back. PRN pain meds were given to calm him down. 0350 spo2 was at 86-88%. He was placed on 2L of oxygen and it is currently reading 96-98%. Pt was made comfortable and repositioned well to help relieve the pain. Pt at bedside shift report was alert and awake in bed.

## 2023-09-29 NOTE — ED NOTES
This RN answered call silva, and Jose Rafael Andrea and Zahra NUNEZ assisted Pt with reposition and to comfort. Gave Pt ice water and restarted IV due to occlusion from reposition. Pt's family departed at this time, too.       Narayan Sameers, [address] [place] [address]  09/28/23 2032

## 2023-09-29 NOTE — PROGRESS NOTES
Afib on tele. 1L O2 NC, RA at baseline. Max 2-assist in bed. Awaiting PT/OT evals. PO chemo pts home med confirmed with pharmacy. NS gtt held unless MAP < 65. Plan to monitor BP. Darien Stubbs RN.

## 2023-09-29 NOTE — TELEPHONE ENCOUNTER
Pt daughter called because pt fell during physical therapy appt. Pt was sent to the hospital. Pt might have pneumonia.

## 2023-09-29 NOTE — PROGRESS NOTES
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 Check 2D echocardiogram as well. Will give albumin given hypotension  -Hold all home antihypertensives  -He has no previous echo in the chart, does not appear to be volume overloaded and is on room air. proBNP is 16,000. Will give 500 mill of normal saline bolus and see if she responds to fluids given his hypotension  -Transfer to stepdown    Congestive heart failure  Hypertension, currently blood pressure low  Dyslipidemia  GERD  Depression  -Holding Bumex due to congestive heart failure  -Continue PTA Zetia, PPI and also Zoloft    Atrial fibrillation on anticoagulation with Eliquis  -Resume Eliquis. Monitor for bleeding.     History of MDS  History of prostate

## 2023-09-29 NOTE — PROGRESS NOTES
Occupational Therapy   OT order acknowledged, chart reviewed and discussed in Rounds. Dr. Trent Herrera recommends holding OT evaluation until tomorrow due to high K+ levels. Will defer and see tomorrow.  Shelby Guerrero OTR/L

## 2023-09-30 ENCOUNTER — APPOINTMENT (OUTPATIENT)
Facility: HOSPITAL | Age: 83
DRG: 640 | End: 2023-09-30
Payer: MEDICARE

## 2023-09-30 LAB
ACCESSION NUMBER, LLC1M: ABNORMAL
ACINETOBACTER CALCOAC BAUMANNII COMPLEX BY PCR: NOT DETECTED
ANION GAP SERPL CALC-SCNC: 7 MMOL/L (ref 5–15)
B FRAGILIS DNA BLD POS QL NAA+NON-PROBE: NOT DETECTED
BASOPHILS # BLD: 0 K/UL (ref 0–0.1)
BASOPHILS NFR BLD: 0 % (ref 0–1)
BIOFIRE TEST COMMENT: ABNORMAL
BUN SERPL-MCNC: 40 MG/DL (ref 6–20)
BUN/CREAT SERPL: 31 (ref 12–20)
C ALBICANS DNA BLD POS QL NAA+NON-PROBE: NOT DETECTED
C AURIS DNA BLD POS QL NAA+NON-PROBE: NOT DETECTED
C GATTII+NEOFOR DNA BLD POS QL NAA+N-PRB: NOT DETECTED
C GLABRATA DNA BLD POS QL NAA+NON-PROBE: NOT DETECTED
C KRUSEI DNA BLD POS QL NAA+NON-PROBE: NOT DETECTED
C PARAP DNA BLD POS QL NAA+NON-PROBE: NOT DETECTED
C TROPICLS DNA BLD POS QL NAA+NON-PROBE: NOT DETECTED
CALCIUM SERPL-MCNC: 7.4 MG/DL (ref 8.5–10.1)
CHLORIDE SERPL-SCNC: 108 MMOL/L (ref 97–108)
CO2 SERPL-SCNC: 21 MMOL/L (ref 21–32)
CREAT SERPL-MCNC: 1.27 MG/DL (ref 0.7–1.3)
DIFFERENTIAL METHOD BLD: ABNORMAL
E CLOAC COMP DNA BLD POS NAA+NON-PROBE: NOT DETECTED
E COLI DNA BLD POS QL NAA+NON-PROBE: NOT DETECTED
E FAECALIS DNA BLD POS QL NAA+NON-PROBE: NOT DETECTED
E FAECIUM DNA BLD POS QL NAA+NON-PROBE: NOT DETECTED
ENTEROBACTERALES DNA BLD POS NAA+N-PRB: NOT DETECTED
EOSINOPHIL # BLD: 0 K/UL (ref 0–0.4)
EOSINOPHIL NFR BLD: 1 % (ref 0–7)
ERYTHROCYTE [DISTWIDTH] IN BLOOD BY AUTOMATED COUNT: 19.9 % (ref 11.5–14.5)
GLUCOSE SERPL-MCNC: 137 MG/DL (ref 65–100)
GP B STREP DNA BLD POS QL NAA+NON-PROBE: NOT DETECTED
HAEM INFLU DNA BLD POS QL NAA+NON-PROBE: NOT DETECTED
HCT VFR BLD AUTO: 29.8 % (ref 36.6–50.3)
HGB BLD-MCNC: 9.1 G/DL (ref 12.1–17)
IMM GRANULOCYTES # BLD AUTO: 0 K/UL (ref 0–0.04)
IMM GRANULOCYTES NFR BLD AUTO: 1 % (ref 0–0.5)
K OXYTOCA DNA BLD POS QL NAA+NON-PROBE: NOT DETECTED
KLEBSIELLA SP DNA BLD POS QL NAA+NON-PRB: NOT DETECTED
KLEBSIELLA SP DNA BLD POS QL NAA+NON-PRB: NOT DETECTED
L MONOCYTOG DNA BLD POS QL NAA+NON-PROBE: NOT DETECTED
LYMPHOCYTES # BLD: 0.4 K/UL (ref 0.8–3.5)
LYMPHOCYTES NFR BLD: 10 % (ref 12–49)
MCH RBC QN AUTO: 31.7 PG (ref 26–34)
MCHC RBC AUTO-ENTMCNC: 30.5 G/DL (ref 30–36.5)
MCV RBC AUTO: 103.8 FL (ref 80–99)
MECA+MECC ISLT/SPM QL: NOT DETECTED
MONOCYTES # BLD: 0.3 K/UL (ref 0–1)
MONOCYTES NFR BLD: 7 % (ref 5–13)
N MEN DNA BLD POS QL NAA+NON-PROBE: NOT DETECTED
NEUTS SEG # BLD: 3.6 K/UL (ref 1.8–8)
NEUTS SEG NFR BLD: 82 % (ref 32–75)
NRBC # BLD: 0 K/UL (ref 0–0.01)
NRBC BLD-RTO: 0 PER 100 WBC
P AERUGINOSA DNA BLD POS NAA+NON-PROBE: NOT DETECTED
PLATELET # BLD AUTO: 198 K/UL (ref 150–400)
PMV BLD AUTO: 12.6 FL (ref 8.9–12.9)
POTASSIUM SERPL-SCNC: 5.8 MMOL/L (ref 3.5–5.1)
PROTEUS SP DNA BLD POS QL NAA+NON-PROBE: NOT DETECTED
RBC # BLD AUTO: 2.87 M/UL (ref 4.1–5.7)
RESISTANT GENE TARGETS: ABNORMAL
S AUREUS DNA BLD POS QL NAA+NON-PROBE: NOT DETECTED
S AUREUS+CONS DNA BLD POS NAA+NON-PROBE: DETECTED
S EPIDERMIDIS DNA BLD POS QL NAA+NON-PRB: DETECTED
S LUGDUNENSIS DNA BLD POS QL NAA+NON-PRB: NOT DETECTED
S MALTOPHILIA DNA BLD POS QL NAA+NON-PRB: NOT DETECTED
S MARCESCENS DNA BLD POS NAA+NON-PROBE: NOT DETECTED
S PNEUM DNA BLD POS QL NAA+NON-PROBE: NOT DETECTED
S PYO DNA BLD POS QL NAA+NON-PROBE: NOT DETECTED
SALMONELLA DNA BLD POS QL NAA+NON-PROBE: NOT DETECTED
SODIUM SERPL-SCNC: 136 MMOL/L (ref 136–145)
STREPTOCOCCUS DNA BLD POS NAA+NON-PROBE: NOT DETECTED
WBC # BLD AUTO: 4.4 K/UL (ref 4.1–11.1)

## 2023-09-30 PROCEDURE — 6370000000 HC RX 637 (ALT 250 FOR IP): Performed by: INTERNAL MEDICINE

## 2023-09-30 PROCEDURE — 2580000003 HC RX 258: Performed by: INTERNAL MEDICINE

## 2023-09-30 PROCEDURE — 97166 OT EVAL MOD COMPLEX 45 MIN: CPT

## 2023-09-30 PROCEDURE — 85025 COMPLETE CBC W/AUTO DIFF WBC: CPT

## 2023-09-30 PROCEDURE — 6360000002 HC RX W HCPCS: Performed by: INTERNAL MEDICINE

## 2023-09-30 PROCEDURE — 97530 THERAPEUTIC ACTIVITIES: CPT

## 2023-09-30 PROCEDURE — 6360000002 HC RX W HCPCS: Performed by: STUDENT IN AN ORGANIZED HEALTH CARE EDUCATION/TRAINING PROGRAM

## 2023-09-30 PROCEDURE — 71045 X-RAY EXAM CHEST 1 VIEW: CPT

## 2023-09-30 PROCEDURE — 94640 AIRWAY INHALATION TREATMENT: CPT

## 2023-09-30 PROCEDURE — 2500000003 HC RX 250 WO HCPCS: Performed by: STUDENT IN AN ORGANIZED HEALTH CARE EDUCATION/TRAINING PROGRAM

## 2023-09-30 PROCEDURE — 87040 BLOOD CULTURE FOR BACTERIA: CPT

## 2023-09-30 PROCEDURE — 97162 PT EVAL MOD COMPLEX 30 MIN: CPT

## 2023-09-30 PROCEDURE — 2060000000 HC ICU INTERMEDIATE R&B

## 2023-09-30 PROCEDURE — 6370000000 HC RX 637 (ALT 250 FOR IP): Performed by: STUDENT IN AN ORGANIZED HEALTH CARE EDUCATION/TRAINING PROGRAM

## 2023-09-30 PROCEDURE — 36415 COLL VENOUS BLD VENIPUNCTURE: CPT

## 2023-09-30 PROCEDURE — 93005 ELECTROCARDIOGRAM TRACING: CPT | Performed by: STUDENT IN AN ORGANIZED HEALTH CARE EDUCATION/TRAINING PROGRAM

## 2023-09-30 PROCEDURE — 80048 BASIC METABOLIC PNL TOTAL CA: CPT

## 2023-09-30 RX ORDER — LIDOCAINE 4 G/G
1 PATCH TOPICAL DAILY
Status: DISCONTINUED | OUTPATIENT
Start: 2023-09-30 | End: 2023-10-07 | Stop reason: HOSPADM

## 2023-09-30 RX ORDER — METOPROLOL TARTRATE 5 MG/5ML
5 INJECTION INTRAVENOUS EVERY 6 HOURS PRN
Status: DISCONTINUED | OUTPATIENT
Start: 2023-09-30 | End: 2023-10-07 | Stop reason: HOSPADM

## 2023-09-30 RX ORDER — MIDODRINE HYDROCHLORIDE 5 MG/1
5 TABLET ORAL
Status: DISCONTINUED | OUTPATIENT
Start: 2023-09-30 | End: 2023-10-01

## 2023-09-30 RX ORDER — MIDODRINE HYDROCHLORIDE 5 MG/1
10 TABLET ORAL ONCE
Status: COMPLETED | OUTPATIENT
Start: 2023-09-30 | End: 2023-09-30

## 2023-09-30 RX ORDER — LIDOCAINE 4 G/G
1 PATCH TOPICAL DAILY
Status: DISCONTINUED | OUTPATIENT
Start: 2023-10-01 | End: 2023-09-30

## 2023-09-30 RX ADMIN — SODIUM ZIRCONIUM CYCLOSILICATE 10 G: 10 POWDER, FOR SUSPENSION ORAL at 11:37

## 2023-09-30 RX ADMIN — METOPROLOL TARTRATE 5 MG: 5 INJECTION INTRAVENOUS at 11:49

## 2023-09-30 RX ADMIN — APIXABAN 5 MG: 5 TABLET, FILM COATED ORAL at 09:36

## 2023-09-30 RX ADMIN — MIDODRINE HYDROCHLORIDE 2.5 MG: 5 TABLET ORAL at 09:36

## 2023-09-30 RX ADMIN — SERTRALINE HYDROCHLORIDE 25 MG: 25 TABLET ORAL at 09:36

## 2023-09-30 RX ADMIN — AZITHROMYCIN MONOHYDRATE 500 MG: 500 INJECTION, POWDER, LYOPHILIZED, FOR SOLUTION INTRAVENOUS at 09:35

## 2023-09-30 RX ADMIN — IPRATROPIUM BROMIDE 0.5 MG: 0.5 SOLUTION RESPIRATORY (INHALATION) at 12:31

## 2023-09-30 RX ADMIN — HYDROCORTISONE SODIUM SUCCINATE 50 MG: 100 INJECTION, POWDER, FOR SOLUTION INTRAMUSCULAR; INTRAVENOUS at 09:35

## 2023-09-30 RX ADMIN — ONDANSETRON 4 MG: 4 TABLET, ORALLY DISINTEGRATING ORAL at 21:01

## 2023-09-30 RX ADMIN — OXYCODONE HYDROCHLORIDE 10 MG: 5 TABLET ORAL at 21:44

## 2023-09-30 RX ADMIN — PANTOPRAZOLE SODIUM 40 MG: 40 TABLET, DELAYED RELEASE ORAL at 07:51

## 2023-09-30 RX ADMIN — DOCUSATE SODIUM 100 MG: 100 CAPSULE, LIQUID FILLED ORAL at 09:36

## 2023-09-30 RX ADMIN — ONDANSETRON 4 MG: 2 INJECTION INTRAMUSCULAR; INTRAVENOUS at 15:20

## 2023-09-30 RX ADMIN — EZETIMIBE 10 MG: 10 TABLET ORAL at 09:36

## 2023-09-30 RX ADMIN — ABIRATERONE ACETATE 1000 MG: 250 TABLET ORAL at 17:01

## 2023-09-30 RX ADMIN — MIDODRINE HYDROCHLORIDE 10 MG: 5 TABLET ORAL at 11:36

## 2023-09-30 RX ADMIN — MIDODRINE HYDROCHLORIDE 5 MG: 5 TABLET ORAL at 17:00

## 2023-09-30 RX ADMIN — HYDROCORTISONE SODIUM SUCCINATE 50 MG: 100 INJECTION, POWDER, FOR SOLUTION INTRAMUSCULAR; INTRAVENOUS at 17:01

## 2023-09-30 RX ADMIN — SODIUM CHLORIDE 1000 MG: 900 INJECTION INTRAVENOUS at 09:35

## 2023-09-30 RX ADMIN — SODIUM CHLORIDE, PRESERVATIVE FREE 10 ML: 5 INJECTION INTRAVENOUS at 20:58

## 2023-09-30 RX ADMIN — SODIUM CHLORIDE, PRESERVATIVE FREE 10 ML: 5 INJECTION INTRAVENOUS at 09:36

## 2023-09-30 RX ADMIN — DICLOFENAC SODIUM 2 G: 10 GEL TOPICAL at 21:53

## 2023-09-30 RX ADMIN — MIDODRINE HYDROCHLORIDE 5 MG: 5 TABLET ORAL at 11:37

## 2023-09-30 ASSESSMENT — PAIN DESCRIPTION - LOCATION: LOCATION: HIP

## 2023-09-30 ASSESSMENT — PAIN SCALES - GENERAL
PAINLEVEL_OUTOF10: 4
PAINLEVEL_OUTOF10: 8

## 2023-09-30 NOTE — PLAN OF CARE
Problem: Occupational Therapy - Adult  Goal: By Discharge: Performs self-care activities at highest level of function for planned discharge setting. See evaluation for individualized goals. Description: FUNCTIONAL STATUS PRIOR TO ADMISSION:  Patient was independent in ADLs and mod I for functional mobility using rollator. ,  ,  ,  ,  ,  ,  ,  ,  ,  ,       HOME SUPPORT: Patient lived with wife and son. Occupational Therapy Goals:  Initiated 9/30/2023  1. Patient will perform grooming in standing with Supervision within 7 day(s). 2.  Patient will perform upper body dressing with Stand by Assist within 7 day(s). 3.  Patient will perform lower body dressing using AE PRN with Moderate Assist within 7 day(s). 4.  Patient will perform toilet transfers with Maximal Assist  within 7 day(s). 5.  Patient will perform all aspects of toileting with Minimal Assist within 7 day(s). 6.  Patient will participate in upper extremity therapeutic exercise/activities with Stand by Assist for 3 minutes within 7 day(s). Outcome: Progressing   OCCUPATIONAL THERAPY EVALUATION    Patient: Karely Mahmood (82 y.o. male)  Date: 9/30/2023  Primary Diagnosis: Left hip pain [M25.552]  Sepsis ([address]) [A41.9]  Severe sepsis ([address]) [A41.9, R65.20]  Community acquired pneumonia, unspecified laterality [J18.9]         Precautions: Fall Risk                  ASSESSMENT :  The patient is limited by decreased functional mobility, independence in ADLs, high-level IADLs, ROM, strength, body mechanics, activity tolerance, endurance, safety awareness, coordination, balance, increased pain levels. Based on the impairments listed above patient is functioning below his baseline for ADLs and functional mobility. He is now completing ADLs with set-up to total assist and functional mobility with mod to mod/max assist x2. Patient received semisupine in bed on RA and cleared for therapy by nursing.  He required assist BLE during bed mobility and Verbal  Barriers to Learning: None  Education Outcome: Verbalized understanding;Continued education needed    Thank you for this referral.  Boy Hooks OT  Minutes: 20    Occupational Therapy Evaluation Charge Determination   History Examination Decision-Making   MEDIUM Complexity : Expanded review of history including physical, cognitive and psychocial history  MEDIUM Complexity: 3-5 Performance deficits relating to physical, cognitive, or psychosocial skills that result in activity limitations and/or participation restrictions MEDIUM Complexity: Patient may present with comorbidities that affect occupational performance.  Minimal to moderate modifications of tasks or assist (eg. physical or verbal) with assist is necessary to enable pt to complete eval   Based on the above components, the patient evaluation is determined to be of the following complexity level: Medium

## 2023-09-30 NOTE — CARE COORDINATION
Care Management Initial Assessment       RUR: 20% moderate  Readmission? No  1st IM letter given? No  1st  letter given: No       09/30/23 0964   Service Assessment   Patient Orientation Alert and Oriented   Cognition Alert   History Provided By Patient   Primary Caregiver Self   Accompanied By/Relationship spouse, Wendy Bonilla, and son   Support Systems Spouse/Significant Other;Children   Patient's Healthcare Decision Maker is: Named in [address]   PCP Verified by CM Yes  Claudeen Scales)   Last Visit to PCP Within last 3 months   Prior Functional Level Independent in ADLs/IADLs   Current Functional Level Independent in ADLs/IADLs   Can patient return to prior living arrangement Yes   Ability to make needs known: Good   Family able to assist with home care needs: Yes   Would you like for me to discuss the discharge plan with any other family members/significant others, and if so, who? No   Financial Resources Medicare   Social/Functional History   Lives With Spouse; Son   Type of [facility]  One level   Home Access Ramped entrance   [address]   Transfer Assistance Independent   Discharge Planning   Type of [address] Spouse/Significant Other;Children   Current Services Prior To Admission None   Potential Assistance Needed Other (Comment)  (IPR)   Patient expects to be discharged to: Acute rehab     CM met with pt, his wife, and his son at bedside to complete assessment and discuss d/c plan. CM explained that PT/OT have recommended IPR, pt and family stated no one had talked to them about this recommendation. CM explained this level of care and available facilities. Pt stated he would like to think about it. CM to follow up tomorrow. This pt will not require auth.     Brandon Bassett, JACEY, weekend   [phone]

## 2023-09-30 NOTE — PROGRESS NOTES
Hospitalist Progress Note    NAME:   Maxx Luther   : 1940   MRN: [id number]     Date/Time: 2023 1:00 PM  Patient PCP: Dakota Escobedo MD    Estimated discharge date: 10/2  Barriers: Oxygen weaned down      Assessment / Plan:    Acute hyperkalemia  -On admission potassium was 5.8 and is currently 6.1  -We will initiate emergent reversal of hyperkalemia with calcium gluconate, insulin and D50 and also Lokelma  -Repeat BMP at 1500  -Telemetry monitoring  -Discontinue home lisinopril at the time of discharge given recurrent hyperkalemia  Dose of Lokelma ordered  Repeat BMP at 8 PM    Left hip pain post fall  -CT of the pelvis shows no definite fracture but shows 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 Check 2D echocardiogram as well. Will give albumin given hypotension  -Hold all home antihypertensives  -He has no previous echo in the chart, does not appear to be volume overloaded and is on room air. proBNP is 16,000.   Will give 500 mill of normal saline bolus and see if she responds to fluids given his hypotension  -Transfer to stepdown    Congestive heart failure  Hypertension, currently blood pressure low  Dyslipidemia  GERD  Depression  -Holding Bumex due to congestive heart failure  -Continue PTA Zetia, PPI and also Zoloft  : Not done yet    Atrial fibrillation on anticoagulation with

## 2023-10-01 LAB
ANION GAP SERPL CALC-SCNC: 5 MMOL/L (ref 5–15)
BASOPHILS # BLD: 0.1 K/UL (ref 0–0.1)
BASOPHILS NFR BLD: 1 % (ref 0–1)
BUN SERPL-MCNC: 44 MG/DL (ref 6–20)
BUN/CREAT SERPL: 29 (ref 12–20)
CALCIUM SERPL-MCNC: 8 MG/DL (ref 8.5–10.1)
CHLORIDE SERPL-SCNC: 109 MMOL/L (ref 97–108)
CO2 SERPL-SCNC: 23 MMOL/L (ref 21–32)
CREAT SERPL-MCNC: 1.51 MG/DL (ref 0.7–1.3)
DIFFERENTIAL METHOD BLD: ABNORMAL
EKG ATRIAL RATE: 277 BPM
EKG DIAGNOSIS: NORMAL
EKG Q-T INTERVAL: 336 MS
EKG QRS DURATION: 88 MS
EKG QTC CALCULATION (BAZETT): 482 MS
EKG R AXIS: -57 DEGREES
EKG T AXIS: -42 DEGREES
EKG VENTRICULAR RATE: 124 BPM
EOSINOPHIL # BLD: 0.1 K/UL (ref 0–0.4)
EOSINOPHIL NFR BLD: 2 % (ref 0–7)
ERYTHROCYTE [DISTWIDTH] IN BLOOD BY AUTOMATED COUNT: 19.8 % (ref 11.5–14.5)
GLUCOSE BLD STRIP.AUTO-MCNC: 214 MG/DL (ref 65–117)
GLUCOSE SERPL-MCNC: 130 MG/DL (ref 65–100)
HCT VFR BLD AUTO: 30.2 % (ref 36.6–50.3)
HGB BLD-MCNC: 9.5 G/DL (ref 12.1–17)
IMM GRANULOCYTES # BLD AUTO: 0 K/UL (ref 0–0.04)
IMM GRANULOCYTES NFR BLD AUTO: 1 % (ref 0–0.5)
LYMPHOCYTES # BLD: 1 K/UL (ref 0.8–3.5)
LYMPHOCYTES NFR BLD: 14 % (ref 12–49)
MCH RBC QN AUTO: 31.5 PG (ref 26–34)
MCHC RBC AUTO-ENTMCNC: 31.5 G/DL (ref 30–36.5)
MCV RBC AUTO: 100 FL (ref 80–99)
MONOCYTES # BLD: 0.7 K/UL (ref 0–1)
MONOCYTES NFR BLD: 9 % (ref 5–13)
NEUTS SEG # BLD: 5.2 K/UL (ref 1.8–8)
NEUTS SEG NFR BLD: 73 % (ref 32–75)
NRBC # BLD: 0 K/UL (ref 0–0.01)
NRBC BLD-RTO: 0 PER 100 WBC
PLATELET # BLD AUTO: 223 K/UL (ref 150–400)
PMV BLD AUTO: 11.9 FL (ref 8.9–12.9)
POTASSIUM SERPL-SCNC: 4.7 MMOL/L (ref 3.5–5.1)
RBC # BLD AUTO: 3.02 M/UL (ref 4.1–5.7)
SERVICE CMNT-IMP: ABNORMAL
SODIUM SERPL-SCNC: 137 MMOL/L (ref 136–145)
WBC # BLD AUTO: 7.1 K/UL (ref 4.1–11.1)

## 2023-10-01 PROCEDURE — 6360000002 HC RX W HCPCS: Performed by: INTERNAL MEDICINE

## 2023-10-01 PROCEDURE — 36415 COLL VENOUS BLD VENIPUNCTURE: CPT

## 2023-10-01 PROCEDURE — 2580000003 HC RX 258: Performed by: INTERNAL MEDICINE

## 2023-10-01 PROCEDURE — 6370000000 HC RX 637 (ALT 250 FOR IP): Performed by: INTERNAL MEDICINE

## 2023-10-01 PROCEDURE — 82962 GLUCOSE BLOOD TEST: CPT

## 2023-10-01 PROCEDURE — 2060000000 HC ICU INTERMEDIATE R&B

## 2023-10-01 PROCEDURE — 80048 BASIC METABOLIC PNL TOTAL CA: CPT

## 2023-10-01 PROCEDURE — 6370000000 HC RX 637 (ALT 250 FOR IP): Performed by: STUDENT IN AN ORGANIZED HEALTH CARE EDUCATION/TRAINING PROGRAM

## 2023-10-01 PROCEDURE — 85025 COMPLETE CBC W/AUTO DIFF WBC: CPT

## 2023-10-01 PROCEDURE — 2700000000 HC OXYGEN THERAPY PER DAY

## 2023-10-01 RX ORDER — MIDODRINE HYDROCHLORIDE 5 MG/1
10 TABLET ORAL EVERY 8 HOURS
Status: DISCONTINUED | OUTPATIENT
Start: 2023-10-01 | End: 2023-10-07 | Stop reason: HOSPADM

## 2023-10-01 RX ORDER — MIDODRINE HYDROCHLORIDE 5 MG/1
10 TABLET ORAL
Status: DISCONTINUED | OUTPATIENT
Start: 2023-10-01 | End: 2023-10-01

## 2023-10-01 RX ADMIN — APIXABAN 5 MG: 5 TABLET, FILM COATED ORAL at 21:07

## 2023-10-01 RX ADMIN — HYDROCORTISONE SODIUM SUCCINATE 50 MG: 100 INJECTION, POWDER, FOR SOLUTION INTRAMUSCULAR; INTRAVENOUS at 08:50

## 2023-10-01 RX ADMIN — MIDODRINE HYDROCHLORIDE 10 MG: 5 TABLET ORAL at 08:50

## 2023-10-01 RX ADMIN — EZETIMIBE 10 MG: 10 TABLET ORAL at 08:49

## 2023-10-01 RX ADMIN — OXYCODONE HYDROCHLORIDE 10 MG: 5 TABLET ORAL at 15:39

## 2023-10-01 RX ADMIN — SERTRALINE HYDROCHLORIDE 25 MG: 25 TABLET ORAL at 08:49

## 2023-10-01 RX ADMIN — HYDROCORTISONE SODIUM SUCCINATE 50 MG: 100 INJECTION, POWDER, FOR SOLUTION INTRAMUSCULAR; INTRAVENOUS at 00:30

## 2023-10-01 RX ADMIN — SODIUM CHLORIDE 1000 MG: 900 INJECTION INTRAVENOUS at 08:50

## 2023-10-01 RX ADMIN — SODIUM CHLORIDE, PRESERVATIVE FREE 10 ML: 5 INJECTION INTRAVENOUS at 21:07

## 2023-10-01 RX ADMIN — SODIUM CHLORIDE, PRESERVATIVE FREE 10 ML: 5 INJECTION INTRAVENOUS at 08:50

## 2023-10-01 RX ADMIN — ABIRATERONE ACETATE 1000 MG: 250 TABLET ORAL at 16:51

## 2023-10-01 RX ADMIN — MIDODRINE HYDROCHLORIDE 10 MG: 5 TABLET ORAL at 16:51

## 2023-10-01 RX ADMIN — ONDANSETRON 4 MG: 2 INJECTION INTRAMUSCULAR; INTRAVENOUS at 16:59

## 2023-10-01 RX ADMIN — DOCUSATE SODIUM 100 MG: 100 CAPSULE, LIQUID FILLED ORAL at 08:49

## 2023-10-01 RX ADMIN — OXYCODONE HYDROCHLORIDE 10 MG: 5 TABLET ORAL at 21:07

## 2023-10-01 RX ADMIN — AZITHROMYCIN MONOHYDRATE 500 MG: 500 INJECTION, POWDER, LYOPHILIZED, FOR SOLUTION INTRAVENOUS at 09:31

## 2023-10-01 RX ADMIN — OXYCODONE HYDROCHLORIDE 10 MG: 5 TABLET ORAL at 01:48

## 2023-10-01 RX ADMIN — APIXABAN 5 MG: 5 TABLET, FILM COATED ORAL at 08:49

## 2023-10-01 RX ADMIN — HYDROCORTISONE SODIUM SUCCINATE 50 MG: 100 INJECTION, POWDER, FOR SOLUTION INTRAMUSCULAR; INTRAVENOUS at 15:39

## 2023-10-01 RX ADMIN — PANTOPRAZOLE SODIUM 40 MG: 40 TABLET, DELAYED RELEASE ORAL at 06:44

## 2023-10-01 ASSESSMENT — PAIN SCALES - GENERAL
PAINLEVEL_OUTOF10: 4
PAINLEVEL_OUTOF10: 2

## 2023-10-01 ASSESSMENT — PAIN DESCRIPTION - LOCATION
LOCATION: LEG
LOCATION: LEG

## 2023-10-01 ASSESSMENT — PAIN DESCRIPTION - DESCRIPTORS: DESCRIPTORS: ACHING

## 2023-10-01 ASSESSMENT — PAIN DESCRIPTION - ORIENTATION
ORIENTATION: RIGHT;LEFT
ORIENTATION: LEFT

## 2023-10-01 NOTE — PROGRESS NOTES
Shift report received from [address] [place] [address]  assessment complete, see flow sheet     2045:  On call paged, patient complaining of generalized pain, emesis noted beginning of the shift, awaiting orders     2101: PRN zofran administered     2144: PRN pain medicine given, denies chest pain, complains of shortness of breath sats 37 RN, 2LNC placed on patient     2153: PRN voltaren applied to patients back     0148: PRN vaibhav given     Shift report given to [facility], [place], operated [facility]

## 2023-10-01 NOTE — PROGRESS NOTES
Hospitalist Progress Note    NAME:   Marleni Park   : 1940   MRN: [id number]     Date/Time: 10/1/2023 10:59 AM  Patient PCP: Ray Nixon MD    Estimated discharge date: 10/3  Barriers: Oxygen weaned down, low BP      Assessment / Plan:    Acute hyperkalemia  -On admission potassium was 5.8 and is currently 6.1  -We will initiate emergent reversal of hyperkalemia with calcium gluconate, insulin and D50 and also Lokelma  -Repeat BMP at 1500  -Telemetry monitoring  -Discontinue home lisinopril at the time of discharge given recurrent hyperkalemia  Potassium today 4.7  Restrict potassium in diet  Repeat BMP tomorrow      Left hip pain post fall  -CT of the pelvis shows no definite fracture but shows 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 Increase midodrine to 10 3 times daily today  Continue with the stress dose steroid  Because still not done      Congestive heart failure  Hypertension, currently blood pressure low  Dyslipidemia  GERD  Depression  -Holding Bumex due to congestive heart failure  -Continue PTA Zetia, PPI and also Zoloft  : Not done yet    Atrial fibrillation on anticoagulation with Eliquis  -Resume Eliquis. Monitor for bleeding.     History of MDS  History of prostate cancer  -Continue PTA Zytiga  -He is on chronic prednisone 5 mg twice daily and given hypotension, we will add stress

## 2023-10-02 ENCOUNTER — TELEPHONE (OUTPATIENT)
Age: 83
End: 2023-10-02

## 2023-10-02 ENCOUNTER — APPOINTMENT (OUTPATIENT)
Facility: HOSPITAL | Age: 83
DRG: 640 | End: 2023-10-02
Attending: INTERNAL MEDICINE
Payer: MEDICARE

## 2023-10-02 LAB
ABO + RH BLD: NORMAL
ANION GAP SERPL CALC-SCNC: 4 MMOL/L (ref 5–15)
BACTERIA SPEC CULT: ABNORMAL
BACTERIA SPEC CULT: ABNORMAL
BASOPHILS # BLD: 0 K/UL (ref 0–0.1)
BASOPHILS NFR BLD: 1 % (ref 0–1)
BLD GP AB SCN SERPL QL ELUTION: NORMAL
BLD PROD TYP BPU: NORMAL
BLD PROD TYP BPU: NORMAL
BLOOD BANK CMNT PATIENT-IMP: NORMAL
BLOOD BANK DISPENSE STATUS: NORMAL
BLOOD BANK DISPENSE STATUS: NORMAL
BLOOD GROUP ANTIBODIES SERPL: NORMAL
BPU ID: NORMAL
BPU ID: NORMAL
BUN SERPL-MCNC: 53 MG/DL (ref 6–20)
BUN/CREAT SERPL: 29 (ref 12–20)
CALCIUM SERPL-MCNC: 7.8 MG/DL (ref 8.5–10.1)
CHLORIDE SERPL-SCNC: 110 MMOL/L (ref 97–108)
CO2 SERPL-SCNC: 23 MMOL/L (ref 21–32)
CREAT SERPL-MCNC: 1.85 MG/DL (ref 0.7–1.3)
CROSSMATCH RESULT: NORMAL
CROSSMATCH RESULT: NORMAL
DAT C3B/C3D, C3D: NORMAL
DAT IGG-SP REAG RBC QL: NORMAL
DAT POLY-SP REAG RBC QL: NORMAL
DIFFERENTIAL METHOD BLD: ABNORMAL
EOSINOPHIL # BLD: 0.1 K/UL (ref 0–0.4)
EOSINOPHIL NFR BLD: 1 % (ref 0–7)
ERYTHROCYTE [DISTWIDTH] IN BLOOD BY AUTOMATED COUNT: 19.7 % (ref 11.5–14.5)
GLUCOSE SERPL-MCNC: 143 MG/DL (ref 65–100)
HCT VFR BLD AUTO: 30.2 % (ref 36.6–50.3)
HGB BLD-MCNC: 9.6 G/DL (ref 12.1–17)
IMM GRANULOCYTES # BLD AUTO: 0 K/UL (ref 0–0.04)
IMM GRANULOCYTES NFR BLD AUTO: 1 % (ref 0–0.5)
LYMPHOCYTES # BLD: 1 K/UL (ref 0.8–3.5)
LYMPHOCYTES NFR BLD: 15 % (ref 12–49)
MCH RBC QN AUTO: 31.9 PG (ref 26–34)
MCHC RBC AUTO-ENTMCNC: 31.8 G/DL (ref 30–36.5)
MCV RBC AUTO: 100.3 FL (ref 80–99)
MONOCYTES # BLD: 0.5 K/UL (ref 0–1)
MONOCYTES NFR BLD: 8 % (ref 5–13)
NEUTS SEG # BLD: 4.9 K/UL (ref 1.8–8)
NEUTS SEG NFR BLD: 74 % (ref 32–75)
NRBC # BLD: 0 K/UL (ref 0–0.01)
NRBC BLD-RTO: 0 PER 100 WBC
PLATELET # BLD AUTO: 236 K/UL (ref 150–400)
PMV BLD AUTO: 11.4 FL (ref 8.9–12.9)
POTASSIUM SERPL-SCNC: 4.5 MMOL/L (ref 3.5–5.1)
RBC # BLD AUTO: 3.01 M/UL (ref 4.1–5.7)
SERVICE CMNT-IMP: ABNORMAL
SODIUM SERPL-SCNC: 137 MMOL/L (ref 136–145)
SPECIMEN EXP DATE BLD: NORMAL
UNIT DIVISION: 0
UNIT DIVISION: 0
WBC # BLD AUTO: 6.6 K/UL (ref 4.1–11.1)

## 2023-10-02 PROCEDURE — 6370000000 HC RX 637 (ALT 250 FOR IP): Performed by: INTERNAL MEDICINE

## 2023-10-02 PROCEDURE — 97530 THERAPEUTIC ACTIVITIES: CPT

## 2023-10-02 PROCEDURE — 2060000000 HC ICU INTERMEDIATE R&B

## 2023-10-02 PROCEDURE — 6370000000 HC RX 637 (ALT 250 FOR IP): Performed by: STUDENT IN AN ORGANIZED HEALTH CARE EDUCATION/TRAINING PROGRAM

## 2023-10-02 PROCEDURE — 6360000002 HC RX W HCPCS: Performed by: INTERNAL MEDICINE

## 2023-10-02 PROCEDURE — 2580000003 HC RX 258: Performed by: INTERNAL MEDICINE

## 2023-10-02 PROCEDURE — 2580000003 HC RX 258: Performed by: STUDENT IN AN ORGANIZED HEALTH CARE EDUCATION/TRAINING PROGRAM

## 2023-10-02 PROCEDURE — 85025 COMPLETE CBC W/AUTO DIFF WBC: CPT

## 2023-10-02 PROCEDURE — 36415 COLL VENOUS BLD VENIPUNCTURE: CPT

## 2023-10-02 PROCEDURE — 93321 DOPPLER ECHO F-UP/LMTD STD: CPT

## 2023-10-02 PROCEDURE — 2700000000 HC OXYGEN THERAPY PER DAY

## 2023-10-02 PROCEDURE — 80048 BASIC METABOLIC PNL TOTAL CA: CPT

## 2023-10-02 RX ORDER — SODIUM CHLORIDE 9 MG/ML
INJECTION, SOLUTION INTRAVENOUS CONTINUOUS
Status: ACTIVE | OUTPATIENT
Start: 2023-10-02 | End: 2023-10-03

## 2023-10-02 RX ADMIN — HYDROCORTISONE SODIUM SUCCINATE 50 MG: 100 INJECTION, POWDER, FOR SOLUTION INTRAMUSCULAR; INTRAVENOUS at 17:01

## 2023-10-02 RX ADMIN — PANTOPRAZOLE SODIUM 40 MG: 40 TABLET, DELAYED RELEASE ORAL at 06:27

## 2023-10-02 RX ADMIN — MIDODRINE HYDROCHLORIDE 10 MG: 5 TABLET ORAL at 08:13

## 2023-10-02 RX ADMIN — SERTRALINE HYDROCHLORIDE 25 MG: 25 TABLET ORAL at 08:13

## 2023-10-02 RX ADMIN — ONDANSETRON 4 MG: 4 TABLET, ORALLY DISINTEGRATING ORAL at 12:05

## 2023-10-02 RX ADMIN — MIDODRINE HYDROCHLORIDE 10 MG: 5 TABLET ORAL at 17:01

## 2023-10-02 RX ADMIN — HYDROCORTISONE SODIUM SUCCINATE 50 MG: 100 INJECTION, POWDER, FOR SOLUTION INTRAMUSCULAR; INTRAVENOUS at 08:11

## 2023-10-02 RX ADMIN — DOCUSATE SODIUM 100 MG: 100 CAPSULE, LIQUID FILLED ORAL at 08:13

## 2023-10-02 RX ADMIN — ABIRATERONE ACETATE 1000 MG: 250 TABLET ORAL at 17:02

## 2023-10-02 RX ADMIN — APIXABAN 5 MG: 5 TABLET, FILM COATED ORAL at 08:13

## 2023-10-02 RX ADMIN — HYDROCORTISONE SODIUM SUCCINATE 50 MG: 100 INJECTION, POWDER, FOR SOLUTION INTRAMUSCULAR; INTRAVENOUS at 00:06

## 2023-10-02 RX ADMIN — AZITHROMYCIN MONOHYDRATE 500 MG: 500 INJECTION, POWDER, LYOPHILIZED, FOR SOLUTION INTRAVENOUS at 08:12

## 2023-10-02 RX ADMIN — APIXABAN 5 MG: 5 TABLET, FILM COATED ORAL at 21:42

## 2023-10-02 RX ADMIN — EZETIMIBE 10 MG: 10 TABLET ORAL at 08:13

## 2023-10-02 RX ADMIN — SODIUM CHLORIDE 1000 MG: 900 INJECTION INTRAVENOUS at 08:12

## 2023-10-02 RX ADMIN — OXYCODONE HYDROCHLORIDE 10 MG: 5 TABLET ORAL at 12:05

## 2023-10-02 RX ADMIN — OXYCODONE HYDROCHLORIDE 10 MG: 5 TABLET ORAL at 21:42

## 2023-10-02 RX ADMIN — SODIUM CHLORIDE: 9 INJECTION, SOLUTION INTRAVENOUS at 13:29

## 2023-10-02 RX ADMIN — SODIUM CHLORIDE, PRESERVATIVE FREE 10 ML: 5 INJECTION INTRAVENOUS at 21:43

## 2023-10-02 RX ADMIN — MIDODRINE HYDROCHLORIDE 10 MG: 5 TABLET ORAL at 00:06

## 2023-10-02 RX ADMIN — ONDANSETRON 4 MG: 4 TABLET, ORALLY DISINTEGRATING ORAL at 04:37

## 2023-10-02 ASSESSMENT — PAIN SCALES - GENERAL
PAINLEVEL_OUTOF10: 6
PAINLEVEL_OUTOF10: 3
PAINLEVEL_OUTOF10: 7
PAINLEVEL_OUTOF10: 4
PAINLEVEL_OUTOF10: 0

## 2023-10-02 ASSESSMENT — PAIN - FUNCTIONAL ASSESSMENT: PAIN_FUNCTIONAL_ASSESSMENT: ACTIVITIES ARE NOT PREVENTED

## 2023-10-02 ASSESSMENT — PAIN DESCRIPTION - LOCATION: LOCATION: HIP;KNEE

## 2023-10-02 ASSESSMENT — PAIN DESCRIPTION - ORIENTATION: ORIENTATION: LEFT

## 2023-10-02 ASSESSMENT — PAIN DESCRIPTION - DESCRIPTORS: DESCRIPTORS: ACHING

## 2023-10-02 NOTE — PLAN OF CARE
Problem: Physical Therapy - Adult  Goal: By Discharge: Performs mobility at highest level of function for planned discharge setting. See evaluation for individualized goals. Description: FUNCTIONAL STATUS PRIOR TO ADMISSION: Patient was modified independent using a rollator for functional mobility. HOME SUPPORT PRIOR TO ADMISSION: The patient lived with family. Physical Therapy Goals  Initiated 9/30/2023  1. Patient will move from supine to sit and sit to supine in bed with contact guard assist within 7 day(s). 2.  Patient will perform sit to stand with contact guard assist within 7 day(s). 3.  Patient will transfer from bed to chair and chair to bed with contact guard assist using the least restrictive device within 7 day(s). 4.  Patient will ambulate with moderate assistance for 15 feet with the least restrictive device within 7 day(s). Outcome: Progressing     PHYSICAL THERAPY TREATMENT    Patient: Belkis Dumont (82 y.o. male)  Date: 10/2/2023  Diagnosis: Left hip pain [M25.552]  Sepsis ([address]) [A41.9]  Severe sepsis ([address]) [A41.9, R65.20]  Community acquired pneumonia, unspecified laterality [J18.9] Sepsis ([address])      Precautions: Fall Risk                    ASSESSMENT:  Patient continues to benefit from skilled PT services and is slowly progressing towards goals. Patient cleared by nursing to mobilize. He required moderate to maximum assist x 2 for bed mobility and sit<>stand transfer, mainly limited by L hip pain with movement. L hip pain seemed to be worse with flexion, relieved once getting back to bed with leg straight. While in standing he was able to pick the R foot up off the ground 1 time, but required significant assistance to weight-shift onto left side. HR max 120 bpm during session. He reported being SOB at times, sats on room air 98%, however patient felt more comfortable re-applying 1L O2. Patient was very pleasant and motivated to work with therapy.  Continues to remain patient's plan of care was discussed with: registered nurse    Patient Education  Education Given To: Patient; Family  Education Provided: Home Exercise Program  Education Provided Comments: ankle pumps and heel slides  Education Method: Verbal;Demonstration  Barriers to Learning: Cognition  Education Outcome: Verbalized understanding;Continued education needed      Nate Stern PT  Minutes: 32

## 2023-10-02 NOTE — PROGRESS NOTES
Hospitalist Progress Note    NAME:   Kevin Mclaughlin   : 1940   MRN: [id number]     Date/Time: 10/2/2023 1:09 PM  Patient PCP: Afshin Chaney MD    Estimated discharge date: 10/3  Barriers: Oxygen weaned down, low BP      Assessment / Plan:    Acute hyperkalemia  -On admission potassium was 5.8 and is currently 6.1  -We will initiate emergent reversal of hyperkalemia with calcium gluconate, insulin and D50 and also Lokelma  -Repeat BMP at 1500  -Telemetry monitoring  -Discontinue home lisinopril at the time of discharge given recurrent hyperkalemia  Potassium today 4.7  Restrict potassium in diet  Repeat BMP tomorrow    IMTIAZ:  Prerenal or cardiogenic  Low-dose maintenance of fluid  Follow with BMP tomorrow  Follow with echo    Left hip pain post fall  -CT of the pelvis shows no definite fracture but shows 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 Increase midodrine to 10 3 times daily today  Continue with the stress dose steroid  Echo done waiting for reading      Congestive heart failure  Hypertension, currently blood pressure low  Dyslipidemia  GERD  Depression  -Holding Bumex due to congestive heart failure  -Continue PTA Zetia, PPI and also Zoloft  : Not done yet    Atrial fibrillation on anticoagulation with Eliquis  -Resume Eliquis. Monitor for bleeding.     History of MDS  History of prostate

## 2023-10-02 NOTE — TELEPHONE ENCOUNTER
Called patient to advise/confirm upcoming appt with Dr. Raj Gutierrez on 10/3/23  for a vv between 9:30 and 12:30. Spoke with chai and wife states that patient had been admitted to the hospital since Thursday.

## 2023-10-02 NOTE — PLAN OF CARE
Problem: Occupational Therapy - Adult  Goal: By Discharge: Performs self-care activities at highest level of function for planned discharge setting. See evaluation for individualized goals. Description: FUNCTIONAL STATUS PRIOR TO ADMISSION:  Patient was independent in ADLs and mod I for functional mobility using rollator. ,  ,  ,  ,  ,  ,  ,  ,  ,  ,       HOME SUPPORT: Patient lived with wife and son. Occupational Therapy Goals:  Initiated 9/30/2023  1. Patient will perform grooming in standing with Supervision within 7 day(s). 2.  Patient will perform upper body dressing with Stand by Assist within 7 day(s). 3.  Patient will perform lower body dressing using AE PRN with Moderate Assist within 7 day(s). 4.  Patient will perform toilet transfers with Maximal Assist  within 7 day(s). 5.  Patient will perform all aspects of toileting with Minimal Assist within 7 day(s). 6.  Patient will participate in upper extremity therapeutic exercise/activities with Stand by Assist for 3 minutes within 7 day(s). Outcome: Progressing   OCCUPATIONAL THERAPY TREATMENT  Patient: Luis Enrique Nicole (82 y.o. male)  Date: 10/2/2023  Primary Diagnosis: Left hip pain [M25.552]  Sepsis ([address]) [A41.9]  Severe sepsis ([address]) [A41.9, R65.20]  Community acquired pneumonia, unspecified laterality [J18.9]       Precautions: Fall Risk                Chart, occupational therapy assessment, plan of care, and goals were reviewed. ASSESSMENT  Patient continues to benefit from skilled OT services and is slowly progressing towards goals. Pt was able to sit on Eob and stated that his pain was a 10 and as he sat his pain decreased a little. BP was stable and HR highest was 120 with stand to sit. He was able to stand and did side step with assist with wt shifting to right and left but only able to take 2 steps. Pt was put back to bed with call bell with in reach, and heels floating and family in room.              PLAN :  Patient

## 2023-10-03 LAB
ANION GAP SERPL CALC-SCNC: 2 MMOL/L (ref 5–15)
BUN SERPL-MCNC: 51 MG/DL (ref 6–20)
BUN/CREAT SERPL: 29 (ref 12–20)
CALCIUM SERPL-MCNC: 7.9 MG/DL (ref 8.5–10.1)
CHLORIDE SERPL-SCNC: 111 MMOL/L (ref 97–108)
CO2 SERPL-SCNC: 26 MMOL/L (ref 21–32)
CREAT SERPL-MCNC: 1.75 MG/DL (ref 0.7–1.3)
ERYTHROCYTE [DISTWIDTH] IN BLOOD BY AUTOMATED COUNT: 19.7 % (ref 11.5–14.5)
GLUCOSE SERPL-MCNC: 123 MG/DL (ref 65–100)
HCT VFR BLD AUTO: 30.5 % (ref 36.6–50.3)
HGB BLD-MCNC: 9.7 G/DL (ref 12.1–17)
MCH RBC QN AUTO: 32 PG (ref 26–34)
MCHC RBC AUTO-ENTMCNC: 31.8 G/DL (ref 30–36.5)
MCV RBC AUTO: 100.7 FL (ref 80–99)
NRBC # BLD: 0 K/UL (ref 0–0.01)
NRBC BLD-RTO: 0 PER 100 WBC
PLATELET # BLD AUTO: 240 K/UL (ref 150–400)
PMV BLD AUTO: 11.4 FL (ref 8.9–12.9)
POTASSIUM SERPL-SCNC: 4.5 MMOL/L (ref 3.5–5.1)
RBC # BLD AUTO: 3.03 M/UL (ref 4.1–5.7)
SODIUM SERPL-SCNC: 139 MMOL/L (ref 136–145)
WBC # BLD AUTO: 5.4 K/UL (ref 4.1–11.1)

## 2023-10-03 PROCEDURE — 2580000003 HC RX 258: Performed by: INTERNAL MEDICINE

## 2023-10-03 PROCEDURE — 6370000000 HC RX 637 (ALT 250 FOR IP): Performed by: INTERNAL MEDICINE

## 2023-10-03 PROCEDURE — 97110 THERAPEUTIC EXERCISES: CPT | Performed by: OCCUPATIONAL THERAPIST

## 2023-10-03 PROCEDURE — 6360000002 HC RX W HCPCS: Performed by: INTERNAL MEDICINE

## 2023-10-03 PROCEDURE — 97530 THERAPEUTIC ACTIVITIES: CPT | Performed by: OCCUPATIONAL THERAPIST

## 2023-10-03 PROCEDURE — 2060000000 HC ICU INTERMEDIATE R&B

## 2023-10-03 PROCEDURE — 85027 COMPLETE CBC AUTOMATED: CPT

## 2023-10-03 PROCEDURE — 6370000000 HC RX 637 (ALT 250 FOR IP): Performed by: STUDENT IN AN ORGANIZED HEALTH CARE EDUCATION/TRAINING PROGRAM

## 2023-10-03 PROCEDURE — 36415 COLL VENOUS BLD VENIPUNCTURE: CPT

## 2023-10-03 PROCEDURE — 2500000003 HC RX 250 WO HCPCS: Performed by: NURSE PRACTITIONER

## 2023-10-03 PROCEDURE — 80048 BASIC METABOLIC PNL TOTAL CA: CPT

## 2023-10-03 RX ORDER — PREDNISONE 20 MG/1
20 TABLET ORAL 2 TIMES DAILY
Status: DISCONTINUED | OUTPATIENT
Start: 2023-10-03 | End: 2023-10-07 | Stop reason: HOSPADM

## 2023-10-03 RX ORDER — SODIUM CHLORIDE 9 MG/ML
INJECTION, SOLUTION INTRAVENOUS CONTINUOUS
Status: ACTIVE | OUTPATIENT
Start: 2023-10-03 | End: 2023-10-04

## 2023-10-03 RX ORDER — HYDROMORPHONE HYDROCHLORIDE 1 MG/ML
0.25 INJECTION, SOLUTION INTRAMUSCULAR; INTRAVENOUS; SUBCUTANEOUS
Status: COMPLETED | OUTPATIENT
Start: 2023-10-03 | End: 2023-10-03

## 2023-10-03 RX ADMIN — MIDODRINE HYDROCHLORIDE 10 MG: 5 TABLET ORAL at 08:24

## 2023-10-03 RX ADMIN — ABIRATERONE ACETATE 1000 MG: 250 TABLET ORAL at 17:00

## 2023-10-03 RX ADMIN — APIXABAN 5 MG: 5 TABLET, FILM COATED ORAL at 08:24

## 2023-10-03 RX ADMIN — EZETIMIBE 10 MG: 10 TABLET ORAL at 08:24

## 2023-10-03 RX ADMIN — OXYCODONE HYDROCHLORIDE 10 MG: 5 TABLET ORAL at 20:19

## 2023-10-03 RX ADMIN — MIDODRINE HYDROCHLORIDE 10 MG: 5 TABLET ORAL at 17:01

## 2023-10-03 RX ADMIN — HYDROCORTISONE SODIUM SUCCINATE 50 MG: 100 INJECTION, POWDER, FOR SOLUTION INTRAMUSCULAR; INTRAVENOUS at 08:31

## 2023-10-03 RX ADMIN — SODIUM CHLORIDE, PRESERVATIVE FREE 5 ML: 5 INJECTION INTRAVENOUS at 20:20

## 2023-10-03 RX ADMIN — SERTRALINE HYDROCHLORIDE 25 MG: 25 TABLET ORAL at 08:24

## 2023-10-03 RX ADMIN — MIDODRINE HYDROCHLORIDE 10 MG: 5 TABLET ORAL at 01:23

## 2023-10-03 RX ADMIN — HYDROCORTISONE SODIUM SUCCINATE 50 MG: 100 INJECTION, POWDER, FOR SOLUTION INTRAMUSCULAR; INTRAVENOUS at 01:24

## 2023-10-03 RX ADMIN — PREDNISONE 20 MG: 20 TABLET ORAL at 20:18

## 2023-10-03 RX ADMIN — DOCUSATE SODIUM 100 MG: 100 CAPSULE, LIQUID FILLED ORAL at 08:24

## 2023-10-03 RX ADMIN — APIXABAN 2.5 MG: 2.5 TABLET, FILM COATED ORAL at 20:19

## 2023-10-03 RX ADMIN — HYDROMORPHONE HYDROCHLORIDE 0.25 MG: 1 INJECTION, SOLUTION INTRAMUSCULAR; INTRAVENOUS; SUBCUTANEOUS at 22:47

## 2023-10-03 RX ADMIN — SODIUM CHLORIDE 1000 MG: 900 INJECTION INTRAVENOUS at 08:26

## 2023-10-03 RX ADMIN — ACETAMINOPHEN 650 MG: 325 TABLET ORAL at 20:19

## 2023-10-03 RX ADMIN — PANTOPRAZOLE SODIUM 40 MG: 40 TABLET, DELAYED RELEASE ORAL at 08:24

## 2023-10-03 RX ADMIN — AZITHROMYCIN MONOHYDRATE 500 MG: 500 INJECTION, POWDER, LYOPHILIZED, FOR SOLUTION INTRAVENOUS at 08:24

## 2023-10-03 ASSESSMENT — PAIN DESCRIPTION - FREQUENCY
FREQUENCY: CONTINUOUS
FREQUENCY: CONTINUOUS

## 2023-10-03 ASSESSMENT — PAIN DESCRIPTION - DESCRIPTORS
DESCRIPTORS: ACHING

## 2023-10-03 ASSESSMENT — PAIN DESCRIPTION - PAIN TYPE
TYPE: ACUTE PAIN;CHRONIC PAIN
TYPE: ACUTE PAIN;CHRONIC PAIN

## 2023-10-03 ASSESSMENT — PAIN DESCRIPTION - LOCATION
LOCATION: ABDOMEN
LOCATION: ABDOMEN;LEG
LOCATION: ABDOMEN;LEG

## 2023-10-03 ASSESSMENT — PAIN - FUNCTIONAL ASSESSMENT
PAIN_FUNCTIONAL_ASSESSMENT: PREVENTS OR INTERFERES SOME ACTIVE ACTIVITIES AND ADLS
PAIN_FUNCTIONAL_ASSESSMENT: PREVENTS OR INTERFERES SOME ACTIVE ACTIVITIES AND ADLS

## 2023-10-03 ASSESSMENT — PAIN DESCRIPTION - ONSET
ONSET: ON-GOING
ONSET: ON-GOING

## 2023-10-03 ASSESSMENT — PAIN SCALES - GENERAL
PAINLEVEL_OUTOF10: 9
PAINLEVEL_OUTOF10: 9
PAINLEVEL_OUTOF10: 0

## 2023-10-03 ASSESSMENT — PAIN DESCRIPTION - ORIENTATION
ORIENTATION: RIGHT;LEFT;LOWER
ORIENTATION: RIGHT;LEFT;LOWER
ORIENTATION: LOWER

## 2023-10-03 ASSESSMENT — PAIN SCALES - WONG BAKER
WONGBAKER_NUMERICALRESPONSE: 0

## 2023-10-03 NOTE — CARE COORDINATION
ransition of Care Plan:    RUR: 21% \"high risk\"  Prior Level of Functioning: Independent with ADL's  Disposition: IPR-pending acceptance  If SNF or IPR: Date FOC offered: 10/03/2023  Date FOC received: 10/03/2023  Accepting facility: Pending  Follow up appointments: To be arranged by IPR  DME needed: Defer to IPR  Transportation at discharge: BLS transport  IM/IMM Medicare/ letter given: 2nd IM letter needed at d/c  Is patient a [employer] and connected with [place]? N/A  Caregiver Contact: Pt's wife Juventino Faria [phone]  Discharge Caregiver contacted prior to discharge? To be contacted at d/c  Care Conference needed? Not at this time  Barriers to discharge:  Medical clearance and IPR    Initial note: Chart reviewed for updates. PT/OT recommendations for IPR acknowledged. CM met with pt at bedside, introduced role and discussed d/c planning. CM discussed PT/OT recommendations with pt. Pt is agreeable with IPR. Pt is requesting for CM to send referral to [facility]. Referrl sent to [facility] pending review. Pt does not need auth. CM will continue to follow up with d/c planning.     VICTORIA Pepper    [phone]

## 2023-10-03 NOTE — PROGRESS NOTES
Hospital dosing protocol adjustment    Apixaban for indication afib    Age >= 80,  Est Crcl (actual wt) < 50  Creatinine 1.75 on 10/3    Apixaban reduced to 2.5mg po bid

## 2023-10-03 NOTE — PLAN OF CARE
Problem: Occupational Therapy - Adult  Goal: By Discharge: Performs self-care activities at highest level of function for planned discharge setting. See evaluation for individualized goals. Description: FUNCTIONAL STATUS PRIOR TO ADMISSION:  Patient was independent in ADLs and mod I for functional mobility using rollator. ,  ,  ,  ,  ,  ,  ,  ,  ,  ,       HOME SUPPORT: Patient lived with wife and son. Occupational Therapy Goals:  Initiated 9/30/2023  1. Patient will perform grooming in standing with Supervision within 7 day(s). 2.  Patient will perform upper body dressing with Stand by Assist within 7 day(s). 3.  Patient will perform lower body dressing using AE PRN with Moderate Assist within 7 day(s). 4.  Patient will perform toilet transfers with Maximal Assist  within 7 day(s). 5.  Patient will perform all aspects of toileting with Minimal Assist within 7 day(s). 6.  Patient will participate in upper extremity therapeutic exercise/activities with Stand by Assist for 3 minutes within 7 day(s). Outcome: Not Progressing     OCCUPATIONAL THERAPY TREATMENT  Patient: Robin Mott (82 y.o. male)  Date: 10/3/2023  Primary Diagnosis: Left hip pain [M25.552]  Sepsis ([address]) [A41.9]  Severe sepsis ([address]) [A41.9, R65.20]  Community acquired pneumonia, unspecified laterality [J18.9]       Precautions: Fall Risk                Chart, occupational therapy assessment, plan of care, and goals were reviewed. ASSESSMENT  Patient presented supine in bed, agreeable to participation in OT. Overall he was max A to dependent for bed mobility and supervision/setup for seated grooming. No LOB occurred during EOB sitting, but he did demonstrate a slight posterior lean preference for part of the session. He was in significant left hip and left flank pain during bed mobility, when being repositioned in the bed and during some of his UE exercises performed in sitting.  The patient demonstrated poor activity tolerance,

## 2023-10-03 NOTE — PROGRESS NOTES
Hospitalist Progress Note    NAME:   Vanda Burnham   : 1940   MRN: [id number]     Date/Time: 10/3/2023 12:38 PM  Patient PCP: Miguel Gimenez MD    Estimated discharge date: 10/3  Barriers: Oxygen weaned down, low BP      Assessment / Plan:    Acute hyperkalemia  -On admission potassium was 5.8 and is currently 6.1  -We will initiate emergent reversal of hyperkalemia with calcium gluconate, insulin and D50 and also Lokelma  -Repeat BMP at 1500  -Telemetry monitoring  -Discontinue home lisinopril at the time of discharge given recurrent hyperkalemia  Potassium normalized over the past 2 days    IMTIAZ:  Prerenal or cardiogenic  Low-dose maintenance of fluid  Follow with BMP tomorrow  Follow with echo: Good reading still pending! 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 Increase midodrine to 10 3 times daily today  Change to prednisone 20 mg 2 times daily  Taper slowly to his home dose  Echo done waiting for reading        Congestive heart failure  Hypertension, currently blood pressure low  Dyslipidemia  GERD  Depression  -Holding Bumex due to congestive heart failure  -Continue PTA Zetia, PPI and also Zoloft  : Not done yet    Atrial fibrillation on anticoagulation with Eliquis  -Resume Eliquis.   Monitor for summation of old records today    NO  Reviewed patient's current orders and MAR    YES  PMH/SH reviewed - no change compared to H&P  ________________________________________________________________________  Care Plan discussed with:    Comments   Patient x    Family      RN x    Care Manager     Consultant                        Multidiciplinary team rounds were held today with , nursing, pharmacist and clinical coordinator. Patient's plan of care was discussed; medications were reviewed and discharge planning was addressed. ________________________________________________________________________  Total NON critical care TIME:  52  Minutes    Total CRITICAL CARE TIME Spent:   Minutes non procedure based      Comments   >50% of visit spent in counseling and coordination of care     ________________________________________________________________________  Shoshana Ng MD     Procedures: see electronic medical records for all procedures/Xrays and details which were not copied into this note but were reviewed prior to creation of Plan. LABS:  I reviewed today's most current labs and imaging studies. Pertinent labs include:  Recent Labs     10/01/23  0155 10/02/23  0213 10/03/23  0135   WBC 7.1 6.6 5.4   HGB 9.5* 9.6* 9.7*   HCT 30.2* 30.2* 30.5*    236 240       Recent Labs     10/01/23  0155 10/02/23  0213 10/03/23  0135    137 139   K 4.7 4.5 4.5   * 110* 111*   CO2 23 23 26   GLUCOSE 130* 143* 123*   BUN 44* 53* 51*   CREATININE 1.51* 1.85* 1.75*   CALCIUM 8.0* 7.8* 7.9*         Signed:  Shoshana Ng MD

## 2023-10-03 NOTE — PROGRESS NOTES
Spiritual Care Assessment/Progress Note  Idania    Name: Luis Enrique Nicole MRN: [id number]    Age: 82 y.o. Sex: male   Language: English     Date: 10/3/2023            Total Time Calculated: 21 min              Spiritual Assessment begun in [facility] 2 PROGRESSIVE CARE  Service Provided For[de-identified] Patient  Referral/Consult From[de-identified] Rounding  Encounter Overview/Reason : Initial Encounter    Spiritual beliefs:      [] Involved in a earline tradition/spiritual practice:      [] Supported by a earline community:      [] Claims no spiritual orientation:      [] Seeking spiritual identity:           [x] Adheres to an individual form of spirituality:      [] Not able to assess:                Identified resources for coping and support system:   Support System: Spouse, Children       [] Prayer                  [] Devotional reading               [] Music                  [] Guided Imagery     [] Pet visits                                        [] Other: (COMMENT)     Specific area/focus of visit   Encounter:    Crisis:    Spiritual/Emotional needs: Type: Spiritual Support  Ritual, Rites and Sacraments:    Grief, Loss, and Adjustments:    Ethics/Mediation:    Behavioral Health:    Palliative Care: Advance Care Planning:      Plan/Referrals: Continue to visit, (comment), Continue Support (comment)    Narrative:  reviewed the patient's chart prior to the visit.  greeted Mr. Edward Ellis and he welcomed the  into his room. He was sitting up in bed eating lunch and his television was off. He shared his concerns about his lunch and his health. He was trying to eat but didn't like the lunch delivered at this time. He shared his concerns in reference to getting older and his health. He also shared his love for his family to include his children and grand daughter. His grand daughter is in [facility]; studies abroad. He discussed his family members who lived beyond his age and are now .  We discussed prayer and earline. He shared his individual practices that are sacred; between him and God.  respected and affirmed his thoughts.  received a page and had to leave the visit with Mr. Elin Monsivais.  advised him of spiritual health services; which are are available 24 hours a day as requested. He expressed gratitude for the visit. Rev. MAYRA Rondon.  [address] [place] [address] E   Paging Service [phone])

## 2023-10-04 ENCOUNTER — TELEPHONE (OUTPATIENT)
Age: 83
End: 2023-10-04

## 2023-10-04 LAB
ANION GAP SERPL CALC-SCNC: 6 MMOL/L (ref 5–15)
BACTERIA SPEC CULT: NORMAL
BUN SERPL-MCNC: 54 MG/DL (ref 6–20)
BUN/CREAT SERPL: 34 (ref 12–20)
CALCIUM SERPL-MCNC: 8.3 MG/DL (ref 8.5–10.1)
CHLORIDE SERPL-SCNC: 113 MMOL/L (ref 97–108)
CO2 SERPL-SCNC: 20 MMOL/L (ref 21–32)
CREAT SERPL-MCNC: 1.61 MG/DL (ref 0.7–1.3)
ERYTHROCYTE [DISTWIDTH] IN BLOOD BY AUTOMATED COUNT: 19.7 % (ref 11.5–14.5)
GLUCOSE SERPL-MCNC: 140 MG/DL (ref 65–100)
HCT VFR BLD AUTO: 31.8 % (ref 36.6–50.3)
HGB BLD-MCNC: 9.9 G/DL (ref 12.1–17)
MCH RBC QN AUTO: 31.6 PG (ref 26–34)
MCHC RBC AUTO-ENTMCNC: 31.1 G/DL (ref 30–36.5)
MCV RBC AUTO: 101.6 FL (ref 80–99)
NRBC # BLD: 0 K/UL (ref 0–0.01)
NRBC BLD-RTO: 0 PER 100 WBC
PLATELET # BLD AUTO: 260 K/UL (ref 150–400)
PMV BLD AUTO: 11.5 FL (ref 8.9–12.9)
POTASSIUM SERPL-SCNC: 5.6 MMOL/L (ref 3.5–5.1)
RBC # BLD AUTO: 3.13 M/UL (ref 4.1–5.7)
SERVICE CMNT-IMP: NORMAL
SODIUM SERPL-SCNC: 139 MMOL/L (ref 136–145)
WBC # BLD AUTO: 7.2 K/UL (ref 4.1–11.1)

## 2023-10-04 PROCEDURE — 6370000000 HC RX 637 (ALT 250 FOR IP): Performed by: INTERNAL MEDICINE

## 2023-10-04 PROCEDURE — 2580000003 HC RX 258: Performed by: INTERNAL MEDICINE

## 2023-10-04 PROCEDURE — 2060000000 HC ICU INTERMEDIATE R&B

## 2023-10-04 PROCEDURE — 6370000000 HC RX 637 (ALT 250 FOR IP): Performed by: STUDENT IN AN ORGANIZED HEALTH CARE EDUCATION/TRAINING PROGRAM

## 2023-10-04 PROCEDURE — 6360000002 HC RX W HCPCS: Performed by: INTERNAL MEDICINE

## 2023-10-04 PROCEDURE — 2580000003 HC RX 258: Performed by: STUDENT IN AN ORGANIZED HEALTH CARE EDUCATION/TRAINING PROGRAM

## 2023-10-04 PROCEDURE — 36415 COLL VENOUS BLD VENIPUNCTURE: CPT

## 2023-10-04 PROCEDURE — 85027 COMPLETE CBC AUTOMATED: CPT

## 2023-10-04 PROCEDURE — 80048 BASIC METABOLIC PNL TOTAL CA: CPT

## 2023-10-04 RX ADMIN — POLYETHYLENE GLYCOL 3350 17 G: 17 POWDER, FOR SOLUTION ORAL at 15:08

## 2023-10-04 RX ADMIN — APIXABAN 2.5 MG: 2.5 TABLET, FILM COATED ORAL at 09:10

## 2023-10-04 RX ADMIN — SODIUM CHLORIDE: 9 INJECTION, SOLUTION INTRAVENOUS at 06:06

## 2023-10-04 RX ADMIN — ABIRATERONE ACETATE 1000 MG: 250 TABLET ORAL at 17:08

## 2023-10-04 RX ADMIN — EZETIMIBE 10 MG: 10 TABLET ORAL at 09:09

## 2023-10-04 RX ADMIN — OXYCODONE HYDROCHLORIDE 10 MG: 5 TABLET ORAL at 21:14

## 2023-10-04 RX ADMIN — PREDNISONE 20 MG: 20 TABLET ORAL at 09:10

## 2023-10-04 RX ADMIN — PANTOPRAZOLE SODIUM 40 MG: 40 TABLET, DELAYED RELEASE ORAL at 06:06

## 2023-10-04 RX ADMIN — SERTRALINE HYDROCHLORIDE 25 MG: 25 TABLET ORAL at 09:10

## 2023-10-04 RX ADMIN — MIDODRINE HYDROCHLORIDE 10 MG: 5 TABLET ORAL at 01:37

## 2023-10-04 RX ADMIN — DOCUSATE SODIUM 100 MG: 100 CAPSULE, LIQUID FILLED ORAL at 09:09

## 2023-10-04 RX ADMIN — OXYCODONE HYDROCHLORIDE 10 MG: 5 TABLET ORAL at 04:53

## 2023-10-04 RX ADMIN — SODIUM CHLORIDE, PRESERVATIVE FREE 10 ML: 5 INJECTION INTRAVENOUS at 09:10

## 2023-10-04 RX ADMIN — PREDNISONE 20 MG: 20 TABLET ORAL at 21:14

## 2023-10-04 RX ADMIN — SODIUM CHLORIDE, PRESERVATIVE FREE 10 ML: 5 INJECTION INTRAVENOUS at 21:15

## 2023-10-04 RX ADMIN — MIDODRINE HYDROCHLORIDE 10 MG: 5 TABLET ORAL at 09:10

## 2023-10-04 RX ADMIN — MIDODRINE HYDROCHLORIDE 10 MG: 5 TABLET ORAL at 17:08

## 2023-10-04 RX ADMIN — APIXABAN 2.5 MG: 2.5 TABLET, FILM COATED ORAL at 21:14

## 2023-10-04 RX ADMIN — SODIUM ZIRCONIUM CYCLOSILICATE 10 G: 10 POWDER, FOR SUSPENSION ORAL at 17:08

## 2023-10-04 RX ADMIN — AZITHROMYCIN MONOHYDRATE 500 MG: 500 INJECTION, POWDER, LYOPHILIZED, FOR SOLUTION INTRAVENOUS at 09:20

## 2023-10-04 RX ADMIN — SODIUM CHLORIDE 1000 MG: 900 INJECTION INTRAVENOUS at 09:09

## 2023-10-04 ASSESSMENT — PAIN SCALES - GENERAL
PAINLEVEL_OUTOF10: 6
PAINLEVEL_OUTOF10: 8
PAINLEVEL_OUTOF10: 4
PAINLEVEL_OUTOF10: 0
PAINLEVEL_OUTOF10: 0

## 2023-10-04 ASSESSMENT — PAIN DESCRIPTION - ORIENTATION
ORIENTATION: LEFT
ORIENTATION: RIGHT;LEFT;LOWER

## 2023-10-04 ASSESSMENT — PAIN DESCRIPTION - FREQUENCY: FREQUENCY: CONTINUOUS

## 2023-10-04 ASSESSMENT — PAIN SCALES - WONG BAKER
WONGBAKER_NUMERICALRESPONSE: 0
WONGBAKER_NUMERICALRESPONSE: 2
WONGBAKER_NUMERICALRESPONSE: 0
WONGBAKER_NUMERICALRESPONSE: 8

## 2023-10-04 ASSESSMENT — PAIN DESCRIPTION - PAIN TYPE: TYPE: ACUTE PAIN;CHRONIC PAIN

## 2023-10-04 ASSESSMENT — PAIN DESCRIPTION - ONSET: ONSET: ON-GOING

## 2023-10-04 ASSESSMENT — PAIN DESCRIPTION - LOCATION
LOCATION: ABDOMEN;BACK;HIP
LOCATION: HIP

## 2023-10-04 ASSESSMENT — PAIN DESCRIPTION - DESCRIPTORS
DESCRIPTORS: ACHING
DESCRIPTORS: THROBBING;ACHING

## 2023-10-04 ASSESSMENT — PAIN - FUNCTIONAL ASSESSMENT: PAIN_FUNCTIONAL_ASSESSMENT: PREVENTS OR INTERFERES SOME ACTIVE ACTIVITIES AND ADLS

## 2023-10-04 NOTE — PLAN OF CARE
Plan of care reviewed with patient. Pt verbalized understanding. Problem: Occupational Therapy - Adult  Goal: By Discharge: Performs self-care activities at highest level of function for planned discharge setting. See evaluation for individualized goals. Description: FUNCTIONAL STATUS PRIOR TO ADMISSION:  Patient was independent in ADLs and mod I for functional mobility using rollator. ,  ,  ,  ,  ,  ,  ,  ,  ,  ,       HOME SUPPORT: Patient lived with wife and son. Occupational Therapy Goals:  Initiated 9/30/2023  1. Patient will perform grooming in standing with Supervision within 7 day(s). 2.  Patient will perform upper body dressing with Stand by Assist within 7 day(s). 3.  Patient will perform lower body dressing using AE PRN with Moderate Assist within 7 day(s). 4.  Patient will perform toilet transfers with Maximal Assist  within 7 day(s). 5.  Patient will perform all aspects of toileting with Minimal Assist within 7 day(s). 6.  Patient will participate in upper extremity therapeutic exercise/activities with Stand by Assist for 3 minutes within 7 day(s).     10/3/2023 1705 by Viviana GARCIA, OTR/L  Outcome: Not Progressing

## 2023-10-04 NOTE — PROGRESS NOTES
6694: Pt potassium is 5.6. Pt assessed, denies any s/sx or heart palpitations. NP Esperanza Singh notified. Per NP, \" thanks for letting me know\". No new orders received. Care continues.

## 2023-10-04 NOTE — PROGRESS NOTES
Bedside shift change report given to Sergey Orta (oncoming nurse) by Sunni Romano (offgoing nurse). Report included the following information Nurse Handoff Report, Intake/Output, Recent Results, Med Rec Status, and Cardiac Rhythm ST . Patient rested through out the night with no complaints.

## 2023-10-04 NOTE — CARE COORDINATION
Transition of Care Plan:     RUR: 21% \"high risk\"  Prior Level of Functioning: Independent with ADL's  Disposition: IPR-Accepted to [facility]  If SNF or IPR: Date FOC offered: 10/03/2023  Date FOC received: 10/03/2023  Accepting facility: [facility]  Follow up appointments: To be arranged by IPR  DME needed: Defer to IPR  Transportation at discharge: BLS transport  IM/IMM Medicare/ letter given: 2nd IM letter needed at d/c  [facility] and [facility] with [facility]? N/A  Caregiver Contact: Pt's wife Michael Burn [phone]  Discharge Caregiver contacted prior to discharge? To be contacted at d/c  Care Conference needed? Not at this time  Barriers to discharge:  Medical clearance    Update 1440 pm: CM met with pt and daughters at bedside, introduced role and discussed d/c planning. Pt and daughter notified that [facility] has accepted pt pending medical clearance. Daughter requested to know when pt will be discharged. CM notified her that she will confirm after IDR's rounds tomorrow. No additional questions/concerns reported by pt and daughter. Initial note: Chart reviewed for updates. CM contacted Jona Garrido admission liaison for [facility] [phone] to follow up with referral. Jona Garrido reported that pt was accepted to [facility]. Pt to [facility] medical clearance at [facility]. CM will continue to follow up with d/c planning.     VICTORIA Dallas    [phone]

## 2023-10-04 NOTE — TELEPHONE ENCOUNTER
Patient's daughter calling needs refill on his chemo med. Zytiga please send to [facility]. He has 3 pills left.

## 2023-10-04 NOTE — PLAN OF CARE
Problem: Discharge Planning  Goal: Discharge to home or other facility with appropriate resources  Outcome: Progressing  Flowsheets (Taken 10/4/2023 9693 by Brandy Ibrahim RN)  Discharge to home or other facility with appropriate resources: Identify barriers to discharge with patient and caregiver     Problem: Pain  Goal: Verbalizes/displays adequate comfort level or baseline comfort level  Outcome: Progressing     Problem: Safety - Adult  Goal: Free from fall injury  Outcome: Progressing     Problem: Skin/Tissue Integrity  Goal: Absence of new skin breakdown  Description: 1. Monitor for areas of redness and/or skin breakdown  2. Assess vascular access sites hourly  3. Every 4-6 hours minimum:  Change oxygen saturation probe site  4. Every 4-6 hours:  If on nasal continuous positive airway pressure, respiratory therapy assess nares and determine need for appliance change or resting period.   Outcome: Progressing

## 2023-10-04 NOTE — PROGRESS NOTES
Hospitalist Progress Note    NAME:   Lulu Rollins   : 1940   MRN: [id number]     Date/Time: 10/4/2023 3:49 PM  Patient PCP: Annia Schaefer MD    Estimated discharge date: 10/5  Barriers: Oxygen weaned down, low BP, hyperkalemia       Assessment / Plan:    Acute hyperkalemia  -On admission potassium was 5.8 and is currently 6.1  -We will initiate emergent reversal of hyperkalemia with calcium gluconate, insulin and D50 and also Lokelma  -Repeat BMP at 1500  -Telemetry monitoring  -Discontinue home lisinopril at the time of discharge given recurrent hyperkalemia  Potassium again 5.9 today  Give 1 dose of Lokelma  Repeat BMP tomorrow a.m. IMTIAZ:  Prerenal or cardiogenic  Low-dose maintenance of fluid  Follow with BMP tomorrow  Follow with echo: Keeping the study pending? 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   Increase midodrine to 10 mg 3 times daily today  Change to prednisone 20 mg 2 times daily  Taper slowly to his home dose  Echo done waiting for reading      Congestive heart failure  Hypertension, currently blood pressure low  Dyslipidemia  GERD  Depression  -Holding Bumex due to congestive heart failure  -Continue PTA Zetia, PPI and also Zoloft  : Not done yet    Atrial fibrillation on anticoagulation with CV:  Regular  rhythm,  No edema  GI:  Soft, Non distended, Non tender. +Bowel sounds  Neurologic:  Drowy but wakes up to commands  Psych:   confused  Skin:  No rashes. No jaundice    Reviewed most current lab test results and cultures  YES  Reviewed most current radiology test results   YES  Review and summation of old records today    NO  Reviewed patient's current orders and MAR    YES  PMH/SH reviewed - no change compared to H&P  ________________________________________________________________________  Care Plan discussed with:    Comments   Patient x    Family      RN x    Care Manager     Consultant                        Multidiciplinary team rounds were held today with , nursing, pharmacist and clinical coordinator. Patient's plan of care was discussed; medications were reviewed and discharge planning was addressed. ________________________________________________________________________  Total NON critical care TIME:  53  Minutes    Total CRITICAL CARE TIME Spent:   Minutes non procedure based      Comments   >50% of visit spent in counseling and coordination of care     ________________________________________________________________________  Taya Arvizu MD     Procedures: see electronic medical records for all procedures/Xrays and details which were not copied into this note but were reviewed prior to creation of Plan. LABS:  I reviewed today's most current labs and imaging studies. Pertinent labs include:  Recent Labs     10/02/23  0213 10/03/23  0135 10/04/23  0329   WBC 6.6 5.4 7.2   HGB 9.6* 9.7* 9.9*   HCT 30.2* 30.5* 31.8*    240 260       Recent Labs     10/02/23  0213 10/03/23  0135 10/04/23  0329    139 139   K 4.5 4.5 5.6*   * 111* 113*   CO2 23 26 20*   GLUCOSE 143* 123* 140*   BUN 53* 51* 54*   CREATININE 1.85* 1.75* 1.61*   CALCIUM 7.8* 7.9* 8.3*         Signed:  Taya Arvizu MD

## 2023-10-05 ENCOUNTER — APPOINTMENT (OUTPATIENT)
Facility: HOSPITAL | Age: 83
DRG: 640 | End: 2023-10-05
Payer: MEDICARE

## 2023-10-05 DIAGNOSIS — C61 PROSTATE CANCER (HCC): Primary | ICD-10-CM

## 2023-10-05 DIAGNOSIS — D46.Z MYELODYSPLASTIC SYNDROME, LOW GRADE (HCC): ICD-10-CM

## 2023-10-05 LAB
ANION GAP SERPL CALC-SCNC: 4 MMOL/L (ref 5–15)
ANION GAP SERPL CALC-SCNC: 6 MMOL/L (ref 5–15)
BUN SERPL-MCNC: 45 MG/DL (ref 6–20)
BUN SERPL-MCNC: 48 MG/DL (ref 6–20)
BUN/CREAT SERPL: 31 (ref 12–20)
BUN/CREAT SERPL: 33 (ref 12–20)
CALCIUM SERPL-MCNC: 8 MG/DL (ref 8.5–10.1)
CALCIUM SERPL-MCNC: 8.2 MG/DL (ref 8.5–10.1)
CHLORIDE SERPL-SCNC: 110 MMOL/L (ref 97–108)
CHLORIDE SERPL-SCNC: 111 MMOL/L (ref 97–108)
CHLORIDE UR-SCNC: 148 MMOL/L
CO2 SERPL-SCNC: 23 MMOL/L (ref 21–32)
CO2 SERPL-SCNC: 25 MMOL/L (ref 21–32)
COMMENT:: NORMAL
COMMENT:: NORMAL
CREAT SERPL-MCNC: 1.46 MG/DL (ref 0.7–1.3)
CREAT SERPL-MCNC: 1.46 MG/DL (ref 0.7–1.3)
CREAT UR-MCNC: <13 MG/DL
ERYTHROCYTE [DISTWIDTH] IN BLOOD BY AUTOMATED COUNT: 19.8 % (ref 11.5–14.5)
GLUCOSE SERPL-MCNC: 135 MG/DL (ref 65–100)
GLUCOSE SERPL-MCNC: 188 MG/DL (ref 65–100)
HCT VFR BLD AUTO: 30.1 % (ref 36.6–50.3)
HGB BLD-MCNC: 9.5 G/DL (ref 12.1–17)
MCH RBC QN AUTO: 31.5 PG (ref 26–34)
MCHC RBC AUTO-ENTMCNC: 31.6 G/DL (ref 30–36.5)
MCV RBC AUTO: 99.7 FL (ref 80–99)
NRBC # BLD: 0 K/UL (ref 0–0.01)
NRBC BLD-RTO: 0 PER 100 WBC
PLATELET # BLD AUTO: 255 K/UL (ref 150–400)
PMV BLD AUTO: 12.4 FL (ref 8.9–12.9)
POTASSIUM SERPL-SCNC: 4.3 MMOL/L (ref 3.5–5.1)
POTASSIUM SERPL-SCNC: 5.8 MMOL/L (ref 3.5–5.1)
RBC # BLD AUTO: 3.02 M/UL (ref 4.1–5.7)
SODIUM SERPL-SCNC: 138 MMOL/L (ref 136–145)
SODIUM SERPL-SCNC: 141 MMOL/L (ref 136–145)
SODIUM UR-SCNC: 114 MMOL/L
SPECIMEN HOLD: NORMAL
SPECIMEN HOLD: NORMAL
TROPONIN I SERPL HS-MCNC: 21 NG/L (ref 0–76)
WBC # BLD AUTO: 5.5 K/UL (ref 4.1–11.1)

## 2023-10-05 PROCEDURE — 6360000002 HC RX W HCPCS: Performed by: INTERNAL MEDICINE

## 2023-10-05 PROCEDURE — 71045 X-RAY EXAM CHEST 1 VIEW: CPT

## 2023-10-05 PROCEDURE — 94760 N-INVAS EAR/PLS OXIMETRY 1: CPT

## 2023-10-05 PROCEDURE — 6370000000 HC RX 637 (ALT 250 FOR IP): Performed by: INTERNAL MEDICINE

## 2023-10-05 PROCEDURE — 6370000000 HC RX 637 (ALT 250 FOR IP): Performed by: STUDENT IN AN ORGANIZED HEALTH CARE EDUCATION/TRAINING PROGRAM

## 2023-10-05 PROCEDURE — 84484 ASSAY OF TROPONIN QUANT: CPT

## 2023-10-05 PROCEDURE — 82436 ASSAY OF URINE CHLORIDE: CPT

## 2023-10-05 PROCEDURE — 2700000000 HC OXYGEN THERAPY PER DAY

## 2023-10-05 PROCEDURE — 82570 ASSAY OF URINE CREATININE: CPT

## 2023-10-05 PROCEDURE — 2580000003 HC RX 258: Performed by: INTERNAL MEDICINE

## 2023-10-05 PROCEDURE — 84300 ASSAY OF URINE SODIUM: CPT

## 2023-10-05 PROCEDURE — 2060000000 HC ICU INTERMEDIATE R&B

## 2023-10-05 PROCEDURE — 93005 ELECTROCARDIOGRAM TRACING: CPT | Performed by: STUDENT IN AN ORGANIZED HEALTH CARE EDUCATION/TRAINING PROGRAM

## 2023-10-05 PROCEDURE — 2500000003 HC RX 250 WO HCPCS: Performed by: INTERNAL MEDICINE

## 2023-10-05 PROCEDURE — 80048 BASIC METABOLIC PNL TOTAL CA: CPT

## 2023-10-05 PROCEDURE — P9047 ALBUMIN (HUMAN), 25%, 50ML: HCPCS | Performed by: INTERNAL MEDICINE

## 2023-10-05 PROCEDURE — 2500000003 HC RX 250 WO HCPCS: Performed by: STUDENT IN AN ORGANIZED HEALTH CARE EDUCATION/TRAINING PROGRAM

## 2023-10-05 PROCEDURE — 36415 COLL VENOUS BLD VENIPUNCTURE: CPT

## 2023-10-05 PROCEDURE — 6360000002 HC RX W HCPCS: Performed by: STUDENT IN AN ORGANIZED HEALTH CARE EDUCATION/TRAINING PROGRAM

## 2023-10-05 PROCEDURE — 85027 COMPLETE CBC AUTOMATED: CPT

## 2023-10-05 RX ORDER — ONDANSETRON 2 MG/ML
8 INJECTION INTRAMUSCULAR; INTRAVENOUS
OUTPATIENT
Start: 2023-10-09

## 2023-10-05 RX ORDER — FUROSEMIDE 10 MG/ML
40 INJECTION INTRAMUSCULAR; INTRAVENOUS ONCE
Status: COMPLETED | OUTPATIENT
Start: 2023-10-05 | End: 2023-10-05

## 2023-10-05 RX ORDER — EPINEPHRINE 1 MG/ML
0.3 INJECTION, SOLUTION, CONCENTRATE INTRAVENOUS PRN
OUTPATIENT
Start: 2023-10-09

## 2023-10-05 RX ORDER — SODIUM CHLORIDE 9 MG/ML
INJECTION, SOLUTION INTRAVENOUS CONTINUOUS
OUTPATIENT
Start: 2023-10-09

## 2023-10-05 RX ORDER — ALBUTEROL SULFATE 90 UG/1
4 AEROSOL, METERED RESPIRATORY (INHALATION) PRN
OUTPATIENT
Start: 2023-10-09

## 2023-10-05 RX ORDER — DIPHENHYDRAMINE HYDROCHLORIDE 50 MG/ML
50 INJECTION INTRAMUSCULAR; INTRAVENOUS
OUTPATIENT
Start: 2023-10-09

## 2023-10-05 RX ORDER — ALBUMIN (HUMAN) 12.5 G/50ML
25 SOLUTION INTRAVENOUS ONCE
Status: COMPLETED | OUTPATIENT
Start: 2023-10-05 | End: 2023-10-05

## 2023-10-05 RX ORDER — FAMOTIDINE 10 MG/ML
20 INJECTION, SOLUTION INTRAVENOUS
OUTPATIENT
Start: 2023-10-09

## 2023-10-05 RX ORDER — CASTOR OIL AND BALSAM, PERU 788; 87 MG/G; MG/G
OINTMENT TOPICAL 2 TIMES DAILY
Status: DISCONTINUED | OUTPATIENT
Start: 2023-10-05 | End: 2023-10-07 | Stop reason: HOSPADM

## 2023-10-05 RX ORDER — MEPERIDINE HYDROCHLORIDE 50 MG/ML
12.5 INJECTION INTRAMUSCULAR; INTRAVENOUS; SUBCUTANEOUS PRN
OUTPATIENT
Start: 2023-10-09

## 2023-10-05 RX ORDER — BUMETANIDE 0.25 MG/ML
1 INJECTION INTRAMUSCULAR; INTRAVENOUS ONCE
Status: COMPLETED | OUTPATIENT
Start: 2023-10-05 | End: 2023-10-05

## 2023-10-05 RX ORDER — ACETAMINOPHEN 325 MG/1
650 TABLET ORAL
OUTPATIENT
Start: 2023-10-09

## 2023-10-05 RX ADMIN — METOPROLOL TARTRATE 5 MG: 5 INJECTION INTRAVENOUS at 21:26

## 2023-10-05 RX ADMIN — PREDNISONE 20 MG: 20 TABLET ORAL at 08:30

## 2023-10-05 RX ADMIN — FUROSEMIDE 40 MG: 10 INJECTION, SOLUTION INTRAMUSCULAR; INTRAVENOUS at 10:49

## 2023-10-05 RX ADMIN — ABIRATERONE ACETATE 1000 MG: 250 TABLET ORAL at 17:01

## 2023-10-05 RX ADMIN — ONDANSETRON 4 MG: 2 INJECTION INTRAMUSCULAR; INTRAVENOUS at 19:15

## 2023-10-05 RX ADMIN — MIDODRINE HYDROCHLORIDE 10 MG: 5 TABLET ORAL at 17:01

## 2023-10-05 RX ADMIN — APIXABAN 5 MG: 5 TABLET, FILM COATED ORAL at 21:26

## 2023-10-05 RX ADMIN — POLYETHYLENE GLYCOL 3350 17 G: 17 POWDER, FOR SOLUTION ORAL at 08:30

## 2023-10-05 RX ADMIN — BUMETANIDE 1 MG: 0.25 INJECTION INTRAMUSCULAR; INTRAVENOUS at 11:20

## 2023-10-05 RX ADMIN — Medication: at 21:26

## 2023-10-05 RX ADMIN — MIDODRINE HYDROCHLORIDE 10 MG: 5 TABLET ORAL at 08:29

## 2023-10-05 RX ADMIN — PREDNISONE 20 MG: 20 TABLET ORAL at 21:26

## 2023-10-05 RX ADMIN — SODIUM ZIRCONIUM CYCLOSILICATE 10 G: 10 POWDER, FOR SUSPENSION ORAL at 11:20

## 2023-10-05 RX ADMIN — SERTRALINE HYDROCHLORIDE 25 MG: 25 TABLET ORAL at 08:29

## 2023-10-05 RX ADMIN — EZETIMIBE 10 MG: 10 TABLET ORAL at 08:30

## 2023-10-05 RX ADMIN — DOCUSATE SODIUM 100 MG: 100 CAPSULE, LIQUID FILLED ORAL at 08:30

## 2023-10-05 RX ADMIN — SODIUM CHLORIDE, PRESERVATIVE FREE 10 ML: 5 INJECTION INTRAVENOUS at 21:27

## 2023-10-05 RX ADMIN — SODIUM ZIRCONIUM CYCLOSILICATE 10 G: 10 POWDER, FOR SUSPENSION ORAL at 21:27

## 2023-10-05 RX ADMIN — AZITHROMYCIN MONOHYDRATE 500 MG: 500 INJECTION, POWDER, LYOPHILIZED, FOR SOLUTION INTRAVENOUS at 08:30

## 2023-10-05 RX ADMIN — PANTOPRAZOLE SODIUM 40 MG: 40 TABLET, DELAYED RELEASE ORAL at 08:30

## 2023-10-05 RX ADMIN — SODIUM CHLORIDE, PRESERVATIVE FREE 10 ML: 5 INJECTION INTRAVENOUS at 08:31

## 2023-10-05 RX ADMIN — APIXABAN 2.5 MG: 2.5 TABLET, FILM COATED ORAL at 08:30

## 2023-10-05 RX ADMIN — SODIUM CHLORIDE 1000 MG: 900 INJECTION INTRAVENOUS at 08:30

## 2023-10-05 RX ADMIN — ALBUMIN (HUMAN) 25 G: 0.25 INJECTION, SOLUTION INTRAVENOUS at 11:20

## 2023-10-05 NOTE — PROGRESS NOTES
RN advised no therapy today as pt became SOB with rolling side to side in bed (getting on and off bedpan). Pt stated he feels terrible. MD at bedside now. Will defer now and continue to follow.

## 2023-10-05 NOTE — PROGRESS NOTES
Hospitalist Progress Note    NAME:   Leigh Ann Mckenzie   : 1940   MRN: [id number]     Date/Time: 10/5/2023 4:51 PM  Patient PCP: Veronica Escobar MD    Estimated discharge date: 10/6  Barriers: respiratory stability, hyperkalemia improvement, TTE    Assessment / Plan:    Acute hyperkalemia - persistent  -On admission potassium was 5.8 and is currently 6.1  -We will initiate emergent reversal of hyperkalemia with calcium gluconate, insulin and D50 and also Lokelma  -Repeat BMP at 1500  -Telemetry monitoring  -Discontinue home lisinopril at the time of discharge given recurrent hyperkalemia  - still 5.8 despite lokelma  - started IV lasix  - consulted Nephrology, appreciate assistance    Congestive heart failure  Hypertension, currently blood pressure low  Dyslipidemia  GERD  Depression  - patient acutely SOB this morning, CXR showed increased pulm edema with R pleural effusion  - started diuretics  - trop normal, EKG with afib (known)  -Continue PTA Zetia, PPI and also Zoloft  - TTE pending    IMTIAZ - improving  Prerenal or cardiogenic  Low-dose maintenance of fluid  - Nephro following    Left hip pain post fall  -CT of the pelvis shows no definite fracture but shows 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   Increase midodrine to 10 mg 3 times daily today  Change to prednisone 20 mg 2 times daily  Taper slowly

## 2023-10-05 NOTE — PROGRESS NOTES
Bedside shift change report given to Steffi Stein (oncoming nurse) by Joe Christina (offgoing nurse). Report included the following information Nurse Handoff Report, Intake/Output, Recent Results, Med Rec Status, and Cardiac Rhythm ST . Patient did not sleep during the night and was restless . Patient was given pain meds  in the early morning.

## 2023-10-05 NOTE — PROGRESS NOTES
Physical Therapy Note    Patient is demonstrating SOB with limited mobility. RN in agreement to hold for medical stability. Will continue to follow.

## 2023-10-05 NOTE — CONSULTS
[address] [place] [address] Drive         NAME:Pepe Rea  [facility]:[id number]   YPL:0/02/9678     Patient VIOLETA--[id number]  Hyperkalemia  Ralph  Fluid overload      Plan  Iv bumex now  Iv albumin  Po lokelma  Check labs this pm    Left hip pain [M25.552]  Sepsis ([address]) [A41.9]  Severe sepsis ([address]) [A41.9, R65.20]  Community acquired pneumonia, unspecified laterality [J18.9]     Jamila Castro MD  [facility]  [facility]  [address], [address] [place] [address]  Phone - [phone]         Fax - [phone] [facility] Office  [address] , [address] [place] [address], [address] [place] [address]  Phone - [phone]        Fax - [phone]

## 2023-10-05 NOTE — TELEPHONE ENCOUNTER
[facility], provided her the number to call. Pt also needs refill on prednisone which is from local pharmacy. However, pt is still inpt and notes show he is on prednisone 20mg BID. Reviewing chart, palliative MD put him on 10mg prednisone BID on 9/19. Pt daughter said she upped the dose. Will discuss above with  to determine if we send in prednisone 5mg BID in addition or forego it. Also who is following up with the prednisone? As far as pt getting degarilex on Monday I asked for her to call us when he is actually discharged from the hospital [facility]. We want him to get the medication but there are reservations from [facility] it seems on his fall risk. Pt is of course a fall risk (has been) his family is well aware of that.

## 2023-10-06 LAB
ALBUMIN SERPL-MCNC: 3.3 G/DL (ref 3.5–5)
ANION GAP SERPL CALC-SCNC: 5 MMOL/L (ref 5–15)
BACTERIA SPEC CULT: NORMAL
BUN SERPL-MCNC: 49 MG/DL (ref 6–20)
BUN/CREAT SERPL: 33 (ref 12–20)
CALCIUM SERPL-MCNC: 8.2 MG/DL (ref 8.5–10.1)
CHLORIDE SERPL-SCNC: 111 MMOL/L (ref 97–108)
CO2 SERPL-SCNC: 25 MMOL/L (ref 21–32)
CREAT SERPL-MCNC: 1.47 MG/DL (ref 0.7–1.3)
ECHO AO ROOT DIAM: 4.2 CM
ECHO AO ROOT INDEX: 1.99 CM/M2
ECHO AV AREA PEAK VELOCITY: 1.1 CM2
ECHO AV AREA VTI: 1.3 CM2
ECHO AV AREA/BSA VTI: 0.6 CM2/M2
ECHO AV MEAN GRADIENT: 15 MMHG
ECHO AV MEAN VELOCITY: 1.9 M/S
ECHO AV PEAK GRADIENT: 23 MMHG
ECHO AV PEAK GRADIENT: 24 MMHG
ECHO AV PEAK VELOCITY: 2.4 M/S
ECHO AV PEAK VELOCITY: 2.5 M/S
ECHO AV VTI: 43.1 CM
ECHO BSA: 2.16 M2
ECHO EST RA PRESSURE: 15 MMHG
ECHO LA DIAMETER INDEX: 2.61 CM/M2
ECHO LA DIAMETER: 5.5 CM
ECHO LA TO AORTIC ROOT RATIO: 1.31
ECHO LV FRACTIONAL SHORTENING: 12 % (ref 28–44)
ECHO LV INTERNAL DIMENSION DIASTOLE INDEX: 2.32 CM/M2
ECHO LV INTERNAL DIMENSION DIASTOLIC MMODE: 5.5 CM (ref 4.2–5.9)
ECHO LV INTERNAL DIMENSION DIASTOLIC: 4.9 CM (ref 4.2–5.9)
ECHO LV INTERNAL DIMENSION SYSTOLIC INDEX: 2.04 CM/M2
ECHO LV INTERNAL DIMENSION SYSTOLIC MMODE: 4.6 CM
ECHO LV INTERNAL DIMENSION SYSTOLIC: 4.3 CM
ECHO LV IVSD MMODE: 1.3 CM (ref 0.6–1)
ECHO LV IVSD: 1.3 CM (ref 0.6–1)
ECHO LV IVSS MMODE: 1.3 CM
ECHO LV MASS 2D: 253.7 G (ref 88–224)
ECHO LV MASS INDEX 2D: 120.2 G/M2 (ref 49–115)
ECHO LV POSTERIOR WALL DIASTOLIC MMODE: 1.3 CM (ref 0.6–1)
ECHO LV POSTERIOR WALL DIASTOLIC: 1.3 CM (ref 0.6–1)
ECHO LV RELATIVE WALL THICKNESS RATIO: 0.53
ECHO LVOT AREA: 3.5 CM2
ECHO LVOT AV VTI INDEX: 0.37
ECHO LVOT DIAM: 2.1 CM
ECHO LVOT MEAN GRADIENT: 1 MMHG
ECHO LVOT PEAK GRADIENT: 2 MMHG
ECHO LVOT PEAK GRADIENT: 2 MMHG
ECHO LVOT PEAK VELOCITY: 0.8 M/S
ECHO LVOT PEAK VELOCITY: 0.8 M/S
ECHO LVOT STROKE VOLUME INDEX: 26.3 ML/M2
ECHO LVOT SV: 55.4 ML
ECHO LVOT VTI: 16 CM
ECHO MV AREA VTI: 2.2 CM2
ECHO MV LVOT VTI INDEX: 1.55
ECHO MV MAX VELOCITY: 1.4 M/S
ECHO MV MEAN GRADIENT: 5 MMHG
ECHO MV MEAN VELOCITY: 1.1 M/S
ECHO MV PEAK GRADIENT: 8 MMHG
ECHO MV REGURGITANT ALIASING (NYQUIST) VELOCITY: 37 CM/S
ECHO MV REGURGITANT VELOCITY PISA: 4.3 M/S
ECHO MV REGURGITANT VTIA: 116.9 CM
ECHO MV VTI: 24.8 CM
ECHO PV MAX VELOCITY: 1.1 M/S
ECHO PV PEAK GRADIENT: 5 MMHG
ECHO RIGHT VENTRICULAR SYSTOLIC PRESSURE (RVSP): 48 MMHG
ECHO TV REGURGITANT MAX VELOCITY: 2.86 M/S
ECHO TV REGURGITANT PEAK GRADIENT: 33 MMHG
EKG DIAGNOSIS: NORMAL
EKG Q-T INTERVAL: 332 MS
EKG QRS DURATION: 100 MS
EKG QTC CALCULATION (BAZETT): 476 MS
EKG R AXIS: -8 DEGREES
EKG T AXIS: 19 DEGREES
EKG VENTRICULAR RATE: 124 BPM
GLUCOSE SERPL-MCNC: 137 MG/DL (ref 65–100)
PHOSPHATE SERPL-MCNC: 3.6 MG/DL (ref 2.6–4.7)
POTASSIUM SERPL-SCNC: 4.4 MMOL/L (ref 3.5–5.1)
SERVICE CMNT-IMP: NORMAL
SODIUM SERPL-SCNC: 141 MMOL/L (ref 136–145)

## 2023-10-06 PROCEDURE — 6370000000 HC RX 637 (ALT 250 FOR IP): Performed by: STUDENT IN AN ORGANIZED HEALTH CARE EDUCATION/TRAINING PROGRAM

## 2023-10-06 PROCEDURE — 36415 COLL VENOUS BLD VENIPUNCTURE: CPT

## 2023-10-06 PROCEDURE — 97535 SELF CARE MNGMENT TRAINING: CPT

## 2023-10-06 PROCEDURE — 80069 RENAL FUNCTION PANEL: CPT

## 2023-10-06 PROCEDURE — 2700000000 HC OXYGEN THERAPY PER DAY

## 2023-10-06 PROCEDURE — 2500000003 HC RX 250 WO HCPCS: Performed by: INTERNAL MEDICINE

## 2023-10-06 PROCEDURE — 6370000000 HC RX 637 (ALT 250 FOR IP): Performed by: INTERNAL MEDICINE

## 2023-10-06 PROCEDURE — 6360000002 HC RX W HCPCS: Performed by: INTERNAL MEDICINE

## 2023-10-06 PROCEDURE — 2060000000 HC ICU INTERMEDIATE R&B

## 2023-10-06 PROCEDURE — 94640 AIRWAY INHALATION TREATMENT: CPT

## 2023-10-06 PROCEDURE — 94760 N-INVAS EAR/PLS OXIMETRY 1: CPT

## 2023-10-06 PROCEDURE — 97530 THERAPEUTIC ACTIVITIES: CPT

## 2023-10-06 PROCEDURE — 2580000003 HC RX 258: Performed by: INTERNAL MEDICINE

## 2023-10-06 RX ORDER — AZITHROMYCIN 250 MG/1
500 TABLET, FILM COATED ORAL DAILY
Status: DISCONTINUED | OUTPATIENT
Start: 2023-10-07 | End: 2023-10-07 | Stop reason: HOSPADM

## 2023-10-06 RX ORDER — BUMETANIDE 0.25 MG/ML
1 INJECTION INTRAMUSCULAR; INTRAVENOUS DAILY
Status: COMPLETED | OUTPATIENT
Start: 2023-10-06 | End: 2023-10-07

## 2023-10-06 RX ORDER — IPRATROPIUM BROMIDE AND ALBUTEROL SULFATE 2.5; .5 MG/3ML; MG/3ML
1 SOLUTION RESPIRATORY (INHALATION)
Status: DISCONTINUED | OUTPATIENT
Start: 2023-10-06 | End: 2023-10-07 | Stop reason: HOSPADM

## 2023-10-06 RX ADMIN — MIDODRINE HYDROCHLORIDE 10 MG: 5 TABLET ORAL at 17:31

## 2023-10-06 RX ADMIN — DICLOFENAC SODIUM 2 G: 10 GEL TOPICAL at 08:37

## 2023-10-06 RX ADMIN — PANTOPRAZOLE SODIUM 40 MG: 40 TABLET, DELAYED RELEASE ORAL at 08:35

## 2023-10-06 RX ADMIN — PREDNISONE 20 MG: 20 TABLET ORAL at 20:20

## 2023-10-06 RX ADMIN — MIDODRINE HYDROCHLORIDE 10 MG: 5 TABLET ORAL at 08:36

## 2023-10-06 RX ADMIN — APIXABAN 5 MG: 5 TABLET, FILM COATED ORAL at 08:36

## 2023-10-06 RX ADMIN — Medication: at 20:20

## 2023-10-06 RX ADMIN — PREDNISONE 20 MG: 20 TABLET ORAL at 08:36

## 2023-10-06 RX ADMIN — Medication: at 09:53

## 2023-10-06 RX ADMIN — SODIUM CHLORIDE, PRESERVATIVE FREE 10 ML: 5 INJECTION INTRAVENOUS at 20:21

## 2023-10-06 RX ADMIN — BUMETANIDE 1 MG: 0.25 INJECTION INTRAMUSCULAR; INTRAVENOUS at 15:46

## 2023-10-06 RX ADMIN — IPRATROPIUM BROMIDE AND ALBUTEROL SULFATE 1 DOSE: .5; 3 SOLUTION RESPIRATORY (INHALATION) at 12:52

## 2023-10-06 RX ADMIN — SODIUM CHLORIDE, PRESERVATIVE FREE 10 ML: 5 INJECTION INTRAVENOUS at 08:35

## 2023-10-06 RX ADMIN — EZETIMIBE 10 MG: 10 TABLET ORAL at 08:35

## 2023-10-06 RX ADMIN — APIXABAN 5 MG: 5 TABLET, FILM COATED ORAL at 20:20

## 2023-10-06 RX ADMIN — ACETAMINOPHEN 650 MG: 325 TABLET ORAL at 23:58

## 2023-10-06 RX ADMIN — ACETAMINOPHEN 650 MG: 325 TABLET ORAL at 18:07

## 2023-10-06 RX ADMIN — DOCUSATE SODIUM 100 MG: 100 CAPSULE, LIQUID FILLED ORAL at 08:35

## 2023-10-06 RX ADMIN — OXYCODONE HYDROCHLORIDE 10 MG: 5 TABLET ORAL at 02:41

## 2023-10-06 RX ADMIN — ABIRATERONE ACETATE 1000 MG: 250 TABLET ORAL at 17:32

## 2023-10-06 RX ADMIN — SODIUM CHLORIDE 1000 MG: 900 INJECTION INTRAVENOUS at 08:36

## 2023-10-06 RX ADMIN — SERTRALINE HYDROCHLORIDE 25 MG: 25 TABLET ORAL at 08:44

## 2023-10-06 RX ADMIN — AZITHROMYCIN MONOHYDRATE 500 MG: 500 INJECTION, POWDER, LYOPHILIZED, FOR SOLUTION INTRAVENOUS at 09:53

## 2023-10-06 RX ADMIN — IPRATROPIUM BROMIDE AND ALBUTEROL SULFATE 1 DOSE: .5; 3 SOLUTION RESPIRATORY (INHALATION) at 20:26

## 2023-10-06 ASSESSMENT — PAIN DESCRIPTION - DESCRIPTORS
DESCRIPTORS: ACHING
DESCRIPTORS: ACHING;THROBBING
DESCRIPTORS: PRESSURE

## 2023-10-06 ASSESSMENT — PAIN SCALES - GENERAL
PAINLEVEL_OUTOF10: 9
PAINLEVEL_OUTOF10: 3
PAINLEVEL_OUTOF10: 6
PAINLEVEL_OUTOF10: 4

## 2023-10-06 ASSESSMENT — PAIN DESCRIPTION - LOCATION
LOCATION: ABDOMEN
LOCATION: HEAD
LOCATION: ABDOMEN

## 2023-10-06 ASSESSMENT — PAIN DESCRIPTION - ORIENTATION: ORIENTATION: ANTERIOR

## 2023-10-06 ASSESSMENT — PAIN SCALES - WONG BAKER: WONGBAKER_NUMERICALRESPONSE: 2

## 2023-10-06 NOTE — PROGRESS NOTES
Comprehensive Nutrition Assessment    Type and Reason for Visit:  Initial, RD Nutrition Re-Screen/LOS    Nutrition Recommendations/Plan:   Continue current diet      Malnutrition Assessment:  Malnutrition Status:  Insufficient data (10/06/23 3997)      Nutrition Assessment:    Patient medically noted for IMTIAZ, hyperkalemia, and pneumonia. PMH MDS, CHF, HTN, GERD, depression, and prostate cancer. Chart reviewed for length of stay. Potassium now WNL. Patient has been receiving a cardiac/low potassium diet. No documented PO intake per flowsheets. Patient unavailable at time of attempted visit with therapists at bedside. Plans for discharge to [facility] when medically stable. Encourage intake of meals. Nutrition Related Findings:    -[phone], K+ 4.4  BM 10/5  Colace, Zetia, Midodrine, Protonix, Prednisone, Zoloft  Wound Type: Wound Consult Pending       Current Nutrition Intake & Therapies:          ADULT DIET; Regular; Low Fat/Low Chol/High Fiber/2 gm Na; Low Potassium (Less than 3000 mg/day)    Anthropometric Measures:  Height: 175.3 cm (5' 9\")  Ideal Body Weight (IBW): 160 lbs (73 kg)       Current Body Weight: 220 lb 7.4 oz (100 kg),   IBW. Current BMI (kg/m2): 32.5                          BMI Categories: Obese Class 1 (BMI 30.0-34. 9)    Estimated Daily Nutrient Needs:  Energy Requirements Based On: Formula  Weight Used for Energy Requirements: Current  Energy (kcal/day): 2200 kcals (BMR x 1. 3AF)  Weight Used for Protein Requirements: Current  Protein (g/day): 80-100g (0.8-1.0 g/kg bw)  Method Used for Fluid Requirements: Other (Comment)  Fluid (ml/day): per MD    Nutrition Diagnosis:   No nutrition diagnosis at this time     Nutrition Interventions:   Food and/or Nutrient Delivery: Continue Current Diet  Nutrition Education/Counseling: No recommendation at this time  Coordination of Nutrition Care: Continue to monitor while inpatient       Goals:     Goals: PO intake 75% or greater, by next RD

## 2023-10-06 NOTE — PROGRESS NOTES
4042: Bedside shift change report given to Suman Swift RN (oncoming nurse) by Kevin Encinas RN (offgoing nurse). Report included the following information Nurse Handoff Report. 1030: O2 sats 87% on room air, placed 2L/NC O2 sats 98%. Upon auscultation, expiratory wheezing noted, pt dyspneic on at rest/exertion. Misty Castillo MD notified. No new orders received at this time. 1900: End of Shift Note    Bedside shift change report given to KEYLA Juares (oncoming nurse) by Porter Christie RN (offgoing nurse). Report included the following information SBAR, Intake/Output, and MAR    Shift worked:  7a-7p     Shift summary and any significant changes:     See chart and aforementioned notes for additional information. Concerns for physician to address:  None at this time     Zone phone for oncoming shift:          Activity:     Number times ambulated in hallways past shift: 0  Number of times OOB to chair past shift: 0    Cardiac:   Cardiac Monitoring: Yes           Access:  Current line(s): PIV     Genitourinary:   Urinary status: voiding and external catheter    Respiratory:      Chronic home O2 use?: NO  Incentive spirometer at bedside: NO       GI:     Current diet:  ADULT DIET; Regular; Low Fat/Low Chol/High Fiber/2 gm Na; Low Potassium (Less than 3000 mg/day)  Passing flatus: YES  Tolerating current diet: YES       Pain Management:   Patient states pain is manageable on current regimen: YES    Skin:     Interventions: float heels, increase time out of bed, and PT/OT consult    Patient Safety:  Fall Score:    Interventions: bed/chair alarm, assistive device (walker, cane.  etc), gripper socks, pt to call before getting OOB, and stay with me (per policy)       Length of Stay:  Expected LOS: 2  Actual LOS: [address], RN

## 2023-10-06 NOTE — PLAN OF CARE
Problem: Occupational Therapy - Adult  Goal: By Discharge: Performs self-care activities at highest level of function for planned discharge setting. See evaluation for individualized goals. Description: FUNCTIONAL STATUS PRIOR TO ADMISSION:  Patient was independent in ADLs and mod I for functional mobility using rollator. ,  ,  ,  ,  ,  ,  ,  ,  ,  ,       HOME SUPPORT: Patient lived with wife and son. Occupational Therapy Goals:  Initiated 9/30/2023; Reviewed during weekly reassessment 10/6/2023- continue all goals  1. Patient will perform grooming in standing with Supervision within 7 day(s). 2.  Patient will perform upper body dressing with Stand by Assist within 7 day(s). 3.  Patient will perform lower body dressing using AE PRN with Moderate Assist within 7 day(s). 4.  Patient will perform toilet transfers with Maximal Assist  within 7 day(s). 5.  Patient will perform all aspects of toileting with Minimal Assist within 7 day(s). 6.  Patient will participate in upper extremity therapeutic exercise/activities with Stand by Assist for 3 minutes within 7 day(s). Outcome: Progressing   OCCUPATIONAL THERAPY TREATMENT  Patient: Heather Villareal (82 y.o. male)  Date: 10/6/2023  Primary Diagnosis: Left hip pain [M25.552]  Sepsis ([address]) [A41.9]  Severe sepsis ([address]) [A41.9, R65.20]  Community acquired pneumonia, unspecified laterality [J18.9]       Precautions: Fall Risk                Chart, occupational therapy assessment, plan of care, and goals were reviewed. ASSESSMENT  Patient continues to benefit from skilled OT services and is slowly progressing towards goals. Patient received semisupine in bed on 1L O2 and cleared for therapy by nursing. He remains motivated and eager to participate in all therapy tasks and receptive to all education. Patient was able to complete supine > sit with min assist x2 and required HOB to be fully elevated.  Once sitting EOB, patient progressed from contact guard to understanding;Continued education needed    Thank you for this referral.  Dorys Yanes, OT  Minutes: 48

## 2023-10-06 NOTE — PLAN OF CARE
Problem: Discharge Planning  Goal: Discharge to home or other facility with appropriate resources  10/5/2023 1947 by Doug James RN  Outcome: Progressing     Problem: Pain  Goal: Verbalizes/displays adequate comfort level or baseline comfort level  10/6/2023 0939 by Bernardino Cowden, RN  Outcome: Progressing  10/5/2023 1947 by Doug James RN  Outcome: Progressing     Problem: Safety - Adult  Goal: Free from fall injury  10/6/2023 0939 by Bernardino Cowden, RN  Outcome: Progressing  10/5/2023 1947 by Doug James RN  Outcome: Progressing     Problem: Skin/Tissue Integrity  Goal: Absence of new skin breakdown  Description: 1. Monitor for areas of redness and/or skin breakdown  2. Assess vascular access sites hourly  3. Every 4-6 hours minimum:  Change oxygen saturation probe site  4. Every 4-6 hours:  If on nasal continuous positive airway pressure, respiratory therapy assess nares and determine need for appliance change or resting period.   10/6/2023 0939 by Bernardino Cowden, RN  Outcome: Progressing  10/5/2023 1947 by Doug James RN  Outcome: Progressing

## 2023-10-06 NOTE — CONSULTS
[facility]    Name:  Billy Lang  MR#:  [id number]  :  1940  ACCOUNT #:  [de-identified]  DATE OF SERVICE:  10/05/2023    REASON FOR CONSULTATION:  Hyperkalemia. REFERRING PHYSICIAN:  Dr. Suzy Santoro. HISTORY OF PRESENT ILLNESS:  The patient is an 81-year-old white male with a past medical history significant for AFib, hypertension, who presented to ER with fall and hip pain. The patient has developed hyperkalemia with a potassium of 5.8. His creatinine level is 1.46 and BUN of 48. The patient is having shortness of breath this morning. The patient has been eating high potassium food prior to admission. He denies any vomiting or diarrhea. The patient has been on potassium supplement along with the Bumex and lisinopril at home. The patient was given [facility] yesterday. His potassium did not improve much. His creatinine level was 1.2 on 2023. His creatinine level was 1.0 in 2023. His potassium level was 4.7 on 10/01/2023. The patient has been admitted to the hospital in 2023. Since admission, the patient is on Bumex. PAST MEDICAL HISTORY:  1.  AFib. 2.  Basal cell skin cancer. 3.  BPH. 4.  GERD. 5.  Hypertension. 6.  Myelodysplastic syndrome. 7.  Osteoarthritis of hip. PAST SURGICAL HISTORY:  1. Cholecystectomy. 2.  cataract surgery. 3.  Basal cell skin cancer removal.  4.  Shoulder surgery. SOCIAL HISTORY:  No smoking or alcohol abuse. FAMILY HISTORY:  Positive for hypertension. HOME MEDICATION:  List reviewed. He is on:  1.  Zoloft. 2.  KCl.  3.  Bumex. 4.  Lisinopril. 5.  Prilosec. PHYSICAL EXAMINATION:  VITAL SIGNS:  Temperature 97.8, pulse 106, blood pressure 108/76, respiratory rate 24 per minute. GENERAL:  The patient is lying in the bed, comfortable, mild distress. Alert, awake, oriented x3. HEENT:  Oral mucosa is dry. LUNGS:  Coarse bilaterally. No wheezing. CARDIAC:  Normal S1, S2.   Tachycardia

## 2023-10-06 NOTE — CARE COORDINATION
Transition of Care Plan:     RUR: 21% \"high risk\"  Prior Level of Functioning: Independent with ADL's  Disposition: [facility]-Accepted to [facility]  If SNF or IPR: Date FOC offered: 10/03/2023  Date FOC received: 10/03/2023  [facility]  Follow up appointments: To be arranged by IPR  DME needed: Defer to IPR  Transportation at discharge: BLS transport  IM/IMM Medicare/ letter given: 2nd IM letter needed at d/c  Is patient a [facility] and connected with [place]? N/A  Caregiver Contact: Pt's wife Fide Harris [phone]  Discharge Caregiver contacted prior to discharge? To be contacted at d/c  Care Conference needed? Not at this time  Barriers to discharge:  Medical clearance    Initial note: Chart reviewed for updates. Pt is still not medically stable for d/c according to the MD. CM will continue to follow up with d/c planning.     VICTORIA Tamayo    [phone]

## 2023-10-06 NOTE — PROGRESS NOTES
Hospitalist Progress Note    NAME:   Judith Fajardo   : 1940   MRN: [id number]     Date/Time: 10/6/2023 3:31 PM  Patient PCP: Marie Peguero MD    Estimated discharge date: 10/8  Barriers: weaning oxygen, respiratory status improvement. Assessment / Plan:    Acute hypoxic respiratory failure  Acute on chronic systolic heart failure  EF 25-30%, severe global hypokinesis  Bumex given today as per nephrology  Continue bumex 1mg iv BID  Dual nebulization standing. Acute hyperkalemia - persistent  resolved    Hypertension, currently blood pressure low  Dyslipidemia  GERD  Depression  -Continue PTA Zetia, PPI and also Zoloft      IMTIAZ - improving  Prerenal or cardiogenic  Low-dose maintenance of fluid  - Nephro following    Left hip pain post fall  -CT of the pelvis shows no definite fracture but shows sclerotic bone metastasis possible cyst versus a mass in the right kidney  -Pain control  -Minimize narcotics as he is drowsy    Sepsis  Suspected aspiration pneumonia  Hypotension  -Chest x-ray shows patchy density in the mid to lower right lung secondary to pneumonia  -No leukocytosis but was hypotensive in the emergency department   -Completed azithromycin  -discontinue ceftriaxone (-10/6)  -Cannot give IV fluid due to congestive heart failure  -Most likely cause for hypotension could be related to pneumonia but patient is on chronic prednisone so stress dose of steroids was added  Continue  midodrine to 10 mg 3 times daily t  Continue  prednisone 20 mg 2 times daily  Taper slowly to his home dose  TTE pending    Atrial fibrillation on anticoagulation with Eliquis  -continue Eliquis. Monitor for bleeding.     History of MDS  History of prostate cancer  -Continue PTA Zytiga  -He is on chronic prednisone 5 mg twice daily and given hypotension, stress dose of steroids added     Medical Decision Making:   I personally reviewed labs: CBC, BMP  I personally reviewed imaging: none  I personally NO  Reviewed patient's current orders and MAR    YES  PMH/SH reviewed - no change compared to H&P  ________________________________________________________________________  Care Plan discussed with:    Comments   Patient x    Family      RN x    Care Manager x    Consultant                       y Multidiciplinary team rounds were held today with , nursing, pharmacist and clinical coordinator. Patient's plan of care was discussed; medications were reviewed and discharge planning was addressed. ________________________________________________________________________  Total NON critical care TIME:  55  Minutes    Total CRITICAL CARE TIME Spent:   Minutes non procedure based      Comments   >50% of visit spent in counseling and coordination of care     _____________________________________________________________    Procedures: see electronic medical records for all procedures/Xrays and details which were not copied into this note but were reviewed prior to creation of Plan. LABS:  I reviewed today's most current labs and imaging studies.   Pertinent labs include:  Recent Labs     10/04/23  0329 10/05/23  0345   WBC 7.2 5.5   HGB 9.9* 9.5*   HCT 31.8* 30.1*    255       Recent Labs     10/05/23  0345 10/05/23  1657 10/06/23  0338    141 141   K 5.8* 4.3 4.4   * 110* 111*   CO2 23 25 25   GLUCOSE 135* 188* 137*   BUN 48* 45* 49*   CREATININE 1.46* 1.46* 1.47*   CALCIUM 8.2* 8.0* 8.2*   PHOS  --   --  3.6   LABALBU  --   --  3.3*         Signed: Antonina Morrow MD

## 2023-10-07 VITALS
WEIGHT: 220.46 LBS | SYSTOLIC BLOOD PRESSURE: 126 MMHG | DIASTOLIC BLOOD PRESSURE: 87 MMHG | TEMPERATURE: 98 F | BODY MASS INDEX: 32.65 KG/M2 | HEIGHT: 69 IN | HEART RATE: 114 BPM | RESPIRATION RATE: 25 BRPM | OXYGEN SATURATION: 93 %

## 2023-10-07 LAB
ALBUMIN SERPL-MCNC: 3.2 G/DL (ref 3.5–5)
ANION GAP SERPL CALC-SCNC: 6 MMOL/L (ref 5–15)
BASOPHILS # BLD: 0 K/UL (ref 0–0.1)
BASOPHILS # BLD: 0.1 K/UL (ref 0–0.1)
BASOPHILS NFR BLD: 1 % (ref 0–1)
BASOPHILS NFR BLD: 1 % (ref 0–1)
BUN SERPL-MCNC: 49 MG/DL (ref 6–20)
BUN/CREAT SERPL: 33 (ref 12–20)
CALCIUM SERPL-MCNC: 8.3 MG/DL (ref 8.5–10.1)
CHLORIDE SERPL-SCNC: 109 MMOL/L (ref 97–108)
CO2 SERPL-SCNC: 25 MMOL/L (ref 21–32)
CREAT SERPL-MCNC: 1.49 MG/DL (ref 0.7–1.3)
DIFFERENTIAL METHOD BLD: ABNORMAL
DIFFERENTIAL METHOD BLD: ABNORMAL
EOSINOPHIL # BLD: 0 K/UL (ref 0–0.4)
EOSINOPHIL # BLD: 0 K/UL (ref 0–0.4)
EOSINOPHIL NFR BLD: 0 % (ref 0–7)
EOSINOPHIL NFR BLD: 0 % (ref 0–7)
ERYTHROCYTE [DISTWIDTH] IN BLOOD BY AUTOMATED COUNT: 19.5 % (ref 11.5–14.5)
ERYTHROCYTE [DISTWIDTH] IN BLOOD BY AUTOMATED COUNT: 20.3 % (ref 11.5–14.5)
GLUCOSE SERPL-MCNC: 156 MG/DL (ref 65–100)
HCT VFR BLD AUTO: 27.8 % (ref 36.6–50.3)
HCT VFR BLD AUTO: 29.3 % (ref 36.6–50.3)
HGB BLD-MCNC: 9 G/DL (ref 12.1–17)
HGB BLD-MCNC: 9.2 G/DL (ref 12.1–17)
IMM GRANULOCYTES # BLD AUTO: 0.1 K/UL (ref 0–0.04)
IMM GRANULOCYTES # BLD AUTO: 0.1 K/UL (ref 0–0.04)
IMM GRANULOCYTES NFR BLD AUTO: 1 % (ref 0–0.5)
IMM GRANULOCYTES NFR BLD AUTO: 1 % (ref 0–0.5)
LYMPHOCYTES # BLD: 0.5 K/UL (ref 0.8–3.5)
LYMPHOCYTES # BLD: 0.5 K/UL (ref 0.8–3.5)
LYMPHOCYTES NFR BLD: 7 % (ref 12–49)
LYMPHOCYTES NFR BLD: 7 % (ref 12–49)
MCH RBC QN AUTO: 30.8 PG (ref 26–34)
MCH RBC QN AUTO: 31.5 PG (ref 26–34)
MCHC RBC AUTO-ENTMCNC: 31.4 G/DL (ref 30–36.5)
MCHC RBC AUTO-ENTMCNC: 32.4 G/DL (ref 30–36.5)
MCV RBC AUTO: 97.2 FL (ref 80–99)
MCV RBC AUTO: 98 FL (ref 80–99)
MONOCYTES # BLD: 0.5 K/UL (ref 0–1)
MONOCYTES # BLD: 0.5 K/UL (ref 0–1)
MONOCYTES NFR BLD: 7 % (ref 5–13)
MONOCYTES NFR BLD: 8 % (ref 5–13)
NEUTS SEG # BLD: 5.3 K/UL (ref 1.8–8)
NEUTS SEG # BLD: 5.9 K/UL (ref 1.8–8)
NEUTS SEG NFR BLD: 83 % (ref 32–75)
NEUTS SEG NFR BLD: 85 % (ref 32–75)
NRBC # BLD: 0.02 K/UL (ref 0–0.01)
NRBC # BLD: 0.02 K/UL (ref 0–0.01)
NRBC BLD-RTO: 0.3 PER 100 WBC
NRBC BLD-RTO: 0.3 PER 100 WBC
PHOSPHATE SERPL-MCNC: 3.3 MG/DL (ref 2.6–4.7)
PLATELET # BLD AUTO: 244 K/UL (ref 150–400)
PLATELET # BLD AUTO: 339 K/UL (ref 150–400)
PMV BLD AUTO: 12.1 FL (ref 8.9–12.9)
PMV BLD AUTO: 12.8 FL (ref 8.9–12.9)
POTASSIUM SERPL-SCNC: 4.1 MMOL/L (ref 3.5–5.1)
RBC # BLD AUTO: 2.86 M/UL (ref 4.1–5.7)
RBC # BLD AUTO: 2.99 M/UL (ref 4.1–5.7)
RBC MORPH BLD: ABNORMAL
SODIUM SERPL-SCNC: 140 MMOL/L (ref 136–145)
WBC # BLD AUTO: 6.5 K/UL (ref 4.1–11.1)
WBC # BLD AUTO: 7 K/UL (ref 4.1–11.1)

## 2023-10-07 PROCEDURE — 6370000000 HC RX 637 (ALT 250 FOR IP): Performed by: STUDENT IN AN ORGANIZED HEALTH CARE EDUCATION/TRAINING PROGRAM

## 2023-10-07 PROCEDURE — 6370000000 HC RX 637 (ALT 250 FOR IP): Performed by: INTERNAL MEDICINE

## 2023-10-07 PROCEDURE — 94640 AIRWAY INHALATION TREATMENT: CPT

## 2023-10-07 PROCEDURE — 2500000003 HC RX 250 WO HCPCS: Performed by: INTERNAL MEDICINE

## 2023-10-07 PROCEDURE — 36415 COLL VENOUS BLD VENIPUNCTURE: CPT

## 2023-10-07 PROCEDURE — 80069 RENAL FUNCTION PANEL: CPT

## 2023-10-07 PROCEDURE — 85025 COMPLETE CBC W/AUTO DIFF WBC: CPT

## 2023-10-07 PROCEDURE — 2580000003 HC RX 258: Performed by: INTERNAL MEDICINE

## 2023-10-07 RX ORDER — AZITHROMYCIN 500 MG/1
500 TABLET, FILM COATED ORAL DAILY
Qty: 5 TABLET | Refills: 0 | Status: SHIPPED
Start: 2023-10-08 | End: 2023-10-13

## 2023-10-07 RX ORDER — MIDODRINE HYDROCHLORIDE 10 MG/1
10 TABLET ORAL EVERY 8 HOURS
Qty: 90 TABLET | Refills: 0 | Status: SHIPPED
Start: 2023-10-07

## 2023-10-07 RX ADMIN — ACETAMINOPHEN 650 MG: 325 TABLET ORAL at 05:31

## 2023-10-07 RX ADMIN — AZITHROMYCIN 500 MG: 250 TABLET, FILM COATED ORAL at 09:43

## 2023-10-07 RX ADMIN — PREDNISONE 20 MG: 20 TABLET ORAL at 09:42

## 2023-10-07 RX ADMIN — EZETIMIBE 10 MG: 10 TABLET ORAL at 09:42

## 2023-10-07 RX ADMIN — BUMETANIDE 1 MG: 0.25 INJECTION INTRAMUSCULAR; INTRAVENOUS at 09:41

## 2023-10-07 RX ADMIN — MIDODRINE HYDROCHLORIDE 10 MG: 5 TABLET ORAL at 00:57

## 2023-10-07 RX ADMIN — Medication: at 09:48

## 2023-10-07 RX ADMIN — DOCUSATE SODIUM 100 MG: 100 CAPSULE, LIQUID FILLED ORAL at 09:42

## 2023-10-07 RX ADMIN — IPRATROPIUM BROMIDE AND ALBUTEROL SULFATE 1 DOSE: .5; 3 SOLUTION RESPIRATORY (INHALATION) at 11:27

## 2023-10-07 RX ADMIN — SODIUM CHLORIDE, PRESERVATIVE FREE 10 ML: 5 INJECTION INTRAVENOUS at 09:49

## 2023-10-07 RX ADMIN — APIXABAN 5 MG: 5 TABLET, FILM COATED ORAL at 09:42

## 2023-10-07 RX ADMIN — IPRATROPIUM BROMIDE AND ALBUTEROL SULFATE 1 DOSE: .5; 3 SOLUTION RESPIRATORY (INHALATION) at 08:26

## 2023-10-07 RX ADMIN — PANTOPRAZOLE SODIUM 40 MG: 40 TABLET, DELAYED RELEASE ORAL at 05:32

## 2023-10-07 RX ADMIN — MIDODRINE HYDROCHLORIDE 10 MG: 5 TABLET ORAL at 09:41

## 2023-10-07 RX ADMIN — SERTRALINE HYDROCHLORIDE 25 MG: 25 TABLET ORAL at 09:41

## 2023-10-07 ASSESSMENT — PAIN DESCRIPTION - ORIENTATION
ORIENTATION: LEFT
ORIENTATION: LEFT

## 2023-10-07 ASSESSMENT — PAIN SCALES - GENERAL
PAINLEVEL_OUTOF10: 3
PAINLEVEL_OUTOF10: 3

## 2023-10-07 ASSESSMENT — PAIN DESCRIPTION - LOCATION
LOCATION: HIP
LOCATION: LEG;ABDOMEN

## 2023-10-07 ASSESSMENT — PAIN DESCRIPTION - DESCRIPTORS: DESCRIPTORS: SORE

## 2023-10-07 ASSESSMENT — PAIN - FUNCTIONAL ASSESSMENT: PAIN_FUNCTIONAL_ASSESSMENT: PREVENTS OR INTERFERES SOME ACTIVE ACTIVITIES AND ADLS

## 2023-10-07 NOTE — DISCHARGE INSTRUCTIONS
HOSPITALIST DISCHARGE INSTRUCTIONS    NAME: Maxx Luther   :  1940   MRN:  [id number]     Date/Time:  10/7/2023 12:03 PM    ADMIT DATE: 2023     DISCHARGE DATE: 10/7/2023     DISCHARGE DIAGNOSIS:  Acute hypoxic respiratory failure - Resolved, off oxygen now with diuresis  Acute on chronic systolic heart failure  EF 25-30%, severe global hypokinesis  Hypertension, currently blood pressure low  Dyslipidemia  GERD  Depression  Acute hyperkalemia- resolved  IMTIAZ - Cr stable at 1.4-1.5  Atrial fibrillation on anticoagulation with Eliquis  History of MDS  History of prostate cancer  DNR       MEDICATIONS:  As per medication reconciliation  list  It is important that you take the medication exactly as they are prescribed. Keep your medication in the bottles provided by the pharmacist and keep a list of the medication names, dosages, and times to be taken in your wallet. Do not take other medications without consulting your doctor. Pain Management: per above medications    What to do at Home    Recommended diet:  cardiac diet and low fat, low cholesterol diet    Recommended activity: activity as tolerated    If you have questions regarding the hospital related prescriptions or hospital related issues please call at [phone]. If you experience any of the following symptoms then please call your primary care physician or return to the emergency room if you cannot get hold of your doctor:  Fever, chills, nausea, vomiting, diarrhea, change in mentation, falling, bleeding, shortness of breath,     Follow Up:  PCP  you are to call and set up an appointment to see them in 7-10 days. Nephrology      Information obtained by :  I understand that if any problems occur once I am at home I am to contact my physician. I understand and acknowledge receipt of the instructions indicated above.

## 2023-10-07 NOTE — DISCHARGE SUMMARY
Discharge Summary    Name: Marleni Park  [id number]  YOB: 1940 (Age: 82 y.o.)   Date of Admission: 9/28/2023  Date of Discharge: 10/7/2023  Attending Physician: Glory Navarro MD    Discharge Diagnosis:   Acute hypoxic respiratory failure - Resolved, off oxygen now with diuresis  Acute on chronic systolic heart failure  EF 25-30%, severe global hypokinesis  Hypertension, currently blood pressure low  Dyslipidemia  GERD  Depression  Acute hyperkalemia- resolved  IMTIAZ - Cr stable at 1.4-1.5  Atrial fibrillation on anticoagulation with Eliquis  History of MDS  History of prostate cancer  DNR    Consultations:  IP CONSULT TO PHARMACY  IP CONSULT TO NEPHROLOGY  IP WOUND CARE NURSE CONSULT TO EVAL      Brief Admission History/Reason for Admission Per Melvin Gowers, MD:   [place] y.o[place]  male with PMHx significant for comorbidities presents to [facility] status post fall with hip pain. Patient states he was in his usual state of health until earlier this afternoon when patient tripped and fell subsequent to which patient had sudden onset severe left hip pain resulting in difficulty in ambulation following which patient presented to the ED. Patient denies any fevers chills nausea vomiting lightheadedness dizziness dyspnea orthopnea paroxysmal nocturnal dyspnea chest pain palpitations headache focal weakness loss sensation auditory or visual symptoms abdominal stool or urinary complaints or any other associated symptoms. Patient endorses no recent sick contacts or travel activity  We were asked to admit for work up and evaluation of the above problems\"    [address] Course by Main Problems:   Acute hypoxic respiratory failure - Resolved, off oxygen now with diuresis  Acute on chronic systolic heart failure  EF 25-30%, severe global hypokinesis  S/p IV bumex , cont Po bumex now  Duoneb.            Acute hyperkalemia -

## 2023-10-07 NOTE — PROGRESS NOTES
RNA Focus Note:    Issue: IMTIAZ, High K, Hypotenion    - Creat stable x 4 days with good UO  - On current bumex dose negative fluid balance with stable and normal BP  - K remains wnl on bumex  - not much to add today  - please call me with ?'s

## 2023-10-07 NOTE — PROGRESS NOTES
Pt voided well overnight, had one bowel movement overnight and was turned frequently. Pt expressed that he would like to push himself and move around and walk a bit more because \"I feel myself getting weaker and weaker. \" Controlled with current regime

## 2023-10-07 NOTE — CARE COORDINATION
Transition of Care Plan:     RUR: 21% \"high risk\"  Prior Level of Functioning: Independent with ADL's  Disposition: [facility]  If SNF or IPR: Date FOC offered: 10/03/2023  Date FOC received: 10/03/2023  Accepting facility: [facility]  Follow up appointments: To be arranged by IPR  DME needed: Defer to IPR  Transportation at discharge: [facility] @ 2:30PM  IM/IMM Medicare/ letter given: Signed 10/7  Is patient a  and connected with [place]? N/A  Caregiver Contact: Pt's wife Juventino Faria [phone]  Discharge Caregiver contacted prior to discharge? Yes  Care Conference needed? Not at this time  Barriers to discharge: None    9:53AM UPDATE  CM received call from [facility], [phone]) reporting they have bed available for admission today if pt is medically stable. CM contacted MD via [employer]. Awaiting response. 10:22AM UPDATE  CM spoke with MD, cleared for d/c. CM called [facility], they will work on admission. Requesting transport to be setup for 2:00PM at earliest. CM provided update to pt at bedside. CM called pt's wife to provide update. CM called [facility] ([phone]) to schedule for 2:00PM pick-up. 10:34AM UPDATE  [facility] scheduled transport for 2:30PM pick-up. If d/c transportation is delayed or pushed back significantly, please contact Nursing Supervisor to get permission to call [facility] for transportation. 12:47PM UPDATE  CM informed that Charge Nurse received call to unit that [facility] pushed back transport to 5:45PM. Nursing Supervisor gave permission to call [facility] to setup transport, scheduled for 2:30PM pick-up. All info entered into AVS.     Pt has no additional CM needs at this time. Accepting MD: Dr. Mary Thrasher #[id number]  Call Report [phone]     10/07/23 [address] Discharge   Transition of Care Consult (CM Consult) Acute Rehab;Transportation Assistance  ([address] to [address] [facility])   [address] Discharge In ambulance; Inpatient rehab  [facility]

## 2023-10-07 NOTE — FLOWSHEET NOTE
Wilma Eldridge, charge nurse from [facility], states the spelling of her name is insignificant, when prompter for correct spelling of her name. Note, that spelling may not reflect true spelling of receiving facility's charge nurse due to this barrier.

## 2023-10-07 NOTE — PLAN OF CARE
Problem: Discharge Planning  Goal: Discharge to home or other facility with appropriate resources  Outcome: Adequate for Discharge     Problem: Pain  Goal: Verbalizes/displays adequate comfort level or baseline comfort level  Outcome: Adequate for Discharge     Problem: Safety - Adult  Goal: Free from fall injury  Outcome: Adequate for Discharge     Problem: Skin/Tissue Integrity  Goal: Absence of new skin breakdown  Description: 1. Monitor for areas of redness and/or skin breakdown  2. Assess vascular access sites hourly  3. Every 4-6 hours minimum:  Change oxygen saturation probe site  4. Every 4-6 hours:  If on nasal continuous positive airway pressure, respiratory therapy assess nares and determine need for appliance change or resting period.   Outcome: Adequate for Discharge     Problem: Respiratory - Adult  Goal: Achieves optimal ventilation and oxygenation  Outcome: Adequate for Discharge

## 2023-10-09 ENCOUNTER — TELEPHONE (OUTPATIENT)
Age: 83
End: 2023-10-09

## 2023-10-09 NOTE — TELEPHONE ENCOUNTER
Dr.Makon called from [facility] Jai Nguyenvard, family member can not transport patient out on her own anymore. Cancelling appt today. They will set up transport for 2pm Friday for [facility].

## 2023-10-12 NOTE — PROGRESS NOTES
RESPONSE TEXT:    Sepsis was ruled out after study.     Query created by: Theron Felix on 10/12/2023 11:15 AM      Electronically signed by:  Remy Laboy MD 10/12/2023 1:30 PM

## 2023-10-13 ENCOUNTER — OFFICE VISIT (OUTPATIENT)
Age: 83
End: 2023-10-13
Payer: MEDICARE

## 2023-10-13 ENCOUNTER — HOSPITAL ENCOUNTER (OUTPATIENT)
Facility: HOSPITAL | Age: 83
Setting detail: INFUSION SERIES
End: 2023-10-13
Payer: MEDICARE

## 2023-10-13 VITALS
HEIGHT: 69 IN | BODY MASS INDEX: 32.56 KG/M2 | OXYGEN SATURATION: 95 % | DIASTOLIC BLOOD PRESSURE: 72 MMHG | HEART RATE: 110 BPM | RESPIRATION RATE: 16 BRPM | SYSTOLIC BLOOD PRESSURE: 114 MMHG | TEMPERATURE: 98.3 F

## 2023-10-13 VITALS
HEART RATE: 110 BPM | OXYGEN SATURATION: 95 % | TEMPERATURE: 98.3 F | DIASTOLIC BLOOD PRESSURE: 72 MMHG | BODY MASS INDEX: 32.56 KG/M2 | SYSTOLIC BLOOD PRESSURE: 114 MMHG | HEIGHT: 69 IN | RESPIRATION RATE: 16 BRPM

## 2023-10-13 DIAGNOSIS — C61 PROSTATE CANCER METASTATIC TO BONE (HCC): Primary | ICD-10-CM

## 2023-10-13 DIAGNOSIS — D46.Z MYELODYSPLASTIC SYNDROME, LOW GRADE (HCC): ICD-10-CM

## 2023-10-13 DIAGNOSIS — C61 PROSTATE CANCER (HCC): Primary | ICD-10-CM

## 2023-10-13 DIAGNOSIS — G89.3 CANCER RELATED PAIN: ICD-10-CM

## 2023-10-13 DIAGNOSIS — Z51.11 ENCOUNTER FOR CHEMOTHERAPY MANAGEMENT: ICD-10-CM

## 2023-10-13 DIAGNOSIS — C79.51 PROSTATE CANCER METASTATIC TO BONE (HCC): Primary | ICD-10-CM

## 2023-10-13 LAB
ALBUMIN SERPL-MCNC: 3.4 G/DL (ref 3.5–5)
ALBUMIN/GLOB SERPL: 1.2 (ref 1.1–2.2)
ALP SERPL-CCNC: 529 U/L (ref 45–117)
ALT SERPL-CCNC: 53 U/L (ref 12–78)
ANION GAP SERPL CALC-SCNC: 4 MMOL/L (ref 5–15)
AST SERPL-CCNC: 29 U/L (ref 15–37)
BASOPHILS # BLD: 0 K/UL (ref 0–0.1)
BASOPHILS NFR BLD: 0 % (ref 0–1)
BILIRUB SERPL-MCNC: 2.3 MG/DL (ref 0.2–1)
BUN SERPL-MCNC: 43 MG/DL (ref 6–20)
BUN/CREAT SERPL: 39 (ref 12–20)
CALCIUM SERPL-MCNC: 8.4 MG/DL (ref 8.5–10.1)
CHLORIDE SERPL-SCNC: 104 MMOL/L (ref 97–108)
CO2 SERPL-SCNC: 31 MMOL/L (ref 21–32)
CREAT SERPL-MCNC: 1.09 MG/DL (ref 0.7–1.3)
DIFFERENTIAL METHOD BLD: ABNORMAL
EOSINOPHIL # BLD: 0.1 K/UL (ref 0–0.4)
EOSINOPHIL NFR BLD: 1 % (ref 0–7)
ERYTHROCYTE [DISTWIDTH] IN BLOOD BY AUTOMATED COUNT: 19.7 % (ref 11.5–14.5)
GLOBULIN SER CALC-MCNC: 2.9 G/DL (ref 2–4)
GLUCOSE SERPL-MCNC: 175 MG/DL (ref 65–100)
HCT VFR BLD AUTO: 28.5 % (ref 36.6–50.3)
HGB BLD-MCNC: 9.2 G/DL (ref 12.1–17)
IMM GRANULOCYTES # BLD AUTO: 0 K/UL (ref 0–0.04)
IMM GRANULOCYTES NFR BLD AUTO: 0 % (ref 0–0.5)
LYMPHOCYTES # BLD: 0.4 K/UL (ref 0.8–3.5)
LYMPHOCYTES NFR BLD: 5 % (ref 12–49)
MCH RBC QN AUTO: 31.6 PG (ref 26–34)
MCHC RBC AUTO-ENTMCNC: 32.3 G/DL (ref 30–36.5)
MCV RBC AUTO: 97.9 FL (ref 80–99)
MONOCYTES # BLD: 0.4 K/UL (ref 0–1)
MONOCYTES NFR BLD: 6 % (ref 5–13)
NEUTS SEG # BLD: 6.5 K/UL (ref 1.8–8)
NEUTS SEG NFR BLD: 88 % (ref 32–75)
NRBC # BLD: 0.02 K/UL (ref 0–0.01)
NRBC BLD-RTO: 0.3 PER 100 WBC
PLATELET # BLD AUTO: 177 K/UL (ref 150–400)
PLATELET COMMENT: ABNORMAL
PMV BLD AUTO: 12.7 FL (ref 8.9–12.9)
POTASSIUM SERPL-SCNC: 3.5 MMOL/L (ref 3.5–5.1)
PROT SERPL-MCNC: 6.3 G/DL (ref 6.4–8.2)
PSA SERPL-MCNC: 2 NG/ML (ref 0.01–4)
RBC # BLD AUTO: 2.91 M/UL (ref 4.1–5.7)
RBC MORPH BLD: ABNORMAL
SODIUM SERPL-SCNC: 139 MMOL/L (ref 136–145)
WBC # BLD AUTO: 7.4 K/UL (ref 4.1–11.1)

## 2023-10-13 PROCEDURE — 85025 COMPLETE CBC W/AUTO DIFF WBC: CPT

## 2023-10-13 PROCEDURE — 1123F ACP DISCUSS/DSCN MKR DOCD: CPT | Performed by: INTERNAL MEDICINE

## 2023-10-13 PROCEDURE — 1111F DSCHRG MED/CURRENT MED MERGE: CPT | Performed by: INTERNAL MEDICINE

## 2023-10-13 PROCEDURE — 99214 OFFICE O/P EST MOD 30 MIN: CPT | Performed by: INTERNAL MEDICINE

## 2023-10-13 PROCEDURE — 1036F TOBACCO NON-USER: CPT | Performed by: INTERNAL MEDICINE

## 2023-10-13 PROCEDURE — G8417 CALC BMI ABV UP PARAM F/U: HCPCS | Performed by: INTERNAL MEDICINE

## 2023-10-13 PROCEDURE — G8484 FLU IMMUNIZE NO ADMIN: HCPCS | Performed by: INTERNAL MEDICINE

## 2023-10-13 PROCEDURE — 96401 CHEMO ANTI-NEOPL SQ/IM: CPT

## 2023-10-13 PROCEDURE — 80053 COMPREHEN METABOLIC PANEL: CPT

## 2023-10-13 PROCEDURE — 84153 ASSAY OF PSA TOTAL: CPT

## 2023-10-13 PROCEDURE — 36415 COLL VENOUS BLD VENIPUNCTURE: CPT

## 2023-10-13 PROCEDURE — 6360000002 HC RX W HCPCS: Performed by: INTERNAL MEDICINE

## 2023-10-13 PROCEDURE — G8427 DOCREV CUR MEDS BY ELIG CLIN: HCPCS | Performed by: INTERNAL MEDICINE

## 2023-10-13 RX ORDER — EPINEPHRINE 1 MG/ML
0.3 INJECTION, SOLUTION, CONCENTRATE INTRAVENOUS PRN
OUTPATIENT
Start: 2023-11-10

## 2023-10-13 RX ORDER — ALBUTEROL SULFATE 90 UG/1
4 AEROSOL, METERED RESPIRATORY (INHALATION) PRN
OUTPATIENT
Start: 2023-11-10

## 2023-10-13 RX ORDER — ACETAMINOPHEN 325 MG/1
650 TABLET ORAL
OUTPATIENT
Start: 2023-11-10

## 2023-10-13 RX ORDER — ONDANSETRON 2 MG/ML
8 INJECTION INTRAMUSCULAR; INTRAVENOUS
OUTPATIENT
Start: 2023-11-10

## 2023-10-13 RX ORDER — FAMOTIDINE 10 MG/ML
20 INJECTION, SOLUTION INTRAVENOUS
OUTPATIENT
Start: 2023-11-10

## 2023-10-13 RX ORDER — SODIUM CHLORIDE 9 MG/ML
INJECTION, SOLUTION INTRAVENOUS CONTINUOUS
OUTPATIENT
Start: 2023-11-10

## 2023-10-13 RX ORDER — DIPHENHYDRAMINE HYDROCHLORIDE 50 MG/ML
50 INJECTION INTRAMUSCULAR; INTRAVENOUS
OUTPATIENT
Start: 2023-11-10

## 2023-10-13 RX ADMIN — DEGARELIX 80 MG: KIT at 14:07

## 2023-10-13 NOTE — PROGRESS NOTES
Analy  at [facility], [address], [facility], [address] [place] [address]  [phone]      Oncology Note         Patient: Cinthia Trotter MRN: [id number]  SSN: [id number]    YOB: 1940  Age: 82 y.o. Sex: male      Subjective:      Cinthia Trotter is a 82 y.o. male who I am seeing for metastatic prostate cancer. He is experiencing increasing back pain. He rates it as 8/10. The pain is constant with exacerbations. He started experiencing pain in the back for the last 6 months. He fell in May, had a fracture of the vertebral body. She underwent vertebral augmentation at [facility]. Since then he has visited ED four more times with worsening pain. CT of the lumbar spine shows widespread metastatic cancer. A biopsy of the bone reveals a diagnosis of metastatic prostate carcinoma. Interval History:    Mr. Chantelle Shanks is receiving [place] and [place]. He fell and was in the hospital last month. [facility] He is doing better. He has received radiation to the hip. Pain is minimal.       Review of Systems:    Constitutional: negative  Eyes: negative  Ears, Nose, Mouth, Throat, and Face: negative  Respiratory: negative  Cardiovascular: negative  Gastrointestinal: negative  Genitourinary:negative  Integument/Breast: negative  Hematologic/Lymphatic: negative  Musculoskeletal: bone pain is minimal  Neurological: negative    Review of systems was reviewed and updated as needed on 10/13/23.       Past Medical History:   Diagnosis Date    A-fib ([address]) 06/12/2019    At risk for sleep apnea     SCORE 5    Basal cell cancer     Benign prostatic hypertrophy     Frequency of urination     GERD (gastroesophageal reflux disease)     Hypertension     Myelodysplasia (myelodysplastic syndrome) (HCC)     Osteoarthritis of right hip 10/29/2021     Past Surgical History:   Procedure Laterality Date    CATARACT REMOVAL

## 2023-10-17 ENCOUNTER — TELEPHONE (OUTPATIENT)
Age: 83
End: 2023-10-17

## 2023-10-17 NOTE — TELEPHONE ENCOUNTER
Spoke with [facility]  at [employer]. He has an appt in [facility] on 10/27. After I reviewed the chart, it is just for labs, and he doesn't need to come that day since labs just done on 10/13.   We will see him on 11/10 as planned

## 2023-10-30 ENCOUNTER — TELEPHONE (OUTPATIENT)
Age: 83
End: 2023-10-30

## 2023-11-03 DIAGNOSIS — D46.Z MYELODYSPLASTIC SYNDROME, LOW GRADE (HCC): ICD-10-CM

## 2023-11-03 DIAGNOSIS — C61 PROSTATE CANCER (HCC): Primary | ICD-10-CM

## 2023-11-03 DIAGNOSIS — D53.9 MACROCYTIC ANEMIA: ICD-10-CM

## 2023-11-10 ENCOUNTER — HOSPITAL ENCOUNTER (OUTPATIENT)
Facility: HOSPITAL | Age: 83
Setting detail: INFUSION SERIES
Discharge: HOME | End: 2023-11-10
Payer: MEDICARE

## 2023-11-10 ENCOUNTER — OFFICE VISIT (OUTPATIENT)
Age: 83
End: 2023-11-10
Payer: MEDICARE

## 2023-11-10 VITALS
TEMPERATURE: 97.9 F | BODY MASS INDEX: 32.56 KG/M2 | DIASTOLIC BLOOD PRESSURE: 72 MMHG | HEIGHT: 69 IN | HEART RATE: 110 BPM | RESPIRATION RATE: 16 BRPM | SYSTOLIC BLOOD PRESSURE: 107 MMHG | OXYGEN SATURATION: 99 %

## 2023-11-10 VITALS
HEART RATE: 110 BPM | RESPIRATION RATE: 16 BRPM | OXYGEN SATURATION: 99 % | SYSTOLIC BLOOD PRESSURE: 107 MMHG | TEMPERATURE: 97.9 F | DIASTOLIC BLOOD PRESSURE: 72 MMHG

## 2023-11-10 DIAGNOSIS — Z51.11 ENCOUNTER FOR CHEMOTHERAPY MANAGEMENT: ICD-10-CM

## 2023-11-10 DIAGNOSIS — C61 PROSTATE CANCER (HCC): Primary | ICD-10-CM

## 2023-11-10 DIAGNOSIS — G89.3 CANCER RELATED PAIN: ICD-10-CM

## 2023-11-10 DIAGNOSIS — C61 PROSTATE CANCER METASTATIC TO BONE (HCC): Primary | ICD-10-CM

## 2023-11-10 DIAGNOSIS — C79.51 PROSTATE CANCER METASTATIC TO BONE (HCC): Primary | ICD-10-CM

## 2023-11-10 LAB
ALBUMIN SERPL-MCNC: 3.2 G/DL (ref 3.5–5)
ALBUMIN/GLOB SERPL: 1 (ref 1.1–2.2)
ALP SERPL-CCNC: 448 U/L (ref 45–117)
ALT SERPL-CCNC: 23 U/L (ref 12–78)
ANION GAP SERPL CALC-SCNC: 6 MMOL/L (ref 5–15)
AST SERPL-CCNC: 18 U/L (ref 15–37)
BASOPHILS # BLD: 0.1 K/UL (ref 0–0.1)
BASOPHILS NFR BLD: 1 % (ref 0–1)
BILIRUB SERPL-MCNC: 1.3 MG/DL (ref 0.2–1)
BUN SERPL-MCNC: 24 MG/DL (ref 6–20)
BUN/CREAT SERPL: 24 (ref 12–20)
CALCIUM SERPL-MCNC: 8.6 MG/DL (ref 8.5–10.1)
CHLORIDE SERPL-SCNC: 108 MMOL/L (ref 97–108)
CO2 SERPL-SCNC: 30 MMOL/L (ref 21–32)
CREAT SERPL-MCNC: 1.01 MG/DL (ref 0.7–1.3)
DIFFERENTIAL METHOD BLD: ABNORMAL
EOSINOPHIL # BLD: 0.1 K/UL (ref 0–0.4)
EOSINOPHIL NFR BLD: 1 % (ref 0–7)
ERYTHROCYTE [DISTWIDTH] IN BLOOD BY AUTOMATED COUNT: 23.9 % (ref 11.5–14.5)
GLOBULIN SER CALC-MCNC: 3.1 G/DL (ref 2–4)
GLUCOSE SERPL-MCNC: 135 MG/DL (ref 65–100)
HCT VFR BLD AUTO: 29.2 % (ref 36.6–50.3)
HGB BLD-MCNC: 9.1 G/DL (ref 12.1–17)
IMM GRANULOCYTES # BLD AUTO: 0.1 K/UL (ref 0–0.04)
IMM GRANULOCYTES NFR BLD AUTO: 1 % (ref 0–0.5)
LYMPHOCYTES # BLD: 0.8 K/UL (ref 0.8–3.5)
LYMPHOCYTES NFR BLD: 15 % (ref 12–49)
MCH RBC QN AUTO: 33.7 PG (ref 26–34)
MCHC RBC AUTO-ENTMCNC: 31.2 G/DL (ref 30–36.5)
MCV RBC AUTO: 108.1 FL (ref 80–99)
MONOCYTES # BLD: 0.5 K/UL (ref 0–1)
MONOCYTES NFR BLD: 9 % (ref 5–13)
NEUTS SEG # BLD: 3.9 K/UL (ref 1.8–8)
NEUTS SEG NFR BLD: 73 % (ref 32–75)
NRBC # BLD: 0.02 K/UL (ref 0–0.01)
NRBC BLD-RTO: 0.4 PER 100 WBC
PLATELET # BLD AUTO: 257 K/UL (ref 150–400)
PMV BLD AUTO: 11.9 FL (ref 8.9–12.9)
POTASSIUM SERPL-SCNC: 3.8 MMOL/L (ref 3.5–5.1)
PROT SERPL-MCNC: 6.3 G/DL (ref 6.4–8.2)
PSA SERPL-MCNC: 0.5 NG/ML (ref 0.01–4)
RBC # BLD AUTO: 2.7 M/UL (ref 4.1–5.7)
RBC MORPH BLD: ABNORMAL
RBC MORPH BLD: ABNORMAL
SODIUM SERPL-SCNC: 144 MMOL/L (ref 136–145)
WBC # BLD AUTO: 5.5 K/UL (ref 4.1–11.1)

## 2023-11-10 PROCEDURE — 36415 COLL VENOUS BLD VENIPUNCTURE: CPT

## 2023-11-10 PROCEDURE — 84153 ASSAY OF PSA TOTAL: CPT

## 2023-11-10 PROCEDURE — 80053 COMPREHEN METABOLIC PANEL: CPT

## 2023-11-10 PROCEDURE — G8427 DOCREV CUR MEDS BY ELIG CLIN: HCPCS | Performed by: INTERNAL MEDICINE

## 2023-11-10 PROCEDURE — 1123F ACP DISCUSS/DSCN MKR DOCD: CPT | Performed by: INTERNAL MEDICINE

## 2023-11-10 PROCEDURE — 99214 OFFICE O/P EST MOD 30 MIN: CPT | Performed by: INTERNAL MEDICINE

## 2023-11-10 PROCEDURE — G8417 CALC BMI ABV UP PARAM F/U: HCPCS | Performed by: INTERNAL MEDICINE

## 2023-11-10 PROCEDURE — 96402 CHEMO HORMON ANTINEOPL SQ/IM: CPT

## 2023-11-10 PROCEDURE — 1036F TOBACCO NON-USER: CPT | Performed by: INTERNAL MEDICINE

## 2023-11-10 PROCEDURE — G8484 FLU IMMUNIZE NO ADMIN: HCPCS | Performed by: INTERNAL MEDICINE

## 2023-11-10 PROCEDURE — 85025 COMPLETE CBC W/AUTO DIFF WBC: CPT

## 2023-11-10 PROCEDURE — 6360000002 HC RX W HCPCS

## 2023-11-10 RX ORDER — DIPHENHYDRAMINE HYDROCHLORIDE 50 MG/ML
50 INJECTION INTRAMUSCULAR; INTRAVENOUS
OUTPATIENT
Start: 2023-11-26

## 2023-11-10 RX ORDER — EPINEPHRINE 1 MG/ML
0.3 INJECTION, SOLUTION INTRAMUSCULAR; SUBCUTANEOUS PRN
Status: DISCONTINUED | OUTPATIENT
Start: 2023-11-10 | End: 2023-11-11 | Stop reason: HOSPADM

## 2023-11-10 RX ORDER — ONDANSETRON 2 MG/ML
8 INJECTION INTRAMUSCULAR; INTRAVENOUS
OUTPATIENT
Start: 2023-11-26

## 2023-11-10 RX ORDER — ACETAMINOPHEN 325 MG/1
650 TABLET ORAL
Status: DISCONTINUED | OUTPATIENT
Start: 2023-11-10 | End: 2023-11-11 | Stop reason: HOSPADM

## 2023-11-10 RX ORDER — METOPROLOL SUCCINATE 25 MG/1
TABLET, EXTENDED RELEASE ORAL
COMMUNITY
Start: 2023-10-28

## 2023-11-10 RX ORDER — ACETAMINOPHEN 325 MG/1
650 TABLET ORAL
OUTPATIENT
Start: 2023-11-26

## 2023-11-10 RX ORDER — ALBUTEROL SULFATE 90 UG/1
4 AEROSOL, METERED RESPIRATORY (INHALATION) PRN
OUTPATIENT
Start: 2023-11-26

## 2023-11-10 RX ORDER — ONDANSETRON 2 MG/ML
8 INJECTION INTRAMUSCULAR; INTRAVENOUS
Status: DISCONTINUED | OUTPATIENT
Start: 2023-11-10 | End: 2023-11-11 | Stop reason: HOSPADM

## 2023-11-10 RX ORDER — DIPHENHYDRAMINE HYDROCHLORIDE 50 MG/ML
50 INJECTION INTRAMUSCULAR; INTRAVENOUS
Status: DISCONTINUED | OUTPATIENT
Start: 2023-11-10 | End: 2023-11-11 | Stop reason: HOSPADM

## 2023-11-10 RX ORDER — ALBUTEROL SULFATE 90 UG/1
4 AEROSOL, METERED RESPIRATORY (INHALATION) PRN
Status: DISCONTINUED | OUTPATIENT
Start: 2023-11-10 | End: 2023-11-11 | Stop reason: HOSPADM

## 2023-11-10 RX ORDER — SODIUM CHLORIDE 9 MG/ML
INJECTION, SOLUTION INTRAVENOUS CONTINUOUS
Status: DISCONTINUED | OUTPATIENT
Start: 2023-11-10 | End: 2023-11-11 | Stop reason: HOSPADM

## 2023-11-10 RX ORDER — EPINEPHRINE 1 MG/ML
0.3 INJECTION, SOLUTION INTRAMUSCULAR; SUBCUTANEOUS PRN
OUTPATIENT
Start: 2023-11-26

## 2023-11-10 RX ORDER — SODIUM CHLORIDE 9 MG/ML
INJECTION, SOLUTION INTRAVENOUS CONTINUOUS
OUTPATIENT
Start: 2023-11-26

## 2023-11-10 RX ADMIN — LEUPROLIDE ACETATE 7.5 MG: KIT at 12:00

## 2023-11-21 ENCOUNTER — TELEPHONE (OUTPATIENT)
Age: 83
End: 2023-11-21

## 2023-11-21 RX ORDER — ONDANSETRON 2 MG/ML
8 INJECTION INTRAMUSCULAR; INTRAVENOUS
OUTPATIENT
Start: 2023-12-01

## 2023-11-21 RX ORDER — ALBUTEROL SULFATE 90 UG/1
4 AEROSOL, METERED RESPIRATORY (INHALATION) PRN
OUTPATIENT
Start: 2023-12-01

## 2023-11-21 RX ORDER — DIPHENHYDRAMINE HYDROCHLORIDE 50 MG/ML
50 INJECTION INTRAMUSCULAR; INTRAVENOUS
OUTPATIENT
Start: 2023-12-01

## 2023-11-21 RX ORDER — SODIUM CHLORIDE 9 MG/ML
INJECTION, SOLUTION INTRAVENOUS CONTINUOUS
OUTPATIENT
Start: 2023-12-01

## 2023-11-21 RX ORDER — FAMOTIDINE 10 MG/ML
20 INJECTION, SOLUTION INTRAVENOUS
OUTPATIENT
Start: 2023-12-01

## 2023-11-21 RX ORDER — ACETAMINOPHEN 325 MG/1
650 TABLET ORAL
OUTPATIENT
Start: 2023-12-01

## 2023-11-21 RX ORDER — EPINEPHRINE 1 MG/ML
0.3 INJECTION, SOLUTION, CONCENTRATE INTRAVENOUS PRN
OUTPATIENT
Start: 2023-12-01

## 2023-11-21 NOTE — TELEPHONE ENCOUNTER
Called pt daughter to let her know pt does NOT need to come this Friday for labs as he is on for next Friday at 11am for labs and lupron. No need to see Friday so cancelling that appointment.

## 2023-11-22 ENCOUNTER — HOSPITAL ENCOUNTER (INPATIENT)
Facility: HOSPITAL | Age: 83
LOS: 6 days | Discharge: NURSING, REHAB OR LONG-TERM CARE | DRG: 291 | End: 2023-11-28
Attending: EMERGENCY MEDICINE | Admitting: GENERAL ACUTE CARE HOSPITAL
Payer: MEDICARE

## 2023-11-22 ENCOUNTER — APPOINTMENT (OUTPATIENT)
Facility: HOSPITAL | Age: 83
DRG: 291 | End: 2023-11-22
Payer: MEDICARE

## 2023-11-22 DIAGNOSIS — J18.1 RIGHT LOWER LOBE CONSOLIDATION (HCC): ICD-10-CM

## 2023-11-22 DIAGNOSIS — R06.02 SHORTNESS OF BREATH: Primary | ICD-10-CM

## 2023-11-22 DIAGNOSIS — I48.91 ATRIAL FIBRILLATION WITH RVR (HCC): ICD-10-CM

## 2023-11-22 DIAGNOSIS — D64.9 ANEMIA, UNSPECIFIED TYPE: ICD-10-CM

## 2023-11-22 LAB
ALBUMIN SERPL-MCNC: 3.4 G/DL (ref 3.5–5)
ALBUMIN/GLOB SERPL: 1.1 (ref 1.1–2.2)
ALP SERPL-CCNC: 295 U/L (ref 45–117)
ALT SERPL-CCNC: 18 U/L (ref 12–78)
ANION GAP SERPL CALC-SCNC: 7 MMOL/L (ref 5–15)
AST SERPL-CCNC: 14 U/L (ref 15–37)
BASOPHILS # BLD: 0.1 K/UL (ref 0–0.1)
BASOPHILS NFR BLD: 1 % (ref 0–1)
BILIRUB SERPL-MCNC: 1.4 MG/DL (ref 0.2–1)
BUN SERPL-MCNC: 31 MG/DL (ref 6–20)
BUN/CREAT SERPL: 25 (ref 12–20)
CALCIUM SERPL-MCNC: 8.8 MG/DL (ref 8.5–10.1)
CHLORIDE SERPL-SCNC: 111 MMOL/L (ref 97–108)
CO2 SERPL-SCNC: 26 MMOL/L (ref 21–32)
CREAT SERPL-MCNC: 1.24 MG/DL (ref 0.7–1.3)
DIFFERENTIAL METHOD BLD: ABNORMAL
EOSINOPHIL # BLD: 0.1 K/UL (ref 0–0.4)
EOSINOPHIL NFR BLD: 1 % (ref 0–7)
ERYTHROCYTE [DISTWIDTH] IN BLOOD BY AUTOMATED COUNT: 24.1 % (ref 11.5–14.5)
FOLATE SERPL-MCNC: 12.2 NG/ML (ref 5–21)
GLOBULIN SER CALC-MCNC: 3 G/DL (ref 2–4)
GLUCOSE SERPL-MCNC: 170 MG/DL (ref 65–100)
HCT VFR BLD AUTO: 31.6 % (ref 36.6–50.3)
HGB BLD-MCNC: 9.8 G/DL (ref 12.1–17)
IMM GRANULOCYTES # BLD AUTO: 0.1 K/UL (ref 0–0.04)
IMM GRANULOCYTES NFR BLD AUTO: 1 % (ref 0–0.5)
LYMPHOCYTES # BLD: 1.1 K/UL (ref 0.8–3.5)
LYMPHOCYTES NFR BLD: 9 % (ref 12–49)
MCH RBC QN AUTO: 34.8 PG (ref 26–34)
MCHC RBC AUTO-ENTMCNC: 31 G/DL (ref 30–36.5)
MCV RBC AUTO: 112.1 FL (ref 80–99)
MONOCYTES # BLD: 1 K/UL (ref 0–1)
MONOCYTES NFR BLD: 8 % (ref 5–13)
NEUTS SEG # BLD: 9.5 K/UL (ref 1.8–8)
NEUTS SEG NFR BLD: 80 % (ref 32–75)
NRBC # BLD: 0.05 K/UL (ref 0–0.01)
NRBC BLD-RTO: 0.4 PER 100 WBC
NT PRO BNP: ABNORMAL PG/ML
PLATELET # BLD AUTO: 271 K/UL (ref 150–400)
PMV BLD AUTO: 12 FL (ref 8.9–12.9)
POTASSIUM SERPL-SCNC: 4.3 MMOL/L (ref 3.5–5.1)
PROCALCITONIN SERPL-MCNC: 0.27 NG/ML
PROT SERPL-MCNC: 6.4 G/DL (ref 6.4–8.2)
RBC # BLD AUTO: 2.82 M/UL (ref 4.1–5.7)
RBC MORPH BLD: ABNORMAL
SODIUM SERPL-SCNC: 144 MMOL/L (ref 136–145)
TROPONIN I SERPL HS-MCNC: 51 NG/L (ref 0–76)
TSH SERPL DL<=0.05 MIU/L-ACNC: 3.11 UIU/ML (ref 0.36–3.74)
VIT B12 SERPL-MCNC: 1099 PG/ML (ref 193–986)
WBC # BLD AUTO: 11.9 K/UL (ref 4.1–11.1)

## 2023-11-22 PROCEDURE — 71250 CT THORAX DX C-: CPT

## 2023-11-22 PROCEDURE — 82607 VITAMIN B-12: CPT

## 2023-11-22 PROCEDURE — 82746 ASSAY OF FOLIC ACID SERUM: CPT

## 2023-11-22 PROCEDURE — 96375 TX/PRO/DX INJ NEW DRUG ADDON: CPT

## 2023-11-22 PROCEDURE — 2500000003 HC RX 250 WO HCPCS: Performed by: EMERGENCY MEDICINE

## 2023-11-22 PROCEDURE — 6370000000 HC RX 637 (ALT 250 FOR IP): Performed by: EMERGENCY MEDICINE

## 2023-11-22 PROCEDURE — 96374 THER/PROPH/DIAG INJ IV PUSH: CPT

## 2023-11-22 PROCEDURE — 80053 COMPREHEN METABOLIC PANEL: CPT

## 2023-11-22 PROCEDURE — 84145 PROCALCITONIN (PCT): CPT

## 2023-11-22 PROCEDURE — 83880 ASSAY OF NATRIURETIC PEPTIDE: CPT

## 2023-11-22 PROCEDURE — 6370000000 HC RX 637 (ALT 250 FOR IP): Performed by: GENERAL ACUTE CARE HOSPITAL

## 2023-11-22 PROCEDURE — 71045 X-RAY EXAM CHEST 1 VIEW: CPT

## 2023-11-22 PROCEDURE — 93005 ELECTROCARDIOGRAM TRACING: CPT | Performed by: EMERGENCY MEDICINE

## 2023-11-22 PROCEDURE — 99285 EMERGENCY DEPT VISIT HI MDM: CPT

## 2023-11-22 PROCEDURE — 84484 ASSAY OF TROPONIN QUANT: CPT

## 2023-11-22 PROCEDURE — 6360000002 HC RX W HCPCS: Performed by: EMERGENCY MEDICINE

## 2023-11-22 PROCEDURE — 2060000000 HC ICU INTERMEDIATE R&B

## 2023-11-22 PROCEDURE — 36415 COLL VENOUS BLD VENIPUNCTURE: CPT

## 2023-11-22 PROCEDURE — 2700000000 HC OXYGEN THERAPY PER DAY

## 2023-11-22 PROCEDURE — 84443 ASSAY THYROID STIM HORMONE: CPT

## 2023-11-22 PROCEDURE — 85025 COMPLETE CBC W/AUTO DIFF WBC: CPT

## 2023-11-22 PROCEDURE — 2580000003 HC RX 258: Performed by: EMERGENCY MEDICINE

## 2023-11-22 PROCEDURE — 2580000003 HC RX 258: Performed by: GENERAL ACUTE CARE HOSPITAL

## 2023-11-22 RX ORDER — ONDANSETRON 4 MG/1
4 TABLET, ORALLY DISINTEGRATING ORAL EVERY 8 HOURS PRN
Status: DISCONTINUED | OUTPATIENT
Start: 2023-11-22 | End: 2023-11-28 | Stop reason: HOSPADM

## 2023-11-22 RX ORDER — ONDANSETRON 2 MG/ML
4 INJECTION INTRAMUSCULAR; INTRAVENOUS EVERY 6 HOURS PRN
Status: DISCONTINUED | OUTPATIENT
Start: 2023-11-22 | End: 2023-11-28 | Stop reason: HOSPADM

## 2023-11-22 RX ORDER — GUAIFENESIN 600 MG/1
600 TABLET, EXTENDED RELEASE ORAL 2 TIMES DAILY
COMMUNITY

## 2023-11-22 RX ORDER — ABIRATERONE ACETATE 250 MG/1
1000 TABLET ORAL DAILY
Status: DISCONTINUED | OUTPATIENT
Start: 2023-11-22 | End: 2023-11-28 | Stop reason: HOSPADM

## 2023-11-22 RX ORDER — DILTIAZEM HYDROCHLORIDE 5 MG/ML
10 INJECTION INTRAVENOUS
Status: COMPLETED | OUTPATIENT
Start: 2023-11-22 | End: 2023-11-22

## 2023-11-22 RX ORDER — ZINC SULFATE 50(220)MG
50 CAPSULE ORAL DAILY
Status: DISCONTINUED | OUTPATIENT
Start: 2023-11-23 | End: 2023-11-28 | Stop reason: HOSPADM

## 2023-11-22 RX ORDER — POTASSIUM CHLORIDE 7.45 MG/ML
10 INJECTION INTRAVENOUS PRN
Status: DISCONTINUED | OUTPATIENT
Start: 2023-11-22 | End: 2023-11-27

## 2023-11-22 RX ORDER — EZETIMIBE 10 MG/1
10 TABLET ORAL DAILY
Status: DISCONTINUED | OUTPATIENT
Start: 2023-11-23 | End: 2023-11-28 | Stop reason: HOSPADM

## 2023-11-22 RX ORDER — ACETAMINOPHEN 325 MG/1
650 TABLET ORAL EVERY 6 HOURS PRN
Status: DISCONTINUED | OUTPATIENT
Start: 2023-11-22 | End: 2023-11-28 | Stop reason: HOSPADM

## 2023-11-22 RX ORDER — POTASSIUM CHLORIDE 20 MEQ/1
20 TABLET, EXTENDED RELEASE ORAL 2 TIMES DAILY
COMMUNITY

## 2023-11-22 RX ORDER — SERTRALINE HYDROCHLORIDE 25 MG/1
25 TABLET, FILM COATED ORAL DAILY
Status: DISCONTINUED | OUTPATIENT
Start: 2023-11-23 | End: 2023-11-28 | Stop reason: HOSPADM

## 2023-11-22 RX ORDER — MIDODRINE HYDROCHLORIDE 5 MG/1
10 TABLET ORAL EVERY 8 HOURS
Status: DISCONTINUED | OUTPATIENT
Start: 2023-11-23 | End: 2023-11-28 | Stop reason: HOSPADM

## 2023-11-22 RX ORDER — POTASSIUM CHLORIDE 20 MEQ/1
20 TABLET, EXTENDED RELEASE ORAL 2 TIMES DAILY
Status: DISCONTINUED | OUTPATIENT
Start: 2023-11-23 | End: 2023-11-28 | Stop reason: HOSPADM

## 2023-11-22 RX ORDER — ACETAMINOPHEN 650 MG/1
650 SUPPOSITORY RECTAL EVERY 6 HOURS PRN
Status: DISCONTINUED | OUTPATIENT
Start: 2023-11-22 | End: 2023-11-28 | Stop reason: HOSPADM

## 2023-11-22 RX ORDER — DIGOXIN 125 MCG
125 TABLET ORAL DAILY
Status: DISCONTINUED | OUTPATIENT
Start: 2023-11-23 | End: 2023-11-28 | Stop reason: HOSPADM

## 2023-11-22 RX ORDER — ZINC SULFATE 50(220)MG
50 CAPSULE ORAL DAILY
COMMUNITY

## 2023-11-22 RX ORDER — ASCORBIC ACID 500 MG
500 TABLET ORAL 2 TIMES DAILY
Status: DISCONTINUED | OUTPATIENT
Start: 2023-11-22 | End: 2023-11-28 | Stop reason: HOSPADM

## 2023-11-22 RX ORDER — SODIUM CHLORIDE 0.9 % (FLUSH) 0.9 %
5-40 SYRINGE (ML) INJECTION EVERY 12 HOURS SCHEDULED
Status: DISCONTINUED | OUTPATIENT
Start: 2023-11-22 | End: 2023-11-28 | Stop reason: HOSPADM

## 2023-11-22 RX ORDER — SODIUM CHLORIDE 0.9 % (FLUSH) 0.9 %
5-40 SYRINGE (ML) INJECTION PRN
Status: DISCONTINUED | OUTPATIENT
Start: 2023-11-22 | End: 2023-11-28 | Stop reason: HOSPADM

## 2023-11-22 RX ORDER — PANTOPRAZOLE SODIUM 40 MG/1
40 TABLET, DELAYED RELEASE ORAL
Status: DISCONTINUED | OUTPATIENT
Start: 2023-11-23 | End: 2023-11-28 | Stop reason: HOSPADM

## 2023-11-22 RX ORDER — ASCORBIC ACID 500 MG
500 TABLET ORAL 2 TIMES DAILY
COMMUNITY

## 2023-11-22 RX ORDER — MAGNESIUM SULFATE IN WATER 40 MG/ML
2000 INJECTION, SOLUTION INTRAVENOUS PRN
Status: DISCONTINUED | OUTPATIENT
Start: 2023-11-22 | End: 2023-11-27

## 2023-11-22 RX ORDER — BUMETANIDE 0.25 MG/ML
0.5 INJECTION INTRAMUSCULAR; INTRAVENOUS ONCE
Status: COMPLETED | OUTPATIENT
Start: 2023-11-22 | End: 2023-11-22

## 2023-11-22 RX ORDER — DIGOXIN 125 MCG
125 TABLET ORAL DAILY
COMMUNITY

## 2023-11-22 RX ORDER — PREDNISONE 5 MG/1
5 TABLET ORAL 2 TIMES DAILY
Status: DISCONTINUED | OUTPATIENT
Start: 2023-11-22 | End: 2023-11-28 | Stop reason: HOSPADM

## 2023-11-22 RX ORDER — POTASSIUM CHLORIDE 20 MEQ/1
40 TABLET, EXTENDED RELEASE ORAL PRN
Status: DISCONTINUED | OUTPATIENT
Start: 2023-11-22 | End: 2023-11-27

## 2023-11-22 RX ORDER — DOXYCYCLINE HYCLATE 100 MG
100 TABLET ORAL
Status: COMPLETED | OUTPATIENT
Start: 2023-11-22 | End: 2023-11-22

## 2023-11-22 RX ORDER — METOPROLOL SUCCINATE 25 MG/1
25 TABLET, EXTENDED RELEASE ORAL 2 TIMES DAILY
Status: DISCONTINUED | OUTPATIENT
Start: 2023-11-22 | End: 2023-11-25

## 2023-11-22 RX ORDER — POLYETHYLENE GLYCOL 3350 17 G/17G
17 POWDER, FOR SOLUTION ORAL DAILY PRN
Status: DISCONTINUED | OUTPATIENT
Start: 2023-11-22 | End: 2023-11-28 | Stop reason: HOSPADM

## 2023-11-22 RX ORDER — BUMETANIDE 1 MG/1
1 TABLET ORAL DAILY
Status: DISCONTINUED | OUTPATIENT
Start: 2023-11-23 | End: 2023-11-23

## 2023-11-22 RX ORDER — SODIUM CHLORIDE 9 MG/ML
INJECTION, SOLUTION INTRAVENOUS PRN
Status: DISCONTINUED | OUTPATIENT
Start: 2023-11-22 | End: 2023-11-28 | Stop reason: HOSPADM

## 2023-11-22 RX ADMIN — METOPROLOL SUCCINATE 25 MG: 25 TABLET, EXTENDED RELEASE ORAL at 15:08

## 2023-11-22 RX ADMIN — DILTIAZEM HYDROCHLORIDE 10 MG: 5 INJECTION, SOLUTION INTRAVENOUS at 10:33

## 2023-11-22 RX ADMIN — SODIUM CHLORIDE 1000 MG: 900 INJECTION INTRAVENOUS at 11:57

## 2023-11-22 RX ADMIN — MIDODRINE HYDROCHLORIDE 10 MG: 5 TABLET ORAL at 23:00

## 2023-11-22 RX ADMIN — BUMETANIDE 0.5 MG: 0.25 INJECTION INTRAMUSCULAR; INTRAVENOUS at 10:33

## 2023-11-22 RX ADMIN — OXYCODONE HYDROCHLORIDE AND ACETAMINOPHEN 500 MG: 500 TABLET ORAL at 15:08

## 2023-11-22 RX ADMIN — SODIUM CHLORIDE, PRESERVATIVE FREE 10 ML: 5 INJECTION INTRAVENOUS at 20:49

## 2023-11-22 RX ADMIN — PREDNISONE 5 MG: 10 TABLET ORAL at 20:49

## 2023-11-22 RX ADMIN — DOXYCYCLINE HYCLATE 100 MG: 100 TABLET, FILM COATED ORAL at 11:57

## 2023-11-22 RX ADMIN — PREDNISONE 5 MG: 10 TABLET ORAL at 15:09

## 2023-11-22 RX ADMIN — ABIRATERONE ACETATE 1000 MG: 250 TABLET ORAL at 17:37

## 2023-11-22 RX ADMIN — DILTIAZEM HYDROCHLORIDE 10 MG: 5 INJECTION, SOLUTION INTRAVENOUS at 11:57

## 2023-11-22 ASSESSMENT — PAIN - FUNCTIONAL ASSESSMENT: PAIN_FUNCTIONAL_ASSESSMENT: NONE - DENIES PAIN

## 2023-11-22 NOTE — PROGRESS NOTES
Pharmacy Medication Reconciliation     The patient was interviewed regarding current PTA medication list, use and drug allergies. The patient was questioned regarding use of any other inhalers, topical products, over the counter medications, herbal medications, vitamin products or ophthalmic/nasal/otic medication use. Allergy Update: Patient has no known allergies. Recommendations/Findings: The following amendments were made to the patient's active medication list on file at [address] [place] [address]:   1) Additions: Ascorbic acid, Guaifenesin, Zinc, Dapagliflozin, Digoxin, Potassium    2) Deletions: Acetaminophen, Zofran, Nutritional Supplements, Docusate    3) Changes:  Bumetanide - takes 1 mg by mouth daily      Pertinent Findings: Patient has a medication list    Clarified PTA med list with patient's medication list, RxQuery.  PTA medication list was corrected to the following:     Prior to Admission Medications   Prescriptions Last Dose Informant   ELIQUIS 5 MG TABS tablet  Other   Sig: Take 1 tablet by mouth 2 times daily   abiraterone acetate (ZYTIGA) 250 MG tablet  Other   Sig: Take 4 tablets by mouth daily   ascorbic acid (VITAMIN C) 500 MG tablet  Other   Sig: Take 1 tablet by mouth 2 times daily   bumetanide (BUMEX) 1 MG tablet  Other   Sig: Take 1 tablet by mouth daily   dapagliflozin (FARXIGA) 10 MG tablet  Other   Sig: Take 1 tablet by mouth every morning   digoxin (LANOXIN) 125 MCG tablet  Other   Sig: Take 1 tablet by mouth daily   ezetimibe (ZETIA) 10 MG tablet  Other   Sig: Take 1 tablet by mouth daily   guaiFENesin (MUCINEX) 600 MG extended release tablet  Other   Sig: Take 1 tablet by mouth 2 times daily   metoprolol succinate (TOPROL XL) 25 MG extended release tablet  Other   Sig: TAKE 1/2 (ONE-HALF) TABLET BY MOUTH ONCE DAILY   midodrine (PROAMATINE) 10 MG tablet  Other   Sig: Take 1 tablet by mouth every 8 (eight) hours   omeprazole (PRILOSEC) 20 MG delayed release capsule  Other   Sig: Take 1 capsule by

## 2023-11-22 NOTE — PLAN OF CARE
Problem: Discharge Planning  Goal: Discharge to home or other facility with appropriate resources  Recent Flowsheet Documentation  Taken 11/22/2023 3259 by Mariana Castillo RN  Discharge to home or other facility with appropriate resources:   Identify barriers to discharge with patient and caregiver   Arrange for needed discharge resources and transportation as appropriate     Problem: Skin/Tissue Integrity  Goal: Absence of new skin breakdown  Description: 1. Monitor for areas of redness and/or skin breakdown  2. Assess vascular access sites hourly  3. Every 4-6 hours minimum:  Change oxygen saturation probe site  4. Every 4-6 hours:  If on nasal continuous positive airway pressure, respiratory therapy assess nares and determine need for appliance change or resting period.   Outcome: Progressing     Problem: Safety - Adult  Goal: Free from fall injury  Outcome: Progressing

## 2023-11-22 NOTE — H&P
Hospitalist Admission Note    NAME:   Morris Dalal   : 1940   MRN: [id number]     Date/Time: 2023 2:03 PM    Patient PCP: Lynette Medellin MD    ______________________________________________________________________  Given the patient's current clinical presentation, I have a high level of concern for decompensation if discharged from the emergency department. Complex decision making was performed, which includes reviewing the patient's available past medical records, laboratory results, and x-ray films. My assessment of this patient's clinical condition and my plan of care is as follows. Assessment / Plan:    Bilateral pleural effusion, worse on the right  Acute hypoxic respiratory failure  ? Obstructive sleep apnea, undiagnosed  Hypotension, on midodrine  Admit to telemetry floor  During my evaluation patient was on room air  CT chest ordered and reported  1. Moderate right and small left pleural effusions. 2.  Bibasilar atelectasis, also worse on the right. No focal airspace disease. 3.  In the right upper lobe, there is apparent 1.5 cm nodule associated with atelectasis, bronchiectasis, and pleural retraction. The nodule may just be an  area of pleural scarring, but it is difficult to tell on noncontrast CT without prior exams. Recommend comparison to older CT, if available, or repeating the CT with IV contrast on a outpatient/nonemergent basis. 4.  Osseous metastatic disease. 5.  Borderline enlarged mediastinal lymph nodes, which may be reactive to the effusions but are indeterminate for metastatic disease. 6.  Cirrhosis and small volume ascites.   Procalcitonin negative, hold off antibiotics  IV diuretics  PT/OT  On room air during my evaluation, home oxygen challenge prior to discharge  Continue midodrine    Atrial fibrillation  A-fib rate controlled controlled, increase metoprolol dose to 25 mg twice daily  Resume digoxin    Prostate cancer  MDS  Resume prostatic hypertrophy     Frequency of urination     GERD (gastroesophageal reflux disease)     Hypertension     Myelodysplasia (myelodysplastic syndrome) (HCC)     Osteoarthritis of right hip 10/29/2021        Past Surgical History:   Procedure Laterality Date    CATARACT REMOVAL Bilateral     CHOLECYSTECTOMY      CT BIOPSY PERCUTANEOUS DEEP BONE  8/25/2023    CT BIOPSY PERCUTANEOUS DEEP BONE 8/25/2023 [facility] RAD CT    MOHS SURGERY  7/11    Rt    OTHER SURGICAL HISTORY  1990    jaw surgery; \"shortened it\" per pt    OTHER SURGICAL HISTORY  12/08/14    basal cell removed from left side of forehead    SHOULDER ARTHROSCOPY      Rt shoulder ligament repair    TONSILLECTOMY         Social History     Tobacco Use    Smoking status: Never    Smokeless tobacco: Never   Substance Use Topics    Alcohol use: No        Family History   Problem Relation Age of Onset    Hypertension Father        No Known Allergies     Prior to Admission medications    Medication Sig Start Date End Date Taking?  Authorizing Provider   ascorbic acid (VITAMIN C) 500 MG tablet Take 1 tablet by mouth 2 times daily   Yes Lang Caputo MD   guaiFENesin (MUCINEX) 600 MG extended release tablet Take 1 tablet by mouth 2 times daily   Yes Lang Caputo MD   zinc sulfate (ZINCATE) 220 (50 Zn)  mg capsule - elemental zinc Take 1 capsule by mouth daily   Yes Lang Caputo MD   digoxin (LANOXIN) 125 MCG tablet Take 1 tablet by mouth daily   Yes Lang Caputo MD   dapagliflozin (FARXIGA) 10 MG tablet Take 1 tablet by mouth every morning   Yes Lang Caputo MD   potassium chloride (KLOR-CON M) 20 MEQ extended release tablet Take 1 tablet by mouth 2 times daily   Yes Lang Caputo MD   metoprolol succinate (TOPROL XL) 25 MG extended release tablet TAKE 1/2 (ONE-HALF) TABLET BY MOUTH ONCE DAILY 10/28/23   Lang Caputo MD   midodrine (PROAMATINE) 10 MG tablet Take 1 tablet by mouth every 8 (eight) hours Moderna dose 1 and 2

## 2023-11-22 NOTE — ED PROVIDER NOTES
(has no administration in time range)   potassium chloride (KLOR-CON M) extended release tablet 40 mEq (has no administration in time range)     Or   potassium bicarb-citric acid (EFFER-K) effervescent tablet 40 mEq (has no administration in time range)     Or   potassium chloride 10 mEq/100 mL IVPB (Peripheral Line) (has no administration in time range)   magnesium sulfate 2000 mg in 50 mL IVPB premix (has no administration in time range)   ondansetron (ZOFRAN-ODT) disintegrating tablet 4 mg (has no administration in time range)     Or   ondansetron (ZOFRAN) injection 4 mg (has no administration in time range)   polyethylene glycol (GLYCOLAX) packet 17 g (has no administration in time range)   acetaminophen (TYLENOL) tablet 650 mg (has no administration in time range)     Or   acetaminophen (TYLENOL) suppository 650 mg (has no administration in time range)   bumetanide (BUMEX) injection 0.5 mg (0.5 mg IntraVENous Given 11/22/23 1033)   dilTIAZem injection 10 mg (10 mg IntraVENous Given 11/22/23 1033)   dilTIAZem injection 10 mg (10 mg IntraVENous Given 11/22/23 1157)   cefTRIAXone (ROCEPHIN) 1,000 mg in sodium chloride 0.9 % 50 mL IVPB (mini-bag) (0 mg IntraVENous Stopped 11/22/23 1230)   doxycycline hyclate (VIBRA-TABS) tablet 100 mg (100 mg Oral Given 11/22/23 1157)       Medical Decision Making  51-year-old male presents emergency department with feeling of shortness of breath. On arrival in [facility] with RVR. Appears slightly hypervolemic, suspect the combination of these 2 may be contributing. Initially hypoxic for EMS, on room air in the ED yet tachypneic. Doubt sepsis. Will check basic labs, troponin and BNP. Check plain film. Question infectious causes, but lower suspicion. Doubt PE at this time as patient is anticoagulated. Will give diltiazem for rate control and diurese as patient appears fluid overloaded. Amount and/or Complexity of Data Reviewed  External Data Reviewed: notes.      Details:

## 2023-11-22 NOTE — ED NOTES
Pt c/o bottom being sore. Pt has stage 2 pressure injury on sacrum. Area was cleaned, protectant applied, optifoam placed on pt, and pillow placed under pt on his left side.      Jennifer Faria RN  11/22/23 5080

## 2023-11-22 NOTE — ED NOTES
Pharmacy advised wife will drop off Zytiga (pt supplied med) and when we have the actual drug in hand then pharmacy will verify.      Dakota Kurtz RN  11/22/23 3019

## 2023-11-22 NOTE — ED NOTES
Pt consented to external catheter. External catheter placed on pt without complications.       Tesfaye Washington RN  11/22/23 1972

## 2023-11-22 NOTE — ED NOTES
TRANSFER - OUT REPORT:    Verbal report given to RN Arnold Schmid on Parvin Massey  being transferred to [facility] for routine progression of patient care       Report consisted of patient's Situation, Background, Assessment and   Recommendations(SBAR). Information from the following report(s) Nurse Handoff Report, ED Encounter Summary, ED SBAR, and [facility] was reviewed with the receiving nurse. [place] Fall Assessment:    Presents to emergency department  because of falls (Syncope, seizure, or loss of consciousness): No  Age > 70: Yes  Altered Mental Status, Intoxication with alcohol or substance confusion (Disorientation, impaired judgment, poor safety awaremess, or inability to follow instructions): No  Impaired Mobility: Ambulates or transfers with assistive devices or assistance; Unable to ambulate or transer.: Yes  Nursing Judgement: Yes          Lines:   Peripheral IV 11/22/23 Right Antecubital (Active)        Opportunity for questions and clarification was provided.       Patient transported with:  Monitor and Registered Nurse           Clark Chavez RN  11/22/23 1689

## 2023-11-22 NOTE — ED NOTES
Attempted to call report and was advised KEYLA Mendoza will call back to receive report; extension provided.      Hyacinth Ricardo RN  11/22/23 6434

## 2023-11-23 LAB
ALBUMIN SERPL-MCNC: 3.2 G/DL (ref 3.5–5)
ALBUMIN/GLOB SERPL: 1.1 (ref 1.1–2.2)
ALP SERPL-CCNC: 276 U/L (ref 45–117)
ALT SERPL-CCNC: 16 U/L (ref 12–78)
ANION GAP SERPL CALC-SCNC: 5 MMOL/L (ref 5–15)
APTT PPP: 27.3 SEC (ref 22.1–31)
AST SERPL-CCNC: 13 U/L (ref 15–37)
BASOPHILS # BLD: 0.1 K/UL (ref 0–0.1)
BASOPHILS NFR BLD: 1 % (ref 0–1)
BILIRUB SERPL-MCNC: 1.7 MG/DL (ref 0.2–1)
BUN SERPL-MCNC: 29 MG/DL (ref 6–20)
BUN/CREAT SERPL: 26 (ref 12–20)
CALCIUM SERPL-MCNC: 8.6 MG/DL (ref 8.5–10.1)
CHLORIDE SERPL-SCNC: 112 MMOL/L (ref 97–108)
CO2 SERPL-SCNC: 25 MMOL/L (ref 21–32)
CREAT SERPL-MCNC: 1.11 MG/DL (ref 0.7–1.3)
DIFFERENTIAL METHOD BLD: ABNORMAL
EKG ATRIAL RATE: 288 BPM
EKG DIAGNOSIS: NORMAL
EKG Q-T INTERVAL: 352 MS
EKG QRS DURATION: 96 MS
EKG QTC CALCULATION (BAZETT): 493 MS
EKG R AXIS: -9 DEGREES
EKG T AXIS: 18 DEGREES
EKG VENTRICULAR RATE: 118 BPM
EOSINOPHIL # BLD: 0 K/UL (ref 0–0.4)
EOSINOPHIL NFR BLD: 0 % (ref 0–7)
ERYTHROCYTE [DISTWIDTH] IN BLOOD BY AUTOMATED COUNT: 24.2 % (ref 11.5–14.5)
GLOBULIN SER CALC-MCNC: 2.8 G/DL (ref 2–4)
GLUCOSE SERPL-MCNC: 145 MG/DL (ref 65–100)
HCT VFR BLD AUTO: 29.9 % (ref 36.6–50.3)
HEMOCCULT STL QL: POSITIVE
HGB BLD-MCNC: 9.2 G/DL (ref 12.1–17)
IMM GRANULOCYTES # BLD AUTO: 0.1 K/UL (ref 0–0.04)
IMM GRANULOCYTES NFR BLD AUTO: 1 % (ref 0–0.5)
LYMPHOCYTES # BLD: 1.7 K/UL (ref 0.8–3.5)
LYMPHOCYTES NFR BLD: 21 % (ref 12–49)
MCH RBC QN AUTO: 33.8 PG (ref 26–34)
MCHC RBC AUTO-ENTMCNC: 30.8 G/DL (ref 30–36.5)
MCV RBC AUTO: 109.9 FL (ref 80–99)
MONOCYTES # BLD: 0.6 K/UL (ref 0–1)
MONOCYTES NFR BLD: 8 % (ref 5–13)
NEUTS SEG # BLD: 5.4 K/UL (ref 1.8–8)
NEUTS SEG NFR BLD: 69 % (ref 32–75)
NRBC # BLD: 0.04 K/UL (ref 0–0.01)
NRBC BLD-RTO: 0.5 PER 100 WBC
PLATELET # BLD AUTO: 258 K/UL (ref 150–400)
PMV BLD AUTO: 12.1 FL (ref 8.9–12.9)
POTASSIUM SERPL-SCNC: 4.3 MMOL/L (ref 3.5–5.1)
PROT SERPL-MCNC: 6 G/DL (ref 6.4–8.2)
RBC # BLD AUTO: 2.72 M/UL (ref 4.1–5.7)
RBC MORPH BLD: ABNORMAL
RBC MORPH BLD: ABNORMAL
SODIUM SERPL-SCNC: 142 MMOL/L (ref 136–145)
THERAPEUTIC RANGE: NORMAL SECS (ref 58–77)
WBC # BLD AUTO: 7.9 K/UL (ref 4.1–11.1)

## 2023-11-23 PROCEDURE — 2500000003 HC RX 250 WO HCPCS: Performed by: GENERAL ACUTE CARE HOSPITAL

## 2023-11-23 PROCEDURE — 80053 COMPREHEN METABOLIC PANEL: CPT

## 2023-11-23 PROCEDURE — 6370000000 HC RX 637 (ALT 250 FOR IP): Performed by: GENERAL ACUTE CARE HOSPITAL

## 2023-11-23 PROCEDURE — 85025 COMPLETE CBC W/AUTO DIFF WBC: CPT

## 2023-11-23 PROCEDURE — 82272 OCCULT BLD FECES 1-3 TESTS: CPT

## 2023-11-23 PROCEDURE — 2060000000 HC ICU INTERMEDIATE R&B

## 2023-11-23 PROCEDURE — 36415 COLL VENOUS BLD VENIPUNCTURE: CPT

## 2023-11-23 PROCEDURE — 2700000000 HC OXYGEN THERAPY PER DAY

## 2023-11-23 PROCEDURE — 2580000003 HC RX 258: Performed by: GENERAL ACUTE CARE HOSPITAL

## 2023-11-23 PROCEDURE — 85730 THROMBOPLASTIN TIME PARTIAL: CPT

## 2023-11-23 RX ORDER — BUMETANIDE 0.25 MG/ML
0.5 INJECTION INTRAMUSCULAR; INTRAVENOUS 2 TIMES DAILY
Status: DISCONTINUED | OUTPATIENT
Start: 2023-11-23 | End: 2023-11-25

## 2023-11-23 RX ADMIN — APIXABAN 5 MG: 5 TABLET, FILM COATED ORAL at 09:01

## 2023-11-23 RX ADMIN — MIDODRINE HYDROCHLORIDE 10 MG: 5 TABLET ORAL at 23:54

## 2023-11-23 RX ADMIN — MIDODRINE HYDROCHLORIDE 10 MG: 5 TABLET ORAL at 16:24

## 2023-11-23 RX ADMIN — BUMETANIDE 0.5 MG: 0.25 INJECTION INTRAMUSCULAR; INTRAVENOUS at 09:02

## 2023-11-23 RX ADMIN — SODIUM CHLORIDE, PRESERVATIVE FREE 10 ML: 5 INJECTION INTRAVENOUS at 21:09

## 2023-11-23 RX ADMIN — OXYCODONE HYDROCHLORIDE AND ACETAMINOPHEN 500 MG: 500 TABLET ORAL at 09:01

## 2023-11-23 RX ADMIN — POTASSIUM CHLORIDE 20 MEQ: 1500 TABLET, EXTENDED RELEASE ORAL at 21:08

## 2023-11-23 RX ADMIN — METOPROLOL SUCCINATE 25 MG: 25 TABLET, EXTENDED RELEASE ORAL at 21:08

## 2023-11-23 RX ADMIN — MIDODRINE HYDROCHLORIDE 10 MG: 5 TABLET ORAL at 09:01

## 2023-11-23 RX ADMIN — BUMETANIDE 0.5 MG: 0.25 INJECTION INTRAMUSCULAR; INTRAVENOUS at 21:08

## 2023-11-23 RX ADMIN — PREDNISONE 5 MG: 10 TABLET ORAL at 21:08

## 2023-11-23 RX ADMIN — DIGOXIN 125 MCG: 125 TABLET ORAL at 09:02

## 2023-11-23 RX ADMIN — APIXABAN 5 MG: 5 TABLET, FILM COATED ORAL at 21:08

## 2023-11-23 RX ADMIN — PANTOPRAZOLE SODIUM 40 MG: 40 TABLET, DELAYED RELEASE ORAL at 05:22

## 2023-11-23 RX ADMIN — SERTRALINE 25 MG: 25 TABLET, FILM COATED ORAL at 09:02

## 2023-11-23 RX ADMIN — SODIUM CHLORIDE, PRESERVATIVE FREE 10 ML: 5 INJECTION INTRAVENOUS at 09:03

## 2023-11-23 RX ADMIN — OXYCODONE HYDROCHLORIDE AND ACETAMINOPHEN 500 MG: 500 TABLET ORAL at 21:08

## 2023-11-23 RX ADMIN — POTASSIUM CHLORIDE 20 MEQ: 1500 TABLET, EXTENDED RELEASE ORAL at 09:01

## 2023-11-23 RX ADMIN — METOPROLOL SUCCINATE 25 MG: 25 TABLET, EXTENDED RELEASE ORAL at 09:01

## 2023-11-23 RX ADMIN — ZINC SULFATE 220 MG (50 MG) CAPSULE 50 MG: CAPSULE at 09:01

## 2023-11-23 RX ADMIN — PREDNISONE 5 MG: 10 TABLET ORAL at 09:01

## 2023-11-23 RX ADMIN — ABIRATERONE ACETATE 1000 MG: 250 TABLET ORAL at 16:24

## 2023-11-23 RX ADMIN — EZETIMIBE 10 MG: 10 TABLET ORAL at 09:01

## 2023-11-23 NOTE — PROGRESS NOTES
Called for cardiology consult for atrial fibrillation. However, patient has seen Dr. Ozzy Gabriel with [place] [facility].  Please call S.

## 2023-11-23 NOTE — PLAN OF CARE
Problem: Discharge Planning  Goal: Discharge to home or other facility with appropriate resources  Outcome: Progressing  Flowsheets  Taken 11/22/2023 1945 by Alyssa Parker RN  Discharge to home or other facility with appropriate resources:   Identify barriers to discharge with patient and caregiver   Arrange for needed discharge resources and transportation as appropriate   Identify discharge learning needs (meds, wound care, etc)   Arrange for interpreters to assist at discharge as needed   Refer to discharge planning if patient needs post-hospital services based on physician order or complex needs related to functional status, cognitive ability or social support system  Taken 11/22/2023 1545 by Samir Valdez, RN  Discharge to home or other facility with appropriate resources:   Identify barriers to discharge with patient and caregiver   Arrange for needed discharge resources and transportation as appropriate     Problem: Skin/Tissue Integrity  Goal: Absence of new skin breakdown  Description: 1. Monitor for areas of redness and/or skin breakdown  2. Assess vascular access sites hourly  3. Every 4-6 hours minimum:  Change oxygen saturation probe site  4. Every 4-6 hours:  If on nasal continuous positive airway pressure, respiratory therapy assess nares and determine need for appliance change or resting period.   11/22/2023 2116 by Alyssa Parker RN  Outcome: Progressing  11/22/2023 [address] by Samir Valdez, RN  Outcome: Progressing     Problem: ABCDS Injury Assessment  Goal: Absence of physical injury  Outcome: Progressing     Problem: Safety - Adult  Goal: Free from fall injury  11/22/2023 2116 by Alyssa Parker RN  Outcome: Progressing  11/22/2023 1835 by Samir Valdez, RN  Outcome: Progressing

## 2023-11-24 ENCOUNTER — HOSPITAL ENCOUNTER (OUTPATIENT)
Facility: HOSPITAL | Age: 83
Setting detail: INFUSION SERIES
End: 2023-11-24

## 2023-11-24 LAB
ANION GAP SERPL CALC-SCNC: 5 MMOL/L (ref 5–15)
BASOPHILS # BLD: 0.1 K/UL (ref 0–0.1)
BASOPHILS NFR BLD: 1 % (ref 0–1)
BUN SERPL-MCNC: 30 MG/DL (ref 6–20)
BUN/CREAT SERPL: 26 (ref 12–20)
CALCIUM SERPL-MCNC: 8.3 MG/DL (ref 8.5–10.1)
CHLORIDE SERPL-SCNC: 112 MMOL/L (ref 97–108)
CO2 SERPL-SCNC: 28 MMOL/L (ref 21–32)
CREAT SERPL-MCNC: 1.16 MG/DL (ref 0.7–1.3)
DIFFERENTIAL METHOD BLD: ABNORMAL
EOSINOPHIL # BLD: 0.1 K/UL (ref 0–0.4)
EOSINOPHIL NFR BLD: 1 % (ref 0–7)
ERYTHROCYTE [DISTWIDTH] IN BLOOD BY AUTOMATED COUNT: 24.2 % (ref 11.5–14.5)
GLUCOSE SERPL-MCNC: 130 MG/DL (ref 65–100)
HCT VFR BLD AUTO: 31.1 % (ref 36.6–50.3)
HGB BLD-MCNC: 9.5 G/DL (ref 12.1–17)
IMM GRANULOCYTES # BLD AUTO: 0.1 K/UL (ref 0–0.04)
IMM GRANULOCYTES NFR BLD AUTO: 2 % (ref 0–0.5)
LYMPHOCYTES # BLD: 1.4 K/UL (ref 0.8–3.5)
LYMPHOCYTES NFR BLD: 20 % (ref 12–49)
MCH RBC QN AUTO: 34.7 PG (ref 26–34)
MCHC RBC AUTO-ENTMCNC: 30.5 G/DL (ref 30–36.5)
MCV RBC AUTO: 113.5 FL (ref 80–99)
MONOCYTES # BLD: 0.6 K/UL (ref 0–1)
MONOCYTES NFR BLD: 8 % (ref 5–13)
NEUTS SEG # BLD: 5 K/UL (ref 1.8–8)
NEUTS SEG NFR BLD: 69 % (ref 32–75)
NRBC # BLD: 0.05 K/UL (ref 0–0.01)
NRBC BLD-RTO: 0.7 PER 100 WBC
PLATELET # BLD AUTO: 244 K/UL (ref 150–400)
PMV BLD AUTO: 12.1 FL (ref 8.9–12.9)
POTASSIUM SERPL-SCNC: 4 MMOL/L (ref 3.5–5.1)
RBC # BLD AUTO: 2.74 M/UL (ref 4.1–5.7)
SODIUM SERPL-SCNC: 145 MMOL/L (ref 136–145)
WBC # BLD AUTO: 7.3 K/UL (ref 4.1–11.1)

## 2023-11-24 PROCEDURE — 97162 PT EVAL MOD COMPLEX 30 MIN: CPT

## 2023-11-24 PROCEDURE — 2580000003 HC RX 258: Performed by: GENERAL ACUTE CARE HOSPITAL

## 2023-11-24 PROCEDURE — 2500000003 HC RX 250 WO HCPCS: Performed by: GENERAL ACUTE CARE HOSPITAL

## 2023-11-24 PROCEDURE — 85025 COMPLETE CBC W/AUTO DIFF WBC: CPT

## 2023-11-24 PROCEDURE — 97530 THERAPEUTIC ACTIVITIES: CPT

## 2023-11-24 PROCEDURE — 80048 BASIC METABOLIC PNL TOTAL CA: CPT

## 2023-11-24 PROCEDURE — 2060000000 HC ICU INTERMEDIATE R&B

## 2023-11-24 PROCEDURE — 36415 COLL VENOUS BLD VENIPUNCTURE: CPT

## 2023-11-24 PROCEDURE — 2700000000 HC OXYGEN THERAPY PER DAY

## 2023-11-24 PROCEDURE — 6370000000 HC RX 637 (ALT 250 FOR IP): Performed by: GENERAL ACUTE CARE HOSPITAL

## 2023-11-24 PROCEDURE — 92610 EVALUATE SWALLOWING FUNCTION: CPT | Performed by: SPEECH-LANGUAGE PATHOLOGIST

## 2023-11-24 RX ADMIN — PANTOPRAZOLE SODIUM 40 MG: 40 TABLET, DELAYED RELEASE ORAL at 06:18

## 2023-11-24 RX ADMIN — ZINC SULFATE 220 MG (50 MG) CAPSULE 50 MG: CAPSULE at 08:44

## 2023-11-24 RX ADMIN — METOPROLOL SUCCINATE 25 MG: 25 TABLET, EXTENDED RELEASE ORAL at 22:00

## 2023-11-24 RX ADMIN — POTASSIUM CHLORIDE 20 MEQ: 1500 TABLET, EXTENDED RELEASE ORAL at 08:44

## 2023-11-24 RX ADMIN — APIXABAN 5 MG: 5 TABLET, FILM COATED ORAL at 22:00

## 2023-11-24 RX ADMIN — MIDODRINE HYDROCHLORIDE 10 MG: 5 TABLET ORAL at 08:44

## 2023-11-24 RX ADMIN — PREDNISONE 5 MG: 10 TABLET ORAL at 22:00

## 2023-11-24 RX ADMIN — DIGOXIN 125 MCG: 125 TABLET ORAL at 08:44

## 2023-11-24 RX ADMIN — OXYCODONE HYDROCHLORIDE AND ACETAMINOPHEN 500 MG: 500 TABLET ORAL at 08:44

## 2023-11-24 RX ADMIN — PREDNISONE 5 MG: 10 TABLET ORAL at 08:43

## 2023-11-24 RX ADMIN — BUMETANIDE 0.5 MG: 0.25 INJECTION INTRAMUSCULAR; INTRAVENOUS at 22:01

## 2023-11-24 RX ADMIN — BUMETANIDE 0.5 MG: 0.25 INJECTION INTRAMUSCULAR; INTRAVENOUS at 08:44

## 2023-11-24 RX ADMIN — APIXABAN 5 MG: 5 TABLET, FILM COATED ORAL at 08:44

## 2023-11-24 RX ADMIN — ABIRATERONE ACETATE 1000 MG: 250 TABLET ORAL at 16:46

## 2023-11-24 RX ADMIN — POTASSIUM CHLORIDE 20 MEQ: 1500 TABLET, EXTENDED RELEASE ORAL at 22:00

## 2023-11-24 RX ADMIN — SERTRALINE 25 MG: 25 TABLET, FILM COATED ORAL at 08:43

## 2023-11-24 RX ADMIN — SODIUM CHLORIDE, PRESERVATIVE FREE 10 ML: 5 INJECTION INTRAVENOUS at 22:00

## 2023-11-24 RX ADMIN — OXYCODONE HYDROCHLORIDE AND ACETAMINOPHEN 500 MG: 500 TABLET ORAL at 22:01

## 2023-11-24 RX ADMIN — METOPROLOL SUCCINATE 25 MG: 25 TABLET, EXTENDED RELEASE ORAL at 08:44

## 2023-11-24 RX ADMIN — MIDODRINE HYDROCHLORIDE 10 MG: 5 TABLET ORAL at 16:46

## 2023-11-24 RX ADMIN — SODIUM CHLORIDE, PRESERVATIVE FREE 10 ML: 5 INJECTION INTRAVENOUS at 08:45

## 2023-11-24 RX ADMIN — EZETIMIBE 10 MG: 10 TABLET ORAL at 08:44

## 2023-11-24 ASSESSMENT — PAIN SCALES - GENERAL
PAINLEVEL_OUTOF10: 0
PAINLEVEL_OUTOF10: 0

## 2023-11-24 NOTE — PROGRESS NOTES
Comprehensive Nutrition Assessment    Type and Reason for Visit:  Initial, Wound    Nutrition Recommendations/Plan:   Continue current diet  Ensure compact BID  Please document % meals and supplements consumed in flowsheet I/O's under intake      Malnutrition Assessment:  Malnutrition Status:  No malnutrition (11/24/23 1446)        Nutrition Assessment:     Chart reviewed for documented wound. Pt medically noted for acute CHF exacerbation, acute hypoxic respiratory failure, ? BRYAN undiagnosed, afib, prostate ca, MDS, chronic hypotension. MST negative for malnutrition risk factors PTA. Pt resting at time of visit and provided limited hx. He does endorse a good appetite PTA, but has not always been interested in the food served here so far. Recent weight trending up r/t fluid retention. Pt unable to say if he is familiar with nutrition supplements. Will add Ensure compact to support wound healing and follow up. Patient Vitals for the past 120 hrs:   PO Meals Eaten (%)   11/24/23 0950 1 - 25%     Wt Readings from Last 5 Encounters:   11/22/23 113.4 kg (250 lb)   10/06/23 100 kg (220 lb 7.4 oz)   09/19/23 99.3 kg (219 lb)   09/11/23 100.8 kg (222 lb 3.2 oz)   09/11/23 100.8 kg (222 lb 3.2 oz)   ]    Nutrition Related Findings:    Labs: Hgb 9.5. Meds: vit C, bumex, protonix, klorcon, prednisone, zinc.   Edema: 2+ pitting LUE & BLE. BM 11/24. Wound Type: Stage II, Pressure Injury (sacrum)       Current Nutrition Intake & Therapies:    Average Meal Intake: Unable to assess  Average Supplements Intake: None Ordered  ADULT DIET; Regular; Low Fat/Low Chol/High Fiber/2 gm Na    Anthropometric Measures:  Height: 175.3 cm (5' 9\")  Ideal Body Weight (IBW): 160 lbs (73 kg)       Current Body Weight: 113.4 kg (250 lb), 156.3 % IBW.  Weight Source: Stated  Current BMI (kg/m2): 36.9        Weight Adjustment For: No Adjustment                 BMI Categories: Obese Class 2 (BMI 35.0 -39.9)    Estimated Daily Nutrient

## 2023-11-24 NOTE — PLAN OF CARE
body structure, function, activity limitation and / or participation in recreation  LOW Complexity : Stable, uncomplicated  AM-PAC  HIGH    Based on the above components, the patient evaluation is determined to be of the following complexity level: Medium

## 2023-11-24 NOTE — CARE COORDINATION
Care Management Initial Assessment       RUR: 25 %  Readmission? No  1st IM letter given? Yes - 11/22/23  1st  letter given: No         11/24/23 1136   Service Assessment   Patient Orientation Alert and Oriented;Person;Place;Situation   Cognition Alert   History Provided By Patient   Primary [address] is: Legal Next of Kin   PCP Verified by CM Yes  (Pt sees Dr. Ernesto Thrasher)   Last Visit to PCP Within last 3 months  (\"last wk\")   Prior Functional Level Independent in ADLs/IADLs   Current Functional Level Assistance with the following:;Mobility   Can patient return to prior living arrangement Yes   Ability to make needs known: Good   Family able to assist with home care needs: Yes   Would you like for me to discuss the discharge plan with any other family members/significant others, and if so, who? Yes  (spouse)   Financial Resources Medicare   Social/Functional History   Lives With Spouse   Type of [address] Cane;Walker, rolling; Wheelchair-manual   Active  No   Discharge Planning   Type of Residence House       3:53 p.m. CM met with pt at bedside to discuss therapy's recommendation of IPR at d/c. Pt adamantly declining. Pt reported he is agreeable to [facility] with [facility]. CM met with pt at bedside to introduce self/role, verify demographics and complete initial assessment. Pt lives with his spouse and plans on returning at d/c. Pt was recently in [employer] for IPR. Pt has a cane, RW and w/c at home. Pt is a  but is not service connected. Pt uses the [address] in [place]. Pt's spouse will transport at d/c.      Advance Care Planning     General Advance Care Planning (ACP) Conversation    Date of Conversation:11/24/23  Conducted with: Patient with Decision Making Capacity    Healthcare Decision Maker:    Primary Decision Maker: [facility] [address] - Spouse - [phone]  Click here to complete

## 2023-11-24 NOTE — CONSULTS
[facility]    Name:  Samra Keyes  MR#:  [id number]  :  1940  ACCOUNT #:  [de-identified]  DATE OF SERVICE:  2023      REQUESTING PHYSICIAN:  Sanket Rondon MD    REASON FOR CONSULTATION:  Evaluate atrial fibrillation. CHIEF COMPLAINT:  Shortness of breath. HISTORY OF PRESENT ILLNESS:  The patient is an 82-year-old man with an apparent history of atrial fibrillation and a cardiomyopathy of uncertain cause. He has a known EF of 25-30%. He also has mild aortic stenosis. This was based on an echocardiogram done back in 10/2023. According to the patient, he has had no prior history of coronary artery disease or prior stents. He denies congestive heart failure. However, he is a somewhat limited historian. His main problem recently has been metastatic prostate cancer and he has been receiving treatment for this. He presented to the [facility] ER yesterday with complaints of shortness of breath. He was noted to have bilateral pleural effusions and evidence of respiratory distress. He was admitted to the hospitalist service and started on therapy, which included IV diuretics. He was also noted to be in intermittent atrial fibrillation. We were asked to see the patient for evaluation. PAST MEDICAL HISTORY:  As noted above, history of metastatic prostate cancer, history of myelodysplasia. SURGERIES:  Prior cataract surgery, prior cholecystectomy, prior Mohs procedure and prior right shoulder surgery. CURRENT MEDICATIONS:  1.  Eliquis. 2.  Toprol XL. 3.  Midodrine. 4.  Digoxin. 5.  Bumex. 6.  Vitamin C.  7.  Protonix. 8.  Prednisone. 9.  Zytiga. 10.  Zoloft. 11.  Zinc sulfate. SOCIAL HISTORY:  The patient does not smoke or abuse alcohol. He lives in [facility] [place]. FAMILY HISTORY:  The patient's father had hypertension. REVIEW OF SYSTEMS:  As noted above, otherwise noncontributory.     PHYSICAL EXAMINATION:  GENERAL:  Exam reveals an

## 2023-11-25 ENCOUNTER — APPOINTMENT (OUTPATIENT)
Facility: HOSPITAL | Age: 83
DRG: 291 | End: 2023-11-25
Payer: MEDICARE

## 2023-11-25 LAB
ANION GAP SERPL CALC-SCNC: 7 MMOL/L (ref 5–15)
ARTERIAL PATENCY WRIST A: YES
BASE DEFICIT BLDA-SCNC: 1.4 MMOL/L
BASOPHILS # BLD: 0.1 K/UL (ref 0–0.1)
BASOPHILS NFR BLD: 1 % (ref 0–1)
BDY SITE: ABNORMAL
BUN SERPL-MCNC: 26 MG/DL (ref 6–20)
BUN/CREAT SERPL: 23 (ref 12–20)
CALCIUM SERPL-MCNC: 8.6 MG/DL (ref 8.5–10.1)
CHLORIDE SERPL-SCNC: 111 MMOL/L (ref 97–108)
CO2 SERPL-SCNC: 27 MMOL/L (ref 21–32)
CREAT SERPL-MCNC: 1.12 MG/DL (ref 0.7–1.3)
DIFFERENTIAL METHOD BLD: ABNORMAL
EOSINOPHIL # BLD: 0.1 K/UL (ref 0–0.4)
EOSINOPHIL NFR BLD: 1 % (ref 0–7)
ERYTHROCYTE [DISTWIDTH] IN BLOOD BY AUTOMATED COUNT: 23.9 % (ref 11.5–14.5)
GAS FLOW.O2 O2 DELIVERY SYS: 2 L/MIN
GLUCOSE SERPL-MCNC: 139 MG/DL (ref 65–100)
HCO3 BLDA-SCNC: 23 MMOL/L (ref 22–26)
HCT VFR BLD AUTO: 33.8 % (ref 36.6–50.3)
HGB BLD-MCNC: 10.1 G/DL (ref 12.1–17)
IMM GRANULOCYTES # BLD AUTO: 0.1 K/UL (ref 0–0.04)
IMM GRANULOCYTES NFR BLD AUTO: 1 % (ref 0–0.5)
LYMPHOCYTES # BLD: 1.3 K/UL (ref 0.8–3.5)
LYMPHOCYTES NFR BLD: 20 % (ref 12–49)
MCH RBC QN AUTO: 33.6 PG (ref 26–34)
MCHC RBC AUTO-ENTMCNC: 29.9 G/DL (ref 30–36.5)
MCV RBC AUTO: 112.3 FL (ref 80–99)
MONOCYTES # BLD: 0.5 K/UL (ref 0–1)
MONOCYTES NFR BLD: 7 % (ref 5–13)
NEUTS SEG # BLD: 4.5 K/UL (ref 1.8–8)
NEUTS SEG NFR BLD: 70 % (ref 32–75)
NRBC # BLD: 0.04 K/UL (ref 0–0.01)
NRBC BLD-RTO: 0.6 PER 100 WBC
PCO2 BLDA: 36 MMHG (ref 35–45)
PH BLDA: 7.42 (ref 7.35–7.45)
PLATELET # BLD AUTO: 231 K/UL (ref 150–400)
PMV BLD AUTO: 11.9 FL (ref 8.9–12.9)
PO2 BLDA: 131 MMHG (ref 80–100)
POTASSIUM SERPL-SCNC: 3.8 MMOL/L (ref 3.5–5.1)
RBC # BLD AUTO: 3.01 M/UL (ref 4.1–5.7)
RBC MORPH BLD: ABNORMAL
RBC MORPH BLD: ABNORMAL
SAO2 % BLD: 99 % (ref 92–97)
SAO2% DEVICE SAO2% SENSOR NAME: ABNORMAL
SODIUM SERPL-SCNC: 145 MMOL/L (ref 136–145)
SPECIMEN SITE: ABNORMAL
WBC # BLD AUTO: 6.6 K/UL (ref 4.1–11.1)

## 2023-11-25 PROCEDURE — 82803 BLOOD GASES ANY COMBINATION: CPT

## 2023-11-25 PROCEDURE — 80048 BASIC METABOLIC PNL TOTAL CA: CPT

## 2023-11-25 PROCEDURE — 6370000000 HC RX 637 (ALT 250 FOR IP): Performed by: INTERNAL MEDICINE

## 2023-11-25 PROCEDURE — 2500000003 HC RX 250 WO HCPCS: Performed by: INTERNAL MEDICINE

## 2023-11-25 PROCEDURE — 6370000000 HC RX 637 (ALT 250 FOR IP): Performed by: GENERAL ACUTE CARE HOSPITAL

## 2023-11-25 PROCEDURE — 36415 COLL VENOUS BLD VENIPUNCTURE: CPT

## 2023-11-25 PROCEDURE — 97535 SELF CARE MNGMENT TRAINING: CPT | Performed by: OCCUPATIONAL THERAPIST

## 2023-11-25 PROCEDURE — 36600 WITHDRAWAL OF ARTERIAL BLOOD: CPT

## 2023-11-25 PROCEDURE — 2060000000 HC ICU INTERMEDIATE R&B

## 2023-11-25 PROCEDURE — 2580000003 HC RX 258: Performed by: GENERAL ACUTE CARE HOSPITAL

## 2023-11-25 PROCEDURE — 85025 COMPLETE CBC W/AUTO DIFF WBC: CPT

## 2023-11-25 PROCEDURE — 2500000003 HC RX 250 WO HCPCS: Performed by: GENERAL ACUTE CARE HOSPITAL

## 2023-11-25 PROCEDURE — 97166 OT EVAL MOD COMPLEX 45 MIN: CPT | Performed by: OCCUPATIONAL THERAPIST

## 2023-11-25 PROCEDURE — 71045 X-RAY EXAM CHEST 1 VIEW: CPT

## 2023-11-25 RX ORDER — BUMETANIDE 0.25 MG/ML
1 INJECTION INTRAMUSCULAR; INTRAVENOUS ONCE
Status: COMPLETED | OUTPATIENT
Start: 2023-11-25 | End: 2023-11-25

## 2023-11-25 RX ORDER — METOPROLOL SUCCINATE 25 MG/1
37.5 TABLET, EXTENDED RELEASE ORAL 2 TIMES DAILY
Status: DISCONTINUED | OUTPATIENT
Start: 2023-11-25 | End: 2023-11-26

## 2023-11-25 RX ORDER — BUMETANIDE 0.25 MG/ML
0.5 INJECTION INTRAMUSCULAR; INTRAVENOUS 2 TIMES DAILY
Status: DISCONTINUED | OUTPATIENT
Start: 2023-11-25 | End: 2023-11-27

## 2023-11-25 RX ADMIN — OXYCODONE HYDROCHLORIDE AND ACETAMINOPHEN 500 MG: 500 TABLET ORAL at 22:27

## 2023-11-25 RX ADMIN — ABIRATERONE ACETATE 1000 MG: 250 TABLET ORAL at 17:15

## 2023-11-25 RX ADMIN — PREDNISONE 5 MG: 10 TABLET ORAL at 10:34

## 2023-11-25 RX ADMIN — PANTOPRAZOLE SODIUM 40 MG: 40 TABLET, DELAYED RELEASE ORAL at 10:32

## 2023-11-25 RX ADMIN — ZINC SULFATE 220 MG (50 MG) CAPSULE 50 MG: CAPSULE at 10:34

## 2023-11-25 RX ADMIN — APIXABAN 5 MG: 5 TABLET, FILM COATED ORAL at 10:34

## 2023-11-25 RX ADMIN — POTASSIUM CHLORIDE 20 MEQ: 1500 TABLET, EXTENDED RELEASE ORAL at 22:27

## 2023-11-25 RX ADMIN — BUMETANIDE 0.5 MG: 0.25 INJECTION INTRAMUSCULAR; INTRAVENOUS at 13:37

## 2023-11-25 RX ADMIN — METOPROLOL SUCCINATE 37.5 MG: 25 TABLET, EXTENDED RELEASE ORAL at 22:27

## 2023-11-25 RX ADMIN — EZETIMIBE 10 MG: 10 TABLET ORAL at 10:33

## 2023-11-25 RX ADMIN — DIGOXIN 125 MCG: 125 TABLET ORAL at 10:34

## 2023-11-25 RX ADMIN — PREDNISONE 5 MG: 10 TABLET ORAL at 22:27

## 2023-11-25 RX ADMIN — BUMETANIDE 0.5 MG: 0.25 INJECTION INTRAMUSCULAR; INTRAVENOUS at 22:34

## 2023-11-25 RX ADMIN — OXYCODONE HYDROCHLORIDE AND ACETAMINOPHEN 500 MG: 500 TABLET ORAL at 10:35

## 2023-11-25 RX ADMIN — BUMETANIDE 1 MG: 0.25 INJECTION INTRAMUSCULAR; INTRAVENOUS at 15:32

## 2023-11-25 RX ADMIN — POTASSIUM CHLORIDE 20 MEQ: 1500 TABLET, EXTENDED RELEASE ORAL at 10:34

## 2023-11-25 RX ADMIN — APIXABAN 5 MG: 5 TABLET, FILM COATED ORAL at 22:27

## 2023-11-25 RX ADMIN — SODIUM CHLORIDE, PRESERVATIVE FREE 10 ML: 5 INJECTION INTRAVENOUS at 22:34

## 2023-11-25 RX ADMIN — SODIUM CHLORIDE, PRESERVATIVE FREE 10 ML: 5 INJECTION INTRAVENOUS at 10:49

## 2023-11-25 RX ADMIN — METOPROLOL SUCCINATE 25 MG: 25 TABLET, EXTENDED RELEASE ORAL at 10:33

## 2023-11-25 RX ADMIN — SERTRALINE 25 MG: 25 TABLET, FILM COATED ORAL at 10:34

## 2023-11-25 ASSESSMENT — PAIN SCALES - GENERAL
PAINLEVEL_OUTOF10: 0
PAINLEVEL_OUTOF10: 0

## 2023-11-25 NOTE — PLAN OF CARE
actual performance in the old (> or = 75 years). Journal of [address], J.[email or web address].[email or web address].F, Ruiz Moise., Monica Andrade (1999). Measuring the change in disability after inpatient rehabilitation; comparison of the responsiveness of the Barthel Index and Functional Earling Measure. Journal of Neurology, Neurosurgery, and Psychiatry, 66(4), 230-203. JHONATAN Dumont, PAOLA Gomez, & Negar Strauss M.A. (2004) Assessment of post-stroke quality of life in cost-effectiveness studies: The usefulness of the Barthel Index and the EuroQoL-5D. Quality of Life Research, 13, 427-43                                                                                                                                                                                                                                 Pain Ratin/10   Pain Intervention(s):        Activity Tolerance:   BP stable in all positions this date    After treatment:   Patient left in no apparent distress sitting up in chair, Call bell within reach, Bed/ chair alarm activated, and nurse informed    COMMUNICATION/EDUCATION:   The patient's plan of care was discussed with: registered nurse    Patient Education  Education Given To: Patient  Education Provided: Plan of Care;ADL Adaptive Strategies;Transfer Training  Education Method: Verbal  Barriers to Learning: Cognition  Education Outcome: Verbalized understanding    Thank you for this referral.  DORIAN Decker/AMANDA  Minutes: 44    Occupational Therapy Evaluation Charge Determination   History Examination Decision-Making   MEDIUM Complexity : Expanded review of history including physical, cognitive and psychocial history  MEDIUM Complexity: 3-5 Performance deficits relating to physical, cognitive, or psychosocial skills that result in activity limitations and/or participation restrictions MEDIUM Complexity: Patient may present with comorbidities

## 2023-11-25 NOTE — PROGRESS NOTES
RRT consulted for IV access, multiple failed attempts by [facility] team and L&D team. 20g IV placed in left antecubital, good blood draw and flush, single attempt. Site dressed and secured with transparent dressing and tape. Primary RN notified.

## 2023-11-26 LAB
ANION GAP SERPL CALC-SCNC: 6 MMOL/L (ref 5–15)
BASOPHILS # BLD: 0.1 K/UL (ref 0–0.1)
BASOPHILS NFR BLD: 1 % (ref 0–1)
BUN SERPL-MCNC: 27 MG/DL (ref 6–20)
BUN/CREAT SERPL: 21 (ref 12–20)
CALCIUM SERPL-MCNC: 8.8 MG/DL (ref 8.5–10.1)
CHLORIDE SERPL-SCNC: 104 MMOL/L (ref 97–108)
CO2 SERPL-SCNC: 33 MMOL/L (ref 21–32)
CREAT SERPL-MCNC: 1.26 MG/DL (ref 0.7–1.3)
DIFFERENTIAL METHOD BLD: ABNORMAL
EOSINOPHIL # BLD: 0.1 K/UL (ref 0–0.4)
EOSINOPHIL NFR BLD: 1 % (ref 0–7)
ERYTHROCYTE [DISTWIDTH] IN BLOOD BY AUTOMATED COUNT: 23.4 % (ref 11.5–14.5)
GLUCOSE SERPL-MCNC: 137 MG/DL (ref 65–100)
HCT VFR BLD AUTO: 35.5 % (ref 36.6–50.3)
HGB BLD-MCNC: 11.1 G/DL (ref 12.1–17)
IMM GRANULOCYTES # BLD AUTO: 0.1 K/UL (ref 0–0.04)
IMM GRANULOCYTES NFR BLD AUTO: 1 % (ref 0–0.5)
LYMPHOCYTES # BLD: 1.4 K/UL (ref 0.8–3.5)
LYMPHOCYTES NFR BLD: 20 % (ref 12–49)
MCH RBC QN AUTO: 34.9 PG (ref 26–34)
MCHC RBC AUTO-ENTMCNC: 31.3 G/DL (ref 30–36.5)
MCV RBC AUTO: 111.6 FL (ref 80–99)
MONOCYTES # BLD: 0.4 K/UL (ref 0–1)
MONOCYTES NFR BLD: 6 % (ref 5–13)
NEUTS SEG # BLD: 4.7 K/UL (ref 1.8–8)
NEUTS SEG NFR BLD: 71 % (ref 32–75)
NRBC # BLD: 0.03 K/UL (ref 0–0.01)
NRBC BLD-RTO: 0.4 PER 100 WBC
PLATELET # BLD AUTO: 229 K/UL (ref 150–400)
PMV BLD AUTO: 11.4 FL (ref 8.9–12.9)
POTASSIUM SERPL-SCNC: 3.6 MMOL/L (ref 3.5–5.1)
RBC # BLD AUTO: 3.18 M/UL (ref 4.1–5.7)
RBC MORPH BLD: ABNORMAL
RBC MORPH BLD: ABNORMAL
SODIUM SERPL-SCNC: 143 MMOL/L (ref 136–145)
WBC # BLD AUTO: 6.8 K/UL (ref 4.1–11.1)

## 2023-11-26 PROCEDURE — 80048 BASIC METABOLIC PNL TOTAL CA: CPT

## 2023-11-26 PROCEDURE — 6370000000 HC RX 637 (ALT 250 FOR IP): Performed by: GENERAL ACUTE CARE HOSPITAL

## 2023-11-26 PROCEDURE — 2060000000 HC ICU INTERMEDIATE R&B

## 2023-11-26 PROCEDURE — 36415 COLL VENOUS BLD VENIPUNCTURE: CPT

## 2023-11-26 PROCEDURE — 6370000000 HC RX 637 (ALT 250 FOR IP): Performed by: INTERNAL MEDICINE

## 2023-11-26 PROCEDURE — 85025 COMPLETE CBC W/AUTO DIFF WBC: CPT

## 2023-11-26 PROCEDURE — 2500000003 HC RX 250 WO HCPCS: Performed by: INTERNAL MEDICINE

## 2023-11-26 PROCEDURE — 6370000000 HC RX 637 (ALT 250 FOR IP): Performed by: NURSE PRACTITIONER

## 2023-11-26 PROCEDURE — 2580000003 HC RX 258: Performed by: GENERAL ACUTE CARE HOSPITAL

## 2023-11-26 RX ADMIN — APIXABAN 5 MG: 5 TABLET, FILM COATED ORAL at 20:25

## 2023-11-26 RX ADMIN — PREDNISONE 5 MG: 10 TABLET ORAL at 09:48

## 2023-11-26 RX ADMIN — METOPROLOL SUCCINATE 37.5 MG: 25 TABLET, EXTENDED RELEASE ORAL at 09:49

## 2023-11-26 RX ADMIN — BUMETANIDE 0.5 MG: 0.25 INJECTION INTRAMUSCULAR; INTRAVENOUS at 20:23

## 2023-11-26 RX ADMIN — OXYCODONE HYDROCHLORIDE AND ACETAMINOPHEN 500 MG: 500 TABLET ORAL at 09:47

## 2023-11-26 RX ADMIN — DIGOXIN 125 MCG: 125 TABLET ORAL at 09:48

## 2023-11-26 RX ADMIN — SERTRALINE 25 MG: 25 TABLET, FILM COATED ORAL at 09:49

## 2023-11-26 RX ADMIN — POTASSIUM CHLORIDE 20 MEQ: 1500 TABLET, EXTENDED RELEASE ORAL at 20:25

## 2023-11-26 RX ADMIN — PANTOPRAZOLE SODIUM 40 MG: 40 TABLET, DELAYED RELEASE ORAL at 09:59

## 2023-11-26 RX ADMIN — OXYCODONE HYDROCHLORIDE AND ACETAMINOPHEN 500 MG: 500 TABLET ORAL at 20:25

## 2023-11-26 RX ADMIN — MIDODRINE HYDROCHLORIDE 10 MG: 5 TABLET ORAL at 06:05

## 2023-11-26 RX ADMIN — EZETIMIBE 10 MG: 10 TABLET ORAL at 09:49

## 2023-11-26 RX ADMIN — APIXABAN 5 MG: 5 TABLET, FILM COATED ORAL at 09:49

## 2023-11-26 RX ADMIN — ABIRATERONE ACETATE 1000 MG: 250 TABLET ORAL at 18:02

## 2023-11-26 RX ADMIN — SODIUM CHLORIDE, PRESERVATIVE FREE 10 ML: 5 INJECTION INTRAVENOUS at 20:26

## 2023-11-26 RX ADMIN — PREDNISONE 5 MG: 10 TABLET ORAL at 20:25

## 2023-11-26 RX ADMIN — POTASSIUM CHLORIDE 20 MEQ: 1500 TABLET, EXTENDED RELEASE ORAL at 09:48

## 2023-11-26 RX ADMIN — MIDODRINE HYDROCHLORIDE 10 MG: 5 TABLET ORAL at 23:16

## 2023-11-26 RX ADMIN — SODIUM CHLORIDE, PRESERVATIVE FREE 10 ML: 5 INJECTION INTRAVENOUS at 10:02

## 2023-11-26 RX ADMIN — ZINC SULFATE 220 MG (50 MG) CAPSULE 50 MG: CAPSULE at 09:48

## 2023-11-26 RX ADMIN — BUMETANIDE 0.5 MG: 0.25 INJECTION INTRAMUSCULAR; INTRAVENOUS at 09:47

## 2023-11-26 ASSESSMENT — PAIN SCALES - GENERAL
PAINLEVEL_OUTOF10: 0

## 2023-11-26 NOTE — ACP (ADVANCE CARE PLANNING)
Advance Care Planning     Advance Care Planning Inpatient Note  [facility]    Today's Date: 11/26/2023  Unit: [facility] CARDIOPULMONARY CARE    Received request from Renrendai. Upon review of chart and communication with care team, patient's decision making abilities are not in question. . Patient was/were present in the room during visit. Goals of ACP Conversation:  Unable to assess    Health Care Decision Makers:       Primary Decision Maker: Drive [address] - Spouse - [phone]  Summary:  No Decision Maker named by patient at this time      Advance Care Planning Documents (Patient Wishes):  Living Will/Advance Directive     Assessment:   received a consult order from Sri Torrez RN in reference to an [address] [place] [address] Directive for the patient. Mr. Nathalie Mathis was asleep when the  entered his room.  prayed silently.  was unable to assess his need for an Advance Medical Directive at this time.      Interventions:  Unable to assess    Care Preferences Communicated:   No    Outcomes/Plan:  Unable to assess    Electronically signed by Katie Buerger on 11/26/2023 at 10:36 AM

## 2023-11-26 NOTE — PROGRESS NOTES
Spiritual Care Assessment/Progress Note  Idania    Name: Lulu Rollins MRN: [id number]    Age: 82 y.o. Sex: male   Language: English     Date: 11/26/2023            Total Time Calculated: 11 min              Spiritual Assessment begun in [facility] CARDIOPULMONARY CARE  Service Provided For[de-identified] Patient  Referral/Consult From[de-identified] Nurse  Encounter Overview/Reason : Advance Care Planning    Spiritual beliefs:      [] Involved in a earline tradition/spiritual practice:      [] Supported by a earline community:      [] Claims no spiritual orientation:      [] Seeking spiritual identity:           [] Adheres to an individual form of spirituality:      [x] Not able to assess:                Identified resources for coping and support system:   Support System: Spouse       [] Prayer                  [] Devotional reading               [] Music                  [] Guided Imagery     [] Pet visits                                        [] Other: (COMMENT)     Specific area/focus of visit   Encounter:    Crisis:    Spiritual/Emotional needs: Type: Spiritual Support  Ritual, Rites and Sacraments:    Grief, Loss, and Adjustments:    Ethics/Mediation:    Behavioral Health:    Palliative Care: Advance Care Planning: Type: ACP conversation    Plan/Referrals: Continue to visit, (comment)  Narrative:    received a consult order from Niurka Brooks RN in reference to an Advance Medical Directive for the patient. Mr. Sendy Lazar was asleep when the  entered his room.  prayed silently.  was unable to assess his need for an Advance Medical Directive at this time.  services are available  24 hours a day as requested. Rev. KIMBER Patel  [address]   Paging Service [phone])

## 2023-11-26 NOTE — PLAN OF CARE
Problem: Occupational Therapy - Adult  Goal: By Discharge: Performs self-care activities at highest level of function for planned discharge setting. See evaluation for individualized goals. Description: FUNCTIONAL STATUS PRIOR TO ADMISSION:     , ADL Assistance: Independent,  ,  ,  ,  ,  ,  , Ambulation Assistance: Independent, Transfer Assistance: Independent, Active : No .  Pt is likely not a reliable . Stated that he was in rehab recently. Pt  reported that he uses AE at baseline--sock aide. HOME SUPPORT: Patient lived with wife and son. Occupational Therapy Goals:  Initiated 11/25/2023  1. Patient will walk into the bathroom to perform grooming in standing with Contact Guard Assist within 7 day(s). 2.  Patient will perform bathing with Minimal Assist within 7 day(s). 3.  Patient will perform upper body dressing and lower body dressing with CGA, using AE,  within 7 day(s). 4.  Patient will perform toilet transfers with Contact Guard Assist  within 7 day(s). 5.  Patient will perform all aspects of toileting with Contact Guard Assist within 7 day(s). 6.  Patient will participate in upper extremity therapeutic exercise/activities with Supervision for 5 minutes within 7 day(s). 7.  Patient will tolerate standing adls for at least 6 minutes within 7 days. 11/25/2023 1753 by Letty Moran OTR/L  Outcome: Not Progressing     Problem: Occupational Therapy - Adult  Goal: By Discharge: Performs self-care activities at highest level of function for planned discharge setting. See evaluation for individualized goals. Description: FUNCTIONAL STATUS PRIOR TO ADMISSION:     , ADL Assistance: Independent,  ,  ,  ,  ,  ,  , Ambulation Assistance: Independent, Transfer Assistance: Independent, Active : No .  Pt is likely not a reliable . Stated that he was in rehab recently. Pt  reported that he uses AE at baseline--sock aide.     HOME SUPPORT: Patient lived with wife and

## 2023-11-27 PROCEDURE — 2060000000 HC ICU INTERMEDIATE R&B

## 2023-11-27 PROCEDURE — 97110 THERAPEUTIC EXERCISES: CPT

## 2023-11-27 PROCEDURE — 6370000000 HC RX 637 (ALT 250 FOR IP): Performed by: INTERNAL MEDICINE

## 2023-11-27 PROCEDURE — 2580000003 HC RX 258: Performed by: GENERAL ACUTE CARE HOSPITAL

## 2023-11-27 PROCEDURE — 6370000000 HC RX 637 (ALT 250 FOR IP): Performed by: GENERAL ACUTE CARE HOSPITAL

## 2023-11-27 PROCEDURE — 2500000003 HC RX 250 WO HCPCS: Performed by: INTERNAL MEDICINE

## 2023-11-27 PROCEDURE — 97535 SELF CARE MNGMENT TRAINING: CPT

## 2023-11-27 PROCEDURE — 6370000000 HC RX 637 (ALT 250 FOR IP): Performed by: NURSE PRACTITIONER

## 2023-11-27 PROCEDURE — 97116 GAIT TRAINING THERAPY: CPT

## 2023-11-27 RX ORDER — BUMETANIDE 1 MG/1
1 TABLET ORAL DAILY
Status: DISCONTINUED | OUTPATIENT
Start: 2023-11-27 | End: 2023-11-28 | Stop reason: HOSPADM

## 2023-11-27 RX ADMIN — SERTRALINE 25 MG: 25 TABLET, FILM COATED ORAL at 09:41

## 2023-11-27 RX ADMIN — PANTOPRAZOLE SODIUM 40 MG: 40 TABLET, DELAYED RELEASE ORAL at 09:41

## 2023-11-27 RX ADMIN — OXYCODONE HYDROCHLORIDE AND ACETAMINOPHEN 500 MG: 500 TABLET ORAL at 09:41

## 2023-11-27 RX ADMIN — PREDNISONE 5 MG: 10 TABLET ORAL at 21:57

## 2023-11-27 RX ADMIN — MIDODRINE HYDROCHLORIDE 10 MG: 5 TABLET ORAL at 17:07

## 2023-11-27 RX ADMIN — BUMETANIDE 0.5 MG: 0.25 INJECTION INTRAMUSCULAR; INTRAVENOUS at 09:40

## 2023-11-27 RX ADMIN — SODIUM CHLORIDE, PRESERVATIVE FREE 10 ML: 5 INJECTION INTRAVENOUS at 21:58

## 2023-11-27 RX ADMIN — BUMETANIDE 1 MG: 1 TABLET ORAL at 17:07

## 2023-11-27 RX ADMIN — SODIUM CHLORIDE, PRESERVATIVE FREE 10 ML: 5 INJECTION INTRAVENOUS at 09:41

## 2023-11-27 RX ADMIN — APIXABAN 5 MG: 5 TABLET, FILM COATED ORAL at 09:42

## 2023-11-27 RX ADMIN — APIXABAN 5 MG: 5 TABLET, FILM COATED ORAL at 21:57

## 2023-11-27 RX ADMIN — PREDNISONE 5 MG: 10 TABLET ORAL at 09:41

## 2023-11-27 RX ADMIN — POTASSIUM CHLORIDE 20 MEQ: 1500 TABLET, EXTENDED RELEASE ORAL at 21:57

## 2023-11-27 RX ADMIN — ZINC SULFATE 220 MG (50 MG) CAPSULE 50 MG: CAPSULE at 09:41

## 2023-11-27 RX ADMIN — DIGOXIN 125 MCG: 125 TABLET ORAL at 09:42

## 2023-11-27 RX ADMIN — OXYCODONE HYDROCHLORIDE AND ACETAMINOPHEN 500 MG: 500 TABLET ORAL at 21:57

## 2023-11-27 RX ADMIN — EZETIMIBE 10 MG: 10 TABLET ORAL at 09:41

## 2023-11-27 RX ADMIN — METOPROLOL SUCCINATE 75 MG: 50 TABLET, EXTENDED RELEASE ORAL at 09:41

## 2023-11-27 RX ADMIN — ABIRATERONE ACETATE 1000 MG: 250 TABLET ORAL at 17:08

## 2023-11-27 RX ADMIN — POTASSIUM CHLORIDE 20 MEQ: 1500 TABLET, EXTENDED RELEASE ORAL at 09:41

## 2023-11-27 ASSESSMENT — PAIN SCALES - GENERAL: PAINLEVEL_OUTOF10: 0

## 2023-11-27 NOTE — PLAN OF CARE
Problem: Physical Therapy - Adult  Goal: By Discharge: Performs mobility at highest level of function for planned discharge setting. See evaluation for individualized goals. Description: FUNCTIONAL STATUS PRIOR TO ADMISSION: Patient was modified independent using a SPC or RW (pt reports it depended on the day) for functional mobility. HOME SUPPORT PRIOR TO ADMISSION: The patient lived with his wife and son, 1-story home with 6 steps or ramp to enter. Physical Therapy Goals  Initiated 11/24/2023  1. Patient will move from supine to sit and sit to supine in bed with minimal assistance within 7 day(s). 2.  Patient will perform sit to stand with contact guard assist within 7 day(s). 3.  Patient will transfer from bed to chair and chair to bed with contact guard assist using the least restrictive device within 7 day(s). 4.  Patient will ambulate with contact guard assist for 100 feet with the least restrictive device within 7 day(s). 5.  Patient will ascend/descend 6 stairs with B handrail(s) with contact guard assist within 7 day(s). Outcome: Progressing   PHYSICAL THERAPY TREATMENT    Patient: Judith Fajardo (82 y.o. male)  Date: 11/27/2023  Diagnosis: Shortness of breath [R06.02]  Atrial fibrillation with rapid ventricular response (HCC) [I48.91]  Atrial fibrillation with RVR (HCC) [I48.91]  Anemia, unspecified type [D64.9]  Right lower lobe consolidation (HCC) [J18.1] Atrial fibrillation with rapid ventricular response (HCC)      Precautions: Fall Risk                    ASSESSMENT:  Patient continues to benefit from skilled PT services and is slowly progressing towards goals however continues to present with impaired activity tolerance, generalized weakness, altered gait pattern, impaired standing balance, GONCALVES, and overall impaired functional mobility. Pt required minAx1 throughout mobility, including sit<>stand transfers and ambulation with RW support.  Pt with decreased gait speed in addition

## 2023-11-27 NOTE — CARE COORDINATION
Transition of Care Plan:    RUR: 21  Prior Level of Functioning: Independent  Disposition: Acute Rehab: [facility]    1:42 PM  CM talked to Kirsten Melendez at [facility] and they can accept the referral.  They will not have a bed until 11/28. CM talked to Pt and DTR and made them aware of the plan. CM will talk to [facility] tomorrow and see if they have a bed and DTR will still plan on transporting Pt.     1:36 PM   CM called [facility]: Kirsten Melendez [phone] and [facility] is stilll reviewing- CM talked to team and they will let us know shortly if they can accept and when. [facility] accepted but they do not have a bed today. 11:43 AM  CM noted therapy notes are in the chart. CM sent update to [facility] and [facility] via [employer]. Pt preference is for [facility] but plan B would be [facility] if [facility] does not have a bed to offer. CM talked to Nasir Samantha: [phone] and she could transport him in the car.     9:36 Am  Noted DC order. Referral was sent to [facility] over the weekend. They are reviewing and asking for updated therapy notes this morning. CM called rehab and asked for updated therapy notes to be put in ASAP since Pt is stable for DC. If SNF or IPR: Date FOC offered: 11/25  Date FOC received: 11/25  Accepting facility:   Date authorization started with reference number:   Date authorization received and expires: Follow up appointments: after rehab  DME needed: n/a  Transportation at discharge: TBD pending progress today. Anticipating BLS. IM/IMM Medicare/ letter given: IM letter 11/27  Is patient a  and connected with VA? N/a   If yes, was [employer] transfer form completed and VA notified? Caregiver Contact: Wife: Marylin Riggs: [phone]  Discharge Caregiver contacted prior to discharge? yes  Care Conference needed?  N/a  Barriers to discharge: Bed Availability    Elpidio Pisano, [address]

## 2023-11-27 NOTE — PROGRESS NOTES
End of Shift Note    Bedside shift change report given to Adelita Munoz RN (oncoming nurse) by Mark Marin RN (offgoing nurse). Report included the following information SBAR, Intake/Output, MAR, and Recent Results    Shift worked:  [id number]     Shift summary and any significant changes:     No significant changes. Patient got very upset this morning and confused. He ripped off his cardiac monitoring and accused staff of holding him hostage. Got him up to the chair and calmed him down. Accepted by [facility] - a bed was not available today. Will hopefully be able to go tomorrow afternoon. [employer] changed to PO this afternoon. Concerns for physician to address:       Zone phone for oncoming shift:          Activity:     Number times ambulated in hallways past shift: 0  Number of times OOB to chair past shift: 1    Cardiac:   Cardiac Monitoring: Yes           Access:  Current line(s): PIV     Genitourinary:   Urinary status: voiding and external catheter    Respiratory:      Chronic home O2 use?: NO  Incentive spirometer at bedside: NO       GI:     Current diet:  ADULT DIET; Regular; Low Fat/Low Chol/High Fiber/2 gm Na  ADULT ORAL NUTRITION SUPPLEMENT; Breakfast, Dinner; Standard 4 oz Oral Supplement  Passing flatus: YES  Tolerating current diet: YES       Pain Management:   Patient states pain is manageable on current regimen: N/A    Skin:     Interventions: float heels, increase time out of bed, PT/OT consult, internal/external urinary devices, and nutritional support    Patient Safety:  Fall Score:    Interventions: bed/chair alarm, assistive device (walker, cane.  etc), gripper socks, pt to call before getting OOB, and stay with me (per policy)       Length of Stay:  Expected LOS: 6  Actual LOS: [address], RN

## 2023-11-27 NOTE — PLAN OF CARE
Problem: Discharge Planning  Goal: Discharge to home or other facility with appropriate resources  Outcome: Progressing     Problem: Skin/Tissue Integrity  Goal: Absence of new skin breakdown  Description: 1. Monitor for areas of redness and/or skin breakdown  2. Assess vascular access sites hourly  3. Every 4-6 hours minimum:  Change oxygen saturation probe site  4. Every 4-6 hours:  If on nasal continuous positive airway pressure, respiratory therapy assess nares and determine need for appliance change or resting period. Outcome: Progressing     Problem: ABCDS Injury Assessment  Goal: Absence of physical injury  Outcome: Progressing     Problem: Safety - Adult  Goal: Free from fall injury  Outcome: Progressing     Problem: Pain  Goal: Verbalizes/displays adequate comfort level or baseline comfort level  Outcome: Progressing     Problem: Physical Therapy - Adult  Goal: By Discharge: Performs mobility at highest level of function for planned discharge setting. See evaluation for individualized goals. Description: FUNCTIONAL STATUS PRIOR TO ADMISSION: Patient was modified independent using a SPC or RW (pt reports it depended on the day) for functional mobility. HOME SUPPORT PRIOR TO ADMISSION: The patient lived with his wife and son, 1-story home with 6 steps or ramp to enter. Physical Therapy Goals  Initiated 11/24/2023  1. Patient will move from supine to sit and sit to supine in bed with minimal assistance within 7 day(s). 2.  Patient will perform sit to stand with contact guard assist within 7 day(s). 3.  Patient will transfer from bed to chair and chair to bed with contact guard assist using the least restrictive device within 7 day(s). 4.  Patient will ambulate with contact guard assist for 100 feet with the least restrictive device within 7 day(s). 5.  Patient will ascend/descend 6 stairs with B handrail(s) with contact guard assist within 7 day(s).    11/27/2023 1046 by Noam Lynch

## 2023-11-27 NOTE — PLAN OF CARE
Problem: Occupational Therapy - Adult  Goal: By Discharge: Performs self-care activities at highest level of function for planned discharge setting. See evaluation for individualized goals. Description: FUNCTIONAL STATUS PRIOR TO ADMISSION:     , ADL Assistance: Independent,  ,  ,  ,  ,  ,  , Ambulation Assistance: Independent, Transfer Assistance: Independent, Active : No .  Pt is likely not a reliable . Stated that he was in rehab recently. Pt  reported that he uses AE at baseline--sock aide. HOME SUPPORT: Patient lived with wife and son. Occupational Therapy Goals:  Initiated 11/25/2023  1. Patient will walk into the bathroom to perform grooming in standing with Contact Guard Assist within 7 day(s). 2.  Patient will perform bathing with Minimal Assist within 7 day(s). 3.  Patient will perform upper body dressing and lower body dressing with CGA, using AE,  within 7 day(s). 4.  Patient will perform toilet transfers with Contact Guard Assist  within 7 day(s). 5.  Patient will perform all aspects of toileting with Contact Guard Assist within 7 day(s). 6.  Patient will participate in upper extremity therapeutic exercise/activities with Supervision for 5 minutes within 7 day(s). 7.  Patient will tolerate standing adls for at least 6 minutes within 7 days. Outcome: Progressing   OCCUPATIONAL THERAPY TREATMENT  Patient: Heather Villareal (82 y.o. male)  Date: 11/27/2023  Primary Diagnosis: Shortness of breath [R06.02]  Atrial fibrillation with rapid ventricular response (HCC) [I48.91]  Atrial fibrillation with RVR (HCC) [I48.91]  Anemia, unspecified type [D64.9]  Right lower lobe consolidation ([address]) [J18.1]       Precautions: Fall Risk                Chart, occupational therapy assessment, plan of care, and goals were reviewed. ASSESSMENT  Patient continues to benefit from skilled OT services and is progressing towards goals. Pt tolerated OT session well.  He continues to present guard assistance;Verbal cueing   Grooming Skilled Clinical Factors: CGA for standing balance at sink for oral hygiene; utilizing LUE on sink for support while completing tasks with RUE. Min verbal cues for posture and walker management at sink. Pt completed functional ambulation recliner<>sink with RW and CGA-min assist with min verbal cues for walker management and positioning. Pt tolerated standing at sink ~2 minutes with consistent unilateral UE support on sink, returning to recliner d/t fatigue. Pain Rating:  No pain reported   Pain Intervention(s): Activity Tolerance:   Fair  and requires rest breaks  Please refer to the flowsheet for vital signs taken during this treatment. After treatment:   Patient left in no apparent distress sitting up in chair, Call bell within reach, Bed/ chair alarm activated, and Caregiver / family present    COMMUNICATION/EDUCATION:   The patient's plan of care was discussed with: physical therapist, registered nurse, and     Patient Education  Education Given To: Patient; Family  Education Provided: Plan of Care;ADL Adaptive Strategies;Transfer Training  Education Method: Verbal  Barriers to Learning: None  Education Outcome: Verbalized understanding;Continued education needed    Thank you for this referral.  Blanca Humphreys OT  Minutes: 21

## 2023-11-27 NOTE — PROGRESS NOTES
Spiritual Care Assessment/Progress Note  Idania    Name: Jagdeep Walker MRN: [id number]    Age: 82 y.o. Sex: male   Language: English     Date: 11/27/2023            Total Time Calculated: 30 min              Spiritual Assessment begun in [facility] CARDIOPULMONARY CARE  Service Provided For[de-identified] Patient  Referral/Consult From[de-identified] Nurse  Encounter Overview/Reason : Advance Care Planning    Spiritual beliefs:      [] Involved in a earline tradition/spiritual practice:      [] Supported by a earline community:      [] Claims no spiritual orientation:      [] Seeking spiritual identity:           [] Adheres to an individual form of spirituality:      [x] Not able to assess:                Identified resources for coping and support system:   Support System: Family members       [] Prayer                  [] Devotional reading               [] Music                  [] Guided Imagery     [] Pet visits                                        [] Other: (COMMENT)     Specific area/focus of visit   Encounter:    Crisis:    Spiritual/Emotional needs: Type: Spiritual Support  Ritual, Rites and Sacraments:    Grief, Loss, and Adjustments:    Ethics/Mediation:    Behavioral Health:    Palliative Care: Advance Care Planning: Type: ACP conversation    Plan/Referrals: Continue to visit, (comment)    Narrative:  received a consult order from staff in reference to an Advance Medical Directive for Mr. Zeeshan Mo. The patient was sitting in his chair next to the window when the  entered his room.  explained the Advance Medical Directive forms to the patient. He shared he doesn't want to be connected to life support at the end of his life however he didn't want to fill out the forms. He agreed to take the forms and share his concerns with his wife and daughter.  provided empathetic listening and affirmed his feelings.      Mr. Zeeshan Mo did not name a Primary Agent for the Advance

## 2023-11-28 ENCOUNTER — TELEPHONE (OUTPATIENT)
Age: 83
End: 2023-11-28

## 2023-11-28 VITALS
OXYGEN SATURATION: 91 % | SYSTOLIC BLOOD PRESSURE: 116 MMHG | WEIGHT: 250 LBS | TEMPERATURE: 97.9 F | BODY MASS INDEX: 37.03 KG/M2 | RESPIRATION RATE: 18 BRPM | DIASTOLIC BLOOD PRESSURE: 77 MMHG | HEART RATE: 91 BPM | HEIGHT: 69 IN

## 2023-11-28 PROCEDURE — 6370000000 HC RX 637 (ALT 250 FOR IP): Performed by: INTERNAL MEDICINE

## 2023-11-28 PROCEDURE — 2580000003 HC RX 258: Performed by: GENERAL ACUTE CARE HOSPITAL

## 2023-11-28 PROCEDURE — 6370000000 HC RX 637 (ALT 250 FOR IP): Performed by: GENERAL ACUTE CARE HOSPITAL

## 2023-11-28 RX ORDER — METOPROLOL SUCCINATE 25 MG/1
75 TABLET, EXTENDED RELEASE ORAL DAILY
Qty: 90 TABLET | Refills: 0 | Status: SHIPPED | OUTPATIENT
Start: 2023-11-29

## 2023-11-28 RX ADMIN — PREDNISONE 5 MG: 10 TABLET ORAL at 08:47

## 2023-11-28 RX ADMIN — OXYCODONE HYDROCHLORIDE AND ACETAMINOPHEN 500 MG: 500 TABLET ORAL at 08:48

## 2023-11-28 RX ADMIN — APIXABAN 5 MG: 5 TABLET, FILM COATED ORAL at 08:47

## 2023-11-28 RX ADMIN — BUMETANIDE 1 MG: 1 TABLET ORAL at 08:48

## 2023-11-28 RX ADMIN — POTASSIUM CHLORIDE 20 MEQ: 1500 TABLET, EXTENDED RELEASE ORAL at 08:48

## 2023-11-28 RX ADMIN — SERTRALINE 25 MG: 25 TABLET, FILM COATED ORAL at 08:47

## 2023-11-28 RX ADMIN — PANTOPRAZOLE SODIUM 40 MG: 40 TABLET, DELAYED RELEASE ORAL at 06:05

## 2023-11-28 RX ADMIN — ZINC SULFATE 220 MG (50 MG) CAPSULE 50 MG: CAPSULE at 08:48

## 2023-11-28 RX ADMIN — EZETIMIBE 10 MG: 10 TABLET ORAL at 08:47

## 2023-11-28 RX ADMIN — DIGOXIN 125 MCG: 125 TABLET ORAL at 08:48

## 2023-11-28 RX ADMIN — METOPROLOL SUCCINATE 75 MG: 50 TABLET, EXTENDED RELEASE ORAL at 08:47

## 2023-11-28 RX ADMIN — SODIUM CHLORIDE, PRESERVATIVE FREE 10 ML: 5 INJECTION INTRAVENOUS at 08:48

## 2023-11-28 NOTE — PLAN OF CARE
Problem: Safety - Adult  Goal: Free from fall injury  11/27/2023 1957 by Gerald Lucero RN  Outcome: Progressing  11/27/2023 1250 by Thiago Forbes RN  Outcome: Progressing

## 2023-11-28 NOTE — CARE COORDINATION
Transition of Care Plan:     RUR: 21  Prior Level of Functioning: Independent  Disposition: Acute Rehab: [facility]  Accepting MD Dr. Kalpesh Mas  RN to call report to [phone]  Room Number 2004 (Room not ready until 3:30 PM)  DTR Michael Qiu with transport at 3 PM   EMTALA will will need to be done prior to DC     3:38 PM   CM talked to Fabricio Carr at [facility] [place] and she indicated that Accepting MD Dr. Mendez Aranda has a question for Hospitalist.  CM sent Hospitalist a perfect serve requesting that he call the MD directly    RN is going over the DC instructions. Family is here and ready to transport. 12:14 PM   Fabricio Carr stated room would be ready around 3:30PM.  Family to transport him around 3 PM.      No further CM needs. 10:36 AM  CM received a VM from Fabricio Carr at [facility] [place] [phone] and they will have a bed for Pt today. They are working on bed assignment and waiting for current Pt to Yue Melendez will call me back and let me know the room number and time it will be ready. 9:53 AM  Noted DC order. [facility] and [facility] to let me know if they have bed to offer today. [facility] is first choice. Rehab to let me know after rounds when they will have a bed to offer. DTR Michael Qiu will transport him in the car. If SNF or IPR: Date FOC offered: 11/25  Date FOC received: 11/25  Accepting facility:   Date authorization started with reference number:   Date authorization received and expires: Follow up appointments: after rehab  DME needed: n/a  Transportation at discharge: TBD pending progress today. Anticipating BLS. IM/IMM Medicare/ letter given: IM letter 11/27  Is patient a [place] and connected with VA? N/a              If yes, was [employer] transfer form completed and VA notified? Caregiver Contact: Wife: Román Lyle: [phone]  Discharge Caregiver contacted prior to discharge? yes  Care Conference needed?  N/a  Barriers to discharge: Bed Availability     Nikoios Ricaitos, [address]

## 2023-11-28 NOTE — TELEPHONE ENCOUNTER
Saima called requesting to cancel appt 12/1 due to patient being in rehab. Will probably be out about 12/7/23. Asking how to proceed with rescheduling appts for Lupron and office.

## 2023-11-28 NOTE — PROGRESS NOTES
1429: Report given to Lane Forman RN at [facility]. Patient will be going to room 2004. Room will not be ready until 1530.

## 2023-11-28 NOTE — DISCHARGE SUMMARY
Discharge Summary    Name: Linda Starks  [id number]  YOB: 1940 (Age: 82 y.o.)   Date of Admission: 11/22/2023  Date of Discharge: 11/28/2023  Attending Physician: Alonzo Bernabe MD    Discharge Diagnosis:     Acute systolic congestive heart failure exacerbation  Acute hypoxic respiratory failure  ? Obstructive sleep apnea, undiagnosed  Atrial fibrillation  Incidental 1.5 cm lung mass  Prostate cancer  MDS  Chronic hypotension  Abnormal CT chest    Consultations:  IP WOUND CARE NURSE CONSULT TO EVAL  IP CONSULT TO SPIRITUAL SERVICES  IP CONSULT TO CARDIOLOGY  IP CONSULT TO CASE MANAGEMENT      Brief Admission History/Reason for Admission Per Damian Young MD:   Luke Baker is a 82 y.o.  male with PMHx significant for  has a past medical history of A-fib ([address]), At risk for sleep apnea, Basal cell cancer, Benign prostatic hypertrophy, Frequency of urination, GERD (gastroesophageal reflux disease), Hypertension, Myelodysplasia (myelodysplastic syndrome) ([address]), and Osteoarthritis of right hip. Patient presented to the hospital for shortness of breath. Patient reports no change in mild chronic productive cough. Patient denies fever, chills, dysphagia, vomiting and chest pain. He also denies flulike symptoms and sick contacts. Denies worsening lower extremity swelling. EMS reported that patient was satting 82% on room air and was in and out of A-fib, was placed on 3 L nasal cannula     ED w/up showed chloride 111, BUN 31, creatinine 1.2, NT proBNP 25,000's, normal troponin, alkaline phosphatase 295, bilirubin 1.4, WBC 11.9, hemoglobin 9.8, . Brief Hospital Course by Main Problems:     Acute systolic congestive heart failure exacerbation  Acute hypoxic respiratory failure  ? Obstructive sleep apnea, undiagnosed  -Patient was admitted hospital noted to have acute diastolic congestive heart failure exacerbation.   2D echo show ejection

## 2023-11-28 NOTE — PLAN OF CARE
Problem: Discharge Planning  Goal: Discharge to home or other facility with appropriate resources  Outcome: Adequate for Discharge     Problem: Skin/Tissue Integrity  Goal: Absence of new skin breakdown  Description: 1. Monitor for areas of redness and/or skin breakdown  2. Assess vascular access sites hourly  3. Every 4-6 hours minimum:  Change oxygen saturation probe site  4. Every 4-6 hours:  If on nasal continuous positive airway pressure, respiratory therapy assess nares and determine need for appliance change or resting period.   Outcome: Adequate for Discharge     Problem: ABCDS Injury Assessment  Goal: Absence of physical injury  Outcome: Adequate for Discharge     Problem: Safety - Adult  Goal: Free from fall injury  Outcome: Adequate for Discharge     Problem: Pain  Goal: Verbalizes/displays adequate comfort level or baseline comfort level  Outcome: Adequate for Discharge

## 2023-11-28 NOTE — DISCHARGE INSTRUCTIONS
We are not likely to get better results with other medication. She may try taking an additional 50 mg (1/2 of 100 mg) 1-2 hours after she takes the first 100 mg to see if that helps her sleep longer.  saw your follow up appointments. The papers will help your doctors be sure to continue the care plan from the hospital.    7.  Please follow-up with your cancer doctor Dr. Rebecca Ramos as below for 1.5 cm lung mass. I already notified Dr. Rebecca Ramos about 1.5 cm lung mass and he is elevated. If you have questions regarding the hospital related prescriptions or hospital related issues please call [facility] at [phone]. You can always direct your questions to your primary care doctor if you are unable to reach your hospital physician; your PCP works as an extension of your hospital doctor just like your hospital doctor is an extension of your PCP for your time at the hospital [facility])      Follow-up with:   PCP: @PCP@  [address] [place] [address],[address]  [address] [facility]  [phone]  Follow up  Patient's Own Medication is located in the Patient's:  [address] [facility] Patient Bin    Medications stored in the designated location: [facility]    At the time of discharge or transfer to another unit, the nurse on duty will retrieve the home medications for the patient    Christian Johnston MD  [address] [facility]  [address]  [facility]  [phone]    Schedule an appointment as soon as possible for a visit in 2 week(s)  For follow up of 1.5 cm lung nodule. Quynh Morton, [address] [place] [address] 3 [address]  [facility]  [phone]    Schedule an appointment as soon as possible for a visit in 1 week(s)      Cecilia Cabral MD  [address] [facility] [phone]    Schedule an appointment as soon as possible for a visit in 1 week(s)         Please call for your own appointment        Information obtained by :  I understand that if any problems occur once I am at home I am to contact my physician. I understand and acknowledge receipt of the instructions indicated above.

## 2023-12-01 ENCOUNTER — HOSPITAL ENCOUNTER (OUTPATIENT)
Facility: HOSPITAL | Age: 83
Setting detail: INFUSION SERIES
End: 2023-12-01

## 2023-12-05 NOTE — PROGRESS NOTES
changes, mild vascular prominence and right   pleural effusion    ED Noted: Tachypnea present; Decreased breath sounds and rales present; B/L LE   Edema    11/22 AP: Bilateral pleural effusion, worse on the right  Acute hypoxic respiratory failure  ? Obstructive sleep apnea, undiagnosed  Hypotension, on midodrine  . .. ... CTA bilaterally, no wheezing or rales. No accessory muscle use    11/23 IM PN:  Bilateral air entry present, crackles present, no wheezing    11/23 Cards PN: Intermittent atrial fibrillation and flutter of uncertain   duration. ..... Rudy Number Rudy Number Scattered crackles bilaterally;   1-2+ pedal edema    11/24 IM PN: Acute systolic congestive heart failure exacerbation  Acute hypoxic respiratory failure  ? ? ?Obstructive sleep apnea, undiagnosed  . Rudy Number Rudy Number Rudy Number Bilateral air entry present, crackles present, no wheezing    11/28 DC Summary: Acute systolic congestive heart failure exacerbation  Acute hypoxic respiratory failure  ? ? ?Obstructive sleep apnea, undiagnosed    Treatment: IP Tele Admit; CXR x 2; Trop. hs; pBNP; IV Bumex; Cards consult;   -Strict I's and O's, daily weights and low-salt diet; PT/OT consult; Supplemental O2 w/ 2L NC    Thank you,    Nancee Mage [email or web address]  Acute Respiratory Failure Clinical Indicators per 3M MS-[employer] Training Guide and   Quick Reference Guide:  pO2 < 60 mmHg or SpO2 (pulse oximetry) < 91% breathing room air  pCO2 > 50 and pH < 7.35  P/F ratio (pO2 / FIO2) < 300  pO2 decrease or pCO2 increase by 10 mmHg from baseline (if known)  Supplemental oxygen of 40% or more  Presence of respiratory distress, tachypnea, dyspnea, shortness of breath,   wheezing  Unable to speak in complete sentences  Use of accessory muscles to breathe  Extreme anxiety and feeling of impending doom  Tripod position  Confusion/altered mental status/obtunded  Options provided:  -- Acute Hypoxic Respiratory Failure as evidenced by, Please document   evidence.   -- Acute Hypoxic Respiratory Failure

## 2023-12-14 ENCOUNTER — OFFICE VISIT (OUTPATIENT)
Age: 83
End: 2023-12-14
Payer: MEDICARE

## 2023-12-14 VITALS
HEIGHT: 69 IN | OXYGEN SATURATION: 97 % | DIASTOLIC BLOOD PRESSURE: 64 MMHG | HEART RATE: 73 BPM | TEMPERATURE: 97.8 F | BODY MASS INDEX: 36.92 KG/M2 | SYSTOLIC BLOOD PRESSURE: 120 MMHG

## 2023-12-14 DIAGNOSIS — C61 PROSTATE CANCER METASTATIC TO BONE (HCC): Primary | ICD-10-CM

## 2023-12-14 DIAGNOSIS — C79.51 PROSTATE CANCER METASTATIC TO BONE (HCC): Primary | ICD-10-CM

## 2023-12-14 DIAGNOSIS — Z51.11 ENCOUNTER FOR CHEMOTHERAPY MANAGEMENT: ICD-10-CM

## 2023-12-14 PROCEDURE — 1123F ACP DISCUSS/DSCN MKR DOCD: CPT | Performed by: INTERNAL MEDICINE

## 2023-12-14 PROCEDURE — 1036F TOBACCO NON-USER: CPT | Performed by: INTERNAL MEDICINE

## 2023-12-14 PROCEDURE — 99214 OFFICE O/P EST MOD 30 MIN: CPT | Performed by: INTERNAL MEDICINE

## 2023-12-14 PROCEDURE — 1111F DSCHRG MED/CURRENT MED MERGE: CPT | Performed by: INTERNAL MEDICINE

## 2023-12-14 PROCEDURE — G8484 FLU IMMUNIZE NO ADMIN: HCPCS | Performed by: INTERNAL MEDICINE

## 2023-12-14 PROCEDURE — G8417 CALC BMI ABV UP PARAM F/U: HCPCS | Performed by: INTERNAL MEDICINE

## 2023-12-14 PROCEDURE — G8428 CUR MEDS NOT DOCUMENT: HCPCS | Performed by: INTERNAL MEDICINE

## 2023-12-14 RX ORDER — NITROFURANTOIN MACROCRYSTALS 100 MG/1
100 CAPSULE ORAL 2 TIMES DAILY
COMMUNITY

## 2023-12-14 NOTE — PROGRESS NOTES
Judith Fajardo is a 82 y.o. male here for treatment for met prostate cancer. Pt takes Abiraterone. Pt been in rehab since hospital visit at [address] [place] [address]. Pt was having some left hand swelling in rehab but now in his elbow. Looks like big knot that is red, swollen, and painful x's 3 days. 1. Have you been to the ER, urgent care clinic since your last visit? Hospitalized since your last visit?   11/22/23 - 58/43/27 Acute systolic congestive heart failure exacerbation      2. Have you seen or consulted any other health care providers outside of the [address] [place] [address] Avenue since your last visit? Include any pap smears or colon screening.    [facility]
enlarged mediastinal nodes, right pleural effusion and right  lung/pleura nodules. These are not Pylarify avid but might represent non  prostatic neoplasia. ABDOMEN/PELVIS:  Focal uptake in the right side of the prostate, nonspecific but compatible with  neoplasia. No avid nodes. Liver, spleen, urinary tract, bowel and ganglia demonstrate physiologic uptake. SKELETAL:  There are innumerable bone metastases. Impression  1. Prostate uptake compatible with neoplasia. 2. Innumerable prostatic bone metastases. 3. Non prostatic intrathoracic neoplasia questioned, as discussed. Lab Results   Component Value Date    WBC 6.8 11/26/2023    HGB 11.1 (L) 11/26/2023    HCT 35.5 (L) 11/26/2023    .6 (H) 11/26/2023     11/26/2023       Lab Results   Component Value Date     11/26/2023    K 3.6 11/26/2023     11/26/2023    CO2 33 (H) 11/26/2023    BUN 27 (H) 11/26/2023    CREATININE 1.26 11/26/2023    GLUCOSE 137 (H) 11/26/2023    CALCIUM 8.8 11/26/2023    PROT 6.0 (L) 11/23/2023    LABALBU 3.2 (L) 11/23/2023    BILITOT 1.7 (H) 11/23/2023    ALKPHOS 276 (H) 11/23/2023    AST 13 (L) 11/23/2023    ALT 16 11/23/2023    LABGLOM 57 (L) 11/26/2023    GLOB 2.8 11/23/2023       Lab Results   Component Value Date    PSA 0.5 11/10/2023             Assessment:     1. 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 ECOG PS 1  Intent of therapy - palliative  Prognosis: Guarded    The standard of care for the treatment of mHSPC is ADT + NHA +/- systemic chemotherapy. He is too frail to receive systemic chemotherapy. Thus I did not offer him Taxotere. Currently on therapy  ADT - Lupron  Abiraterone/Prednisone     Tolerating treatment very well  Denies any side effects. A detailed system by system evaluation of side effect was performed to assess adverse events. Blood counts are acceptable. Results reviewed with the patient  PSA is low.      Hormonal administration is associated with myriad

## 2024-01-01 ENCOUNTER — HOSPITAL ENCOUNTER (EMERGENCY)
Facility: HOSPITAL | Age: 84
End: 2024-03-11
Payer: MEDICARE

## 2024-01-01 PROCEDURE — 99283 EMERGENCY DEPT VISIT LOW MDM: CPT

## 2024-01-02 ENCOUNTER — HOSPITAL ENCOUNTER (INPATIENT)
Facility: HOSPITAL | Age: 84
LOS: 4 days | Discharge: HOME HEALTH | DRG: 558 | End: 2024-01-07
Attending: EMERGENCY MEDICINE | Admitting: STUDENT IN AN ORGANIZED HEALTH CARE EDUCATION/TRAINING PROGRAM
Payer: MEDICARE

## 2024-01-02 ENCOUNTER — APPOINTMENT (OUTPATIENT)
Facility: HOSPITAL | Age: 84
End: 2024-01-02
Payer: MEDICARE

## 2024-01-02 ENCOUNTER — APPOINTMENT (OUTPATIENT)
Facility: HOSPITAL | Age: 84
DRG: 558 | End: 2024-01-02
Payer: MEDICARE

## 2024-01-02 DIAGNOSIS — M71.122 SEPTIC BURSITIS OF ELBOW, LEFT: Primary | ICD-10-CM

## 2024-01-02 LAB
ALBUMIN SERPL-MCNC: 2.9 G/DL (ref 3.5–5)
ALBUMIN/GLOB SERPL: 0.7 (ref 1.1–2.2)
ALP SERPL-CCNC: 216 U/L (ref 45–117)
ALT SERPL-CCNC: 20 U/L (ref 12–78)
ANION GAP SERPL CALC-SCNC: 2 MMOL/L (ref 5–15)
AST SERPL-CCNC: 13 U/L (ref 15–37)
BASOPHILS # BLD: 0.1 K/UL (ref 0–0.1)
BASOPHILS NFR BLD: 1 % (ref 0–1)
BILIRUB SERPL-MCNC: 1 MG/DL (ref 0.2–1)
BUN SERPL-MCNC: 24 MG/DL (ref 6–20)
BUN/CREAT SERPL: 29 (ref 12–20)
CALCIUM SERPL-MCNC: 9.4 MG/DL (ref 8.5–10.1)
CHLORIDE SERPL-SCNC: 104 MMOL/L (ref 97–108)
CO2 SERPL-SCNC: 31 MMOL/L (ref 21–32)
CREAT SERPL-MCNC: 0.83 MG/DL (ref 0.7–1.3)
DIFFERENTIAL METHOD BLD: ABNORMAL
EOSINOPHIL # BLD: 0 K/UL (ref 0–0.4)
EOSINOPHIL NFR BLD: 0 % (ref 0–7)
ERYTHROCYTE [DISTWIDTH] IN BLOOD BY AUTOMATED COUNT: 18.6 % (ref 11.5–14.5)
GLOBULIN SER CALC-MCNC: 4 G/DL (ref 2–4)
GLUCOSE SERPL-MCNC: 103 MG/DL (ref 65–100)
HCT VFR BLD AUTO: 33.8 % (ref 36.6–50.3)
HGB BLD-MCNC: 10.6 G/DL (ref 12.1–17)
IMM GRANULOCYTES # BLD AUTO: 0.3 K/UL (ref 0–0.04)
IMM GRANULOCYTES NFR BLD AUTO: 2 % (ref 0–0.5)
LYMPHOCYTES # BLD: 2.6 K/UL (ref 0.8–3.5)
LYMPHOCYTES NFR BLD: 16 % (ref 12–49)
MCH RBC QN AUTO: 33.7 PG (ref 26–34)
MCHC RBC AUTO-ENTMCNC: 31.4 G/DL (ref 30–36.5)
MCV RBC AUTO: 107.3 FL (ref 80–99)
MONOCYTES # BLD: 1 K/UL (ref 0–1)
MONOCYTES NFR BLD: 6 % (ref 5–13)
NEUTS SEG # BLD: 12 K/UL (ref 1.8–8)
NEUTS SEG NFR BLD: 75 % (ref 32–75)
NRBC # BLD: 0.04 K/UL (ref 0–0.01)
NRBC BLD-RTO: 0.2 PER 100 WBC
PLATELET # BLD AUTO: 300 K/UL (ref 150–400)
PMV BLD AUTO: 11.6 FL (ref 8.9–12.9)
POTASSIUM SERPL-SCNC: 4.8 MMOL/L (ref 3.5–5.1)
PROT SERPL-MCNC: 6.9 G/DL (ref 6.4–8.2)
RBC # BLD AUTO: 3.15 M/UL (ref 4.1–5.7)
SODIUM SERPL-SCNC: 137 MMOL/L (ref 136–145)
WBC # BLD AUTO: 16 K/UL (ref 4.1–11.1)

## 2024-01-02 PROCEDURE — 96365 THER/PROPH/DIAG IV INF INIT: CPT

## 2024-01-02 PROCEDURE — 36415 COLL VENOUS BLD VENIPUNCTURE: CPT

## 2024-01-02 PROCEDURE — 73080 X-RAY EXAM OF ELBOW: CPT

## 2024-01-02 PROCEDURE — 80053 COMPREHEN METABOLIC PANEL: CPT

## 2024-01-02 PROCEDURE — 6360000002 HC RX W HCPCS: Performed by: EMERGENCY MEDICINE

## 2024-01-02 PROCEDURE — 87040 BLOOD CULTURE FOR BACTERIA: CPT

## 2024-01-02 PROCEDURE — 96366 THER/PROPH/DIAG IV INF ADDON: CPT

## 2024-01-02 PROCEDURE — 85025 COMPLETE CBC W/AUTO DIFF WBC: CPT

## 2024-01-02 PROCEDURE — 2580000003 HC RX 258: Performed by: EMERGENCY MEDICINE

## 2024-01-02 PROCEDURE — 99285 EMERGENCY DEPT VISIT HI MDM: CPT

## 2024-01-02 RX ADMIN — VANCOMYCIN HYDROCHLORIDE 1750 MG: 10 INJECTION, POWDER, LYOPHILIZED, FOR SOLUTION INTRAVENOUS at 23:00

## 2024-01-02 ASSESSMENT — PAIN - FUNCTIONAL ASSESSMENT: PAIN_FUNCTIONAL_ASSESSMENT: 0-10

## 2024-01-02 ASSESSMENT — PAIN SCALES - GENERAL: PAINLEVEL_OUTOF10: 6

## 2024-01-03 PROBLEM — M71.122 SEPTIC BURSITIS OF ELBOW, LEFT: Status: ACTIVE | Noted: 2024-01-03

## 2024-01-03 LAB
ANION GAP SERPL CALC-SCNC: 7 MMOL/L (ref 5–15)
BASOPHILS # BLD: 0.1 K/UL (ref 0–0.1)
BASOPHILS NFR BLD: 1 % (ref 0–1)
BUN SERPL-MCNC: 23 MG/DL (ref 6–20)
BUN/CREAT SERPL: 28 (ref 12–20)
CALCIUM SERPL-MCNC: 9 MG/DL (ref 8.5–10.1)
CHLORIDE SERPL-SCNC: 105 MMOL/L (ref 97–108)
CO2 SERPL-SCNC: 27 MMOL/L (ref 21–32)
CREAT SERPL-MCNC: 0.83 MG/DL (ref 0.7–1.3)
CRP SERPL-MCNC: 10.5 MG/DL (ref 0–0.6)
DIFFERENTIAL METHOD BLD: ABNORMAL
DIGOXIN SERPL-MCNC: 1.2 NG/ML (ref 0.9–2)
EOSINOPHIL # BLD: 0.1 K/UL (ref 0–0.4)
EOSINOPHIL NFR BLD: 1 % (ref 0–7)
ERYTHROCYTE [DISTWIDTH] IN BLOOD BY AUTOMATED COUNT: 18.4 % (ref 11.5–14.5)
ERYTHROCYTE [SEDIMENTATION RATE] IN BLOOD: 70 MM/HR (ref 0–20)
GLUCOSE SERPL-MCNC: 123 MG/DL (ref 65–100)
HCT VFR BLD AUTO: 31.5 % (ref 36.6–50.3)
HGB BLD-MCNC: 10.2 G/DL (ref 12.1–17)
IMM GRANULOCYTES # BLD AUTO: 0.1 K/UL (ref 0–0.04)
IMM GRANULOCYTES NFR BLD AUTO: 1 % (ref 0–0.5)
LYMPHOCYTES # BLD: 2.8 K/UL (ref 0.8–3.5)
LYMPHOCYTES NFR BLD: 27 % (ref 12–49)
MCH RBC QN AUTO: 34.5 PG (ref 26–34)
MCHC RBC AUTO-ENTMCNC: 32.4 G/DL (ref 30–36.5)
MCV RBC AUTO: 106.4 FL (ref 80–99)
MONOCYTES # BLD: 0.7 K/UL (ref 0–1)
MONOCYTES NFR BLD: 7 % (ref 5–13)
NEUTS SEG # BLD: 6.8 K/UL (ref 1.8–8)
NEUTS SEG NFR BLD: 63 % (ref 32–75)
NRBC # BLD: 0.03 K/UL (ref 0–0.01)
NRBC BLD-RTO: 0.3 PER 100 WBC
PLATELET # BLD AUTO: 282 K/UL (ref 150–400)
PMV BLD AUTO: 11.2 FL (ref 8.9–12.9)
POTASSIUM SERPL-SCNC: 4.2 MMOL/L (ref 3.5–5.1)
RBC # BLD AUTO: 2.96 M/UL (ref 4.1–5.7)
SODIUM SERPL-SCNC: 139 MMOL/L (ref 136–145)
WBC # BLD AUTO: 10.6 K/UL (ref 4.1–11.1)

## 2024-01-03 PROCEDURE — 97162 PT EVAL MOD COMPLEX 30 MIN: CPT

## 2024-01-03 PROCEDURE — 97530 THERAPEUTIC ACTIVITIES: CPT

## 2024-01-03 PROCEDURE — 97530 THERAPEUTIC ACTIVITIES: CPT | Performed by: OCCUPATIONAL THERAPIST

## 2024-01-03 PROCEDURE — 80162 ASSAY OF DIGOXIN TOTAL: CPT

## 2024-01-03 PROCEDURE — 97165 OT EVAL LOW COMPLEX 30 MIN: CPT | Performed by: OCCUPATIONAL THERAPIST

## 2024-01-03 PROCEDURE — 6360000002 HC RX W HCPCS: Performed by: STUDENT IN AN ORGANIZED HEALTH CARE EDUCATION/TRAINING PROGRAM

## 2024-01-03 PROCEDURE — 85652 RBC SED RATE AUTOMATED: CPT

## 2024-01-03 PROCEDURE — 1100000003 HC PRIVATE W/ TELEMETRY

## 2024-01-03 PROCEDURE — 80048 BASIC METABOLIC PNL TOTAL CA: CPT

## 2024-01-03 PROCEDURE — 86140 C-REACTIVE PROTEIN: CPT

## 2024-01-03 PROCEDURE — 99221 1ST HOSP IP/OBS SF/LOW 40: CPT | Performed by: ORTHOPAEDIC SURGERY

## 2024-01-03 PROCEDURE — 97535 SELF CARE MNGMENT TRAINING: CPT | Performed by: OCCUPATIONAL THERAPIST

## 2024-01-03 PROCEDURE — 85025 COMPLETE CBC W/AUTO DIFF WBC: CPT

## 2024-01-03 PROCEDURE — 6370000000 HC RX 637 (ALT 250 FOR IP): Performed by: STUDENT IN AN ORGANIZED HEALTH CARE EDUCATION/TRAINING PROGRAM

## 2024-01-03 PROCEDURE — 36415 COLL VENOUS BLD VENIPUNCTURE: CPT

## 2024-01-03 PROCEDURE — 2580000003 HC RX 258: Performed by: STUDENT IN AN ORGANIZED HEALTH CARE EDUCATION/TRAINING PROGRAM

## 2024-01-03 RX ORDER — OXYCODONE HYDROCHLORIDE 5 MG/1
5 TABLET ORAL EVERY 4 HOURS PRN
Status: DISCONTINUED | OUTPATIENT
Start: 2024-01-03 | End: 2024-01-07 | Stop reason: HOSPADM

## 2024-01-03 RX ORDER — ONDANSETRON 2 MG/ML
4 INJECTION INTRAMUSCULAR; INTRAVENOUS EVERY 4 HOURS PRN
Status: DISCONTINUED | OUTPATIENT
Start: 2024-01-03 | End: 2024-01-03 | Stop reason: SDUPTHER

## 2024-01-03 RX ORDER — MIDODRINE HYDROCHLORIDE 5 MG/1
10 TABLET ORAL EVERY 8 HOURS PRN
Status: DISCONTINUED | OUTPATIENT
Start: 2024-01-03 | End: 2024-01-07 | Stop reason: HOSPADM

## 2024-01-03 RX ORDER — POLYETHYLENE GLYCOL 3350 17 G/17G
17 POWDER, FOR SOLUTION ORAL DAILY PRN
Status: DISCONTINUED | OUTPATIENT
Start: 2024-01-03 | End: 2024-01-07 | Stop reason: HOSPADM

## 2024-01-03 RX ORDER — PANTOPRAZOLE SODIUM 40 MG/1
40 TABLET, DELAYED RELEASE ORAL
Status: DISCONTINUED | OUTPATIENT
Start: 2024-01-03 | End: 2024-01-07 | Stop reason: HOSPADM

## 2024-01-03 RX ORDER — MIDODRINE HYDROCHLORIDE 5 MG/1
10 TABLET ORAL EVERY 8 HOURS
Status: DISCONTINUED | OUTPATIENT
Start: 2024-01-03 | End: 2024-01-03

## 2024-01-03 RX ORDER — ONDANSETRON 4 MG/1
4 TABLET, ORALLY DISINTEGRATING ORAL EVERY 8 HOURS PRN
Status: DISCONTINUED | OUTPATIENT
Start: 2024-01-03 | End: 2024-01-07 | Stop reason: HOSPADM

## 2024-01-03 RX ORDER — ABIRATERONE ACETATE 250 MG/1
1000 TABLET ORAL DAILY
Status: DISCONTINUED | OUTPATIENT
Start: 2024-01-03 | End: 2024-01-07 | Stop reason: HOSPADM

## 2024-01-03 RX ORDER — SODIUM CHLORIDE 0.9 % (FLUSH) 0.9 %
5-40 SYRINGE (ML) INJECTION EVERY 12 HOURS SCHEDULED
Status: DISCONTINUED | OUTPATIENT
Start: 2024-01-03 | End: 2024-01-07 | Stop reason: HOSPADM

## 2024-01-03 RX ORDER — ACETAMINOPHEN 650 MG/1
650 SUPPOSITORY RECTAL EVERY 6 HOURS PRN
Status: DISCONTINUED | OUTPATIENT
Start: 2024-01-03 | End: 2024-01-07 | Stop reason: HOSPADM

## 2024-01-03 RX ORDER — EZETIMIBE 10 MG/1
10 TABLET ORAL DAILY
Status: DISCONTINUED | OUTPATIENT
Start: 2024-01-03 | End: 2024-01-07 | Stop reason: HOSPADM

## 2024-01-03 RX ORDER — ACETAMINOPHEN 325 MG/1
650 TABLET ORAL EVERY 6 HOURS PRN
Status: DISCONTINUED | OUTPATIENT
Start: 2024-01-03 | End: 2024-01-07 | Stop reason: HOSPADM

## 2024-01-03 RX ORDER — DIGOXIN 125 MCG
125 TABLET ORAL DAILY
Status: DISCONTINUED | OUTPATIENT
Start: 2024-01-03 | End: 2024-01-07 | Stop reason: HOSPADM

## 2024-01-03 RX ORDER — BUMETANIDE 1 MG/1
1 TABLET ORAL DAILY
Status: DISCONTINUED | OUTPATIENT
Start: 2024-01-03 | End: 2024-01-07 | Stop reason: HOSPADM

## 2024-01-03 RX ORDER — SODIUM CHLORIDE 9 MG/ML
INJECTION, SOLUTION INTRAVENOUS CONTINUOUS
Status: ACTIVE | OUTPATIENT
Start: 2024-01-03 | End: 2024-01-03

## 2024-01-03 RX ORDER — SODIUM CHLORIDE 9 MG/ML
INJECTION, SOLUTION INTRAVENOUS PRN
Status: DISCONTINUED | OUTPATIENT
Start: 2024-01-03 | End: 2024-01-07 | Stop reason: HOSPADM

## 2024-01-03 RX ORDER — ONDANSETRON 2 MG/ML
4 INJECTION INTRAMUSCULAR; INTRAVENOUS EVERY 6 HOURS PRN
Status: DISCONTINUED | OUTPATIENT
Start: 2024-01-03 | End: 2024-01-07 | Stop reason: HOSPADM

## 2024-01-03 RX ORDER — SODIUM CHLORIDE 0.9 % (FLUSH) 0.9 %
5-40 SYRINGE (ML) INJECTION PRN
Status: DISCONTINUED | OUTPATIENT
Start: 2024-01-03 | End: 2024-01-07 | Stop reason: HOSPADM

## 2024-01-03 RX ORDER — ABIRATERONE ACETATE 250 MG/1
1000 TABLET ORAL DAILY
Status: DISCONTINUED | OUTPATIENT
Start: 2024-01-03 | End: 2024-01-03

## 2024-01-03 RX ORDER — PREDNISONE 5 MG/1
5 TABLET ORAL 2 TIMES DAILY
Status: DISCONTINUED | OUTPATIENT
Start: 2024-01-03 | End: 2024-01-07 | Stop reason: HOSPADM

## 2024-01-03 RX ORDER — ACETAMINOPHEN 325 MG/1
650 TABLET ORAL EVERY 4 HOURS PRN
Status: DISCONTINUED | OUTPATIENT
Start: 2024-01-03 | End: 2024-01-03 | Stop reason: SDUPTHER

## 2024-01-03 RX ORDER — SERTRALINE HYDROCHLORIDE 25 MG/1
25 TABLET, FILM COATED ORAL DAILY
Status: DISCONTINUED | OUTPATIENT
Start: 2024-01-03 | End: 2024-01-07 | Stop reason: HOSPADM

## 2024-01-03 RX ADMIN — PANTOPRAZOLE SODIUM 40 MG: 40 TABLET, DELAYED RELEASE ORAL at 11:20

## 2024-01-03 RX ADMIN — PREDNISONE 5 MG: 10 TABLET ORAL at 02:45

## 2024-01-03 RX ADMIN — EZETIMIBE 10 MG: 10 TABLET ORAL at 11:20

## 2024-01-03 RX ADMIN — PREDNISONE 5 MG: 10 TABLET ORAL at 11:19

## 2024-01-03 RX ADMIN — BUMETANIDE 1 MG: 1 TABLET ORAL at 11:17

## 2024-01-03 RX ADMIN — CEFTRIAXONE SODIUM 1000 MG: 1 INJECTION, POWDER, FOR SOLUTION INTRAMUSCULAR; INTRAVENOUS at 02:40

## 2024-01-03 RX ADMIN — DIGOXIN 125 MCG: 125 TABLET ORAL at 11:21

## 2024-01-03 RX ADMIN — VANCOMYCIN HYDROCHLORIDE 2000 MG: 10 INJECTION, POWDER, LYOPHILIZED, FOR SOLUTION INTRAVENOUS at 04:22

## 2024-01-03 RX ADMIN — SODIUM CHLORIDE: 9 INJECTION, SOLUTION INTRAVENOUS at 02:33

## 2024-01-03 RX ADMIN — METOPROLOL SUCCINATE 75 MG: 50 TABLET, EXTENDED RELEASE ORAL at 11:20

## 2024-01-03 RX ADMIN — SODIUM CHLORIDE, PRESERVATIVE FREE 10 ML: 5 INJECTION INTRAVENOUS at 11:26

## 2024-01-03 RX ADMIN — APIXABAN 5 MG: 5 TABLET, FILM COATED ORAL at 11:21

## 2024-01-03 RX ADMIN — SERTRALINE HYDROCHLORIDE 25 MG: 25 TABLET ORAL at 11:20

## 2024-01-03 RX ADMIN — VANCOMYCIN HYDROCHLORIDE 500 MG: 500 INJECTION, POWDER, LYOPHILIZED, FOR SOLUTION INTRAVENOUS at 11:34

## 2024-01-03 ASSESSMENT — PAIN SCALES - GENERAL: PAINLEVEL_OUTOF10: 4

## 2024-01-03 NOTE — CONSULTS
Orthopedic consult note:    83-year-old male with draining infectious olecranon bursitis of the left elbow.  Patient states that he started to develop swelling in his forearm and hand as well as left elbow approximately 2 weeks ago.  Patient's symptoms progressively worsen with pain in his left elbow until a couple of days ago when he started draining from his elbow.  Patient presented to Ortho on-call last night and was advised to present to the emergency department.  Patient was subsequently admitted to the hospital and our services were consulted to evaluate the patient.  Patient states that his symptoms have significantly improved since starting to drain from his elbow.  He denies any pain with range of motion of his elbow.  He denies any sensation changes.  He denies any other areas of focal pain.    Exam: Inspection of the left elbow reveals mild erythema and swelling of the left elbow with an open wound which is having some mild bloody drainage.  There is moderate swelling of the left forearm and hand palpation does not elicit pain in this area or exacerbated drainage.  Patient is performing full active range of motion of the left elbow, wrist, and left shoulder.  No pain with passive range of motion of the elbow.  No pain to palpation of the remainder of the left upper extremity.  All compartments soft to touch.  Full range of motion's of all digits of left hand.  Radial pulse 2+.  Sensation light touch intact.  Capillary fill less than 3 seconds.    Imaging:EXAM: XR ELBOW LEFT (MIN 3 VIEWS)     INDICATION: elbow pain and swelling.     COMPARISON: None.     FINDINGS: Three views of the left elbow demonstrate broad soft tissue swelling  with no no fracture, dislocation, effusion or other acute abnormality.     IMPRESSION:  Broad soft tissue swelling with no other acute abnormality    Plan:  -No plans for operative intervention of this olecranon bursitis.  Patient is actively draining at this point which

## 2024-01-03 NOTE — ED PROVIDER NOTES
[facility] EMERGENCY DEPT  EMERGENCY DEPARTMENT ENCOUNTER       Pt Name: Pepe Latham  MRN: [id number]  Birthdate 1940  Date of evaluation: 1/2/2024  Provider: Pretty Pettit MD   PCP: Weston Pendleton MD  Note Started: 10:34 PM EST 1/2/24     CHIEF COMPLAINT       Chief Complaint   Patient presents with    Arm Pain     Patient with L arm swelling for about a week.  Patient reports the area has been leaking.  Patient seen at [facility] and diagnosed with infected bursa.         HISTORY OF PRESENT ILLNESS: 1 or more elements      History From: patient, History limited by: none     Pepe Latham is a 83 y.o. male who presents with LUE pain and swelling. Sent by [facility] on call.       Please See MDM for Additional Details of the HPI/PMH  Nursing Notes were all reviewed and agreed with or any disagreements were addressed in the HPI.     REVIEW OF SYSTEMS        Positives and Pertinent negatives as per HPI.    PAST HISTORY     Past Medical History:  Past Medical History:   Diagnosis Date    A-fib (HCC) 06/12/2019    At risk for sleep apnea     SCORE 5    Basal cell cancer     Benign prostatic hypertrophy     Frequency of urination     GERD (gastroesophageal reflux disease)     Hypertension     Myelodysplasia (myelodysplastic syndrome) ([facility])     Osteoarthritis of right hip 10/29/2021       Past Surgical History:  Past Surgical History:   Procedure Laterality Date    CATARACT REMOVAL Bilateral     CHOLECYSTECTOMY      CT BIOPSY PERCUTANEOUS DEEP BONE  8/25/2023    CT BIOPSY PERCUTANEOUS DEEP BONE 8/25/2023 [facility] RAD CT    MOHS SURGERY  7/11    Rt    OTHER SURGICAL HISTORY  1990    jaw surgery; \"shortened it\" per pt    OTHER SURGICAL HISTORY  12/08/14    basal cell removed from left side of forehead    SHOULDER ARTHROSCOPY      Rt shoulder ligament repair    TONSILLECTOMY         Family History:  Family History   Problem Relation Age of Onset    Hypertension Father        Social History:  Social History     Tobacco

## 2024-01-04 LAB
ANION GAP SERPL CALC-SCNC: 4 MMOL/L (ref 5–15)
BASOPHILS # BLD: 0.1 K/UL (ref 0–0.1)
BASOPHILS NFR BLD: 1 % (ref 0–1)
BUN SERPL-MCNC: 22 MG/DL (ref 6–20)
BUN/CREAT SERPL: 26 (ref 12–20)
CALCIUM SERPL-MCNC: 8.7 MG/DL (ref 8.5–10.1)
CHLORIDE SERPL-SCNC: 105 MMOL/L (ref 97–108)
CO2 SERPL-SCNC: 29 MMOL/L (ref 21–32)
CREAT SERPL-MCNC: 0.84 MG/DL (ref 0.7–1.3)
DIFFERENTIAL METHOD BLD: ABNORMAL
EOSINOPHIL # BLD: 0.1 K/UL (ref 0–0.4)
EOSINOPHIL NFR BLD: 1 % (ref 0–7)
ERYTHROCYTE [DISTWIDTH] IN BLOOD BY AUTOMATED COUNT: 18.2 % (ref 11.5–14.5)
GLUCOSE SERPL-MCNC: 143 MG/DL (ref 65–100)
HCT VFR BLD AUTO: 27 % (ref 36.6–50.3)
HGB BLD-MCNC: 8.6 G/DL (ref 12.1–17)
IMM GRANULOCYTES # BLD AUTO: 0.1 K/UL (ref 0–0.04)
IMM GRANULOCYTES NFR BLD AUTO: 2 % (ref 0–0.5)
LYMPHOCYTES # BLD: 1.7 K/UL (ref 0.8–3.5)
LYMPHOCYTES NFR BLD: 21 % (ref 12–49)
MCH RBC QN AUTO: 33.5 PG (ref 26–34)
MCHC RBC AUTO-ENTMCNC: 31.9 G/DL (ref 30–36.5)
MCV RBC AUTO: 105.1 FL (ref 80–99)
MONOCYTES # BLD: 0.5 K/UL (ref 0–1)
MONOCYTES NFR BLD: 6 % (ref 5–13)
NEUTS SEG # BLD: 5.8 K/UL (ref 1.8–8)
NEUTS SEG NFR BLD: 69 % (ref 32–75)
NRBC # BLD: 0.02 K/UL (ref 0–0.01)
NRBC BLD-RTO: 0.2 PER 100 WBC
PLATELET # BLD AUTO: 264 K/UL (ref 150–400)
PMV BLD AUTO: 11.3 FL (ref 8.9–12.9)
POTASSIUM SERPL-SCNC: 3.7 MMOL/L (ref 3.5–5.1)
RBC # BLD AUTO: 2.57 M/UL (ref 4.1–5.7)
SODIUM SERPL-SCNC: 138 MMOL/L (ref 136–145)
VANCOMYCIN SERPL-MCNC: 22.8 UG/ML
WBC # BLD AUTO: 8.3 K/UL (ref 4.1–11.1)

## 2024-01-04 PROCEDURE — 36415 COLL VENOUS BLD VENIPUNCTURE: CPT

## 2024-01-04 PROCEDURE — 1100000003 HC PRIVATE W/ TELEMETRY

## 2024-01-04 PROCEDURE — 6360000002 HC RX W HCPCS: Performed by: STUDENT IN AN ORGANIZED HEALTH CARE EDUCATION/TRAINING PROGRAM

## 2024-01-04 PROCEDURE — 97110 THERAPEUTIC EXERCISES: CPT

## 2024-01-04 PROCEDURE — 80202 ASSAY OF VANCOMYCIN: CPT

## 2024-01-04 PROCEDURE — 6370000000 HC RX 637 (ALT 250 FOR IP): Performed by: STUDENT IN AN ORGANIZED HEALTH CARE EDUCATION/TRAINING PROGRAM

## 2024-01-04 PROCEDURE — 97116 GAIT TRAINING THERAPY: CPT

## 2024-01-04 PROCEDURE — 80048 BASIC METABOLIC PNL TOTAL CA: CPT

## 2024-01-04 PROCEDURE — 85025 COMPLETE CBC W/AUTO DIFF WBC: CPT

## 2024-01-04 PROCEDURE — 97530 THERAPEUTIC ACTIVITIES: CPT

## 2024-01-04 PROCEDURE — 2580000003 HC RX 258: Performed by: STUDENT IN AN ORGANIZED HEALTH CARE EDUCATION/TRAINING PROGRAM

## 2024-01-04 RX ADMIN — APIXABAN 5 MG: 5 TABLET, FILM COATED ORAL at 10:46

## 2024-01-04 RX ADMIN — DIGOXIN 125 MCG: 125 TABLET ORAL at 10:47

## 2024-01-04 RX ADMIN — METOPROLOL SUCCINATE 75 MG: 50 TABLET, EXTENDED RELEASE ORAL at 10:46

## 2024-01-04 RX ADMIN — VANCOMYCIN HYDROCHLORIDE 500 MG: 500 INJECTION, POWDER, LYOPHILIZED, FOR SOLUTION INTRAVENOUS at 04:35

## 2024-01-04 RX ADMIN — BUMETANIDE 1 MG: 1 TABLET ORAL at 10:47

## 2024-01-04 RX ADMIN — PREDNISONE 5 MG: 10 TABLET ORAL at 00:24

## 2024-01-04 RX ADMIN — ABIRATERONE ACETATE 1000 MG: 250 TABLET ORAL at 10:54

## 2024-01-04 RX ADMIN — PREDNISONE 5 MG: 10 TABLET ORAL at 10:47

## 2024-01-04 RX ADMIN — PREDNISONE 5 MG: 10 TABLET ORAL at 21:29

## 2024-01-04 RX ADMIN — CEFTRIAXONE SODIUM 1000 MG: 1 INJECTION, POWDER, FOR SOLUTION INTRAMUSCULAR; INTRAVENOUS at 10:45

## 2024-01-04 RX ADMIN — SODIUM CHLORIDE, PRESERVATIVE FREE 10 ML: 5 INJECTION INTRAVENOUS at 21:29

## 2024-01-04 RX ADMIN — PANTOPRAZOLE SODIUM 40 MG: 40 TABLET, DELAYED RELEASE ORAL at 07:04

## 2024-01-04 RX ADMIN — APIXABAN 5 MG: 5 TABLET, FILM COATED ORAL at 00:27

## 2024-01-04 RX ADMIN — SODIUM CHLORIDE, PRESERVATIVE FREE 10 ML: 5 INJECTION INTRAVENOUS at 10:49

## 2024-01-04 RX ADMIN — APIXABAN 5 MG: 5 TABLET, FILM COATED ORAL at 21:29

## 2024-01-04 RX ADMIN — VANCOMYCIN HYDROCHLORIDE 500 MG: 500 INJECTION, POWDER, LYOPHILIZED, FOR SOLUTION INTRAVENOUS at 12:59

## 2024-01-04 RX ADMIN — SERTRALINE HYDROCHLORIDE 25 MG: 25 TABLET ORAL at 10:46

## 2024-01-04 RX ADMIN — VANCOMYCIN HYDROCHLORIDE 500 MG: 500 INJECTION, POWDER, LYOPHILIZED, FOR SOLUTION INTRAVENOUS at 21:29

## 2024-01-04 RX ADMIN — VANCOMYCIN HYDROCHLORIDE 500 MG: 500 INJECTION, POWDER, LYOPHILIZED, FOR SOLUTION INTRAVENOUS at 00:36

## 2024-01-04 RX ADMIN — EZETIMIBE 10 MG: 10 TABLET ORAL at 10:46

## 2024-01-04 NOTE — CARE COORDINATION
Care Management Initial Assessment       RUR: 23%  Readmission? No  1st IM letter given? Yes - 1/3/24  1st  letter given: No      CM introduce self, explain role and confirmed demographics with pt.    Pt lives with his spouse in an one story home home with a ramp to enter.    Pt stated that he is currently open to home health but was unable to recall the name of the agency.    Pt was     Pt uses [employer] [facility] [employer].    At the time of d/c pt's family will transport.    CM will follow and assist with d/c planning.    01/04/24 1126   Service Assessment   Patient Orientation Alert and Oriented   Cognition Short Term Memory Deficit   History Provided By Patient   Primary Caregiver Self   Support Systems Spouse/Significant Other;Children   Patient's Healthcare Decision Maker is: Legal Next of Kin  (Pt's spouse Meme Latham)   PCP Verified by CM Yes   Last Visit to PCP   (Last week)   Prior Functional Level Assistance with the following:;Mobility   Current Functional Level Assistance with the following:;Mobility   Can patient return to prior living arrangement Yes   Family able to assist with home care needs: Yes   Would you like for me to discuss the discharge plan with any other family members/significant others, and if so, who? Yes  (Pt's spouse Meme Latham)   Financial Resources Medicare;Other (Comment)  (BCBS)   Community Resources None   Social/Functional History   Lives With Spouse   Type of Home House   Home Equipment Cane;Rollator;Walker, rolling;Wheelchair-electric;Grab bars;Hospital bed  (ramp, shower chair)   Active  No   Patient's  Info Pt's family   Discharge Planning   Type of Residence House   Current Services Prior To Admission Home Care   Potential Assistance Needed Home Care   Patient expects to be discharged to: House     Advance Care Planning     General Advance Care Planning (ACP) Conversation    Date of Conversation: 1/2/2024  Conducted with: Patient with

## 2024-01-04 NOTE — PLAN OF CARE
Problem: Physical Therapy - Adult  Goal: By Discharge: Performs mobility at highest level of function for planned discharge setting.  See evaluation for individualized goals.  Description: FUNCTIONAL STATUS PRIOR TO ADMISSION: Pt lives with wife and son and has family next door. He was recently at [facility] for rehab and states he did well. He is however vague about what device he uses for mobility at home stating he \"uses whatever I need\" and has a RW, cane and electric w/c available to him. He wears post op shoes currently and has since he had some blisters on the dorsum of both feet (no longer present but still able to see redness).     HOME SUPPORT PRIOR TO ADMISSION: The patient lived with wife and son.    Physical Therapy Goals  Initiated 1/3/2024  1.  Patient will move from supine to sit and sit to supine, scoot up and down, and roll side to side in bed with independence within 7 day(s).    2.  Patient will perform sit to stand with independence within 7 day(s).  3.  Patient will transfer from bed to chair and chair to bed with modified independence using the least restrictive device within 7 day(s).  4.  Patient will ambulate with modified independence for 150 feet with the least restrictive device within 7 day(s).       Outcome: Progressing    PHYSICAL THERAPY EVALUATION    Patient: Pepe Latham (83 y.o. male)  Date: 1/3/2024  Primary Diagnosis: Septic bursitis of elbow, left [M71.122]       Precautions: Fall Risk, Bed Alarm                    ASSESSMENT :   DEFICITS/IMPAIRMENTS:   The patient is limited by decreased functional mobility, independence in ADLs, ROM, strength, activity tolerance, balance. He is overall min A of 1 for bed mobility, CGA/min A for transfers. Side stepped at edge of bed with the rolling walker with CGA to min A; He used bed behind him for some support. VSS. VSS for session. Pt not currently c/o of pain in L elbow, stating it feels much better since it was drained. He does have 
  Problem: Occupational Therapy - Adult  Goal: By Discharge: Performs self-care activities at highest level of function for planned discharge setting.  See evaluation for individualized goals.  Description: FUNCTIONAL STATUS PRIOR TO ADMISSION:  just recently discharged from [facility] to home, ambulated with RW/SPC or used electric wheelchair (pt reports he \"uses whatever I need\" and wasn't more descriptive with questioning), performed ADLS on his own and occasionally uses [employer]acher for dressing, family performs IADLS   ,  ,  ,  ,  ,  ,  ,  ,  ,  ,       HOME SUPPORT: Patient lived wife and son.    Occupational Therapy Goals:  Initiated 1/3/2024  1.  Patient will perform grooming with Modified Greenway within 7 day(s).  2.  Patient will perform upper body dressing and lower body dressing with Modified Greenway within 7 day(s).  3.  Patient will perform toileting with Modified Greenway within 7 day(s).  4.  Patient will perform toilet transfers with Modified Greenway  within 7 day(s).  5.  Patient will be independent in upper extremity therapeutic exercise program within 7 day(s).      Outcome: Not Progressing   OCCUPATIONAL THERAPY EVALUATION    Patient: Pepe Latham (83 y.o. male)  Date: 1/3/2024  Primary Diagnosis: Septic bursitis of elbow, left [M71.122]         Precautions: Fall Risk (DNR)                  ASSESSMENT :  The patient is limited by decreased functional mobility, independence in ADLs, strength, activity tolerance, balance.    Based on the impairments listed above pt reports little pain in left UE and left UE is red and severely swollen.  He has decreased active range of motion in bilateral shoulders but is able to compensate.  He reported that left shoulder is more impaired than baseline and this is likely due to severe swelling and weight of swollen arm.  Min assist for supine to sit on left side of stretcher and pt was unable to effectively push up swollen left UE.  Good seated 
  Problem: Physical Therapy - Adult  Goal: By Discharge: Performs mobility at highest level of function for planned discharge setting.  See evaluation for individualized goals.  Description: FUNCTIONAL STATUS PRIOR TO ADMISSION: Pt lives with wife and son and has family next door. He was recently at [facility] for rehab and states he did well. He is however vague about what device he uses for mobility at home stating he \"uses whatever I need\" and has a RW, cane and electric w/c available to him. He wears post op shoes currently and has since he had some blisters on the dorsum of both feet (no longer present but still able to see redness).     HOME SUPPORT PRIOR TO ADMISSION: The patient lived with wife and son.    Physical Therapy Goals  Initiated 1/3/2024  1.  Patient will move from supine to sit and sit to supine, scoot up and down, and roll side to side in bed with independence within 7 day(s).    2.  Patient will perform sit to stand with independence within 7 day(s).  3.  Patient will transfer from bed to chair and chair to bed with modified independence using the least restrictive device within 7 day(s).  4.  Patient will ambulate with modified independence for 150 feet with the least restrictive device within 7 day(s).       1/3/2024 1408 by Tara Noble, PT  Outcome: Progressing     Problem: Safety - Adult  Goal: Free from fall injury  Outcome: Progressing     Problem: Discharge Planning  Goal: Discharge to home or other facility with appropriate resources  Outcome: Progressing     Problem: Pain  Goal: Verbalizes/displays adequate comfort level or baseline comfort level  Outcome: Progressing     
Problem: Physical Therapy - Adult  Goal: By Discharge: Performs mobility at highest level of function for planned discharge setting.  See evaluation for individualized goals.  Description: FUNCTIONAL STATUS PRIOR TO ADMISSION: Pt lives with wife and son and has family next door. He was recently at [facility] for rehab and states he did well. He is however vague about what device he uses for mobility at home stating he \"uses whatever I need\" and has a RW, cane and electric w/c available to him. He wears post op shoes currently and has since he had some blisters on the dorsum of both feet (no longer present but still able to see redness).     HOME SUPPORT PRIOR TO ADMISSION: The patient lived with wife and son.    Physical Therapy Goals  Initiated 1/3/2024  1.  Patient will move from supine to sit and sit to supine, scoot up and down, and roll side to side in bed with independence within 7 day(s).    2.  Patient will perform sit to stand with independence within 7 day(s).  3.  Patient will transfer from bed to chair and chair to bed with modified independence using the least restrictive device within 7 day(s).  4.  Patient will ambulate with modified independence for 150 feet with the least restrictive device within 7 day(s).       Outcome: Progressing    PHYSICAL THERAPY TREATMENT    Patient: Pepe Latham (83 y.o. male)  Date: 1/4/2024  Diagnosis: Septic bursitis of elbow, left [M71.122] Septic bursitis of elbow, left      Precautions: Fall Risk, Bed Alarm                    ASSESSMENT:  Patient continues to benefit from skilled PT services and is progressing towards goals. Patient performs bed mobility with close S, transfers with RW for UE support and CGA. Pt noted with generalized weakness, requires additional time to perform sit <> stands from recliner chair (lower surface compared to elevated bed). Pt ambulated 60' with RW and CGA, demos max forward flexed posture, able to correct with cueing but unable to 
decline in PM compared to AM  After treatment:   Patient left in no apparent distress in bed, Call bell within reach, Bed/ chair alarm activated, Side rails x3, and B UEs elevated; nsg notified of patient c/o \"burning on my bottom when sitting in the chair\"    COMMUNICATION/EDUCATION:   The patient's plan of care was discussed with: physical therapist, registered nurse, and certified nursing assistant/patient care technician    Patient Education  Education Given To: Patient  Education Provided: Home Exercise Program;Precautions;ADL Adaptive Strategies;Energy Conservation;Transfer Training;Fall Prevention Strategies  Education Method: Demonstration;Verbal;Teach Back  Barriers to Learning: None  Education Outcome: Verbalized understanding;Demonstrated understanding;Continued education needed    Thank you for this referral.  Serge Brunson, OTR/L  Minutes: 45

## 2024-01-05 LAB
ANION GAP SERPL CALC-SCNC: 5 MMOL/L (ref 5–15)
BASOPHILS # BLD: 0.1 K/UL (ref 0–0.1)
BASOPHILS NFR BLD: 1 % (ref 0–1)
BUN SERPL-MCNC: 21 MG/DL (ref 6–20)
BUN/CREAT SERPL: 25 (ref 12–20)
CALCIUM SERPL-MCNC: 8.4 MG/DL (ref 8.5–10.1)
CHLORIDE SERPL-SCNC: 104 MMOL/L (ref 97–108)
CO2 SERPL-SCNC: 30 MMOL/L (ref 21–32)
CREAT SERPL-MCNC: 0.83 MG/DL (ref 0.7–1.3)
DIFFERENTIAL METHOD BLD: ABNORMAL
EOSINOPHIL # BLD: 0.1 K/UL (ref 0–0.4)
EOSINOPHIL NFR BLD: 1 % (ref 0–7)
ERYTHROCYTE [DISTWIDTH] IN BLOOD BY AUTOMATED COUNT: 17.8 % (ref 11.5–14.5)
GLUCOSE SERPL-MCNC: 133 MG/DL (ref 65–100)
HCT VFR BLD AUTO: 27.5 % (ref 36.6–50.3)
HGB BLD-MCNC: 8.8 G/DL (ref 12.1–17)
IMM GRANULOCYTES # BLD AUTO: 0.1 K/UL (ref 0–0.04)
IMM GRANULOCYTES NFR BLD AUTO: 1 % (ref 0–0.5)
LYMPHOCYTES # BLD: 1.8 K/UL (ref 0.8–3.5)
LYMPHOCYTES NFR BLD: 26 % (ref 12–49)
MCH RBC QN AUTO: 33.5 PG (ref 26–34)
MCHC RBC AUTO-ENTMCNC: 32 G/DL (ref 30–36.5)
MCV RBC AUTO: 104.6 FL (ref 80–99)
MONOCYTES # BLD: 0.4 K/UL (ref 0–1)
MONOCYTES NFR BLD: 7 % (ref 5–13)
NEUTS SEG # BLD: 4.4 K/UL (ref 1.8–8)
NEUTS SEG NFR BLD: 64 % (ref 32–75)
NRBC # BLD: 0.02 K/UL (ref 0–0.01)
NRBC BLD-RTO: 0.3 PER 100 WBC
PLATELET # BLD AUTO: 294 K/UL (ref 150–400)
PMV BLD AUTO: 11.2 FL (ref 8.9–12.9)
POTASSIUM SERPL-SCNC: 3.7 MMOL/L (ref 3.5–5.1)
RBC # BLD AUTO: 2.63 M/UL (ref 4.1–5.7)
SODIUM SERPL-SCNC: 139 MMOL/L (ref 136–145)
WBC # BLD AUTO: 6.8 K/UL (ref 4.1–11.1)

## 2024-01-05 PROCEDURE — 6360000002 HC RX W HCPCS: Performed by: STUDENT IN AN ORGANIZED HEALTH CARE EDUCATION/TRAINING PROGRAM

## 2024-01-05 PROCEDURE — 87077 CULTURE AEROBIC IDENTIFY: CPT

## 2024-01-05 PROCEDURE — 36415 COLL VENOUS BLD VENIPUNCTURE: CPT

## 2024-01-05 PROCEDURE — 1100000003 HC PRIVATE W/ TELEMETRY

## 2024-01-05 PROCEDURE — 87070 CULTURE OTHR SPECIMN AEROBIC: CPT

## 2024-01-05 PROCEDURE — 2580000003 HC RX 258: Performed by: STUDENT IN AN ORGANIZED HEALTH CARE EDUCATION/TRAINING PROGRAM

## 2024-01-05 PROCEDURE — 87186 SC STD MICRODIL/AGAR DIL: CPT

## 2024-01-05 PROCEDURE — 87205 SMEAR GRAM STAIN: CPT

## 2024-01-05 PROCEDURE — 80048 BASIC METABOLIC PNL TOTAL CA: CPT

## 2024-01-05 PROCEDURE — 6370000000 HC RX 637 (ALT 250 FOR IP): Performed by: STUDENT IN AN ORGANIZED HEALTH CARE EDUCATION/TRAINING PROGRAM

## 2024-01-05 PROCEDURE — 87075 CULTR BACTERIA EXCEPT BLOOD: CPT

## 2024-01-05 PROCEDURE — 85025 COMPLETE CBC W/AUTO DIFF WBC: CPT

## 2024-01-05 RX ADMIN — SODIUM CHLORIDE, PRESERVATIVE FREE 10 ML: 5 INJECTION INTRAVENOUS at 20:52

## 2024-01-05 RX ADMIN — PANTOPRAZOLE SODIUM 40 MG: 40 TABLET, DELAYED RELEASE ORAL at 06:46

## 2024-01-05 RX ADMIN — SODIUM CHLORIDE, PRESERVATIVE FREE 10 ML: 5 INJECTION INTRAVENOUS at 09:24

## 2024-01-05 RX ADMIN — VANCOMYCIN HYDROCHLORIDE 500 MG: 500 INJECTION, POWDER, LYOPHILIZED, FOR SOLUTION INTRAVENOUS at 15:34

## 2024-01-05 RX ADMIN — APIXABAN 5 MG: 5 TABLET, FILM COATED ORAL at 20:50

## 2024-01-05 RX ADMIN — ABIRATERONE ACETATE 1000 MG: 250 TABLET ORAL at 15:39

## 2024-01-05 RX ADMIN — METOPROLOL SUCCINATE 75 MG: 50 TABLET, EXTENDED RELEASE ORAL at 09:20

## 2024-01-05 RX ADMIN — APIXABAN 5 MG: 5 TABLET, FILM COATED ORAL at 09:20

## 2024-01-05 RX ADMIN — SERTRALINE HYDROCHLORIDE 25 MG: 25 TABLET ORAL at 09:21

## 2024-01-05 RX ADMIN — CEFTRIAXONE SODIUM 1000 MG: 1 INJECTION, POWDER, FOR SOLUTION INTRAMUSCULAR; INTRAVENOUS at 09:19

## 2024-01-05 RX ADMIN — PREDNISONE 5 MG: 10 TABLET ORAL at 20:50

## 2024-01-05 RX ADMIN — EZETIMIBE 10 MG: 10 TABLET ORAL at 09:20

## 2024-01-05 RX ADMIN — PREDNISONE 5 MG: 10 TABLET ORAL at 09:21

## 2024-01-05 RX ADMIN — VANCOMYCIN HYDROCHLORIDE 500 MG: 500 INJECTION, POWDER, LYOPHILIZED, FOR SOLUTION INTRAVENOUS at 20:53

## 2024-01-05 RX ADMIN — DIGOXIN 125 MCG: 125 TABLET ORAL at 09:21

## 2024-01-05 RX ADMIN — VANCOMYCIN HYDROCHLORIDE 500 MG: 500 INJECTION, POWDER, LYOPHILIZED, FOR SOLUTION INTRAVENOUS at 06:42

## 2024-01-05 RX ADMIN — BUMETANIDE 1 MG: 1 TABLET ORAL at 09:21

## 2024-01-05 NOTE — WOUND CARE
Wound Care consult for the Left Elbow wound.  Chart reviewed and patient assessed for this today.  Pt. Is 83 years old and he was admitted for Septic bursitis of the left elbow with associated cellulitis.  Pt. Also has other co-morbidities listed and that may be barrier to wound healing including A-Fib on Eliquis, CHF and FARNSWORTH cirrhosis.  He is being treated for cancer and has had a recent fall.  He is now at home after a stay in [facility].    History of present condition:  Pt. Presented to Ortho on call clinic and was sent to the ED to be admitted for this elbow wound / condition. This all started about 2 weeks ago with swelling and pain in the forearm and left hand. On the day he sought assistance with this the elbow had started to drain.  As it drained, the pain was eased significantly.     Admission photo: Left elbow wound                     Assessment and Treatment today:  the left elbow is red from the mid forearm to the distal humerus. The skin is hot and the wound is draining cream colored pus on the old dressing and it was expressed from the wound today.     Wound bed after cleansing the wound. Red and  Granulating.     Cleansed with Vashe, wiped with gauze and   Dressed with silver dressing moistened with   The Vashe solution. ABD pad and wrapped   With zach and then an ACE bandage.       Pus expressed from the wound and collected  For wound culture. Also swabbed the wound  Bed.       Wound 10/05/23 Sacrum Inner Stage 2 (Active)   Number of days: 92       Wound 01/05/24 Elbow Left;Posterior Open, Draining left elbow wound - needs 2 x daily wound care: (Active)   Wound Image   01/05/24 1228   Wound Etiology Other 01/05/24 1228   Dressing Status New dressing applied 01/05/24 1228   Wound Cleansed Vashe 01/05/24 1228   Dressing/Treatment Silver dressing;ABD;Gauze dressing/dressing sponge;Ace wrap 01/05/24 1228   Dressing Change Due 01/05/24 01/05/24 1228   Wound Length (cm) 2 cm

## 2024-01-05 NOTE — CARE COORDINATION
Transition of Care Plan:    RUR:  24%  Prior Level of Functioning: Independent   Disposition: Home with spouse and home health-[facility]   If SNF or IPR: Date FOC offered:   Date FOC received:   Accepting facility:   Date authorization started with reference number:   Date authorization received and expires:   Follow up appointments: PCP   DME needed: No DME needed   Transportation at discharge: Pt's spouse to transport  IM/IMM Medicare/ letter given: 1/5/24  Is patient a  and connected with VA? No   If yes, was  transfer form completed and VA notified? No  Caregiver Contact: Pt's spouse Meme Latham   Discharge Caregiver contacted prior to discharge? Pt and pt's spouse   Care Conference needed? No  Barriers to discharge: Cultures and Medical clearance      CM spoke to pt's spouse Meme regarding the recommendation is for pt to have home health services at the time of d/c and that pt stated to CM that he was currently open to home health but could not recall the name of the agency. Pt's spouse informed CM that pt is currently open to [facility]. Referral sent to [facility].    Pt has a potential d/c tomorrow pending cultures and medical clearance.    CM will follow and assist with d/c planning.    Tangela Hills

## 2024-01-06 LAB
ANION GAP SERPL CALC-SCNC: 2 MMOL/L (ref 5–15)
BASOPHILS # BLD: 0.1 K/UL (ref 0–0.1)
BASOPHILS NFR BLD: 1 % (ref 0–1)
BUN SERPL-MCNC: 18 MG/DL (ref 6–20)
BUN/CREAT SERPL: 25 (ref 12–20)
CALCIUM SERPL-MCNC: 8.8 MG/DL (ref 8.5–10.1)
CHLORIDE SERPL-SCNC: 105 MMOL/L (ref 97–108)
CO2 SERPL-SCNC: 32 MMOL/L (ref 21–32)
CREAT SERPL-MCNC: 0.72 MG/DL (ref 0.7–1.3)
DATE LAST DOSE: ABNORMAL
DIFFERENTIAL METHOD BLD: ABNORMAL
DOSE AMOUNT: ABNORMAL UNITS
DOSE DATE/TIME: ABNORMAL
EOSINOPHIL # BLD: 0.1 K/UL (ref 0–0.4)
EOSINOPHIL NFR BLD: 1 % (ref 0–7)
ERYTHROCYTE [DISTWIDTH] IN BLOOD BY AUTOMATED COUNT: 17.7 % (ref 11.5–14.5)
GLUCOSE SERPL-MCNC: 119 MG/DL (ref 65–100)
HCT VFR BLD AUTO: 28.7 % (ref 36.6–50.3)
HGB BLD-MCNC: 9.2 G/DL (ref 12.1–17)
IMM GRANULOCYTES # BLD AUTO: 0.1 K/UL (ref 0–0.04)
IMM GRANULOCYTES NFR BLD AUTO: 1 % (ref 0–0.5)
LYMPHOCYTES # BLD: 1.9 K/UL (ref 0.8–3.5)
LYMPHOCYTES NFR BLD: 27 % (ref 12–49)
MCH RBC QN AUTO: 33.7 PG (ref 26–34)
MCHC RBC AUTO-ENTMCNC: 32.1 G/DL (ref 30–36.5)
MCV RBC AUTO: 105.1 FL (ref 80–99)
MONOCYTES # BLD: 0.4 K/UL (ref 0–1)
MONOCYTES NFR BLD: 6 % (ref 5–13)
NEUTS SEG # BLD: 4.5 K/UL (ref 1.8–8)
NEUTS SEG NFR BLD: 64 % (ref 32–75)
NRBC # BLD: 0 K/UL (ref 0–0.01)
NRBC BLD-RTO: 0 PER 100 WBC
PLATELET # BLD AUTO: 296 K/UL (ref 150–400)
PMV BLD AUTO: 11 FL (ref 8.9–12.9)
POTASSIUM SERPL-SCNC: 3.4 MMOL/L (ref 3.5–5.1)
RBC # BLD AUTO: 2.73 M/UL (ref 4.1–5.7)
SODIUM SERPL-SCNC: 139 MMOL/L (ref 136–145)
VANCOMYCIN TROUGH SERPL-MCNC: 16.3 UG/ML (ref 5–10)
WBC # BLD AUTO: 7.1 K/UL (ref 4.1–11.1)

## 2024-01-06 PROCEDURE — 6370000000 HC RX 637 (ALT 250 FOR IP): Performed by: STUDENT IN AN ORGANIZED HEALTH CARE EDUCATION/TRAINING PROGRAM

## 2024-01-06 PROCEDURE — 36415 COLL VENOUS BLD VENIPUNCTURE: CPT

## 2024-01-06 PROCEDURE — 6360000002 HC RX W HCPCS: Performed by: STUDENT IN AN ORGANIZED HEALTH CARE EDUCATION/TRAINING PROGRAM

## 2024-01-06 PROCEDURE — 80048 BASIC METABOLIC PNL TOTAL CA: CPT

## 2024-01-06 PROCEDURE — 2580000003 HC RX 258: Performed by: STUDENT IN AN ORGANIZED HEALTH CARE EDUCATION/TRAINING PROGRAM

## 2024-01-06 PROCEDURE — 85025 COMPLETE CBC W/AUTO DIFF WBC: CPT

## 2024-01-06 PROCEDURE — 1100000003 HC PRIVATE W/ TELEMETRY

## 2024-01-06 PROCEDURE — 6360000002 HC RX W HCPCS: Performed by: NURSE PRACTITIONER

## 2024-01-06 PROCEDURE — 6370000000 HC RX 637 (ALT 250 FOR IP): Performed by: NURSE PRACTITIONER

## 2024-01-06 PROCEDURE — 80202 ASSAY OF VANCOMYCIN: CPT

## 2024-01-06 RX ORDER — POTASSIUM CHLORIDE 20 MEQ/1
20 TABLET, EXTENDED RELEASE ORAL ONCE
Status: COMPLETED | OUTPATIENT
Start: 2024-01-06 | End: 2024-01-06

## 2024-01-06 RX ORDER — DIPHENHYDRAMINE HYDROCHLORIDE 50 MG/ML
25 INJECTION INTRAMUSCULAR; INTRAVENOUS ONCE
Status: DISCONTINUED | OUTPATIENT
Start: 2024-01-06 | End: 2024-01-07 | Stop reason: HOSPADM

## 2024-01-06 RX ADMIN — APIXABAN 5 MG: 5 TABLET, FILM COATED ORAL at 10:14

## 2024-01-06 RX ADMIN — SODIUM CHLORIDE, PRESERVATIVE FREE 10 ML: 5 INJECTION INTRAVENOUS at 10:20

## 2024-01-06 RX ADMIN — SODIUM CHLORIDE, PRESERVATIVE FREE 10 ML: 5 INJECTION INTRAVENOUS at 21:55

## 2024-01-06 RX ADMIN — ACETAMINOPHEN 650 MG: 325 TABLET ORAL at 19:04

## 2024-01-06 RX ADMIN — APIXABAN 5 MG: 5 TABLET, FILM COATED ORAL at 21:54

## 2024-01-06 RX ADMIN — EZETIMIBE 10 MG: 10 TABLET ORAL at 10:14

## 2024-01-06 RX ADMIN — PREDNISONE 5 MG: 10 TABLET ORAL at 21:54

## 2024-01-06 RX ADMIN — POTASSIUM CHLORIDE 20 MEQ: 1500 TABLET, EXTENDED RELEASE ORAL at 10:15

## 2024-01-06 RX ADMIN — DIGOXIN 125 MCG: 125 TABLET ORAL at 10:14

## 2024-01-06 RX ADMIN — PREDNISONE 5 MG: 10 TABLET ORAL at 10:15

## 2024-01-06 RX ADMIN — VANCOMYCIN HYDROCHLORIDE 750 MG: 750 INJECTION, POWDER, LYOPHILIZED, FOR SOLUTION INTRAVENOUS at 13:22

## 2024-01-06 RX ADMIN — METOPROLOL SUCCINATE 75 MG: 50 TABLET, EXTENDED RELEASE ORAL at 10:14

## 2024-01-06 RX ADMIN — PANTOPRAZOLE SODIUM 40 MG: 40 TABLET, DELAYED RELEASE ORAL at 06:43

## 2024-01-06 RX ADMIN — ABIRATERONE ACETATE 1000 MG: 250 TABLET ORAL at 17:05

## 2024-01-06 RX ADMIN — SERTRALINE HYDROCHLORIDE 25 MG: 25 TABLET ORAL at 10:14

## 2024-01-06 RX ADMIN — BUMETANIDE 1 MG: 1 TABLET ORAL at 10:19

## 2024-01-06 RX ADMIN — CEFTRIAXONE SODIUM 1000 MG: 1 INJECTION, POWDER, FOR SOLUTION INTRAMUSCULAR; INTRAVENOUS at 10:35

## 2024-01-06 RX ADMIN — VANCOMYCIN HYDROCHLORIDE 500 MG: 500 INJECTION, POWDER, LYOPHILIZED, FOR SOLUTION INTRAVENOUS at 19:11

## 2024-01-06 ASSESSMENT — PAIN DESCRIPTION - ORIENTATION
ORIENTATION: RIGHT
ORIENTATION: RIGHT;LEFT

## 2024-01-06 ASSESSMENT — PAIN DESCRIPTION - LOCATION
LOCATION: SHOULDER
LOCATION: SHOULDER

## 2024-01-06 ASSESSMENT — PAIN DESCRIPTION - DESCRIPTORS
DESCRIPTORS: ACHING
DESCRIPTORS: DISCOMFORT

## 2024-01-06 ASSESSMENT — PAIN SCALES - GENERAL
PAINLEVEL_OUTOF10: 2
PAINLEVEL_OUTOF10: 0
PAINLEVEL_OUTOF10: 5

## 2024-01-07 VITALS
TEMPERATURE: 98.2 F | DIASTOLIC BLOOD PRESSURE: 80 MMHG | RESPIRATION RATE: 18 BRPM | HEIGHT: 70 IN | HEART RATE: 73 BPM | OXYGEN SATURATION: 99 % | WEIGHT: 167.11 LBS | BODY MASS INDEX: 23.92 KG/M2 | SYSTOLIC BLOOD PRESSURE: 132 MMHG

## 2024-01-07 LAB
ANION GAP SERPL CALC-SCNC: 2 MMOL/L (ref 5–15)
BACTERIA SPEC CULT: ABNORMAL
BACTERIA SPEC CULT: NORMAL
BASOPHILS # BLD: 0.1 K/UL (ref 0–0.1)
BASOPHILS NFR BLD: 1 % (ref 0–1)
BUN SERPL-MCNC: 22 MG/DL (ref 6–20)
BUN/CREAT SERPL: 27 (ref 12–20)
CALCIUM SERPL-MCNC: 9.2 MG/DL (ref 8.5–10.1)
CHLORIDE SERPL-SCNC: 106 MMOL/L (ref 97–108)
CO2 SERPL-SCNC: 32 MMOL/L (ref 21–32)
CREAT SERPL-MCNC: 0.81 MG/DL (ref 0.7–1.3)
DIFFERENTIAL METHOD BLD: ABNORMAL
EOSINOPHIL # BLD: 0.1 K/UL (ref 0–0.4)
EOSINOPHIL NFR BLD: 1 % (ref 0–7)
ERYTHROCYTE [DISTWIDTH] IN BLOOD BY AUTOMATED COUNT: 17.7 % (ref 11.5–14.5)
GLUCOSE SERPL-MCNC: 136 MG/DL (ref 65–100)
GRAM STN SPEC: ABNORMAL
GRAM STN SPEC: ABNORMAL
HCT VFR BLD AUTO: 29 % (ref 36.6–50.3)
HGB BLD-MCNC: 9.3 G/DL (ref 12.1–17)
IMM GRANULOCYTES # BLD AUTO: 0.1 K/UL (ref 0–0.04)
IMM GRANULOCYTES NFR BLD AUTO: 1 % (ref 0–0.5)
LYMPHOCYTES # BLD: 1.8 K/UL (ref 0.8–3.5)
LYMPHOCYTES NFR BLD: 23 % (ref 12–49)
MCH RBC QN AUTO: 33.8 PG (ref 26–34)
MCHC RBC AUTO-ENTMCNC: 32.1 G/DL (ref 30–36.5)
MCV RBC AUTO: 105.5 FL (ref 80–99)
MONOCYTES # BLD: 0.5 K/UL (ref 0–1)
MONOCYTES NFR BLD: 6 % (ref 5–13)
NEUTS SEG # BLD: 5.3 K/UL (ref 1.8–8)
NEUTS SEG NFR BLD: 68 % (ref 32–75)
NRBC # BLD: 0 K/UL (ref 0–0.01)
NRBC BLD-RTO: 0 PER 100 WBC
PLATELET # BLD AUTO: 303 K/UL (ref 150–400)
PMV BLD AUTO: 11 FL (ref 8.9–12.9)
POTASSIUM SERPL-SCNC: 3.3 MMOL/L (ref 3.5–5.1)
RBC # BLD AUTO: 2.75 M/UL (ref 4.1–5.7)
SERVICE CMNT-IMP: ABNORMAL
SERVICE CMNT-IMP: NORMAL
SODIUM SERPL-SCNC: 140 MMOL/L (ref 136–145)
WBC # BLD AUTO: 7.8 K/UL (ref 4.1–11.1)

## 2024-01-07 PROCEDURE — 6370000000 HC RX 637 (ALT 250 FOR IP): Performed by: STUDENT IN AN ORGANIZED HEALTH CARE EDUCATION/TRAINING PROGRAM

## 2024-01-07 PROCEDURE — 80048 BASIC METABOLIC PNL TOTAL CA: CPT

## 2024-01-07 PROCEDURE — 2580000003 HC RX 258: Performed by: STUDENT IN AN ORGANIZED HEALTH CARE EDUCATION/TRAINING PROGRAM

## 2024-01-07 PROCEDURE — 6360000002 HC RX W HCPCS: Performed by: STUDENT IN AN ORGANIZED HEALTH CARE EDUCATION/TRAINING PROGRAM

## 2024-01-07 PROCEDURE — 85025 COMPLETE CBC W/AUTO DIFF WBC: CPT

## 2024-01-07 PROCEDURE — 36415 COLL VENOUS BLD VENIPUNCTURE: CPT

## 2024-01-07 PROCEDURE — 6370000000 HC RX 637 (ALT 250 FOR IP): Performed by: NURSE PRACTITIONER

## 2024-01-07 RX ORDER — DOXYCYCLINE HYCLATE 100 MG
100 TABLET ORAL EVERY 12 HOURS SCHEDULED
Qty: 20 TABLET | Refills: 0 | Status: SHIPPED | OUTPATIENT
Start: 2024-01-07 | End: 2024-01-17

## 2024-01-07 RX ORDER — DOXYCYCLINE HYCLATE 100 MG
100 TABLET ORAL EVERY 12 HOURS SCHEDULED
Status: DISCONTINUED | OUTPATIENT
Start: 2024-01-07 | End: 2024-01-07 | Stop reason: HOSPADM

## 2024-01-07 RX ORDER — AMOXICILLIN AND CLAVULANATE POTASSIUM 875; 125 MG/1; MG/1
1 TABLET, FILM COATED ORAL EVERY 12 HOURS SCHEDULED
Qty: 20 TABLET | Refills: 0 | Status: SHIPPED | OUTPATIENT
Start: 2024-01-07 | End: 2024-01-17

## 2024-01-07 RX ORDER — AMOXICILLIN AND CLAVULANATE POTASSIUM 875; 125 MG/1; MG/1
1 TABLET, FILM COATED ORAL EVERY 12 HOURS SCHEDULED
Status: DISCONTINUED | OUTPATIENT
Start: 2024-01-07 | End: 2024-01-07 | Stop reason: HOSPADM

## 2024-01-07 RX ADMIN — DOXYCYCLINE HYCLATE 100 MG: 100 TABLET, COATED ORAL at 11:02

## 2024-01-07 RX ADMIN — DIGOXIN 125 MCG: 125 TABLET ORAL at 10:56

## 2024-01-07 RX ADMIN — PREDNISONE 5 MG: 10 TABLET ORAL at 10:56

## 2024-01-07 RX ADMIN — BUMETANIDE 1 MG: 1 TABLET ORAL at 10:56

## 2024-01-07 RX ADMIN — SERTRALINE HYDROCHLORIDE 25 MG: 25 TABLET ORAL at 10:56

## 2024-01-07 RX ADMIN — SODIUM CHLORIDE, PRESERVATIVE FREE 10 ML: 5 INJECTION INTRAVENOUS at 11:03

## 2024-01-07 RX ADMIN — VANCOMYCIN HYDROCHLORIDE 500 MG: 500 INJECTION, POWDER, LYOPHILIZED, FOR SOLUTION INTRAVENOUS at 11:07

## 2024-01-07 RX ADMIN — VANCOMYCIN HYDROCHLORIDE 500 MG: 500 INJECTION, POWDER, LYOPHILIZED, FOR SOLUTION INTRAVENOUS at 02:01

## 2024-01-07 RX ADMIN — PANTOPRAZOLE SODIUM 40 MG: 40 TABLET, DELAYED RELEASE ORAL at 06:46

## 2024-01-07 RX ADMIN — AMOXICILLIN AND CLAVULANATE POTASSIUM 1 TABLET: 875; 125 TABLET, FILM COATED ORAL at 11:02

## 2024-01-07 RX ADMIN — EZETIMIBE 10 MG: 10 TABLET ORAL at 10:55

## 2024-01-07 RX ADMIN — APIXABAN 5 MG: 5 TABLET, FILM COATED ORAL at 10:55

## 2024-01-07 RX ADMIN — ACETAMINOPHEN 650 MG: 325 TABLET ORAL at 01:11

## 2024-01-07 RX ADMIN — METOPROLOL SUCCINATE 75 MG: 50 TABLET, EXTENDED RELEASE ORAL at 10:56

## 2024-01-07 ASSESSMENT — PAIN DESCRIPTION - DESCRIPTORS: DESCRIPTORS: ACHING

## 2024-01-07 ASSESSMENT — PAIN DESCRIPTION - LOCATION: LOCATION: BACK

## 2024-01-07 ASSESSMENT — PAIN SCALES - GENERAL
PAINLEVEL_OUTOF10: 0
PAINLEVEL_OUTOF10: 6

## 2024-01-07 ASSESSMENT — PAIN DESCRIPTION - ORIENTATION: ORIENTATION: LEFT

## 2024-01-07 NOTE — DISCHARGE SUMMARY
Discharge Summary    Name: Pepe Latham  [id number]  YOB: 1940 (Age: 83 y.o.)   Date of Admission: 1/2/2024  Date of Discharge: 1/7/2024  Attending Physician: No att. providers found    Discharge Diagnosis:     Septic bursitis of left elbow with associated cellulitis     Atrial fibrillation on Eliquis     Chronic hypotension     HFrEF-EF 25-30%     MDS     History of prostate cancer     GERD     Consultations:  IP CONSULT TO ORTHOPEDIC SURGERY  IP CONSULT TO HOSPITALIST  IP CONSULT TO PHARMACY  IP WOUND CARE NURSE CONSULT TO EVAL  IP CONSULT TO CASE MANAGEMENT  IP CONSULT TO CASE MANAGEMENT      Brief Admission History/Reason for Admission Per Prasanna Downing, DO:     Pepe Latham is a 83 y.o.  male with PMHx as listed below presenting to the emergency department from [facility] with concern for septic bursitis of left elbow.  Patient reports progressive redness and swelling of left forearm with large swollen lump in his left elbow has become progressively more painful ultimately ruptured 2-3 days ago draining purulent material with significant improvement in pain.  Was evaluated at orthopedics at [place] referred to emergency department out of concern for septic bursitis recommending IV antibiotics and orthopedics consultation.  ROS otherwise negative.  Patient specifically denying systemic signs of infection.     In the ED, patient afebrile and hemodynamically stable saturating mid 90s on room air.  Left elbow radiographs demonstrating broad soft tissue swelling with no other acute abnormality.  Labs demonstrate: Sodium 137, potassium 4.3, glucose 103, BUN 24, creatinine 0.83, LFTs grossly unremarkable, WBC 16.0, hemoglobin 10.6-.3, platelets 300.  Patient given empiric vancomycin by ED provider.  Brief Hospital Course by Main Problems:     Septic bursitis of left elbow with associated cellulitis  L elbow XR: Broad soft tissue swelling with no

## 2024-01-07 NOTE — DISCHARGE INSTRUCTIONS
Plan: wound care will need to be done two times daily for the left elbow as ordered morning and night.  This is essential to assess for type and amount of drainage as well as encouraging drainage. Pt. Can bend the elbow as needed to also make it drain more.     Orders for wound care:     Wound Care needs to be done TWICE per day - Morning and night for the left elbow:  Remove the old dressing and note the amount and type of drainage on the dressing - Document it in the flow sheet assessment.  Cleanse the wound with Vashe solution and wipe with gauze to remove the old drainage and wound debris.  Apply a Vashe solution moistened Opticel Ag dressing to the wound and then an ABD pad and wrap with small roll zach and then the ACE bandage. Date / time the dressing.

## 2024-01-08 LAB
BACTERIA SPEC CULT: NORMAL
SERVICE CMNT-IMP: NORMAL

## 2024-01-08 NOTE — PROGRESS NOTES
Hospitalist Progress Note    NAME:   Pepe Latham   : 1940   MRN: [id number]     Date/Time: 2024 9:11 AM  Patient PCP: Weston Pendleton MD    Estimated discharge date:   Barriers: symptom improvement, culture results      Assessment / Plan:    Septic bursitis of left elbow with associated cellulitis  L elbow XR: Broad soft tissue swelling with no other acute abnormality   CRP: 10.5  BC : NGTD  WC : 1+ gram + cocci in pairs, culture pending  - Elevate left upper extremity and apply compression with Ace wrap  - Wound care RN following. WC sent today with wound care  - Orthopedic surgery consulted, recommends continues IV antibiotic and transition to oral with improvement. OP follow up with Dr. Lance within 2 weeks at discharge.  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      Dressing is intact, shadow of drainage noted. Benadryl IV x 1 dose, PO PRN ordered    Discussed with RN events overnight.     HPI  Pepe Latham is a 83 y.o.  male with PMHx as listed below presenting to the emergency department from [facility] with concern for septic 
        Hospitalist Progress Note    NAME:   Pepe Latham   : 1940   MRN: [id number]     Date/Time: 1/3/2024 1:58 PM  Patient PCP: Wetson Pendleton MD    Estimated discharge date: >48 hours  Barriers: ortho recommendation, symptom improvement      Assessment / Plan:    Septic bursitis of left elbow with associated cellulitis  L elbow XR: Broad soft tissue swelling with no other acute abnormality   CRP: 10.5  BC: NGTD  - Elevate left upper extremity and apply compression with Ace wrap  - Wound care  - Orthopedic surgery consulted, greatly appreciate their expertise  - Suspect this is best managed medically  - Continue vancomycin-pharmacy consulted for dosing assistance  - Broaden coverage with addition of ceftriaxone  - Oxycodone as needed pain     Atrial fibrillation on Eliquis  Chronic hypotension  HFrEF-EF 25-30%  Digoxin level: 1.2  Continue PTA Eliquis, Bumex, digoxin, Zetia, metoprolol     MDS  - Continue PTA prednisone     History of prostate cancer  - Continue PTA abaterone     GERD  - Continue PTA Protonix     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     Patient denies pain on elbow, wrap with ace but with notable arm swelling. Instructed to keep it elevated.    Discussed with RN events overnight.     Pepe Latham is a 83 y.o.  male with PMHx as listed below presenting to the emergency department from [facility] with concern for septic bursitis of left elbow.  Patient reports progressive redness and swelling of left forearm with large swollen lump in his left elbow has become progressively more painful ultimately 
        Hospitalist Progress Note    NAME:   Pepe Latham   : 1940   MRN: [id number]     Date/Time: 2024 7:36 AM  Patient PCP: Weston Pendleton MD    Estimated discharge date:  Barriers: culture, symptom improvement      Assessment / Plan:    Septic bursitis of left elbow with associated cellulitis  L elbow XR: Broad soft tissue swelling with no other acute abnormality   CRP: 10.5  BC: NGTD  - Elevate left upper extremity and apply compression with Ace wrap  - Wound care  - Orthopedic surgery consulted, greatly appreciate their expertise  - Suspect this is best managed medically  - Continue vancomycin-pharmacy consulted for dosing assistance  - Broaden coverage with addition of ceftriaxone  - Oxycodone as needed pain     Atrial fibrillation on Eliquis  Chronic hypotension  HFrEF-EF 25-30%  Digoxin level: 1.2  Continue PTA Eliquis, Bumex, digoxin, Zetia, metoprolol     MDS  - Continue PTA prednisone     History of prostate cancer  - Continue PTA abaterone     GERD  - Continue PTA Protonix     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      Left arm elevated on pillow, wrap with ACE bandage, still swollen    Denies fever, chills, nausea, pain    Discussed with RN events overnight.     HPI  Pepe Latham is a 83 y.o.  male with PMHx as listed below presenting to the emergency department from [facility] with concern for septic bursitis of left elbow.  Patient reports progressive redness and swelling of left forearm with large swollen lump in his left elbow has become progressively more painful ultimately ruptured 2-3 days ago 
Bedside report given to Matthieu AYALA Pt's home meds was endorsed to incoming nurse  
End of Shift Note    Bedside shift change report given to KEYLA Brantley (oncoming nurse) by Fabienne Canela RN (offgoing nurse).  Report included the following information SBAR, Kardex, Intake/Output, and MAR    Shift worked:  [id number]     Shift summary and any significant changes:     Patient tolerated care fairly well, complained of shoulder (right & left) pain, 1 dose of PRN Tylenol provided.  All scheduled meds, pt education, and frequent rounding provided.       Concerns for physician to address:       Zone phone for oncoming shift:            Fabienne Canela RN                            
End of Shift Note    Bedside shift change report given to Leeann RAMIRES (oncoming nurse) by Michael Brown RN (offgoing nurse).  Report included the following information SBAR, Kardex, and MAR    Shift worked:  7am-1930     Shift summary and any significant changes:     Prescribed medication done. Pt has wound in his left elbow. Wound was cleaned and new dressing was applied. IV saline locked.           Michael Brown RN                            
End of Shift Note    Bedside shift change report given to RN (oncoming nurse) by Matthieu Regan LPN (offgoing nurse).  Report included the following information SBAR, Kardex, ED Summary, Intake/Output, MAR, and Recent Results    Shift worked:  night     Shift summary and any significant changes:     Pt voiding via urinal  Pt received vancomycin overnight   Pt VSS, A&ox4  L elbow wrapped with ace wrap     Concerns for physician to address:  none     Zone phone for oncoming shift:        Activity:     Number times ambulated in hallways past shift: 0  Number of times OOB to chair past shift: 0    Cardiac:   Cardiac Monitoring: Yes           Access:  Current line(s): PIV     Genitourinary:   Urinary status: voiding    Respiratory:      Chronic home O2 use?: NO  Incentive spirometer at bedside: YES       GI:     Current diet:  ADULT DIET; Regular; 4 carb choices (60 gm/meal); Low Fat/Low Chol/High Fiber/2 gm Na  Passing flatus: YES  Tolerating current diet: YES       Pain Management:   Patient states pain is manageable on current regimen: YES    Skin:     Interventions: float heels, PT/OT consult, limit briefs, internal/external urinary devices, and nutritional support    Patient Safety:  Fall Score:    Interventions: {fall interventions:98943}       Length of Stay:  Expected LOS: 2  Actual LOS: 1      Matthieu Regan LPN                           
End of Shift Note    Bedside shift change report given to lily RAMIRES (oncoming nurse) by Michael Brown RN (offgoing nurse).  Report included the following information SBAR, Kardex, and MAR    Shift worked:  7am-1930pm     Shift summary and any significant changes:     Prescribed medication done.pt is on Iv antibiotics. Pt had bowel movement today. Wound nurse did the dressing an his left elbow wound. Dressing should be done twice daily. For today is should be done before bedtime.No any complain during the shift.             Michael Brown RN                            
I have reviewed discharge instructions with the patient and son. The patient and son verbalized understanding. Discharge medications reviewed with patient and son and appropriate educational materials and side effects teaching were provided. Follow-up appointments reviewed. Opportunity for questions and clarification was provided.  Venous access removed without difficulty.  Patient's belongings gathered and sent with patient. Patient is ready for discharge.     Fabienne Canela RN    
PCP hospital follow-up transitional care appointment has been scheduled with Dr. Weston Pendleton on 1/8/24 1020. CMA left instructions for patient to take discharge papers to appt. Pending patient discharge.  Cece Anderson, Care Management Assistant   
Pharmacy Antimicrobial Kinetic Dosing    Indication for Antimicrobials: SSTI    Current Regimen of Each Antimicrobial:  Vancomycin 500mg IV q8h -  start  day 5  Ceftriaxone 1g IV q24h - start 1/3  day 4    Previous Antimicrobial Therapy:    Goal Level: Vancomycin -600    Date Dose & Interval Measured (mcg/mL) Predicted AUC   1/3/24 500 mg IV q8h 22.8 478   24 05:08 500 mg IV q8h 16.4 459           Significant Cultures:  : blood: ngtd   Wcx - prob staph aureus    Labs:  Recent Labs     Units 24  0508 24  0353 24  0339   CREATININE MG/DL 0.72 0.83 0.84   BUN MG/DL 18 21* 22*   WBC K/uL 7.1 6.8 8.3     Temp (24hrs), Av.1 °F (36.2 °C), Min:94.9 °F (34.9 °C), Max:98.6 °F (37 °C)    Conditions for Dosing Consideration: None    Creatinine Clearance (mL/min): Estimated Creatinine Clearance: 83 mL/min (based on SCr of 0.72 mg/dL).     Impression/Plan:   Afebrile, WBC wnl, Scr stable  Vancomycin has a projected AUC which is therapeutic; continue same regimen  Vancomycin 500mg  5am dose was apparently held; discussed with primary care nurse today; entered a once Vanc 750mg IV to give now and retimed next dose of Vanc to give 6 hours later to keep Vanc level therapeutic  Continue ceftriaxone   Bmp daily  Antimicrobial stop date to be determined     Pharmacy will follow daily and adjust medications as appropriate for renal function and/or serum levels.    Thank you,  HANNA PEREZ, [facility]    
Pharmacy Antimicrobial Kinetic Dosing    Indication for Antimicrobials: SSTI    Current Regimen of Each Antimicrobial:  Vancomycin pharmacy to dose; Start Date ; Day # 3  Ceftriaxone 1g q 24h (start: 1/3,d ay 1)    Previous Antimicrobial Therapy:    Goal Level: Vancomycin -600    Date Dose & Interval Measured (mcg/mL) Predicted AUC   24 Once at 2300     1/3/24 Once at 0422     1/3/24 q8h 22.8 at 0339 on  478     Significant Cultures:   : blood: ngtd    Labs:  Recent Labs     Units 24  0339 24  0224 24  1714   CREATININE MG/DL 0.84 0.83 0.83   BUN MG/DL 22* 23* 24*   WBC K/uL 8.3 10.6 16.0*     Temp (24hrs), Av.3 °F (36.8 °C), Min:98.1 °F (36.7 °C), Max:98.4 °F (36.9 °C)      Conditions for Dosing Consideration: None    Creatinine Clearance (mL/min): Estimated Creatinine Clearance: 71 mL/min (based on SCr of 0.84 mg/dL).       Impression/Plan:   Vancomycin 1750mg load, then 2000g given x 7 hr later, then 500mg q 8h for auc 478  Vancomycin random level on  drawn at 03:39 - 3 hours post dose resulted at 22.8  Will continue 500 mg Q8H dose regimen since level is still in therapeutic range and schedule a trough level on  with AM labs  Continue ceftriaxone as above  Bmp daily  Antimicrobial stop date to be determined     Pharmacy will follow daily and adjust medications as appropriate for renal function and/or serum levels.    Thank you,  Roland Alejo [facility]      
Pharmacy Antimicrobial Kinetic Dosing    Indication for Antimicrobials: ssti     Current Regimen of Each Antimicrobial:  Vancomycin pharmacy to dose; Start Date ; Day # 2  Ceftriaxone 1g q 24h (start: 1/3,d ay 1)    Previous Antimicrobial Therapy:    Goal Level: Vancomycin -600    Date Dose & Interval Measured (mcg/mL) Predicted AUC                       Significant Cultures:   : blood: ngtd    Labs:  Recent Labs     Units 24  0224 24  1714   CREATININE MG/DL 0.83 0.83   BUN MG/DL 23* 24*   WBC K/uL 10.6 16.0*     Temp (24hrs), Av.4 °F (36.9 °C), Min:97.9 °F (36.6 °C), Max:99 °F (37.2 °C)      Conditions for Dosing Consideration: None    Creatinine Clearance (mL/min): Estimated Creatinine Clearance: 72 mL/min (based on SCr of 0.83 mg/dL).       Impression/Plan:   Vancomycin 1750mg load, then 2000g given x 7 hr later, then 500mg q 8h for auc 471  Vancomycin random level on  with am labs  Continue ceftriaxone as above  Bmp daily  Antimicrobial stop date to be determined     Pharmacy will follow daily and adjust medications as appropriate for renal function and/or serum levels.    Thank you,  Jaden Keyes RPH    
Physical Therapy    Pt seen for eval. Overall min A of 1 for bed mobility, CGA to stand. Side stepped at edge of bed with the rolling walker with CGA to min A. He used bed behind him for some support. VSS. Pt will benefit from HHPT at d/c. Full report to follow.    Rupa Noble, PT  
TRANSFER - IN REPORT:    Verbal report received from Wendi  on Pepe Latham  being received from ER for routine progression of patient care      Report consisted of patient's Situation, Background, Assessment and   Recommendations(SBAR).     Information from the following report(s) ED Encounter Summary, ED SBAR, MAR, and Cardiac Rhythm aFIB  was reviewed with the receiving nurse.    Opportunity for questions and clarification was provided.      Assessment completed upon patient's arrival to unit and care assumed.     
cultures  YES  Reviewed most current radiology test results   YES  Review and summation of old records today    NO  Reviewed patient's current orders and MAR    YES  PMH/SH reviewed - no change compared to H&P    Procedures: see electronic medical records for all procedures/Xrays and details which were not copied into this note but were reviewed prior to creation of Plan.      LABS:  I reviewed today's most current labs and imaging studies.  Pertinent labs include:  Recent Labs     01/03/24 0224 01/04/24 0339 01/05/24  0353   WBC 10.6 8.3 6.8   HGB 10.2* 8.6* 8.8*   HCT 31.5* 27.0* 27.5*    264 294     Recent Labs     01/02/24  1714 01/03/24 0224 01/04/24 0339 01/05/24  0353    139 138 139   K 4.8 4.2 3.7 3.7    105 105 104   CO2 31 27 29 30   GLUCOSE 103* 123* 143* 133*   BUN 24* 23* 22* 21*   CREATININE 0.83 0.83 0.84 0.83   CALCIUM 9.4 9.0 8.7 8.4*   LABALBU 2.9*  --   --   --    BILITOT 1.0  --   --   --    AST 13*  --   --   --    ALT 20  --   --   --        Signed: AVTAR Fisher - BETTINA

## 2024-01-16 ENCOUNTER — TELEPHONE (OUTPATIENT)
Age: 84
End: 2024-01-16

## 2024-01-16 NOTE — TELEPHONE ENCOUNTER
Pt daughter called, wanted to know if pt should come in sooner. Pt has gone downhill the past couple days and has no strength at all.

## 2024-01-16 NOTE — TELEPHONE ENCOUNTER
Returned call to daughter.  HIPAA verified by two patient identifiers.   Will inquire with MD when we should see him.

## 2024-01-19 ENCOUNTER — OFFICE VISIT (OUTPATIENT)
Age: 84
End: 2024-01-19
Payer: MEDICARE

## 2024-01-19 VITALS
DIASTOLIC BLOOD PRESSURE: 102 MMHG | BODY MASS INDEX: 23.98 KG/M2 | TEMPERATURE: 97.4 F | SYSTOLIC BLOOD PRESSURE: 148 MMHG | OXYGEN SATURATION: 96 % | HEIGHT: 70 IN | HEART RATE: 77 BPM

## 2024-01-19 DIAGNOSIS — C61 PROSTATE CANCER METASTATIC TO BONE (HCC): Primary | ICD-10-CM

## 2024-01-19 DIAGNOSIS — G89.3 CANCER RELATED PAIN: ICD-10-CM

## 2024-01-19 DIAGNOSIS — C79.51 PROSTATE CANCER METASTATIC TO BONE (HCC): Primary | ICD-10-CM

## 2024-01-19 PROCEDURE — 1036F TOBACCO NON-USER: CPT | Performed by: INTERNAL MEDICINE

## 2024-01-19 PROCEDURE — G8420 CALC BMI NORM PARAMETERS: HCPCS | Performed by: INTERNAL MEDICINE

## 2024-01-19 PROCEDURE — G8484 FLU IMMUNIZE NO ADMIN: HCPCS | Performed by: INTERNAL MEDICINE

## 2024-01-19 PROCEDURE — 99214 OFFICE O/P EST MOD 30 MIN: CPT | Performed by: INTERNAL MEDICINE

## 2024-01-19 PROCEDURE — G8427 DOCREV CUR MEDS BY ELIG CLIN: HCPCS | Performed by: INTERNAL MEDICINE

## 2024-01-19 PROCEDURE — 1111F DSCHRG MED/CURRENT MED MERGE: CPT | Performed by: INTERNAL MEDICINE

## 2024-01-19 PROCEDURE — 1123F ACP DISCUSS/DSCN MKR DOCD: CPT | Performed by: INTERNAL MEDICINE

## 2024-01-19 NOTE — PROGRESS NOTES
Pepe Latham is a 83 y.o. male here for follow up appt for met prostate cancer.  Pt takes Abiraterone.   Pt presents in wheelchair.  Pt extremely weak and been going down hill shortly after hospital visit.  Appt Feb 19th for vascular testing with cardiologist.   Will be getting [facility] on March 15th.  CT on March 4th.   March 8th cardioversion.   Next week will see hand doctor.    1. Have you been to the ER, urgent care clinic since your last visit?  Hospitalized since your last visit?     1/2/24 - 1/724 Septic bursitis of left elbow with associated cellulitis      2. Have you seen or consulted any other health care providers outside of the [facility] since your last visit?  Include any pap smears or colon screening. Podiatrist/cardiologist  
prostate, nonspecific but compatible with  neoplasia.  No avid nodes.  Liver, spleen, urinary tract, bowel and ganglia demonstrate physiologic uptake.      SKELETAL:  There are innumerable bone metastases.    Impression  1. Prostate uptake compatible with neoplasia.  2. Innumerable prostatic bone metastases.  3. Non prostatic intrathoracic neoplasia questioned, as discussed.      Lab Results   Component Value Date    WBC 7.8 01/07/2024    HGB 9.3 (L) 01/07/2024    HCT 29.0 (L) 01/07/2024    .5 (H) 01/07/2024     01/07/2024       Lab Results   Component Value Date     01/07/2024    K 3.3 (L) 01/07/2024     01/07/2024    CO2 32 01/07/2024    BUN 22 (H) 01/07/2024    CREATININE 0.81 01/07/2024    GLUCOSE 136 (H) 01/07/2024    CALCIUM 9.2 01/07/2024    PROT 6.9 01/02/2024    LABALBU 2.9 (L) 01/02/2024    BILITOT 1.0 01/02/2024    ALKPHOS 216 (H) 01/02/2024    AST 13 (L) 01/02/2024    ALT 20 01/02/2024    LABGLOM >60 01/07/2024    AGRATIO 0.7 (L) 01/02/2024    GLOB 4.0 01/02/2024       Lab Results   Component Value Date    PSA 0.5 11/10/2023             Assessment:     1. 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     ECOG PS 1  Intent of therapy - palliative  Prognosis: Guarded    The standard of care for the treatment of mHSPC is ADT + NHA +/- systemic chemotherapy.     He is too frail to receive systemic chemotherapy. Thus I did not offer him Taxotere.     Currently on therapy  ADT - Lupron  Abiraterone/Prednisone     Tolerating treatment very well  Denies any side effects.   A detailed system by system evaluation of side effect was performed to assess adverse events.   Blood counts are acceptable. Results reviewed with the patient  PSA is low.      Hormonal administration is associated with myriad of side effects and would be considered high risk medication.        2. Cancer related pain    Oxycodone for pain control  Bowel regimen  Palliative care consult        Plan:       > Continue

## 2024-01-29 ENCOUNTER — HOSPITAL ENCOUNTER (INPATIENT)
Facility: HOSPITAL | Age: 84
LOS: 7 days | Discharge: NURSING, REHAB OR LONG-TERM CARE | DRG: 871 | End: 2024-02-05
Attending: STUDENT IN AN ORGANIZED HEALTH CARE EDUCATION/TRAINING PROGRAM | Admitting: INTERNAL MEDICINE
Payer: MEDICARE

## 2024-01-29 ENCOUNTER — APPOINTMENT (OUTPATIENT)
Facility: HOSPITAL | Age: 84
DRG: 871 | End: 2024-01-29
Payer: MEDICARE

## 2024-01-29 DIAGNOSIS — A41.9 SEVERE SEPSIS (HCC): Primary | ICD-10-CM

## 2024-01-29 DIAGNOSIS — R41.82 ALTERED MENTAL STATUS, UNSPECIFIED ALTERED MENTAL STATUS TYPE: ICD-10-CM

## 2024-01-29 DIAGNOSIS — R65.20 SEVERE SEPSIS (HCC): Primary | ICD-10-CM

## 2024-01-29 DIAGNOSIS — N30.01 ACUTE CYSTITIS WITH HEMATURIA: ICD-10-CM

## 2024-01-29 LAB
ALBUMIN SERPL-MCNC: 3.3 G/DL (ref 3.5–5)
ALBUMIN/GLOB SERPL: 0.9 (ref 1.1–2.2)
ALP SERPL-CCNC: 176 U/L (ref 45–117)
ALT SERPL-CCNC: 16 U/L (ref 12–78)
ANION GAP BLD CALC-SCNC: 16 (ref 10–20)
ANION GAP SERPL CALC-SCNC: 8 MMOL/L (ref 5–15)
APPEARANCE UR: ABNORMAL
AST SERPL-CCNC: 8 U/L (ref 15–37)
BACTERIA URNS QL MICRO: ABNORMAL /HPF
BASE DEFICIT BLD-SCNC: 5.4 MMOL/L
BASOPHILS # BLD: 0.2 K/UL (ref 0–0.1)
BASOPHILS NFR BLD: 1 % (ref 0–1)
BILIRUB SERPL-MCNC: 1.7 MG/DL (ref 0.2–1)
BILIRUB UR QL: NEGATIVE
BUN SERPL-MCNC: 26 MG/DL (ref 6–20)
BUN/CREAT SERPL: 17 (ref 12–20)
CA-I BLD-MCNC: 1.21 MMOL/L (ref 1.12–1.32)
CALCIUM SERPL-MCNC: 9.5 MG/DL (ref 8.5–10.1)
CHLORIDE BLD-SCNC: 105 MMOL/L (ref 100–108)
CHLORIDE SERPL-SCNC: 107 MMOL/L (ref 97–108)
CK SERPL-CCNC: 17 U/L (ref 39–308)
CO2 BLD-SCNC: 21 MMOL/L (ref 19–24)
CO2 SERPL-SCNC: 26 MMOL/L (ref 21–32)
COLOR UR: ABNORMAL
CREAT SERPL-MCNC: 1.56 MG/DL (ref 0.7–1.3)
CREAT UR-MCNC: 1.2 MG/DL (ref 0.6–1.3)
DIFFERENTIAL METHOD BLD: ABNORMAL
DIGOXIN SERPL-MCNC: 1 NG/ML (ref 0.9–2)
EKG ATRIAL RATE: 147 BPM
EKG DIAGNOSIS: NORMAL
EKG Q-T INTERVAL: 326 MS
EKG QRS DURATION: 92 MS
EKG QTC CALCULATION (BAZETT): 481 MS
EKG R AXIS: -9 DEGREES
EKG T AXIS: -58 DEGREES
EKG VENTRICULAR RATE: 131 BPM
EOSINOPHIL # BLD: 0 K/UL (ref 0–0.4)
EOSINOPHIL NFR BLD: 0 % (ref 0–7)
EPITH CASTS URNS QL MICRO: ABNORMAL /LPF
ERYTHROCYTE [DISTWIDTH] IN BLOOD BY AUTOMATED COUNT: 18.8 % (ref 11.5–14.5)
GLOBULIN SER CALC-MCNC: 3.5 G/DL (ref 2–4)
GLUCOSE BLD STRIP.AUTO-MCNC: 127 MG/DL (ref 65–117)
GLUCOSE BLD STRIP.AUTO-MCNC: 327 MG/DL (ref 74–106)
GLUCOSE SERPL-MCNC: 219 MG/DL (ref 65–100)
GLUCOSE UR STRIP.AUTO-MCNC: NEGATIVE MG/DL
HCO3 BLDA-SCNC: 21 MMOL/L
HCT VFR BLD AUTO: 36.1 % (ref 36.6–50.3)
HGB BLD-MCNC: 11.4 G/DL (ref 12.1–17)
HGB UR QL STRIP: ABNORMAL
IMM GRANULOCYTES # BLD AUTO: 0.1 K/UL (ref 0–0.04)
IMM GRANULOCYTES NFR BLD AUTO: 1 % (ref 0–0.5)
KETONES UR QL STRIP.AUTO: NEGATIVE MG/DL
LACTATE BLD-SCNC: 11.41 MMOL/L (ref 0.4–2)
LACTATE BLD-SCNC: 2.75 MMOL/L (ref 0.4–2)
LACTATE BLD-SCNC: 5.54 MMOL/L (ref 0.4–2)
LEUKOCYTE ESTERASE UR QL STRIP.AUTO: ABNORMAL
LYMPHOCYTES # BLD: 3.3 K/UL (ref 0.8–3.5)
LYMPHOCYTES NFR BLD: 15 % (ref 12–49)
MCH RBC QN AUTO: 33 PG (ref 26–34)
MCHC RBC AUTO-ENTMCNC: 31.6 G/DL (ref 30–36.5)
MCV RBC AUTO: 104.6 FL (ref 80–99)
MONOCYTES # BLD: 1.3 K/UL (ref 0–1)
MONOCYTES NFR BLD: 6 % (ref 5–13)
MUCOUS THREADS URNS QL MICRO: ABNORMAL /LPF
NEUTS SEG # BLD: 17.7 K/UL (ref 1.8–8)
NEUTS SEG NFR BLD: 77 % (ref 32–75)
NITRITE UR QL STRIP.AUTO: POSITIVE
NRBC # BLD: 0.16 K/UL (ref 0–0.01)
NRBC BLD-RTO: 0.7 PER 100 WBC
NT PRO BNP: 3326 PG/ML
PCO2 BLDV: 43.7 MMHG (ref 41–51)
PH BLDV: 7.29 (ref 7.32–7.42)
PH UR STRIP: 6.5 (ref 5–8)
PLATELET # BLD AUTO: 402 K/UL (ref 150–400)
PMV BLD AUTO: 12.9 FL (ref 8.9–12.9)
PO2 BLDV: <27 MMHG (ref 25–40)
POTASSIUM BLD-SCNC: 4.1 MMOL/L (ref 3.5–5.5)
POTASSIUM SERPL-SCNC: 3.7 MMOL/L (ref 3.5–5.1)
PROCALCITONIN SERPL-MCNC: 0.22 NG/ML
PROT SERPL-MCNC: 6.8 G/DL (ref 6.4–8.2)
PROT UR STRIP-MCNC: >300 MG/DL
RBC # BLD AUTO: 3.45 M/UL (ref 4.1–5.7)
RBC #/AREA URNS HPF: ABNORMAL /HPF (ref 0–5)
SARS-COV-2 RDRP RESP QL NAA+PROBE: NOT DETECTED
SERVICE CMNT-IMP: ABNORMAL
SERVICE CMNT-IMP: ABNORMAL
SODIUM BLD-SCNC: 142 MMOL/L (ref 136–145)
SODIUM SERPL-SCNC: 141 MMOL/L (ref 136–145)
SOURCE: NORMAL
SP GR UR REFRACTOMETRY: 1.02
SPECIMEN SITE: ABNORMAL
TROPONIN I SERPL HS-MCNC: 38 NG/L (ref 0–76)
TSH SERPL DL<=0.05 MIU/L-ACNC: 2.83 UIU/ML (ref 0.36–3.74)
URINE CULTURE IF INDICATED: ABNORMAL
UROBILINOGEN UR QL STRIP.AUTO: 0.2 EU/DL (ref 0.2–1)
WBC # BLD AUTO: 22.6 K/UL (ref 4.1–11.1)
WBC URNS QL MICRO: >100 /HPF (ref 0–4)

## 2024-01-29 PROCEDURE — 87635 SARS-COV-2 COVID-19 AMP PRB: CPT

## 2024-01-29 PROCEDURE — 75635 CT ANGIO ABDOMINAL ARTERIES: CPT

## 2024-01-29 PROCEDURE — 81001 URINALYSIS AUTO W/SCOPE: CPT

## 2024-01-29 PROCEDURE — 6360000002 HC RX W HCPCS: Performed by: STUDENT IN AN ORGANIZED HEALTH CARE EDUCATION/TRAINING PROGRAM

## 2024-01-29 PROCEDURE — 6360000002 HC RX W HCPCS: Performed by: INTERNAL MEDICINE

## 2024-01-29 PROCEDURE — 82962 GLUCOSE BLOOD TEST: CPT

## 2024-01-29 PROCEDURE — 87154 CUL TYP ID BLD PTHGN 6+ TRGT: CPT

## 2024-01-29 PROCEDURE — 71045 X-RAY EXAM CHEST 1 VIEW: CPT

## 2024-01-29 PROCEDURE — 84145 PROCALCITONIN (PCT): CPT

## 2024-01-29 PROCEDURE — 87186 SC STD MICRODIL/AGAR DIL: CPT

## 2024-01-29 PROCEDURE — 83880 ASSAY OF NATRIURETIC PEPTIDE: CPT

## 2024-01-29 PROCEDURE — 84443 ASSAY THYROID STIM HORMONE: CPT

## 2024-01-29 PROCEDURE — 82330 ASSAY OF CALCIUM: CPT

## 2024-01-29 PROCEDURE — 82550 ASSAY OF CK (CPK): CPT

## 2024-01-29 PROCEDURE — 87086 URINE CULTURE/COLONY COUNT: CPT

## 2024-01-29 PROCEDURE — 6360000004 HC RX CONTRAST MEDICATION: Performed by: STUDENT IN AN ORGANIZED HEALTH CARE EDUCATION/TRAINING PROGRAM

## 2024-01-29 PROCEDURE — 70450 CT HEAD/BRAIN W/O DYE: CPT

## 2024-01-29 PROCEDURE — 82947 ASSAY GLUCOSE BLOOD QUANT: CPT

## 2024-01-29 PROCEDURE — 2580000003 HC RX 258: Performed by: INTERNAL MEDICINE

## 2024-01-29 PROCEDURE — 6370000000 HC RX 637 (ALT 250 FOR IP): Performed by: INTERNAL MEDICINE

## 2024-01-29 PROCEDURE — 84132 ASSAY OF SERUM POTASSIUM: CPT

## 2024-01-29 PROCEDURE — 2060000000 HC ICU INTERMEDIATE R&B

## 2024-01-29 PROCEDURE — 96365 THER/PROPH/DIAG IV INF INIT: CPT

## 2024-01-29 PROCEDURE — 80162 ASSAY OF DIGOXIN TOTAL: CPT

## 2024-01-29 PROCEDURE — 84295 ASSAY OF SERUM SODIUM: CPT

## 2024-01-29 PROCEDURE — 36415 COLL VENOUS BLD VENIPUNCTURE: CPT

## 2024-01-29 PROCEDURE — 96366 THER/PROPH/DIAG IV INF ADDON: CPT

## 2024-01-29 PROCEDURE — 82803 BLOOD GASES ANY COMBINATION: CPT

## 2024-01-29 PROCEDURE — 85025 COMPLETE CBC W/AUTO DIFF WBC: CPT

## 2024-01-29 PROCEDURE — 83605 ASSAY OF LACTIC ACID: CPT

## 2024-01-29 PROCEDURE — 84484 ASSAY OF TROPONIN QUANT: CPT

## 2024-01-29 PROCEDURE — 80053 COMPREHEN METABOLIC PANEL: CPT

## 2024-01-29 PROCEDURE — 87077 CULTURE AEROBIC IDENTIFY: CPT

## 2024-01-29 PROCEDURE — 99285 EMERGENCY DEPT VISIT HI MDM: CPT

## 2024-01-29 PROCEDURE — 2580000003 HC RX 258: Performed by: STUDENT IN AN ORGANIZED HEALTH CARE EDUCATION/TRAINING PROGRAM

## 2024-01-29 PROCEDURE — 87040 BLOOD CULTURE FOR BACTERIA: CPT

## 2024-01-29 RX ORDER — POTASSIUM CHLORIDE 7.45 MG/ML
10 INJECTION INTRAVENOUS PRN
Status: DISCONTINUED | OUTPATIENT
Start: 2024-01-29 | End: 2024-01-30

## 2024-01-29 RX ORDER — POLYETHYLENE GLYCOL 3350 17 G/17G
17 POWDER, FOR SOLUTION ORAL DAILY PRN
Status: DISCONTINUED | OUTPATIENT
Start: 2024-01-29 | End: 2024-02-05 | Stop reason: HOSPADM

## 2024-01-29 RX ORDER — POTASSIUM CHLORIDE 1.5 G/1.58G
40 POWDER, FOR SOLUTION ORAL PRN
Status: DISCONTINUED | OUTPATIENT
Start: 2024-01-29 | End: 2024-01-30

## 2024-01-29 RX ORDER — ONDANSETRON 2 MG/ML
4 INJECTION INTRAMUSCULAR; INTRAVENOUS EVERY 6 HOURS PRN
Status: DISCONTINUED | OUTPATIENT
Start: 2024-01-29 | End: 2024-02-05 | Stop reason: HOSPADM

## 2024-01-29 RX ORDER — ABIRATERONE ACETATE 250 MG/1
1000 TABLET ORAL DAILY
Status: DISCONTINUED | OUTPATIENT
Start: 2024-01-30 | End: 2024-01-30

## 2024-01-29 RX ORDER — ACETAMINOPHEN 325 MG/1
650 TABLET ORAL EVERY 6 HOURS PRN
Status: DISCONTINUED | OUTPATIENT
Start: 2024-01-29 | End: 2024-02-05 | Stop reason: HOSPADM

## 2024-01-29 RX ORDER — SODIUM CHLORIDE 0.9 % (FLUSH) 0.9 %
5-40 SYRINGE (ML) INJECTION PRN
Status: DISCONTINUED | OUTPATIENT
Start: 2024-01-29 | End: 2024-02-05 | Stop reason: HOSPADM

## 2024-01-29 RX ORDER — ACETAMINOPHEN 650 MG/1
650 SUPPOSITORY RECTAL EVERY 6 HOURS PRN
Status: DISCONTINUED | OUTPATIENT
Start: 2024-01-29 | End: 2024-02-05 | Stop reason: HOSPADM

## 2024-01-29 RX ORDER — SERTRALINE HYDROCHLORIDE 25 MG/1
25 TABLET, FILM COATED ORAL DAILY
Status: DISCONTINUED | OUTPATIENT
Start: 2024-01-30 | End: 2024-02-05 | Stop reason: HOSPADM

## 2024-01-29 RX ORDER — POTASSIUM CHLORIDE 20 MEQ/1
40 TABLET, EXTENDED RELEASE ORAL PRN
Status: DISCONTINUED | OUTPATIENT
Start: 2024-01-29 | End: 2024-01-30

## 2024-01-29 RX ORDER — EZETIMIBE 10 MG/1
10 TABLET ORAL DAILY
Status: DISCONTINUED | OUTPATIENT
Start: 2024-01-29 | End: 2024-02-05 | Stop reason: HOSPADM

## 2024-01-29 RX ORDER — ENOXAPARIN SODIUM 100 MG/ML
40 INJECTION SUBCUTANEOUS EVERY 24 HOURS
Status: DISCONTINUED | OUTPATIENT
Start: 2024-01-29 | End: 2024-01-29

## 2024-01-29 RX ORDER — ASCORBIC ACID 500 MG
500 TABLET ORAL 2 TIMES DAILY
Status: DISCONTINUED | OUTPATIENT
Start: 2024-01-29 | End: 2024-02-05 | Stop reason: HOSPADM

## 2024-01-29 RX ORDER — PANTOPRAZOLE SODIUM 40 MG/1
40 TABLET, DELAYED RELEASE ORAL
Status: DISCONTINUED | OUTPATIENT
Start: 2024-01-30 | End: 2024-02-05 | Stop reason: HOSPADM

## 2024-01-29 RX ORDER — INSULIN LISPRO 100 [IU]/ML
0-8 INJECTION, SOLUTION INTRAVENOUS; SUBCUTANEOUS EVERY 6 HOURS
Status: DISCONTINUED | OUTPATIENT
Start: 2024-01-30 | End: 2024-02-01

## 2024-01-29 RX ORDER — 0.9 % SODIUM CHLORIDE 0.9 %
250 INTRAVENOUS SOLUTION INTRAVENOUS ONCE
Status: COMPLETED | OUTPATIENT
Start: 2024-01-29 | End: 2024-01-29

## 2024-01-29 RX ORDER — PREDNISONE 5 MG/1
5 TABLET ORAL 2 TIMES DAILY
Status: DISCONTINUED | OUTPATIENT
Start: 2024-01-29 | End: 2024-02-05 | Stop reason: HOSPADM

## 2024-01-29 RX ORDER — SODIUM CHLORIDE 9 MG/ML
INJECTION, SOLUTION INTRAVENOUS CONTINUOUS
Status: DISCONTINUED | OUTPATIENT
Start: 2024-01-29 | End: 2024-01-30

## 2024-01-29 RX ORDER — SODIUM CHLORIDE 9 MG/ML
INJECTION, SOLUTION INTRAVENOUS PRN
Status: DISCONTINUED | OUTPATIENT
Start: 2024-01-29 | End: 2024-02-05 | Stop reason: HOSPADM

## 2024-01-29 RX ORDER — CASTOR OIL AND BALSAM, PERU 788; 87 MG/G; MG/G
OINTMENT TOPICAL 2 TIMES DAILY
Status: DISCONTINUED | OUTPATIENT
Start: 2024-01-29 | End: 2024-02-05 | Stop reason: HOSPADM

## 2024-01-29 RX ORDER — SODIUM CHLORIDE 0.9 % (FLUSH) 0.9 %
5-40 SYRINGE (ML) INJECTION EVERY 12 HOURS SCHEDULED
Status: DISCONTINUED | OUTPATIENT
Start: 2024-01-29 | End: 2024-02-05 | Stop reason: HOSPADM

## 2024-01-29 RX ORDER — DEXTROSE MONOHYDRATE 100 MG/ML
INJECTION, SOLUTION INTRAVENOUS CONTINUOUS PRN
Status: DISCONTINUED | OUTPATIENT
Start: 2024-01-29 | End: 2024-02-05 | Stop reason: HOSPADM

## 2024-01-29 RX ORDER — ZINC SULFATE 50(220)MG
50 CAPSULE ORAL DAILY
Status: DISCONTINUED | OUTPATIENT
Start: 2024-01-30 | End: 2024-02-05 | Stop reason: HOSPADM

## 2024-01-29 RX ORDER — MIDODRINE HYDROCHLORIDE 5 MG/1
10 TABLET ORAL
Status: DISCONTINUED | OUTPATIENT
Start: 2024-01-29 | End: 2024-02-05 | Stop reason: HOSPADM

## 2024-01-29 RX ORDER — ONDANSETRON 4 MG/1
4 TABLET, ORALLY DISINTEGRATING ORAL EVERY 8 HOURS PRN
Status: DISCONTINUED | OUTPATIENT
Start: 2024-01-29 | End: 2024-02-05 | Stop reason: HOSPADM

## 2024-01-29 RX ORDER — DIGOXIN 125 MCG
125 TABLET ORAL DAILY
Status: DISCONTINUED | OUTPATIENT
Start: 2024-01-30 | End: 2024-02-05 | Stop reason: HOSPADM

## 2024-01-29 RX ORDER — GLUCAGON 1 MG/ML
1 KIT INJECTION PRN
Status: DISCONTINUED | OUTPATIENT
Start: 2024-01-29 | End: 2024-02-05 | Stop reason: HOSPADM

## 2024-01-29 RX ORDER — MAGNESIUM SULFATE IN WATER 40 MG/ML
2000 INJECTION, SOLUTION INTRAVENOUS PRN
Status: DISCONTINUED | OUTPATIENT
Start: 2024-01-29 | End: 2024-01-30

## 2024-01-29 RX ADMIN — PIPERACILLIN AND TAZOBACTAM 3375 MG: 3; .375 INJECTION, POWDER, FOR SOLUTION INTRAVENOUS; PARENTERAL at 18:15

## 2024-01-29 RX ADMIN — IOPAMIDOL 100 ML: 755 INJECTION, SOLUTION INTRAVENOUS at 14:23

## 2024-01-29 RX ADMIN — VANCOMYCIN HYDROCHLORIDE 2000 MG: 10 INJECTION, POWDER, LYOPHILIZED, FOR SOLUTION INTRAVENOUS at 14:43

## 2024-01-29 RX ADMIN — SODIUM CHLORIDE: 9 INJECTION, SOLUTION INTRAVENOUS at 18:13

## 2024-01-29 RX ADMIN — ENOXAPARIN SODIUM 40 MG: 100 INJECTION SUBCUTANEOUS at 18:14

## 2024-01-29 RX ADMIN — SODIUM CHLORIDE, PRESERVATIVE FREE 10 ML: 5 INJECTION INTRAVENOUS at 21:28

## 2024-01-29 RX ADMIN — SODIUM CHLORIDE 250 ML: 9 INJECTION, SOLUTION INTRAVENOUS at 12:26

## 2024-01-29 RX ADMIN — Medication: at 21:29

## 2024-01-29 RX ADMIN — PIPERACILLIN AND TAZOBACTAM 4500 MG: 4; .5 INJECTION, POWDER, LYOPHILIZED, FOR SOLUTION INTRAVENOUS at 12:27

## 2024-01-29 NOTE — ED NOTES
TRANSFER - OUT REPORT:    Verbal report given to Sofia Latham  being transferred to [facility] for routine progression of patient care       Report consisted of patient's Situation, Background, Assessment and   Recommendations(SBAR).     Information from the following report(s) Nurse Handoff Report, Index, ED SBAR, Intake/Output, MAR, and Cardiac Rhythm afib  was reviewed with the receiving nurse.    [place] Fall Assessment:    Presents to emergency department  because of falls (Syncope, seizure, or loss of consciousness): Yes  Age > 70: Yes  Altered Mental Status, Intoxication with alcohol or substance confusion (Disorientation, impaired judgment, poor safety awaremess, or inability to follow instructions): Yes  Impaired Mobility: Ambulates or transfers with assistive devices or assistance; Unable to ambulate or transer.: Yes  Nursing Judgement: Yes          Lines:   Peripheral IV 01/29/24 Left Antecubital (Active)       Peripheral IV 01/29/24 Left Cephalic (Active)        Opportunity for questions and clarification was provided.      Patient transported with:  Monitor and Tech

## 2024-01-29 NOTE — PROGRESS NOTES
End of Shift Note    Bedside shift change report given to KEYLA Hoover (oncoming nurse) by Sofia Sequeira RN (offgoing nurse).  Report included the following information SBAR, Kardex, and MAR    Shift worked:  days     Shift summary and any significant changes:     Pt transferred from ED for sepsis. Pt in controlled afib. RA. On continuous NS IVF at 75 ml/hr. Wound care consulted for vascular discoloration on the top of pt's feet and stage 1 or fissure on sacrum. See below for wound photos. It's very red. Venelex ordered. Pt's wife states she has pt's chemo med at bedside. Pt NPO due to failing swallow screening.      Concerns for physician to address:  -     Zone phone for oncoming shift:   -         Length of Stay:  Expected LOS: 3  Actual LOS: 0      Sofia Sequeira, RN

## 2024-01-29 NOTE — H&P
Hospitalist Admission Note    NAME:   Pepe Latham   : 1940   MRN: [id number]     Date/Time: 2024 6:36 PM    Patient PCP: Weston Pendleton MD    ______________________________________________________________________  Given the patient's current clinical presentation, I have a high level of concern for decompensation if discharged from the emergency department.  Complex decision making was performed, which includes reviewing the patient's available past medical records, laboratory results, and x-ray films.       My assessment of this patient's clinical condition and my plan of care is as follows.    Assessment / Plan:  Severe sepsis:  Syncope  Acute metabolic encephalopathy  Acute UTI  S/p zosyn in ED  Continue zosyn and vancomycin  Will follow urine culture  Will follow blood culture      IMTIAZ:  S/p fluid bolus  Start NS at 75cc/hour given hx of CHF  Will monitor creatinine    Atrial fibrillation  Continue metoprolol and eliquis  Will follow digoxin level  Hold digoxin until levels are back    Chronic hypotension:  Continue midodrine        CHF:  EF 25-30% on ECHO 10/2/23  Cautious use of iv fluids     Elevated bilirubin:  Will monitor      Hx of prostate cancer  MDS  Continue PTA prednisone  Continue PTA abaterone              Medical Decision Making:   I personally reviewed labs: cbc- leukocytosis, BMP: elevated creatinine, elevated lactic acid  I personally reviewed imaging:CT abdomen and pelvis  I personally reviewed EKG:  Toxic drug monitoring:   Discussed case with: ED provider. After discussion I am in agreement that acuity of patient's medical condition necessitates hospital stay.      Code Status: DNR  DVT Prophylaxis: eliquis  Baseline:     Subjective:   CHIEF COMPLAINT: generalized weakness.    HISTORY OF PRESENT ILLNESS:     Pepe Latham is a 83 y.o.  male presented to ED with c/o generalized weakness.  Patient is poor historian.  Wife states that patient went to take shower.

## 2024-01-29 NOTE — ED PROVIDER NOTES
Cedrick Munguia MD  [SP] Cornelio Saldana MD       Disposition Considerations (Tests not done, Shared Decision Making, Pt Expectation of Test or Tx.):     FINAL IMPRESSION     1. Severe sepsis (HCC)          DISPOSITION/PLAN   DISPOSITION        Admit Note: Pt is being admitted by hospitalist. The results of their tests and reason(s) for their admission have been discussed with pt and/or available family. They convey agreement and understanding for the need to be admitted and for the admission diagnosis.     PATIENT REFERRED TO:  No follow-up provider specified.       DISCHARGE MEDICATIONS:     Medication List        ASK your doctor about these medications      abiraterone acetate 250 MG tablet  Commonly known as: Zytiga  Take 4 tablets by mouth daily     ascorbic acid 500 MG tablet  Commonly known as: VITAMIN C     bumetanide 1 MG tablet  Commonly known as: BUMEX     dapagliflozin 10 MG tablet  Commonly known as: FARXIGA     digoxin 125 MCG tablet  Commonly known as: LANOXIN     Eliquis 5 MG Tabs tablet  Generic drug: apixaban     ezetimibe 10 MG tablet  Commonly known as: ZETIA     metoprolol succinate 25 MG extended release tablet  Commonly known as: TOPROL XL  Take 3 tablets by mouth daily     midodrine 10 MG tablet  Commonly known as: PROAMATINE  Take 1 tablet by mouth every 8 (eight) hours     mupirocin 2 % ointment  Commonly known as: BACTROBAN  Apply topically 3 times daily.     omeprazole 20 MG delayed release capsule  Commonly known as: PRILOSEC     potassium chloride 20 MEQ extended release tablet  Commonly known as: KLOR-CON M     predniSONE 5 MG tablet  Commonly known as: DELTASONE  Take 1 tablet by mouth 2 times daily     sertraline 25 MG tablet  Commonly known as: Zoloft  Take 1 tablet by mouth daily     zinc sulfate 220 (50 Zn)  mg capsule - elemental zinc  Commonly known as: ZINCATE                DISCONTINUED MEDICATIONS:  Current Discharge Medication List          I am the Primary

## 2024-01-30 LAB
ALBUMIN SERPL-MCNC: 2.7 G/DL (ref 3.5–5)
ALBUMIN/GLOB SERPL: 0.9 (ref 1.1–2.2)
ALP SERPL-CCNC: 132 U/L (ref 45–117)
ALT SERPL-CCNC: 12 U/L (ref 12–78)
ANION GAP SERPL CALC-SCNC: 5 MMOL/L (ref 5–15)
AST SERPL-CCNC: 12 U/L (ref 15–37)
BASOPHILS # BLD: 0.2 K/UL (ref 0–0.1)
BASOPHILS NFR BLD: 1 % (ref 0–1)
BILIRUB DIRECT SERPL-MCNC: 0.3 MG/DL (ref 0–0.2)
BILIRUB SERPL-MCNC: 1.1 MG/DL (ref 0.2–1)
BUN SERPL-MCNC: 28 MG/DL (ref 6–20)
BUN/CREAT SERPL: 21 (ref 12–20)
CALCIUM SERPL-MCNC: 8.6 MG/DL (ref 8.5–10.1)
CHLORIDE SERPL-SCNC: 113 MMOL/L (ref 97–108)
CO2 SERPL-SCNC: 27 MMOL/L (ref 21–32)
CREAT SERPL-MCNC: 1.34 MG/DL (ref 0.7–1.3)
DIFFERENTIAL METHOD BLD: ABNORMAL
DIGOXIN SERPL-MCNC: 1.1 NG/ML (ref 0.9–2)
EOSINOPHIL # BLD: 0 K/UL (ref 0–0.4)
EOSINOPHIL NFR BLD: 0 % (ref 0–7)
ERYTHROCYTE [DISTWIDTH] IN BLOOD BY AUTOMATED COUNT: 18.4 % (ref 11.5–14.5)
EST. AVERAGE GLUCOSE BLD GHB EST-MCNC: 114 MG/DL
GLOBULIN SER CALC-MCNC: 3.1 G/DL (ref 2–4)
GLUCOSE BLD STRIP.AUTO-MCNC: 107 MG/DL (ref 65–117)
GLUCOSE BLD STRIP.AUTO-MCNC: 132 MG/DL (ref 65–117)
GLUCOSE BLD STRIP.AUTO-MCNC: 183 MG/DL (ref 65–117)
GLUCOSE BLD STRIP.AUTO-MCNC: 187 MG/DL (ref 65–117)
GLUCOSE SERPL-MCNC: 98 MG/DL (ref 65–100)
HBA1C MFR BLD: 5.6 % (ref 4–5.6)
HCT VFR BLD AUTO: 27.3 % (ref 36.6–50.3)
HGB BLD-MCNC: 8.7 G/DL (ref 12.1–17)
IMM GRANULOCYTES # BLD AUTO: 0 K/UL (ref 0–0.04)
IMM GRANULOCYTES NFR BLD AUTO: 0 % (ref 0–0.5)
LACTATE SERPL-SCNC: 1.7 MMOL/L (ref 0.4–2)
LYMPHOCYTES # BLD: 2.4 K/UL (ref 0.8–3.5)
LYMPHOCYTES NFR BLD: 18 % (ref 12–49)
MCH RBC QN AUTO: 32.3 PG (ref 26–34)
MCHC RBC AUTO-ENTMCNC: 31.9 G/DL (ref 30–36.5)
MCV RBC AUTO: 101.5 FL (ref 80–99)
MONOCYTES # BLD: 0.8 K/UL (ref 0–1)
MONOCYTES NFR BLD: 6 % (ref 5–13)
NEUTS SEG # BLD: 9.6 K/UL (ref 1.8–8)
NEUTS SEG NFR BLD: 74 % (ref 32–75)
NRBC # BLD: 0.03 K/UL (ref 0–0.01)
NRBC BLD-RTO: 0.2 PER 100 WBC
PLATELET # BLD AUTO: 263 K/UL (ref 150–400)
PMV BLD AUTO: 11.9 FL (ref 8.9–12.9)
POTASSIUM SERPL-SCNC: 3.6 MMOL/L (ref 3.5–5.1)
PROT SERPL-MCNC: 5.8 G/DL (ref 6.4–8.2)
RBC # BLD AUTO: 2.69 M/UL (ref 4.1–5.7)
SERVICE CMNT-IMP: ABNORMAL
SERVICE CMNT-IMP: NORMAL
SODIUM SERPL-SCNC: 145 MMOL/L (ref 136–145)
WBC # BLD AUTO: 13.1 K/UL (ref 4.1–11.1)

## 2024-01-30 PROCEDURE — 2580000003 HC RX 258: Performed by: INTERNAL MEDICINE

## 2024-01-30 PROCEDURE — 36415 COLL VENOUS BLD VENIPUNCTURE: CPT

## 2024-01-30 PROCEDURE — 83036 HEMOGLOBIN GLYCOSYLATED A1C: CPT

## 2024-01-30 PROCEDURE — 80162 ASSAY OF DIGOXIN TOTAL: CPT

## 2024-01-30 PROCEDURE — 80048 BASIC METABOLIC PNL TOTAL CA: CPT

## 2024-01-30 PROCEDURE — 82962 GLUCOSE BLOOD TEST: CPT

## 2024-01-30 PROCEDURE — 94760 N-INVAS EAR/PLS OXIMETRY 1: CPT

## 2024-01-30 PROCEDURE — 92610 EVALUATE SWALLOWING FUNCTION: CPT

## 2024-01-30 PROCEDURE — 6360000002 HC RX W HCPCS: Performed by: INTERNAL MEDICINE

## 2024-01-30 PROCEDURE — 6370000000 HC RX 637 (ALT 250 FOR IP): Performed by: INTERNAL MEDICINE

## 2024-01-30 PROCEDURE — 2060000000 HC ICU INTERMEDIATE R&B

## 2024-01-30 PROCEDURE — 80076 HEPATIC FUNCTION PANEL: CPT

## 2024-01-30 PROCEDURE — 85025 COMPLETE CBC W/AUTO DIFF WBC: CPT

## 2024-01-30 RX ORDER — ABIRATERONE ACETATE 250 MG/1
1000 TABLET ORAL DAILY
Status: DISCONTINUED | OUTPATIENT
Start: 2024-01-30 | End: 2024-02-05 | Stop reason: HOSPADM

## 2024-01-30 RX ADMIN — APIXABAN 2.5 MG: 2.5 TABLET, FILM COATED ORAL at 08:33

## 2024-01-30 RX ADMIN — OXYCODONE HYDROCHLORIDE AND ACETAMINOPHEN 500 MG: 500 TABLET ORAL at 08:33

## 2024-01-30 RX ADMIN — SODIUM CHLORIDE, PRESERVATIVE FREE 10 ML: 5 INJECTION INTRAVENOUS at 08:27

## 2024-01-30 RX ADMIN — VANCOMYCIN HYDROCHLORIDE 750 MG: 750 INJECTION, POWDER, LYOPHILIZED, FOR SOLUTION INTRAVENOUS at 10:51

## 2024-01-30 RX ADMIN — MIDODRINE HYDROCHLORIDE 10 MG: 5 TABLET ORAL at 12:00

## 2024-01-30 RX ADMIN — APIXABAN 2.5 MG: 2.5 TABLET, FILM COATED ORAL at 20:18

## 2024-01-30 RX ADMIN — Medication: at 20:18

## 2024-01-30 RX ADMIN — METOPROLOL SUCCINATE 75 MG: 50 TABLET, EXTENDED RELEASE ORAL at 08:33

## 2024-01-30 RX ADMIN — ZINC SULFATE 220 MG (50 MG) CAPSULE 50 MG: CAPSULE at 08:33

## 2024-01-30 RX ADMIN — SERTRALINE HYDROCHLORIDE 25 MG: 25 TABLET ORAL at 08:33

## 2024-01-30 RX ADMIN — ABIRATERONE ACETATE 1000 MG: 250 TABLET ORAL at 16:41

## 2024-01-30 RX ADMIN — EZETIMIBE 10 MG: 10 TABLET ORAL at 08:33

## 2024-01-30 RX ADMIN — MIDODRINE HYDROCHLORIDE 10 MG: 5 TABLET ORAL at 16:42

## 2024-01-30 RX ADMIN — PREDNISONE 5 MG: 5 TABLET ORAL at 20:18

## 2024-01-30 RX ADMIN — PREDNISONE 5 MG: 5 TABLET ORAL at 08:33

## 2024-01-30 RX ADMIN — OXYCODONE HYDROCHLORIDE AND ACETAMINOPHEN 500 MG: 500 TABLET ORAL at 20:18

## 2024-01-30 RX ADMIN — SODIUM CHLORIDE, PRESERVATIVE FREE 10 ML: 5 INJECTION INTRAVENOUS at 20:19

## 2024-01-30 RX ADMIN — MIDODRINE HYDROCHLORIDE 10 MG: 5 TABLET ORAL at 08:33

## 2024-01-30 RX ADMIN — Medication: at 08:36

## 2024-01-30 RX ADMIN — PIPERACILLIN AND TAZOBACTAM 3375 MG: 3; .375 INJECTION, POWDER, FOR SOLUTION INTRAVENOUS; PARENTERAL at 16:49

## 2024-01-30 RX ADMIN — DIGOXIN 125 MCG: 125 TABLET ORAL at 08:33

## 2024-01-30 RX ADMIN — PIPERACILLIN AND TAZOBACTAM 3375 MG: 3; .375 INJECTION, POWDER, FOR SOLUTION INTRAVENOUS; PARENTERAL at 08:27

## 2024-01-30 RX ADMIN — PIPERACILLIN AND TAZOBACTAM 3375 MG: 3; .375 INJECTION, POWDER, FOR SOLUTION INTRAVENOUS; PARENTERAL at 02:28

## 2024-01-30 NOTE — WOUND CARE
Wound care nurse consult for POA sacral fissure  Chart reviewed    84 y/o CM admitted for sepsis.   Past Medical History:   Diagnosis Date    A-fib (HCC) 06/12/2019    At risk for sleep apnea     SCORE 5    Atrial fibrillation (HCC)     Basal cell cancer     Benign prostatic hypertrophy     Frequency of urination     GERD (gastroesophageal reflux disease)     Hypertension     Myelodysplasia (myelodysplastic syndrome) (HCC)     Osteoarthritis of right hip 10/29/2021     Past Surgical History:   Procedure Laterality Date    CATARACT REMOVAL Bilateral     CHOLECYSTECTOMY      CT BIOPSY PERCUTANEOUS DEEP BONE  8/25/2023    CT BIOPSY PERCUTANEOUS DEEP BONE 8/25/2023 [facility] RAD CT    MOHS SURGERY  7/11    Rt    OTHER SURGICAL HISTORY  1990    jaw surgery; \"shortened it\" per pt    OTHER SURGICAL HISTORY  12/08/14    basal cell removed from left side of forehead    SHOULDER ARTHROSCOPY      Rt shoulder ligament repair    TONSILLECTOMY        Patient has incontinence MASD to sacrum with a moisture related fissure:      Patient has discoloration to bilateral dorsal feet caused by chronic irritation of shoes and vascular issues - they do not appear to be wounds:      Recommend    Sacrum/gluteal cleft: prn, Gently cleanse with foamy soap and water, pat dry and apply Z-guard zinc cream to irritated skin.    HERB EM RN, CWON

## 2024-01-30 NOTE — PROGRESS NOTES
Physician Progress Note      PATIENT:               VIANEY ROSALES  CSN #:                  [id number]  :                       1940  ADMIT DATE:       2024 11:39 AM  DISCH DATE:  RESPONDING  PROVIDER #:        Marsha Herr MD          QUERY TEXT:    Patient was admitted with Sepsis, noted to have \"Atrial fibrillation\" in H&P   note on  and is maintained on Eliquis. If possible, please document in   progress notes and discharge summary if you are evaluating and/or treating any   of the following:    The medical record reflects the following:  Risk Factors: Age 83 M, HTN, CHF  Clinical Indicators:  H&P noted \"Atrial fibrillation\" and is maintained   on Eliquis.  Treatment:  Eliquis.  Options provided:  -- Secondary hypercoagulable state in a patient with atrial fibrillation  -- Other - I will add my own diagnosis  -- Disagree - Not applicable / Not valid  -- Disagree - Clinically unable to determine / Unknown  -- Refer to Clinical Documentation Reviewer    PROVIDER RESPONSE TEXT:    This patient has secondary hypercoagulable state in a patient with atrial   fibrillation.    Query created by: Rilee, Cheryle on 2024 9:44 AM      Electronically signed by:  Marsha Herr MD 2024 11:24 AM

## 2024-01-30 NOTE — PROGRESS NOTES
End of Shift Note    Bedside shift change report given to KEYLA Martino (oncoming nurse) by Sofia Sequeira RN (offgoing nurse).  Report included the following information SBAR, Kardex, and MAR    Shift worked:  days     Shift summary and any significant changes:     Pt passed swallow screen. PO meds given  Speech worked with pt and diet advanced to regular.    Palliative consulted    NS IVF d/c   Concerns for physician to address:  -     Zone phone for oncoming shift:   -       Length of Stay:  Expected LOS: 3  Actual LOS: 1      Sofia Sequeira, RN

## 2024-01-30 NOTE — PROGRESS NOTES
Nursing notified patient npo due to failed swallow eval bedside, pt has po med ordered tonight, and reported glucose was elevated on admit. No other concerns reported. VSS. Ordered SSI q6h, hypoglycemia protocol, accuchecks q6h while npo. Speech swallow eval consult ordered for in the AM, A1C in the AM, nurse may hold meds tonight, asked to please notify dayshift hospitalist in the AM. Patient denies any further complaints or concerns. No acute distress reported. Nursing to notify Hospitalist for further/continued concerns. Will remain available overnight for further concerns if nursing/patient needs. Will defer further evaluation/management to the day shift team.    Non-billable note.

## 2024-01-30 NOTE — PROGRESS NOTES
Hospitalist Progress Note    NAME:   Pepe Latham   : 1940   MRN: [id number]     Date/Time: 2024 1:57 PM  Patient PCP: Weston Pendleton MD    Estimated discharge date: 48 hours  Barriers: urine culture, blood culture      Assessment / Plan:  Severe sepsis:  Syncope  Acute metabolic encephalopathy  Acute UTI  S/p zosyn in ED  Continue zosyn and vancomycin  Will follow urine culture  Will follow blood culture        IMTIAZ:  S/p fluid bolus  Discontinue NS at 75cc/hour given hx of CHF  Will monitor creatinine     Atrial fibrillation  Continue metoprolol and eliquis  Continue digoxin     Chronic hypotension:  Continue midodrine           CHF:  EF 25-30% on ECHO 10/2/23        Elevated bilirubin:  Will monitor        Hx of prostate cancer  MDS  Continue PTA prednisone  Continue PTA abaterone                     Medical Decision Making:   I personally reviewed labs:cbc, BMP  I personally reviewed imaging:  I personally reviewed EKG:  Toxic drug monitoring:   Discussed case with:         Code Status: DNR  DVT Prophylaxis: Lovenox  GI Prophylaxis:    Subjective:     Chief Complaint / Reason for Physician Visit  Patient reports feeling better. Wants to drink water.       Objective:     VITALS:   Last 24hrs VS reviewed since prior progress note. Most recent are:  Patient Vitals for the past 24 hrs:   BP Temp Temp src Pulse Resp SpO2   24 1050 120/63 98.3 °F (36.8 °C) Oral 81 20 100 %   24 0833 122/75 -- -- 95 -- --   24 0730 122/69 98.2 °F (36.8 °C) Oral 91 16 99 %   24 0406 108/74 97.7 °F (36.5 °C) Axillary (!) 109 26 98 %   24 2344 133/61 97.1 °F (36.2 °C) Axillary 93 26 97 %   24 2016 126/66 97.3 °F (36.3 °C) Axillary 87 15 100 %   24 1845 122/71 98.4 °F (36.9 °C) Oral 91 25 98 %   24 1700 131/66 -- -- 91 15 96 %   24 1652 -- -- -- 91 14 97 %   24 1637 -- -- -- 96 16 95 %   24 -- -- -- 91 17 96 %   24 -- -- -- 89 17 97

## 2024-01-30 NOTE — PROGRESS NOTES
Pharmacy Antimicrobial Kinetic Dosing    Indication for Antimicrobials: UTI     Current Regimen of Each Antimicrobial:  Vancomycin pharmacy to dose; Start Date ; Day # 1  Zosyn 3.375 gm IV q8h; Start Date ; Day # 1    Goal Level: Vancomycin -600    Date Dose & Interval Measured (mcg/mL) Predicted AUC                       Significant Cultures:    urine culture: in process    Labs:  Recent Labs     Units 24  1248 24  1212 24  1200   CREATININE MG/DL 1.56*  --  1.2   BUN MG/DL 26*  --   --    PROCAL ng/mL  --  0.22  --    WBC K/uL  --  22.6*  --      Temp (24hrs), Av °F (36.7 °C), Min:97.5 °F (36.4 °C), Max:98.4 °F (36.9 °C)      Conditions for Dosing Consideration:  IMTIAZ    Creatinine Clearance (mL/min): Estimated Creatinine Clearance: 38 mL/min (A) (based on SCr of 1.56 mg/dL (H)).       Impression/Plan:   Vancomycin 2000 mg loading dose, then 750 mg IV q18h (predicted , trough 15.6mcg/ml)  Continue zosyn 3.375 gm IV q8h  Antimicrobial stop date 7 days      Pharmacy will follow daily and adjust medications as appropriate for renal function and/or serum levels.    Thank you,  Audra Henley, [facility]

## 2024-01-31 PROBLEM — A41.9 SEVERE SEPSIS (HCC): Status: ACTIVE | Noted: 2024-01-31

## 2024-01-31 PROBLEM — Z71.89 GOALS OF CARE, COUNSELING/DISCUSSION: Status: ACTIVE | Noted: 2024-01-31

## 2024-01-31 PROBLEM — R65.20 SEVERE SEPSIS (HCC): Status: ACTIVE | Noted: 2024-01-31

## 2024-01-31 PROBLEM — Z51.5 PALLIATIVE CARE ENCOUNTER: Status: ACTIVE | Noted: 2024-01-31

## 2024-01-31 LAB
ANION GAP SERPL CALC-SCNC: 5 MMOL/L (ref 5–15)
BASOPHILS # BLD: 0.1 K/UL (ref 0–0.1)
BASOPHILS NFR BLD: 1 % (ref 0–1)
BUN SERPL-MCNC: 21 MG/DL (ref 6–20)
BUN/CREAT SERPL: 22 (ref 12–20)
CALCIUM SERPL-MCNC: 8.5 MG/DL (ref 8.5–10.1)
CHLORIDE SERPL-SCNC: 114 MMOL/L (ref 97–108)
CO2 SERPL-SCNC: 26 MMOL/L (ref 21–32)
CREAT SERPL-MCNC: 0.94 MG/DL (ref 0.7–1.3)
DIFFERENTIAL METHOD BLD: ABNORMAL
EOSINOPHIL # BLD: 0 K/UL (ref 0–0.4)
EOSINOPHIL NFR BLD: 0 % (ref 0–7)
ERYTHROCYTE [DISTWIDTH] IN BLOOD BY AUTOMATED COUNT: 18.1 % (ref 11.5–14.5)
GLUCOSE BLD STRIP.AUTO-MCNC: 130 MG/DL (ref 65–117)
GLUCOSE BLD STRIP.AUTO-MCNC: 143 MG/DL (ref 65–117)
GLUCOSE BLD STRIP.AUTO-MCNC: 205 MG/DL (ref 65–117)
GLUCOSE SERPL-MCNC: 114 MG/DL (ref 65–100)
HCT VFR BLD AUTO: 26.2 % (ref 36.6–50.3)
HGB BLD-MCNC: 8.4 G/DL (ref 12.1–17)
IMM GRANULOCYTES # BLD AUTO: 0 K/UL (ref 0–0.04)
IMM GRANULOCYTES NFR BLD AUTO: 0 % (ref 0–0.5)
LYMPHOCYTES # BLD: 1.8 K/UL (ref 0.8–3.5)
LYMPHOCYTES NFR BLD: 22 % (ref 12–49)
MCH RBC QN AUTO: 33.2 PG (ref 26–34)
MCHC RBC AUTO-ENTMCNC: 32.1 G/DL (ref 30–36.5)
MCV RBC AUTO: 103.6 FL (ref 80–99)
MONOCYTES # BLD: 0.4 K/UL (ref 0–1)
MONOCYTES NFR BLD: 5 % (ref 5–13)
NEUTS SEG # BLD: 5.9 K/UL (ref 1.8–8)
NEUTS SEG NFR BLD: 72 % (ref 32–75)
NRBC # BLD: 0 K/UL (ref 0–0.01)
NRBC BLD-RTO: 0 PER 100 WBC
PLATELET # BLD AUTO: 229 K/UL (ref 150–400)
PMV BLD AUTO: 11.7 FL (ref 8.9–12.9)
POTASSIUM SERPL-SCNC: 3.5 MMOL/L (ref 3.5–5.1)
RBC # BLD AUTO: 2.53 M/UL (ref 4.1–5.7)
SERVICE CMNT-IMP: ABNORMAL
SODIUM SERPL-SCNC: 145 MMOL/L (ref 136–145)
VANCOMYCIN SERPL-MCNC: 24.9 UG/ML
WBC # BLD AUTO: 8.3 K/UL (ref 4.1–11.1)

## 2024-01-31 PROCEDURE — 6370000000 HC RX 637 (ALT 250 FOR IP): Performed by: INTERNAL MEDICINE

## 2024-01-31 PROCEDURE — 6360000002 HC RX W HCPCS: Performed by: INTERNAL MEDICINE

## 2024-01-31 PROCEDURE — 99222 1ST HOSP IP/OBS MODERATE 55: CPT | Performed by: NURSE PRACTITIONER

## 2024-01-31 PROCEDURE — 82962 GLUCOSE BLOOD TEST: CPT

## 2024-01-31 PROCEDURE — 85025 COMPLETE CBC W/AUTO DIFF WBC: CPT

## 2024-01-31 PROCEDURE — 36415 COLL VENOUS BLD VENIPUNCTURE: CPT

## 2024-01-31 PROCEDURE — 80202 ASSAY OF VANCOMYCIN: CPT

## 2024-01-31 PROCEDURE — 97162 PT EVAL MOD COMPLEX 30 MIN: CPT

## 2024-01-31 PROCEDURE — 2060000000 HC ICU INTERMEDIATE R&B

## 2024-01-31 PROCEDURE — 80048 BASIC METABOLIC PNL TOTAL CA: CPT

## 2024-01-31 PROCEDURE — 97116 GAIT TRAINING THERAPY: CPT

## 2024-01-31 PROCEDURE — 6370000000 HC RX 637 (ALT 250 FOR IP): Performed by: NURSE PRACTITIONER

## 2024-01-31 PROCEDURE — 2580000003 HC RX 258: Performed by: INTERNAL MEDICINE

## 2024-01-31 RX ADMIN — ZINC SULFATE 220 MG (50 MG) CAPSULE 50 MG: CAPSULE at 10:27

## 2024-01-31 RX ADMIN — Medication: at 10:28

## 2024-01-31 RX ADMIN — OXYCODONE HYDROCHLORIDE AND ACETAMINOPHEN 500 MG: 500 TABLET ORAL at 20:34

## 2024-01-31 RX ADMIN — ABIRATERONE ACETATE 1000 MG: 250 TABLET ORAL at 10:29

## 2024-01-31 RX ADMIN — DIGOXIN 125 MCG: 125 TABLET ORAL at 10:26

## 2024-01-31 RX ADMIN — PIPERACILLIN AND TAZOBACTAM 3375 MG: 3; .375 INJECTION, POWDER, FOR SOLUTION INTRAVENOUS; PARENTERAL at 02:08

## 2024-01-31 RX ADMIN — MIDODRINE HYDROCHLORIDE 10 MG: 5 TABLET ORAL at 17:45

## 2024-01-31 RX ADMIN — SODIUM CHLORIDE, PRESERVATIVE FREE 10 ML: 5 INJECTION INTRAVENOUS at 10:28

## 2024-01-31 RX ADMIN — PIPERACILLIN AND TAZOBACTAM 3375 MG: 3; .375 INJECTION, POWDER, FOR SOLUTION INTRAVENOUS; PARENTERAL at 10:36

## 2024-01-31 RX ADMIN — APIXABAN 2.5 MG: 2.5 TABLET, FILM COATED ORAL at 20:34

## 2024-01-31 RX ADMIN — PANTOPRAZOLE SODIUM 40 MG: 40 TABLET, DELAYED RELEASE ORAL at 06:06

## 2024-01-31 RX ADMIN — MIDODRINE HYDROCHLORIDE 10 MG: 5 TABLET ORAL at 12:42

## 2024-01-31 RX ADMIN — SODIUM CHLORIDE, PRESERVATIVE FREE 10 ML: 5 INJECTION INTRAVENOUS at 20:35

## 2024-01-31 RX ADMIN — SERTRALINE HYDROCHLORIDE 25 MG: 25 TABLET ORAL at 10:27

## 2024-01-31 RX ADMIN — MIDODRINE HYDROCHLORIDE 10 MG: 5 TABLET ORAL at 10:27

## 2024-01-31 RX ADMIN — SODIUM CHLORIDE 20 ML/HR: 9 INJECTION, SOLUTION INTRAVENOUS at 02:06

## 2024-01-31 RX ADMIN — Medication: at 20:35

## 2024-01-31 RX ADMIN — PREDNISONE 5 MG: 5 TABLET ORAL at 20:34

## 2024-01-31 RX ADMIN — PIPERACILLIN AND TAZOBACTAM 3375 MG: 3; .375 INJECTION, POWDER, FOR SOLUTION INTRAVENOUS; PARENTERAL at 17:47

## 2024-01-31 RX ADMIN — APIXABAN 2.5 MG: 2.5 TABLET, FILM COATED ORAL at 10:26

## 2024-01-31 RX ADMIN — PREDNISONE 5 MG: 5 TABLET ORAL at 10:26

## 2024-01-31 RX ADMIN — EZETIMIBE 10 MG: 10 TABLET ORAL at 10:27

## 2024-01-31 RX ADMIN — OXYCODONE HYDROCHLORIDE AND ACETAMINOPHEN 500 MG: 500 TABLET ORAL at 10:26

## 2024-01-31 RX ADMIN — VANCOMYCIN HYDROCHLORIDE 750 MG: 750 INJECTION, POWDER, LYOPHILIZED, FOR SOLUTION INTRAVENOUS at 03:51

## 2024-01-31 RX ADMIN — INSULIN LISPRO 2 UNITS: 100 INJECTION, SOLUTION INTRAVENOUS; SUBCUTANEOUS at 17:45

## 2024-01-31 NOTE — PROGRESS NOTES
Hospitalist Progress Note    NAME:   Pepe Latham   : 1940   MRN: [id number]     Date/Time: 2024 4:34 PM  Patient PCP: Weston Pendleton MD    Estimated discharge date: 24  Barriers: SNF      Assessment / Plan:  Severe sepsis:  Syncope  Acute metabolic encephalopathy  Acute UTI  Urine culture shows pseudomonas sensitive to zosyn and serratia marcecens(zosyn sensitivity pending)  Can switch to levofloxacin on discharge as both organism is sensitive to levofloxacin  Continue zosyn  Discontinue vancomycin          IMTIAZ:  S/p fluid   resolved     Atrial fibrillation  Continue metoprolol and eliquis  Continue digoxin     Chronic hypotension:  Continue midodrine           CHF:  EF 25-30% on ECHO 10/2/23        Elevated bilirubin:  Trending down        Hx of prostate cancer  MDS  Continue PTA prednisone  Continue PTA abaterone                     Medical Decision Making:   I personally reviewed labs:cbc, BMP  I personally reviewed imaging:  I personally reviewed EKG:  Toxic drug monitoring:   Discussed case with: Patient, RN        Code Status: DNR  DVT Prophylaxis: Lovenox  GI Prophylaxis:    Subjective:     Chief Complaint / Reason for Physician Visit  NO overnight event.        Objective:     VITALS:   Last 24hrs VS reviewed since prior progress note. Most recent are:  Patient Vitals for the past 24 hrs:   BP Temp Temp src Pulse Resp SpO2   24 1529 133/61 97.7 °F (36.5 °C) Oral 86 22 96 %   24 1100 117/60 98 °F (36.7 °C) Oral 76 22 95 %   24 1000 (!) 104/58 -- -- -- -- --   24 0828 127/75 -- -- -- -- --   24 0800 -- 98.3 °F (36.8 °C) Oral 82 25 --   24 2351 121/75 98.7 °F (37.1 °C) Oral 78 15 --   24 2000 128/63 -- -- 72 25 --           Intake/Output Summary (Last 24 hours) at 2024 1634  Last data filed at 2024 0605  Gross per 24 hour   Intake --   Output 770 ml   Net -770 ml          I had a face to face encounter and independently examined

## 2024-01-31 NOTE — PROGRESS NOTES
Physician Progress Note      PATIENT:               VIANEY ROSALES  CSN #:                  [id number]  :                       1940  ADMIT DATE:       2024 11:39 AM  DISCH DATE:  RESPONDING  PROVIDER #:        Marsha Herr MD          QUERY TEXT:    Pt admitted and has CHF documented. If possible, please document in progress   notes and discharge summary further specificity regarding the type and acuity   of CHF:      The medical record reflects the following:  Risk Factors: 84 y/o male Hx: CHF, Atrial fibrillation on Eliquis, MDS,   Prostate CA,    Clinical Indicators:  H&P: CHF:  EF 25-30% on ECHO 10/2/23  Cautious use of iv fluids     MD PN: CHF:: EF 25-30% on ECHO 10/2/23    01/29/24 12:48 NT Pro-BNP: 3,326 (H)    Historical Data: 10/2/23 Echo: Left Ventricle: Severely reduced left   ventricular systolic function with a visually estimated EF of 25 - 30%. Left   ventricle size is normal. Increased wall thickness. Severe global hypokinesis   present    Treatment: admit, hospitalist consult, Labs, cautious use of IV fluids (d/t   CHF), metoprolol XL, I/Os, Telemetry, supplemental 02 prn, vitals per unit   routine,  Options provided:  -- Chronic Systolic CHF/HFrEF  -- Other - I will add my own diagnosis  -- Disagree - Not applicable / Not valid  -- Disagree - Clinically unable to determine / Unknown  -- Refer to Clinical Documentation Reviewer    PROVIDER RESPONSE TEXT:    This patient has chronic systolic CHF/HFrEF.    Query created by: Rilee, Cheryle on 2024 8:44 AM      Electronically signed by:  Marsha Herr MD 2024 4:37 PM

## 2024-01-31 NOTE — PROGRESS NOTES
..End of Shift Note    Bedside shift change report given to KEYLA Martino (oncoming nurse) by Amandeep Alva RN (offgoing nurse).  Report included the following information SBAR    Shift worked:  0700 - 1900     Shift summary and any significant changes:    Pt. Tolerated all medication w/o issue.  No c/o pain or distress.  Wife at bedside during day shift.  PT worked with patient and ambulated to chair.  Palliative care met with patient during day shift.     Concerns for physician to address: No     Zone phone for oncoming shift:  No       Activity:     Number times ambulated in hallways past shift: 0  Number of times OOB to chair past shift: 1    Cardiac:   Cardiac Monitoring: Yes           Access:  Current line(s): PIV     Genitourinary:   Urinary status: voiding    Respiratory:      Chronic home O2 use?: NO  Incentive spirometer at bedside: NO       GI:     Current diet:  ADULT DIET; Regular  Passing flatus: YES  Tolerating current diet: YES       Pain Management:   Patient states pain is manageable on current regimen: YES    Skin:     Interventions: increase time out of bed    Patient Safety:  Fall Score:    Interventions: bed/chair alarm and gripper socks       Length of Stay:  Expected LOS: 4  Actual LOS: 2      Amandeep Alva RN

## 2024-01-31 NOTE — PROGRESS NOTES
End of Shift Note    Bedside shift change report given to ED (oncoming nurse) by Gunner Brand RN (offgoing nurse).  Report included the following information Cardiac Rhythm      Shift worked:  1900-0730     Shift summary and any significant changes:     Patient settled overnight. He denies having any pain. He has used the bedpan by the commode by the bedside     Concerns for physician to address:  [facility]     Zone phone for oncoming shift:   [phone]       Activity:     Number times ambulated in hallways past shift: 1  Number of times OOB to chair past shift: 1    Cardiac:   Cardiac Monitoring: Yes           Access:  Current line(s): PIV     Genitourinary:   Urinary status: voiding    Respiratory:      Chronic home O2 use?: NO  Incentive spirometer at bedside: NO       GI:     Current diet:  ADULT DIET; Regular  Passing flatus: YES  Tolerating current diet: YES       Pain Management:   Patient states pain is manageable on current regimen: N/A    Skin:     Interventions: float heels    Patient Safety:  Fall Score:    Interventions: bed/chair alarm       Length of Stay:  Expected LOS: 4  Actual LOS: 2      Gunner Brand RN

## 2024-02-01 ENCOUNTER — TELEPHONE (OUTPATIENT)
Age: 84
End: 2024-02-01

## 2024-02-01 LAB
ACCESSION NUMBER, LLC1M: ABNORMAL
ACINETOBACTER CALCOAC BAUMANNII COMPLEX BY PCR: NOT DETECTED
ANION GAP SERPL CALC-SCNC: 3 MMOL/L (ref 5–15)
B FRAGILIS DNA BLD POS QL NAA+NON-PROBE: NOT DETECTED
BACTERIA SPEC CULT: ABNORMAL
BACTERIA SPEC CULT: ABNORMAL
BASOPHILS # BLD: 0.1 K/UL (ref 0–0.1)
BASOPHILS NFR BLD: 1 % (ref 0–1)
BIOFIRE TEST COMMENT: ABNORMAL
BUN SERPL-MCNC: 16 MG/DL (ref 6–20)
BUN/CREAT SERPL: 17 (ref 12–20)
C ALBICANS DNA BLD POS QL NAA+NON-PROBE: NOT DETECTED
C AURIS DNA BLD POS QL NAA+NON-PROBE: NOT DETECTED
C GATTII+NEOFOR DNA BLD POS QL NAA+N-PRB: NOT DETECTED
C GLABRATA DNA BLD POS QL NAA+NON-PROBE: NOT DETECTED
C KRUSEI DNA BLD POS QL NAA+NON-PROBE: NOT DETECTED
C PARAP DNA BLD POS QL NAA+NON-PROBE: NOT DETECTED
C TROPICLS DNA BLD POS QL NAA+NON-PROBE: NOT DETECTED
CALCIUM SERPL-MCNC: 8.6 MG/DL (ref 8.5–10.1)
CC UR VC: ABNORMAL
CHLORIDE SERPL-SCNC: 112 MMOL/L (ref 97–108)
CO2 SERPL-SCNC: 27 MMOL/L (ref 21–32)
CREAT SERPL-MCNC: 0.96 MG/DL (ref 0.7–1.3)
DIFFERENTIAL METHOD BLD: ABNORMAL
E CLOAC COMP DNA BLD POS NAA+NON-PROBE: NOT DETECTED
E COLI DNA BLD POS QL NAA+NON-PROBE: NOT DETECTED
E FAECALIS DNA BLD POS QL NAA+NON-PROBE: NOT DETECTED
E FAECIUM DNA BLD POS QL NAA+NON-PROBE: NOT DETECTED
ENTEROBACTERALES DNA BLD POS NAA+N-PRB: NOT DETECTED
EOSINOPHIL # BLD: 0 K/UL (ref 0–0.4)
EOSINOPHIL NFR BLD: 1 % (ref 0–7)
ERYTHROCYTE [DISTWIDTH] IN BLOOD BY AUTOMATED COUNT: 18.3 % (ref 11.5–14.5)
GLUCOSE BLD STRIP.AUTO-MCNC: 134 MG/DL (ref 65–117)
GLUCOSE BLD STRIP.AUTO-MCNC: 139 MG/DL (ref 65–117)
GLUCOSE BLD STRIP.AUTO-MCNC: 149 MG/DL (ref 65–117)
GLUCOSE BLD STRIP.AUTO-MCNC: 152 MG/DL (ref 65–117)
GLUCOSE BLD STRIP.AUTO-MCNC: 192 MG/DL (ref 65–117)
GLUCOSE SERPL-MCNC: 127 MG/DL (ref 65–100)
GP B STREP DNA BLD POS QL NAA+NON-PROBE: NOT DETECTED
HAEM INFLU DNA BLD POS QL NAA+NON-PROBE: NOT DETECTED
HCT VFR BLD AUTO: 26.3 % (ref 36.6–50.3)
HGB BLD-MCNC: 8.2 G/DL (ref 12.1–17)
IMM GRANULOCYTES # BLD AUTO: 0 K/UL (ref 0–0.04)
IMM GRANULOCYTES NFR BLD AUTO: 1 % (ref 0–0.5)
K OXYTOCA DNA BLD POS QL NAA+NON-PROBE: NOT DETECTED
KLEBSIELLA SP DNA BLD POS QL NAA+NON-PRB: NOT DETECTED
KLEBSIELLA SP DNA BLD POS QL NAA+NON-PRB: NOT DETECTED
L MONOCYTOG DNA BLD POS QL NAA+NON-PROBE: NOT DETECTED
LYMPHOCYTES # BLD: 1.3 K/UL (ref 0.8–3.5)
LYMPHOCYTES NFR BLD: 23 % (ref 12–49)
MCH RBC QN AUTO: 32.4 PG (ref 26–34)
MCHC RBC AUTO-ENTMCNC: 31.2 G/DL (ref 30–36.5)
MCV RBC AUTO: 104 FL (ref 80–99)
MECA+MECC ISLT/SPM QL: DETECTED
MONOCYTES # BLD: 0.3 K/UL (ref 0–1)
MONOCYTES NFR BLD: 5 % (ref 5–13)
N MEN DNA BLD POS QL NAA+NON-PROBE: NOT DETECTED
NEUTS SEG # BLD: 4 K/UL (ref 1.8–8)
NEUTS SEG NFR BLD: 69 % (ref 32–75)
NRBC # BLD: 0 K/UL (ref 0–0.01)
NRBC BLD-RTO: 0 PER 100 WBC
P AERUGINOSA DNA BLD POS NAA+NON-PROBE: NOT DETECTED
PLATELET # BLD AUTO: 245 K/UL (ref 150–400)
PMV BLD AUTO: 11.6 FL (ref 8.9–12.9)
POTASSIUM SERPL-SCNC: 4.3 MMOL/L (ref 3.5–5.1)
PROTEUS SP DNA BLD POS QL NAA+NON-PROBE: NOT DETECTED
RBC # BLD AUTO: 2.53 M/UL (ref 4.1–5.7)
RESISTANT GENE TARGETS: ABNORMAL
S AUREUS DNA BLD POS QL NAA+NON-PROBE: NOT DETECTED
S AUREUS+CONS DNA BLD POS NAA+NON-PROBE: DETECTED
S EPIDERMIDIS DNA BLD POS QL NAA+NON-PRB: DETECTED
S LUGDUNENSIS DNA BLD POS QL NAA+NON-PRB: NOT DETECTED
S MALTOPHILIA DNA BLD POS QL NAA+NON-PRB: NOT DETECTED
S MARCESCENS DNA BLD POS NAA+NON-PROBE: NOT DETECTED
S PNEUM DNA BLD POS QL NAA+NON-PROBE: NOT DETECTED
S PYO DNA BLD POS QL NAA+NON-PROBE: NOT DETECTED
SALMONELLA DNA BLD POS QL NAA+NON-PROBE: NOT DETECTED
SERVICE CMNT-IMP: ABNORMAL
SODIUM SERPL-SCNC: 142 MMOL/L (ref 136–145)
STREPTOCOCCUS DNA BLD POS NAA+NON-PROBE: NOT DETECTED
WBC # BLD AUTO: 5.8 K/UL (ref 4.1–11.1)

## 2024-02-01 PROCEDURE — 85025 COMPLETE CBC W/AUTO DIFF WBC: CPT

## 2024-02-01 PROCEDURE — 6370000000 HC RX 637 (ALT 250 FOR IP): Performed by: INTERNAL MEDICINE

## 2024-02-01 PROCEDURE — 82962 GLUCOSE BLOOD TEST: CPT

## 2024-02-01 PROCEDURE — 6360000002 HC RX W HCPCS: Performed by: INTERNAL MEDICINE

## 2024-02-01 PROCEDURE — 1100000003 HC PRIVATE W/ TELEMETRY

## 2024-02-01 PROCEDURE — 80048 BASIC METABOLIC PNL TOTAL CA: CPT

## 2024-02-01 PROCEDURE — 2580000003 HC RX 258: Performed by: INTERNAL MEDICINE

## 2024-02-01 PROCEDURE — 97165 OT EVAL LOW COMPLEX 30 MIN: CPT

## 2024-02-01 PROCEDURE — 97535 SELF CARE MNGMENT TRAINING: CPT

## 2024-02-01 PROCEDURE — 36415 COLL VENOUS BLD VENIPUNCTURE: CPT

## 2024-02-01 PROCEDURE — 97530 THERAPEUTIC ACTIVITIES: CPT

## 2024-02-01 RX ORDER — LEVOFLOXACIN 750 MG/1
750 TABLET, FILM COATED ORAL EVERY 24 HOURS
Status: DISCONTINUED | OUTPATIENT
Start: 2024-02-01 | End: 2024-02-05 | Stop reason: HOSPADM

## 2024-02-01 RX ORDER — INSULIN LISPRO 100 [IU]/ML
0-4 INJECTION, SOLUTION INTRAVENOUS; SUBCUTANEOUS NIGHTLY
Status: DISCONTINUED | OUTPATIENT
Start: 2024-02-01 | End: 2024-02-05 | Stop reason: HOSPADM

## 2024-02-01 RX ORDER — ABIRATERONE ACETATE 250 MG/1
TABLET ORAL
Qty: 120 TABLET | Refills: 5 | Status: ACTIVE | OUTPATIENT
Start: 2024-02-01

## 2024-02-01 RX ORDER — INSULIN LISPRO 100 [IU]/ML
0-8 INJECTION, SOLUTION INTRAVENOUS; SUBCUTANEOUS
Status: DISCONTINUED | OUTPATIENT
Start: 2024-02-02 | End: 2024-02-05 | Stop reason: HOSPADM

## 2024-02-01 RX ADMIN — PIPERACILLIN AND TAZOBACTAM 3375 MG: 3; .375 INJECTION, POWDER, FOR SOLUTION INTRAVENOUS; PARENTERAL at 09:28

## 2024-02-01 RX ADMIN — PREDNISONE 5 MG: 5 TABLET ORAL at 21:32

## 2024-02-01 RX ADMIN — EZETIMIBE 10 MG: 10 TABLET ORAL at 09:17

## 2024-02-01 RX ADMIN — SODIUM CHLORIDE, PRESERVATIVE FREE 10 ML: 5 INJECTION INTRAVENOUS at 09:16

## 2024-02-01 RX ADMIN — ABIRATERONE ACETATE 1000 MG: 250 TABLET ORAL at 16:03

## 2024-02-01 RX ADMIN — LEVOFLOXACIN 750 MG: 750 TABLET, FILM COATED ORAL at 16:03

## 2024-02-01 RX ADMIN — DIGOXIN 125 MCG: 125 TABLET ORAL at 11:05

## 2024-02-01 RX ADMIN — OXYCODONE HYDROCHLORIDE AND ACETAMINOPHEN 500 MG: 500 TABLET ORAL at 21:31

## 2024-02-01 RX ADMIN — APIXABAN 2.5 MG: 2.5 TABLET, FILM COATED ORAL at 21:31

## 2024-02-01 RX ADMIN — PREDNISONE 5 MG: 5 TABLET ORAL at 09:14

## 2024-02-01 RX ADMIN — Medication: at 21:31

## 2024-02-01 RX ADMIN — PIPERACILLIN AND TAZOBACTAM 3375 MG: 3; .375 INJECTION, POWDER, FOR SOLUTION INTRAVENOUS; PARENTERAL at 02:57

## 2024-02-01 RX ADMIN — ZINC SULFATE 220 MG (50 MG) CAPSULE 50 MG: CAPSULE at 09:14

## 2024-02-01 RX ADMIN — SERTRALINE HYDROCHLORIDE 25 MG: 25 TABLET ORAL at 09:14

## 2024-02-01 RX ADMIN — OXYCODONE HYDROCHLORIDE AND ACETAMINOPHEN 500 MG: 500 TABLET ORAL at 09:14

## 2024-02-01 RX ADMIN — MIDODRINE HYDROCHLORIDE 10 MG: 5 TABLET ORAL at 14:12

## 2024-02-01 RX ADMIN — SODIUM CHLORIDE, PRESERVATIVE FREE 10 ML: 5 INJECTION INTRAVENOUS at 21:32

## 2024-02-01 RX ADMIN — PANTOPRAZOLE SODIUM 40 MG: 40 TABLET, DELAYED RELEASE ORAL at 07:48

## 2024-02-01 RX ADMIN — APIXABAN 2.5 MG: 2.5 TABLET, FILM COATED ORAL at 09:14

## 2024-02-01 RX ADMIN — METOPROLOL SUCCINATE 75 MG: 50 TABLET, EXTENDED RELEASE ORAL at 09:14

## 2024-02-01 RX ADMIN — MIDODRINE HYDROCHLORIDE 10 MG: 5 TABLET ORAL at 09:14

## 2024-02-01 RX ADMIN — Medication: at 09:15

## 2024-02-01 RX ADMIN — SODIUM CHLORIDE 10 ML/HR: 9 INJECTION, SOLUTION INTRAVENOUS at 02:55

## 2024-02-01 RX ADMIN — MIDODRINE HYDROCHLORIDE 10 MG: 5 TABLET ORAL at 17:24

## 2024-02-01 ASSESSMENT — PAIN SCALES - GENERAL
PAINLEVEL_OUTOF10: 0
PAINLEVEL_OUTOF10: 0

## 2024-02-01 NOTE — TELEPHONE ENCOUNTER
Called pt daughter.  Left VM explaining pt should get the medication at 12 today and yesterday was given sooner than scheduled time and I relayed the information from the pharmacist on her self identified VM.

## 2024-02-01 NOTE — PROGRESS NOTES
Occupational Therapy  2/1/2024    Orders received, chart reviewed and patient evaluated by occupational therapy. Pending progression with skilled acute occupational therapy, recommend:  Therapy up to 5 days/week in Skilled nursing facility    Recommend with nursing patient to complete as able in order to maintain strength, endurance and independence: OOB to chair 3x/day, ADLs with supervision/setup and mobilizing to the BS for toileting with x1 assist. Thank you for your assistance.     Full evaluation to follow.

## 2024-02-01 NOTE — PROGRESS NOTES
..End of Shift Note    Bedside shift change report given to KEYLA Zaman(oncoming nurse) by Amandeep Alva RN (offgoing nurse).  Report included the following information SBAR    Shift worked:  0700 - 1900     Shift summary and any significant changes:     Pt. Tolerated all medication w/o issue.  No c/o pain or distress.  Estimated discharge is 2/2.  Wife and daughter at bedside during day shift.      Concerns for physician to address:  No     Zone phone for oncoming shift:   No                           Activity:     Number times ambulated in hallways past shift: 0  Number of times OOB to chair past shift: 1    Cardiac:   Cardiac Monitoring: Yes           Access:  Current line(s): PIV     Genitourinary:   Urinary status: voiding    Respiratory:      Chronic home O2 use?: YES  Incentive spirometer at bedside: No       GI:     Current diet:  ADULT DIET; Regular  Passing flatus: YES  Tolerating current diet: YES       Pain Management:   Patient states pain is manageable on current regimen: YES    Skin:     Interventions: float heels    Patient Safety:  Fall Score:    Interventions: gripper socks and pt to call before getting OOB       Length of Stay:  Expected LOS: 4  Actual LOS: 3      Amandeep Alva RN

## 2024-02-01 NOTE — PROGRESS NOTES
Hospitalist Progress Note    NAME:   Pepe Latham   : 1940   MRN: [id number]     Date/Time: 2024 3:31 PM  Patient PCP: Weston Pendleton MD    Estimated discharge date: 24  Barriers: Placement      Assessment / Plan:  Severe sepsis  Pseudomonas and Serratia urinary tract infection  -Urine cultures are growing Pseudomonas and also Serratia sensitive to Zosyn and also levofloxacin.  Will change to levofloxacin.    Acute metabolic encephalopathy  -Discussed with patient's daughter at the bedside and patient was incoherent but never had any near-syncope or syncopal episode.  -His mental status is back to normal.      IMTIAZ:  -Creatinine is improved.     Atrial fibrillation  Continue metoprolol and eliquis  Continue digoxin     Chronic hypotension:  Continue midodrine           CHF:  EF 25-30% on ECHO 10/2/23.  Continue metoprolol        Elevated bilirubin:  -Bilirubin is now normal        Hx of prostate cancer  MDS  Continue PTA prednisone  Continue PTA abaterone                     Medical Decision Making:   I personally reviewed labs:cbc, BMP  I personally reviewed imaging:  I personally reviewed EKG:  Toxic drug monitoring:   Discussed case with: Pharmacy,         Code Status: DNR  DVT Prophylaxis: Lovenox  GI Prophylaxis:    Subjective:     Chief Complaint / Reason for Physician Visit  He does not report any dysuria or urgency  Overnight events noted  Daughter present at bedside and discussed plan.  Patient never had syncope but was incoherent and felt generally weak before admission  He did not report any chest pain, shortness of breath or dizziness      Objective:     VITALS:   Last 24hrs VS reviewed since prior progress note. Most recent are:  Patient Vitals for the past 24 hrs:   BP Temp Temp src Pulse Resp SpO2 Height Weight   24 1500 (!) 143/76 98.3 °F (36.8 °C) Oral 73 22 94 % -- --   24 1015 (!) 141/71 97.7 °F (36.5 °C) Oral 81 22 -- -- --   24 0741 (!)

## 2024-02-01 NOTE — CONSULTS
Patient Advocacy, Ethics, etc.)    Spiritual Affiliation: Other    Any spiritual / Nondenominational concerns:  [] Yes /  [x] No   If \"Yes\" to discuss with pastoral care during IDT     Does caregiver feel burdened by caring for their loved one:   [] Yes /  [x] No /  [] No Caregiver Present/Available [] No Caregiver [] Pt Lives at Facility  If \"Yes\" to discuss with social work during IDT    Anticipatory grief assessment:   [x] Normal  / [] Maladaptive     If \"Maladaptive\" to discuss with social work during IDT    ESAS Anxiety: Anxiety Score: Not anxious    ESAS Depression: Depression Score: Not depressed        LAB AND IMAGING FINDINGS:   Objective data reviewed:  labs, images, records, medication use, vitals, and chart     FINAL COMMENTS   Thank you for allowing Palliative Medicine to participate in the care of Pepe Latham.    Only check if applicable and billing time based rather than MDM  [] The total encounter time on this service date was ____ minutes which was spent performing a face-to-face encounter and personally completing the provider-level activities documented in the note. This includes time spent prior to the visit and after the visit in direct care of the patient. This time does not include time spent in any separately reportable services.    Electronically signed by   AVTAR Lazo NP  Palliative Care Team  on 2/5/2024 at 10:48 AM

## 2024-02-01 NOTE — PROGRESS NOTES
End of Shift Note    Bedside shift change report given to ED (oncoming nurse) by Gunner Brand RN (offgoing nurse).  Report included the following information SBAR, Kardex, ED Summary, MAR, and Cardiac Rhythm NSR    Shift worked:  1900-0730     Shift summary and any significant changes:     Patient settled overnight No new issues raised. He remains on antibiotics. He denies having any pain.     Concerns for physician to address:  none     Zone phone for oncoming shift:   [phone]       Activity:     Number times ambulated in hallways past shift: 1  Number of times OOB to chair past shift: 1    Cardiac:   Cardiac Monitoring: Yes           Access:  Current line(s): PIV     Genitourinary:   Urinary status: voiding    Respiratory:      Chronic home O2 use?: YES  Incentive spirometer at bedside: YES       GI:     Current diet:  ADULT DIET; Regular  Passing flatus: YES  Tolerating current diet: YES       Pain Management:   Patient states pain is manageable on current regimen: N/A    Skin:     Interventions: specialty bed    Patient Safety:  Fall Score:    Interventions: assistive device (walker, cane. etc)       Length of Stay:  Expected LOS: 4  Actual LOS: 3      Gunner Brand, RN

## 2024-02-01 NOTE — TELEPHONE ENCOUNTER
Patients daughter called and stated that usually the patient takes his zytiga medication between 4:30 and 5 and an hour before he eats. Patient is currently in the hospital and they are giving it to him at 9:30 with the rest of his medication and after he eats in the morning. She wanted to know if this was the way to do it and if not can someone call over to the pharmacist at the hospital and let them know he needs to take it as he does at home.       # [phone]

## 2024-02-02 LAB
ANION GAP SERPL CALC-SCNC: 5 MMOL/L (ref 5–15)
BACTERIA SPEC CULT: ABNORMAL
BACTERIA SPEC CULT: ABNORMAL
BASOPHILS # BLD: 0.1 K/UL (ref 0–0.1)
BASOPHILS NFR BLD: 1 % (ref 0–1)
BUN SERPL-MCNC: 17 MG/DL (ref 6–20)
BUN/CREAT SERPL: 18 (ref 12–20)
CALCIUM SERPL-MCNC: 8.7 MG/DL (ref 8.5–10.1)
CHLORIDE SERPL-SCNC: 109 MMOL/L (ref 97–108)
CO2 SERPL-SCNC: 26 MMOL/L (ref 21–32)
CREAT SERPL-MCNC: 0.93 MG/DL (ref 0.7–1.3)
DIFFERENTIAL METHOD BLD: ABNORMAL
EOSINOPHIL # BLD: 0.1 K/UL (ref 0–0.4)
EOSINOPHIL NFR BLD: 2 % (ref 0–7)
ERYTHROCYTE [DISTWIDTH] IN BLOOD BY AUTOMATED COUNT: 18.1 % (ref 11.5–14.5)
GLUCOSE BLD STRIP.AUTO-MCNC: 134 MG/DL (ref 65–117)
GLUCOSE BLD STRIP.AUTO-MCNC: 137 MG/DL (ref 65–117)
GLUCOSE BLD STRIP.AUTO-MCNC: 149 MG/DL (ref 65–117)
GLUCOSE BLD STRIP.AUTO-MCNC: 164 MG/DL (ref 65–117)
GLUCOSE SERPL-MCNC: 102 MG/DL (ref 65–100)
HCT VFR BLD AUTO: 26.7 % (ref 36.6–50.3)
HGB BLD-MCNC: 8.2 G/DL (ref 12.1–17)
IMM GRANULOCYTES # BLD AUTO: 0 K/UL (ref 0–0.04)
IMM GRANULOCYTES NFR BLD AUTO: 1 % (ref 0–0.5)
LYMPHOCYTES # BLD: 1.8 K/UL (ref 0.8–3.5)
LYMPHOCYTES NFR BLD: 31 % (ref 12–49)
MCH RBC QN AUTO: 31.8 PG (ref 26–34)
MCHC RBC AUTO-ENTMCNC: 30.7 G/DL (ref 30–36.5)
MCV RBC AUTO: 103.5 FL (ref 80–99)
MONOCYTES # BLD: 0.4 K/UL (ref 0–1)
MONOCYTES NFR BLD: 7 % (ref 5–13)
NEUTS SEG # BLD: 3.4 K/UL (ref 1.8–8)
NEUTS SEG NFR BLD: 58 % (ref 32–75)
NRBC # BLD: 0.02 K/UL (ref 0–0.01)
NRBC BLD-RTO: 0.3 PER 100 WBC
PLATELET # BLD AUTO: 251 K/UL (ref 150–400)
PMV BLD AUTO: 11.5 FL (ref 8.9–12.9)
POTASSIUM SERPL-SCNC: 4.3 MMOL/L (ref 3.5–5.1)
RBC # BLD AUTO: 2.58 M/UL (ref 4.1–5.7)
SERVICE CMNT-IMP: ABNORMAL
SODIUM SERPL-SCNC: 140 MMOL/L (ref 136–145)
WBC # BLD AUTO: 5.9 K/UL (ref 4.1–11.1)

## 2024-02-02 PROCEDURE — 36415 COLL VENOUS BLD VENIPUNCTURE: CPT

## 2024-02-02 PROCEDURE — 82962 GLUCOSE BLOOD TEST: CPT

## 2024-02-02 PROCEDURE — 6370000000 HC RX 637 (ALT 250 FOR IP): Performed by: INTERNAL MEDICINE

## 2024-02-02 PROCEDURE — 80048 BASIC METABOLIC PNL TOTAL CA: CPT

## 2024-02-02 PROCEDURE — 1100000003 HC PRIVATE W/ TELEMETRY

## 2024-02-02 PROCEDURE — 85025 COMPLETE CBC W/AUTO DIFF WBC: CPT

## 2024-02-02 PROCEDURE — 2580000003 HC RX 258: Performed by: INTERNAL MEDICINE

## 2024-02-02 RX ADMIN — OXYCODONE HYDROCHLORIDE AND ACETAMINOPHEN 500 MG: 500 TABLET ORAL at 09:18

## 2024-02-02 RX ADMIN — ZINC SULFATE 220 MG (50 MG) CAPSULE 50 MG: CAPSULE at 09:18

## 2024-02-02 RX ADMIN — DIGOXIN 125 MCG: 125 TABLET ORAL at 09:17

## 2024-02-02 RX ADMIN — MIDODRINE HYDROCHLORIDE 10 MG: 5 TABLET ORAL at 09:18

## 2024-02-02 RX ADMIN — PREDNISONE 5 MG: 5 TABLET ORAL at 21:04

## 2024-02-02 RX ADMIN — MIDODRINE HYDROCHLORIDE 10 MG: 5 TABLET ORAL at 12:32

## 2024-02-02 RX ADMIN — SODIUM CHLORIDE, PRESERVATIVE FREE 10 ML: 5 INJECTION INTRAVENOUS at 21:04

## 2024-02-02 RX ADMIN — APIXABAN 2.5 MG: 2.5 TABLET, FILM COATED ORAL at 21:04

## 2024-02-02 RX ADMIN — Medication: at 09:19

## 2024-02-02 RX ADMIN — EZETIMIBE 10 MG: 10 TABLET ORAL at 09:17

## 2024-02-02 RX ADMIN — Medication: at 21:04

## 2024-02-02 RX ADMIN — PANTOPRAZOLE SODIUM 40 MG: 40 TABLET, DELAYED RELEASE ORAL at 09:17

## 2024-02-02 RX ADMIN — OXYCODONE HYDROCHLORIDE AND ACETAMINOPHEN 500 MG: 500 TABLET ORAL at 21:04

## 2024-02-02 RX ADMIN — METOPROLOL SUCCINATE 75 MG: 50 TABLET, EXTENDED RELEASE ORAL at 09:17

## 2024-02-02 RX ADMIN — APIXABAN 2.5 MG: 2.5 TABLET, FILM COATED ORAL at 09:17

## 2024-02-02 RX ADMIN — LEVOFLOXACIN 750 MG: 750 TABLET, FILM COATED ORAL at 15:28

## 2024-02-02 RX ADMIN — PREDNISONE 5 MG: 5 TABLET ORAL at 09:17

## 2024-02-02 RX ADMIN — MIDODRINE HYDROCHLORIDE 10 MG: 5 TABLET ORAL at 17:17

## 2024-02-02 RX ADMIN — SERTRALINE HYDROCHLORIDE 25 MG: 25 TABLET ORAL at 09:18

## 2024-02-02 RX ADMIN — ABIRATERONE ACETATE 1000 MG: 250 TABLET ORAL at 15:29

## 2024-02-02 RX ADMIN — SODIUM CHLORIDE, PRESERVATIVE FREE 10 ML: 5 INJECTION INTRAVENOUS at 09:18

## 2024-02-02 ASSESSMENT — PAIN SCALES - GENERAL
PAINLEVEL_OUTOF10: 0

## 2024-02-02 NOTE — PROGRESS NOTES
..End of Shift Note    Bedside shift change report given to KEYLA Danielle (oncoming nurse) by Amandeep Alva RN (offgoing nurse).  Report included the following information SBAR    Shift worked:  0700 - 1900     Shift summary and any significant changes:    Pt. Tolerated all medication w/o issue.  No c/o pain or distress.  Family at bedside to discuss plan with MD during day shift.  Discharge possibly 2/3.       Concerns for physician to address: No     Zone phone for oncoming shift:  No       Activity:     Number times ambulated in hallways past shift: 0  Number of times OOB to chair past shift: 1    Cardiac:   Cardiac Monitoring: Yes           Access:  Current line(s): PIV     Genitourinary:   Urinary status: voiding    Respiratory:      Chronic home O2 use?: NO  Incentive spirometer at bedside: NO       GI:     Current diet:  ADULT DIET; Regular  Passing flatus: YES  Tolerating current diet: YES       Pain Management:   Patient states pain is manageable on current regimen: YES    Skin:     Interventions: increase time out of bed    Patient Safety:  Fall Score:    Interventions: pt to call before getting OOB       Length of Stay:  Expected LOS: 5  Actual LOS: 4      Amandeep Alva RN

## 2024-02-02 NOTE — PROGRESS NOTES
Hospitalist Progress Note    NAME:   Pepe Latham   : 1940   MRN: [id number]     Date/Time: 2024 3:27 PM  Patient PCP: Weston Pendleton MD    Estimated discharge date: 2/3/24  Barriers: Placement      Assessment / Plan:  Severe sepsis  Pseudomonas and Serratia urinary tract infection  -Urine cultures are growing Pseudomonas and also Serratia sensitive to Zosyn and also levofloxacin.  Continue levofloxacin.    Acute metabolic encephalopathy  -Discussed with patient's daughter at the bedside and patient was incoherent but never had any near-syncope or syncopal episode.  -His mental status is back to normal.     coag negative bacteremia  -Most likely contaminant.  No treatment needed      IMTIAZ:  -Creatinine is improved.     Atrial fibrillation  Continue metoprolol and eliquis  Continue digoxin     Chronic hypotension:  Continue midodrine           CHF:  EF 25-30% on ECHO 10/2/23.  Continue metoprolol        Elevated bilirubin:  -Bilirubin is now normal        Hx of prostate cancer  MDS  Continue PTA prednisone  Continue PTA abaterone                     Medical Decision Making:   I personally reviewed labs:cbc, BMP  I personally reviewed imaging:  I personally reviewed EKG:  Toxic drug monitoring:   Discussed case with: Pharmacy,         Code Status: DNR  DVT Prophylaxis: Lovenox  GI Prophylaxis:    Subjective:     Chief Complaint / Reason for Physician Visit  He does not report any dysuria or urgency  Overnight events noted  Daughter present at bedside and discussed plan.  Patient never had syncope but was incoherent and felt generally weak before admission  He did not report any chest pain, shortness of breath or dizziness      Objective:     VITALS:   Last 24hrs VS reviewed since prior progress note. Most recent are:  Patient Vitals for the past 24 hrs:   BP Temp Temp src Pulse Resp SpO2   24 1518 (!) 145/66 98.3 °F (36.8 °C) Oral 65 18 97 %   24 1039 138/63 97.8 °F

## 2024-02-02 NOTE — PROGRESS NOTES
Spiritual Care Assessment/Progress Note  [facility]    Name: Pepe Latham MRN: [id number]    Age: 83 y.o.     Sex: male   Language: English     Date: 2/2/2024            Total Time Calculated: 16 min              Spiritual Assessment begun in [facility] CARDIOPULMONARY CARE  Service Provided For:: Patient  Referral/Consult From:: Palliative Care  Encounter Overview/Reason : Initial Encounter    Spiritual beliefs:      [] Involved in a earline tradition/spiritual practice:      [] Supported by a earline community:      [] Claims no spiritual orientation:      [] Seeking spiritual identity:           [x] Adheres to an individual form of spirituality:      [] Not able to assess:                Identified resources for coping and support system:   Support System: Spouse, Children       [] Prayer                  [] Devotional reading               [] Music                  [] Guided Imagery     [] Pet visits                                        [] Other: (COMMENT)     Specific area/focus of visit   Encounter:    Crisis:    Spiritual/Emotional needs: Type: Spiritual Support  Ritual, Rites and Sacraments:    Grief, Loss, and Adjustments:    Ethics/Mediation:    Behavioral Health:    Palliative Care: Type: Palliative Care, Initial/Spiritual Assessment  Advance Care Planning:      Plan/Referrals: Continue Support (comment)    Narrative:   Met wit Mr. Latham. He was grateful for this 's visit. He shared that he has had a wonderful life with his wife, family (Daughter and grand-daughter) and that God has been good to him. He shared that his earline is very personal. He is hoping to be able to return home soon. He thanked me for the visit, for speaking with him and listening.     MARIA A Briseno.  PRN    [facility] [phone]

## 2024-02-02 NOTE — PROGRESS NOTES
Physician Progress Note      PATIENT:               VIANEY ROSALES  CSN #:                  [id number]  :                       1940  ADMIT DATE:       2024 11:39 AM  DISCH DATE:  RESPONDING  PROVIDER #:        Elia Duran MD          QUERY TEXT:    Patient was admitted with Sepsis, noted to have \"Atrial fibrillation\" in H&P   note on  and is maintained on Eliquis. If possible, please document in   progress notes and discharge summary if you are evaluating and/or treating any   of the following:    The medical record reflects the following:  Risk Factors: Age 83 M, HTN, CHF  Clinical Indicators:  H&P noted \"Atrial fibrillation\" and is maintained   on Eliquis.  Treatment:  Eliquis.    Chronic: nonspecific term that could be referring to paroxysmal, persistent,   or permanent  Longstanding persistent: persistent and continuous, lasting > 1 year.  Paroxysmal - self-terminating or intermittent; resolves with or without   intervention within 7 days of onset; may recur with various frequency.  Persistent - Fails to terminate within 7 days; Often requires meds or   cardioversion to restore to NSR.  Permanent - longstanding & persistent; Medication has been ineffective in   restoring NSR &/or cardioversion is contraindicated    Definitions per [employer] Training Guide and Quick Reference Guide, MDC 5   Diseases and Disorders of the Circulatory System; 2019; . Software content   from the [employer]? Advanced CDI Transformation Program  Options provided:  -- Paroxysmal Atrial Fibrillation  -- Longstanding Persistent Atrial Fibrillation  -- Permanent Atrial Fibrillation  -- Persistent Atrial Fibrillation  -- Chronic Atrial Fibrillation, unspecified  -- Other - I will add my own diagnosis  -- Disagree - Not applicable / Not valid  -- Disagree - Clinically unable to determine / Unknown  -- Refer to Clinical Documentation Reviewer    PROVIDER RESPONSE TEXT:    This patient has paroxysmal atrial  no

## 2024-02-02 NOTE — PROGRESS NOTES
End of Shift Note    Bedside shift change report given to  (oncoming nurse) by Junie Brown RN (offgoing nurse).  Report included the following information SBAR    Shift worked:       Shift summary and any significant changes:          Concerns for physician to address:  2148 ,Blood cultures 1/29 Positive for Gram +cocci in clusters 1/2 bottles (NP [place] Made aware via perfect serve ,no new orders)     Zone phone for oncoming shift:          Activity:     Number times ambulated in hallways past shift: 0  Number of times OOB to chair past shift: 0    Cardiac:   Cardiac Monitoring: Yes           Access:  Current line(s): PIV     Genitourinary:   Urinary status: voiding    Respiratory:      Chronic home O2 use?: NO  Incentive spirometer at bedside: NO       GI:     Current diet:  ADULT DIET; Regular  Passing flatus: YES  Tolerating current diet: YES       Pain Management:   Patient states pain is manageable on current regimen: YES    Skin:     Interventions: specialty bed, float heels, and increase time out of bed    Patient Safety:  Fall Score:    Interventions: bed/chair alarm, assistive device (walker, cane. etc), gripper socks, pt to call before getting OOB, and stay with me (per policy)       Length of Stay:  Expected LOS: 4  Actual LOS: 3      Junie Brown, RN

## 2024-02-02 NOTE — PROGRESS NOTES
Nursing notified patient lab result of bld cx 1/29 gram positive cocci in clusters 1 of 2 bottles. No other reported concerns at this time. VSS. Pt with sepsis uti already showing sensitivities and antbx regimen was changed during dayshift to levaquin appears. This results is likely a contaminant- labs have been continuing to improve and remains hemodynamically stable. Patient denies any further complaints or concerns. No acute distress reported. Nursing to notify Hospitalist for further/continued concerns. Will remain available overnight for further concerns if nursing/patient needs. Will defer further evaluation/management to the day shift team.    Non-billable note.

## 2024-02-03 LAB
GLUCOSE BLD STRIP.AUTO-MCNC: 116 MG/DL (ref 65–117)
GLUCOSE BLD STRIP.AUTO-MCNC: 159 MG/DL (ref 65–117)
GLUCOSE BLD STRIP.AUTO-MCNC: 180 MG/DL (ref 65–117)
GLUCOSE BLD STRIP.AUTO-MCNC: 183 MG/DL (ref 65–117)
SERVICE CMNT-IMP: ABNORMAL
SERVICE CMNT-IMP: NORMAL

## 2024-02-03 PROCEDURE — 82962 GLUCOSE BLOOD TEST: CPT

## 2024-02-03 PROCEDURE — 6370000000 HC RX 637 (ALT 250 FOR IP): Performed by: INTERNAL MEDICINE

## 2024-02-03 PROCEDURE — 2580000003 HC RX 258: Performed by: INTERNAL MEDICINE

## 2024-02-03 PROCEDURE — 1100000003 HC PRIVATE W/ TELEMETRY

## 2024-02-03 RX ADMIN — ABIRATERONE ACETATE 1000 MG: 250 TABLET ORAL at 15:12

## 2024-02-03 RX ADMIN — APIXABAN 2.5 MG: 2.5 TABLET, FILM COATED ORAL at 20:00

## 2024-02-03 RX ADMIN — PREDNISONE 5 MG: 5 TABLET ORAL at 20:00

## 2024-02-03 RX ADMIN — PANTOPRAZOLE SODIUM 40 MG: 40 TABLET, DELAYED RELEASE ORAL at 09:24

## 2024-02-03 RX ADMIN — EZETIMIBE 10 MG: 10 TABLET ORAL at 09:15

## 2024-02-03 RX ADMIN — APIXABAN 2.5 MG: 2.5 TABLET, FILM COATED ORAL at 09:15

## 2024-02-03 RX ADMIN — LEVOFLOXACIN 750 MG: 750 TABLET, FILM COATED ORAL at 15:15

## 2024-02-03 RX ADMIN — Medication: at 20:01

## 2024-02-03 RX ADMIN — METOPROLOL SUCCINATE 75 MG: 50 TABLET, EXTENDED RELEASE ORAL at 09:14

## 2024-02-03 RX ADMIN — MIDODRINE HYDROCHLORIDE 10 MG: 5 TABLET ORAL at 09:15

## 2024-02-03 RX ADMIN — DIGOXIN 125 MCG: 125 TABLET ORAL at 09:15

## 2024-02-03 RX ADMIN — MIDODRINE HYDROCHLORIDE 10 MG: 5 TABLET ORAL at 17:06

## 2024-02-03 RX ADMIN — SERTRALINE HYDROCHLORIDE 25 MG: 25 TABLET ORAL at 09:14

## 2024-02-03 RX ADMIN — ZINC SULFATE 220 MG (50 MG) CAPSULE 50 MG: CAPSULE at 09:15

## 2024-02-03 RX ADMIN — SODIUM CHLORIDE, PRESERVATIVE FREE 10 ML: 5 INJECTION INTRAVENOUS at 20:00

## 2024-02-03 RX ADMIN — MIDODRINE HYDROCHLORIDE 10 MG: 5 TABLET ORAL at 12:07

## 2024-02-03 RX ADMIN — OXYCODONE HYDROCHLORIDE AND ACETAMINOPHEN 500 MG: 500 TABLET ORAL at 20:01

## 2024-02-03 RX ADMIN — PREDNISONE 5 MG: 5 TABLET ORAL at 09:14

## 2024-02-03 RX ADMIN — OXYCODONE HYDROCHLORIDE AND ACETAMINOPHEN 500 MG: 500 TABLET ORAL at 09:15

## 2024-02-03 ASSESSMENT — PAIN SCALES - GENERAL
PAINLEVEL_OUTOF10: 0
PAINLEVEL_OUTOF10: 0

## 2024-02-03 NOTE — PROGRESS NOTES
End of Shift Note    Bedside shift change report given to KEYLA Zaman (oncoming nurse) by Oc Acevedo RN (offgoing nurse).  Report included the following information SBAR, Kardex, and Cardiac Rhythm      Shift worked:  4782-2065     Shift summary and any significant changes:     No acute changes throughout shift. Pending isolation bed at facility.      Concerns for physician to address:  None     Zone phone for oncoming shift:   [phone]           Cleo Acevedo RN

## 2024-02-03 NOTE — PROGRESS NOTES
Hospitalist Progress Note    NAME:   Pepe Latham   : 1940   MRN: [id number]     Date/Time: 2/3/2024 1:19 PM  Patient PCP: Weston Pendleton MD    Estimated discharge date: 2/3/24  Barriers: Placement      Assessment / Plan:  Severe sepsis  Pseudomonas and Serratia urinary tract infection  -Urine cultures are growing Pseudomonas and also Serratia sensitive to Zosyn and also levofloxacin.  Continue levofloxacin.  Medically clear and waiting on bed placement    Acute metabolic encephalopathy  -Discussed with patient's daughter at the bedside and patient was incoherent but never had any near-syncope or syncopal episode.  -His mental status is back to normal.     coag negative bacteremia  -Most likely contaminant.  No treatment needed      IMTIAZ:  -Creatinine is improved.     Atrial fibrillation  Continue metoprolol and eliquis  Continue digoxin     Chronic hypotension:  Continue midodrine           CHF:  EF 25-30% on ECHO 10/2/23.  Continue metoprolol        Elevated bilirubin:  -Bilirubin is now normal        Hx of prostate cancer  MDS  Continue PTA prednisone  Continue PTA abaterone                     Medical Decision Making:   I personally reviewed labs:cbc, BMP  I personally reviewed imaging:  I personally reviewed EKG:  Toxic drug monitoring:   Discussed case with: Pharmacy,         Code Status: DNR  DVT Prophylaxis: Lovenox  GI Prophylaxis:    Subjective:     Chief Complaint / Reason for Physician Visit  Does not report any abdominal pain, nausea vomiting  No diarrhea        Objective:     VITALS:   Last 24hrs VS reviewed since prior progress note. Most recent are:  Patient Vitals for the past 24 hrs:   BP Temp Temp src Pulse Resp SpO2   24 1102 116/76 97.6 °F (36.4 °C) Oral 84 25 99 %   24 0914 (!) 143/73 97.1 °F (36.2 °C) Temporal 100 29 98 %   24 0331 (!) 157/81 97.3 °F (36.3 °C) Oral 63 12 98 %   24 0203 (!) 135/105 -- -- 81 20 --   24 2205 (!)

## 2024-02-04 LAB
BACTERIA SPEC CULT: NORMAL
GLUCOSE BLD STRIP.AUTO-MCNC: 111 MG/DL (ref 65–117)
GLUCOSE BLD STRIP.AUTO-MCNC: 124 MG/DL (ref 65–117)
GLUCOSE BLD STRIP.AUTO-MCNC: 156 MG/DL (ref 65–117)
GLUCOSE BLD STRIP.AUTO-MCNC: 184 MG/DL (ref 65–117)
SERVICE CMNT-IMP: ABNORMAL
SERVICE CMNT-IMP: NORMAL
SERVICE CMNT-IMP: NORMAL

## 2024-02-04 PROCEDURE — 82962 GLUCOSE BLOOD TEST: CPT

## 2024-02-04 PROCEDURE — 2580000003 HC RX 258: Performed by: INTERNAL MEDICINE

## 2024-02-04 PROCEDURE — 6370000000 HC RX 637 (ALT 250 FOR IP): Performed by: INTERNAL MEDICINE

## 2024-02-04 PROCEDURE — 1100000003 HC PRIVATE W/ TELEMETRY

## 2024-02-04 RX ADMIN — OXYCODONE HYDROCHLORIDE AND ACETAMINOPHEN 500 MG: 500 TABLET ORAL at 21:41

## 2024-02-04 RX ADMIN — PANTOPRAZOLE SODIUM 40 MG: 40 TABLET, DELAYED RELEASE ORAL at 07:22

## 2024-02-04 RX ADMIN — SODIUM CHLORIDE, PRESERVATIVE FREE 10 ML: 5 INJECTION INTRAVENOUS at 08:40

## 2024-02-04 RX ADMIN — LEVOFLOXACIN 750 MG: 750 TABLET, FILM COATED ORAL at 16:53

## 2024-02-04 RX ADMIN — MIDODRINE HYDROCHLORIDE 10 MG: 5 TABLET ORAL at 08:40

## 2024-02-04 RX ADMIN — SODIUM CHLORIDE, PRESERVATIVE FREE 10 ML: 5 INJECTION INTRAVENOUS at 21:42

## 2024-02-04 RX ADMIN — Medication: at 21:42

## 2024-02-04 RX ADMIN — APIXABAN 2.5 MG: 2.5 TABLET, FILM COATED ORAL at 08:40

## 2024-02-04 RX ADMIN — OXYCODONE HYDROCHLORIDE AND ACETAMINOPHEN 500 MG: 500 TABLET ORAL at 08:40

## 2024-02-04 RX ADMIN — PREDNISONE 5 MG: 5 TABLET ORAL at 08:40

## 2024-02-04 RX ADMIN — MIDODRINE HYDROCHLORIDE 10 MG: 5 TABLET ORAL at 12:38

## 2024-02-04 RX ADMIN — PREDNISONE 5 MG: 5 TABLET ORAL at 21:41

## 2024-02-04 RX ADMIN — METOPROLOL SUCCINATE 75 MG: 50 TABLET, EXTENDED RELEASE ORAL at 08:40

## 2024-02-04 RX ADMIN — APIXABAN 2.5 MG: 2.5 TABLET, FILM COATED ORAL at 21:41

## 2024-02-04 RX ADMIN — ABIRATERONE ACETATE 1000 MG: 250 TABLET ORAL at 16:53

## 2024-02-04 RX ADMIN — ZINC SULFATE 220 MG (50 MG) CAPSULE 50 MG: CAPSULE at 08:39

## 2024-02-04 RX ADMIN — EZETIMIBE 10 MG: 10 TABLET ORAL at 08:40

## 2024-02-04 RX ADMIN — SERTRALINE HYDROCHLORIDE 25 MG: 25 TABLET ORAL at 08:41

## 2024-02-04 RX ADMIN — DIGOXIN 125 MCG: 125 TABLET ORAL at 08:40

## 2024-02-04 RX ADMIN — MIDODRINE HYDROCHLORIDE 10 MG: 5 TABLET ORAL at 16:53

## 2024-02-04 ASSESSMENT — PAIN SCALES - GENERAL: PAINLEVEL_OUTOF10: 0

## 2024-02-04 NOTE — PLAN OF CARE
Problem: Discharge Planning  Goal: Discharge to home or other facility with appropriate resources  1/31/2024 1233 by Amandeep Alva RN  Outcome: Progressing  1/31/2024 0323 by Gunner Brand RN  Outcome: Progressing     Problem: Skin/Tissue Integrity  Goal: Absence of new skin breakdown  Description: 1.  Monitor for areas of redness and/or skin breakdown  2.  Assess vascular access sites hourly  3.  Every 4-6 hours minimum:  Change oxygen saturation probe site  4.  Every 4-6 hours:  If on nasal continuous positive airway pressure, respiratory therapy assess nares and determine need for appliance change or resting period.  1/31/2024 1233 by Amandeep Alva, RN  Outcome: Progressing  1/31/2024 0323 by Gunner Brand, RN  Outcome: Progressing     Problem: Safety - Adult  Goal: Free from fall injury  1/31/2024 1233 by Amandeep Alva, RN  Outcome: Progressing  1/31/2024 0323 by Gunner Brand, RN  Outcome: Progressing     
  Problem: Discharge Planning  Goal: Discharge to home or other facility with appropriate resources  2/1/2024 0820 by Amandeep Alva RN  Outcome: Progressing  2/1/2024 0820 by Amandeep Alva RN  Outcome: Progressing     Problem: Skin/Tissue Integrity  Goal: Absence of new skin breakdown  Description: 1.  Monitor for areas of redness and/or skin breakdown  2.  Assess vascular access sites hourly  3.  Every 4-6 hours minimum:  Change oxygen saturation probe site  4.  Every 4-6 hours:  If on nasal continuous positive airway pressure, respiratory therapy assess nares and determine need for appliance change or resting period.  2/1/2024 0820 by Amandeep Alva RN  Outcome: Progressing  2/1/2024 0820 by Amandeep Alva RN  Outcome: Progressing     Problem: Safety - Adult  Goal: Free from fall injury  2/1/2024 0820 by Amandeep Alva RN  Outcome: Progressing  2/1/2024 0820 by Amandeep Alva RN  Outcome: Progressing     
  Problem: Discharge Planning  Goal: Discharge to home or other facility with appropriate resources  Outcome: Progressing     Problem: Skin/Tissue Integrity  Goal: Absence of new skin breakdown  Description: 1.  Monitor for areas of redness and/or skin breakdown  2.  Assess vascular access sites hourly  3.  Every 4-6 hours minimum:  Change oxygen saturation probe site  4.  Every 4-6 hours:  If on nasal continuous positive airway pressure, respiratory therapy assess nares and determine need for appliance change or resting period.  2/2/2024 0813 by Amandeep Alva RN  Outcome: Progressing  2/1/2024 2307 by Junie Brown RN  Outcome: Progressing     Problem: Safety - Adult  Goal: Free from fall injury  2/2/2024 0813 by Amandeep Alva RN  Outcome: Progressing  2/1/2024 2307 by Junie Brown RN  Outcome: Progressing     Problem: Cardiovascular - Adult  Goal: Maintains optimal cardiac output and hemodynamic stability  2/2/2024 0813 by Amandeep Alva RN  Outcome: Progressing  2/1/2024 2308 by Junie Brown RN  Outcome: Progressing  Flowsheets (Taken 2/1/2024 1910)  Maintains optimal cardiac output and hemodynamic stability:   Monitor blood pressure and heart rate   Monitor urine output and notify Licensed Independent Practitioner for values outside of normal range   Assess for signs of decreased cardiac output  Goal: Absence of cardiac dysrhythmias or at baseline  2/1/2024 2308 by Junie Brown RN  Outcome: Progressing  Flowsheets (Taken 2/1/2024 1910)  Absence of cardiac dysrhythmias or at baseline:   Monitor cardiac rate and rhythm   Assess for signs of decreased cardiac output     
  Problem: Discharge Planning  Goal: Discharge to home or other facility with appropriate resources  Outcome: Progressing     Problem: Skin/Tissue Integrity  Goal: Absence of new skin breakdown  Description: 1.  Monitor for areas of redness and/or skin breakdown  2.  Assess vascular access sites hourly  3.  Every 4-6 hours minimum:  Change oxygen saturation probe site  4.  Every 4-6 hours:  If on nasal continuous positive airway pressure, respiratory therapy assess nares and determine need for appliance change or resting period.  Outcome: Progressing     Problem: Safety - Adult  Goal: Free from fall injury  Outcome: Progressing     
  Problem: Discharge Planning  Goal: Discharge to home or other facility with appropriate resources  Outcome: Progressing     Problem: Skin/Tissue Integrity  Goal: Absence of new skin breakdown  Description: 1.  Monitor for areas of redness and/or skin breakdown  2.  Assess vascular access sites hourly  3.  Every 4-6 hours minimum:  Change oxygen saturation probe site  4.  Every 4-6 hours:  If on nasal continuous positive airway pressure, respiratory therapy assess nares and determine need for appliance change or resting period.  Outcome: Progressing     Problem: Safety - Adult  Goal: Free from fall injury  Outcome: Progressing     Problem: Cardiovascular - Adult  Goal: Maintains optimal cardiac output and hemodynamic stability  Outcome: Progressing  Goal: Absence of cardiac dysrhythmias or at baseline  Outcome: Progressing     Problem: Infection - Adult  Goal: Absence of infection at discharge  Outcome: Progressing  Goal: Absence of infection during hospitalization  Outcome: Progressing     Problem: Metabolic/Fluid and Electrolytes - Adult  Goal: Electrolytes maintained within normal limits  Outcome: Progressing  Goal: Hemodynamic stability and optimal renal function maintained  Outcome: Progressing     
  Problem: Skin/Tissue Integrity  Goal: Absence of new skin breakdown  Description: 1.  Monitor for areas of redness and/or skin breakdown  2.  Assess vascular access sites hourly  3.  Every 4-6 hours minimum:  Change oxygen saturation probe site  4.  Every 4-6 hours:  If on nasal continuous positive airway pressure, respiratory therapy assess nares and determine need for appliance change or resting period.  Outcome: Progressing     Problem: Safety - Adult  Goal: Free from fall injury  Outcome: Progressing     Problem: Cardiovascular - Adult  Goal: Maintains optimal cardiac output and hemodynamic stability  Outcome: Progressing     Problem: Infection - Adult  Goal: Absence of infection at discharge  Outcome: Progressing  Flowsheets (Taken 2/3/2024 1916)  Absence of infection at discharge:   Assess and monitor for signs and symptoms of infection   Monitor lab/diagnostic results   Monitor all insertion sites i.e., indwelling lines, tubes and drains     Problem: Metabolic/Fluid and Electrolytes - Adult  Goal: Electrolytes maintained within normal limits  Outcome: Progressing  Flowsheets (Taken 2/3/2024 1916)  Electrolytes maintained within normal limits:   Monitor labs and assess patient for signs and symptoms of electrolyte imbalances   Administer electrolyte replacement as ordered   Monitor response to electrolyte replacements, including repeat lab results as appropriate  Goal: Hemodynamic stability and optimal renal function maintained  Outcome: Progressing  Flowsheets (Taken 2/3/2024 1916)  Hemodynamic stability and optimal renal function maintained:   Monitor labs and assess for signs and symptoms of volume excess or deficit   Monitor intake, output and patient weight     
  Problem: Skin/Tissue Integrity  Goal: Absence of new skin breakdown  Description: 1.  Monitor for areas of redness and/or skin breakdown  2.  Assess vascular access sites hourly  3.  Every 4-6 hours minimum:  Change oxygen saturation probe site  4.  Every 4-6 hours:  If on nasal continuous positive airway pressure, respiratory therapy assess nares and determine need for appliance change or resting period.  Outcome: Progressing     Problem: Safety - Adult  Goal: Free from fall injury  Outcome: Progressing     Problem: Occupational Therapy - Adult  Goal: By Discharge: Performs self-care activities at highest level of function for planned discharge setting.  See evaluation for individualized goals.  Description: FUNCTIONAL STATUS PRIOR TO ADMISSION:  Patient reports using RW, SPC, and electric wheelchair for functional mobility \"depending on the day.\" History of falls and recently discharged from [facility] with HHOT/PT.  Receives Help From: Family, ADL Assistance: Needs assistance, Bath: Maximum assistance, Dressing: Maximum assistance, Grooming: Maximum assistance, Feeding: Independent, Toileting: Needs assistance, Homemaking Assistance: Needs assistance,  ,  , Active : No     HOME SUPPORT: Patient lived with wife and son who provide assistance for ADLs. Wife ambulates with rollator.    Occupational Therapy Goals:  Initiated 2/1/2024  1.  Patient will perform standing grooming 5 minutes with Contact Guard Assist within 7 day(s).  2.  Patient will perform upper body dressing with Contact Guard Assist within 7 day(s).  3.  Patient will perform lower body dressing and appropriate AE with Maximal Assist within 7 day(s).  4.  Patient will perform toilet transfers with Contact Guard Assist  within 7 day(s).  5.  Patient will perform all aspects of toileting with Minimal Assist within 7 day(s).  6.  Patient will participate in upper extremity therapeutic exercise/activities with Contact Guard Assist for 5 minutes within 7 
  Problem: Skin/Tissue Integrity  Goal: Absence of new skin breakdown  Description: 1.  Monitor for areas of redness and/or skin breakdown  2.  Assess vascular access sites hourly  3.  Every 4-6 hours minimum:  Change oxygen saturation probe site  4.  Every 4-6 hours:  If on nasal continuous positive airway pressure, respiratory therapy assess nares and determine need for appliance change or resting period.  Outcome: Progressing     Problem: Safety - Adult  Goal: Free from fall injury  Outcome: Progressing     Problem: Occupational Therapy - Adult  Goal: By Discharge: Performs self-care activities at highest level of function for planned discharge setting.  See evaluation for individualized goals.  Description: FUNCTIONAL STATUS PRIOR TO ADMISSION:  Patient reports using RW, SPC, and electric wheelchair for functional mobility \"depending on the day.\" History of falls and recently discharged [facility] with HHOT/PT.  Receives Help From: Family, ADL Assistance: Needs assistance, Bath: Maximum assistance, Dressing: Maximum assistance, Grooming: Maximum assistance, Feeding: Independent, Toileting: Needs assistance, Homemaking Assistance: Needs assistance,  ,  , Active : No     HOME SUPPORT: Patient lived with wife and son who provide assistance for ADLs. Wife ambulates with rollator.    Occupational Therapy Goals:  Initiated 2/1/2024  1.  Patient will perform standing grooming 5 minutes with Contact Guard Assist within 7 day(s).  2.  Patient will perform upper body dressing with Contact Guard Assist within 7 day(s).  3.  Patient will perform lower body dressing and appropriate AE with Maximal Assist within 7 day(s).  4.  Patient will perform toilet transfers with Contact Guard Assist  within 7 day(s).  5.  Patient will perform all aspects of toileting with Minimal Assist within 7 day(s).  6.  Patient will participate in upper extremity therapeutic exercise/activities with Contact Guard Assist for 5 minutes within 7 
Speech LAnguage Pathology EVALUATION/DISCHARGE    Patient: Pepe Latham (83 y.o. male)  Date: 1/30/2024  Primary Diagnosis: Acute cystitis with hematuria [N30.01]  Sepsis (HCC) [A41.9]  Severe sepsis (HCC) [A41.9, R65.20]  Altered mental status, unspecified altered mental status type [R41.82]       Precautions: reflux precautions                     ASSESSMENT :  Based on the objective data described below, the patient presents with seemingly functional oropharyngeal swallow and suspect esophageal component. Prior to PO trials, patient with coughing followed by gurgled regurgitation episode and expectoration of thick, clear secretions into cup. Hiccups also noted during evaluation. Suspect esophageal component. Oral structures and functions seemingly WNL. Patient fed himself sips of thin via straw, puree, and solid. No overt clinical s/s aspiration noted across PO trials, even with consecutive sips thin via straw. SLP educated patient on aspiration and reflux precautions. Patient may benefit from GI consult. No further acute SLP services warranted at this time. SLP will sign off.     Patient will be discharged from skilled speech-language pathology services at this time.     PLAN :  Recommendations and Planned Interventions:  Diet: Regular and thin liquids  --Medications as tolerated   --Upright all PO intake   --Oral hygiene 2-3x/day     Reflux precautions:  · Sit fully upright at 90 for meals (prefer that meals are eating while sitting in a chair)  · Remain up for 1-hour after meals  · Consider 6-smaller meals throughout the day (eat about every 2-3 hours) instead of large meals  · Avoid acidic foods (tomato based, citrus fruits, alcohol, high-fat dairy, caffeine, fried foods)  · Trial warm, de-caffeinated beverages with meals to improve esophageal clearance  · Sleep w/ HOB elevated (30 degrees)  · Stop eating/drinking anything except water 2-hours before bedtime  -Avoid strenuous activity after meals 
Yes  Sit to Stand: Contact-guard assistance  Stand to Sit: Contact-guard assistance  Bed to Chair: Contact-guard assistance  Balance:               Balance  Sitting: Intact  Standing: Impaired (RW support)  Standing - Static: Good;Constant support  Standing - Dynamic: Fair;Constant support  Ambulation/Gait Training:                       Gait  Overall Level of Assistance: Contact-guard assistance  Distance (ft): 3 Feet  Assistive Device: Gait belt;Walker, rolling  Base of Support: Widened  Speed/Latricia: Pace decreased (< 100 feet/min)  Step Length: Right shortened;Left shortened  Gait Abnormalities: Decreased step clearance;Shuffling gait;Trunk sway increased                                                                                                                                                                                                                                                      [facility]-PAC®      Basic Mobility Inpatient Short Form (6-Clicks) Version 2  How much HELP from another person do you currently need... (If the patient hasn't done an activity recently, how much help from another person do you think they would need if they tried?) Total A Lot A Little None   1.  Turning from your back to your side while in a flat bed without using bedrails? []  1 []  2 [x]  3  []  4   2.  Moving from lying on your back to sitting on the side of a flat bed without using bedrails? []  1 []  2 [x]  3  []  4   3.  Moving to and from a bed to a chair (including a wheelchair)? []  1 []  2 [x]  3  []  4   4. Standing up from a chair using your arms (e.g. wheelchair or bedside chair)? []  1 []  2 [x]  3  []  4   5.  Walking in hospital room? []  1 []  2 [x]  3  []  4   6.  Climbing 3-5 steps with a railing? []  1 [x]  2 []  3  []  4     Raw Score: 17/24                            Cutoff score ?171,2,3 had higher odds of discharging home with home health or need of SNF/IPR.    1. Miladys Gar 
Inpatient   How much help is needed for putting on and taking off regular lower body clothing?: Total  How much help is needed for bathing (which includes washing, rinsing, drying)?: A Lot  How much help is needed for toileting (which includes using toilet, bedpan, or urinal)?: A Lot  How much help is needed for putting on and taking off regular upper body clothing?: A Little  How much help is needed for taking care of personal grooming?: None  How much help for eating meals?: None  AM-[facility] Inpatient Daily Activity Raw Score: 16  AM-PAC Inpatient ADL T-Scale Score : 35.96  ADL Inpatient CMS 0-100% Score: 53.32  ADL Inpatient CMS G-Code Modifier : CK     Interpretation of Tool:  Represents clinically-significant functional categories (i.e. Activities of daily living).  Cutoff score 39.4 (19) correlates to a good likelihood of discharging home versus a facility  Jihan Enrique, Miladys Martinez, Fernando Bautista, Litzy Powell, Andre Christina, Luigi Enrique, AM-PAC “6-Clicks” Functional Assessment Scores Predict Acute Care Hospital Discharge Destination, Physical Therapy, Volume 94, Issue 9, 1 September 2014, Pages 9129-8446, [email or web address]       Pain Rating:  Pain in B shoulder at baseline  Pain Intervention(s):   rest    Activity Tolerance:   Fair , requires rest breaks, and SpO2 stable on room air    After treatment:   Patient left in no apparent distress sitting up in chair, Call bell within reach, Bed/ chair alarm activated, and Caregiver / family present    COMMUNICATION/EDUCATION:   The patient's plan of care was discussed with: registered nurse    Patient Education  Education Given To: Patient;Family  Education Provided: Role of Therapy;Plan of Care;Precautions;Fall Prevention Strategies;Transfer Training;Orientation  Education Method: Verbal;Demonstration  Barriers to Learning: Cognition  Education Outcome: Verbalized understanding;Continued education needed    Thank you for this

## 2024-02-04 NOTE — PROGRESS NOTES
End of Shift Note    Bedside shift change report given to  (oncoming nurse) by Junie Brown RN (offgoing nurse).  Report included the following information SBAR    Shift worked:  Ambulated to bathroom with 1 assist and walker ,weak but tolerated well .     Shift summary and any significant changes:          Concerns for physician to address:       Zone phone for oncoming shift:          Activity:     Number times ambulated in hallways past shift: 0  Number of times OOB to chair past shift: 0    Cardiac:   Cardiac Monitoring: Yes           Access:  Current line(s): PIV     Genitourinary:   Urinary status: voiding    Respiratory:      Chronic home O2 use?: NO  Incentive spirometer at bedside: YES       GI:     Current diet:  ADULT DIET; Regular  Passing flatus:   Tolerating current diet: YES       Pain Management:   Patient states pain is manageable on current regimen: YES    Skin:     Interventions: increase time out of bed    Patient Safety:  Fall Score:    Interventions: bed/chair alarm, assistive device (walker, cane. etc), gripper socks, pt to call before getting OOB, and stay with me (per policy)       Length of Stay:  Expected LOS: 8  Actual LOS: 5      Junie Brown RN

## 2024-02-04 NOTE — PROGRESS NOTES
End of Shift Note    Bedside shift change report given to KEYLA Womack(oncoming nurse) by Oc Acevedo RN (offgoing nurse).  Report included the following information SBAR, Kardex, and Cardiac Rhythm      Shift worked: 4543-0545     Shift summary and any significant changes:    Pt up to chair once x 3 hours. No acute changes throughout shift. Awaiting rehab bed availability.     Concerns for physician to address:  None     Zone phone for oncoming shift:   [phone]

## 2024-02-05 VITALS
SYSTOLIC BLOOD PRESSURE: 123 MMHG | HEART RATE: 93 BPM | BODY MASS INDEX: 23.55 KG/M2 | HEIGHT: 70 IN | WEIGHT: 164.46 LBS | OXYGEN SATURATION: 97 % | TEMPERATURE: 97.6 F | DIASTOLIC BLOOD PRESSURE: 80 MMHG | RESPIRATION RATE: 17 BRPM

## 2024-02-05 LAB
GLUCOSE BLD STRIP.AUTO-MCNC: 133 MG/DL (ref 65–117)
GLUCOSE BLD STRIP.AUTO-MCNC: 133 MG/DL (ref 65–117)
SERVICE CMNT-IMP: ABNORMAL
SERVICE CMNT-IMP: ABNORMAL

## 2024-02-05 PROCEDURE — 2580000003 HC RX 258: Performed by: INTERNAL MEDICINE

## 2024-02-05 PROCEDURE — 6370000000 HC RX 637 (ALT 250 FOR IP): Performed by: INTERNAL MEDICINE

## 2024-02-05 PROCEDURE — 6360000002 HC RX W HCPCS: Performed by: INTERNAL MEDICINE

## 2024-02-05 PROCEDURE — 82962 GLUCOSE BLOOD TEST: CPT

## 2024-02-05 RX ADMIN — ONDANSETRON 4 MG: 2 INJECTION INTRAMUSCULAR; INTRAVENOUS at 08:36

## 2024-02-05 RX ADMIN — Medication: at 08:26

## 2024-02-05 RX ADMIN — OXYCODONE HYDROCHLORIDE AND ACETAMINOPHEN 500 MG: 500 TABLET ORAL at 08:25

## 2024-02-05 RX ADMIN — METOPROLOL SUCCINATE 75 MG: 50 TABLET, EXTENDED RELEASE ORAL at 08:25

## 2024-02-05 RX ADMIN — MIDODRINE HYDROCHLORIDE 10 MG: 5 TABLET ORAL at 12:09

## 2024-02-05 RX ADMIN — SERTRALINE HYDROCHLORIDE 25 MG: 25 TABLET ORAL at 08:25

## 2024-02-05 RX ADMIN — PREDNISONE 5 MG: 5 TABLET ORAL at 08:25

## 2024-02-05 RX ADMIN — PANTOPRAZOLE SODIUM 40 MG: 40 TABLET, DELAYED RELEASE ORAL at 08:24

## 2024-02-05 RX ADMIN — APIXABAN 2.5 MG: 2.5 TABLET, FILM COATED ORAL at 08:25

## 2024-02-05 RX ADMIN — EZETIMIBE 10 MG: 10 TABLET ORAL at 08:25

## 2024-02-05 RX ADMIN — MIDODRINE HYDROCHLORIDE 10 MG: 5 TABLET ORAL at 08:25

## 2024-02-05 RX ADMIN — DIGOXIN 125 MCG: 125 TABLET ORAL at 08:25

## 2024-02-05 RX ADMIN — ZINC SULFATE 220 MG (50 MG) CAPSULE 50 MG: CAPSULE at 08:25

## 2024-02-05 RX ADMIN — SODIUM CHLORIDE, PRESERVATIVE FREE 10 ML: 5 INJECTION INTRAVENOUS at 08:26

## 2024-02-05 NOTE — PROGRESS NOTES
2330:  Bedside SBAR received from Shanta.  Patient is resting comfortably, and does not wish to have the lights dimmed.  0640:  Patient up to toilet at this time.  0650:  Patient back in bed at this time.  0700:  Bedside SBAR given to Oc RAMIRES.

## 2024-02-05 NOTE — PROGRESS NOTES
Hospitalist Progress Note    NAME:   Pepe Latham   : 1940   MRN: [id number]     Date/Time: 2024 9:04 PM  Patient PCP: Weston Pendleton MD    Estimated discharge date: 24  Barriers: Placement      Assessment / Plan:  Severe sepsis  Pseudomonas and Serratia urinary tract infection  -Urine cultures are growing Pseudomonas and also Serratia sensitive to Zosyn and also levofloxacin.  Continue levofloxacin.  Medically clear and waiting on bed placement    Acute metabolic encephalopathy  -Discussed with patient's daughter at the bedside and patient was incoherent but never had any near-syncope or syncopal episode.  -His mental status is back to normal.     coag negative bacteremia  -Most likely contaminant.  No treatment needed      IMTIAZ:  -Creatinine is improved.     Atrial fibrillation  Continue metoprolol and eliquis  Continue digoxin     Chronic hypotension:  Continue midodrine           CHF:  EF 25-30% on ECHO 10/2/23.  Continue metoprolol        Elevated bilirubin:  -Bilirubin is now normal        Hx of prostate cancer  MDS  Continue PTA prednisone  Continue PTA abaterone                     Medical Decision Making:   I personally reviewed labs:cbc, BMP  I personally reviewed imaging:  I personally reviewed EKG:  Toxic drug monitoring:   Discussed case with: Pharmacy,         Code Status: DNR  DVT Prophylaxis: Lovenox  GI Prophylaxis:    Subjective:     Chief Complaint / Reason for Physician Visit  No new symptoms        Objective:     VITALS:   Last 24hrs VS reviewed since prior progress note. Most recent are:  Patient Vitals for the past 24 hrs:   BP Temp Temp src Pulse Resp SpO2   24 1938 137/69 97.8 °F (36.6 °C) Oral 73 25 99 %   24 1528 (!) 142/118 97.8 °F (36.6 °C) Oral 71 23 99 %   24 1128 (!) 140/67 97.8 °F (36.6 °C) Oral 80 24 97 %   24 0838 (!) 151/79 97.7 °F (36.5 °C) Axillary 86 26 --   24 0315 (!) 148/69 98 °F (36.7 °C) Oral 63 25

## 2024-02-05 NOTE — PROGRESS NOTES
Wound Care Instructions   Remove the old dressing and note the amount and type of drainage on the dressing - Document it in the flow sheet assessment.  Cleanse the wound with Vashe solution and wipe with gauze to remove the old drainage and wound debris.  Apply a Vashe solution moistened Opticel Ag dressing to the wound and then an ABD pad and wrap with small roll zach and then the ACE bandage. Date / time the dressing.

## 2024-02-05 NOTE — PROGRESS NOTES
1430: Report called to Steph at [facility].  1535: Family at bedside to transfer patient to [facility]. All belongings with patient. Discharge instructions reviewed.

## 2024-02-05 NOTE — DISCHARGE SUMMARY
Discharge Summary    Name: Pepe Latham  [id number]  YOB: 1940 (Age: 83 y.o.)   Date of Admission: 1/29/2024  Date of Discharge: 2/5/2024  Attending Physician: Elia Duran MD    Discharge Diagnosis:     Severe sepsis  Pseudomonas and Serratia urinary tract infection  Acute metabolic encephalopathy  1/4 coag negative bacteremia  IMITAZ:  Atrial fibrillation  Chronic hypotension:  CHF  Elevated bilirubin:  Hx of prostate cancer  MDS    Consultations:  IP WOUND CARE NURSE CONSULT TO EVAL  IP CONSULT TO PALLIATIVE CARE      Brief Admission History/Reason for Admission Per Marsha Herr MD:   Pepe Latham is a 83 y.o.  male presented to ED with c/o generalized weakness.  Patient is poor historian.  Wife states that patient went to take shower. There was concern for syncope. Patient was unresponsive and unable to walk, so family called EMS.   On his way to hospital, patient was more responsive, however, still confused.  Patient requesting to drink water. As per family, patient failed nursing swallow screen.   Patient was requesting for water at the time of my interview.     Brief Hospital Course by Main Problems:     Severe sepsis  Pseudomonas and Serratia urinary tract infection  -Urine cultures are growing Pseudomonas and also Serratia sensitive to Zosyn and also levofloxacin.  Continue levofloxacin.  Medically clear.     Acute metabolic encephalopathy  -Discussed with patient's daughter at the bedside and patient was incoherent but never had any near-syncope or syncopal episode.  -His mental status is back to normal.     1/4 coag negative bacteremia  -Most likely contaminant.  No treatment needed        IMTIAZ:  -Creatinine is improved.     Atrial fibrillation  Continue metoprolol and eliquis  Continue digoxin     Chronic hypotension:  Continue midodrine           CHF:  EF 25-30% on ECHO 10/2/23.  Continue metoprolol         Elevated bilirubin:  -Bilirubin is now

## 2024-02-05 NOTE — DISCHARGE INSTRUCTIONS
Patient Discharge Instructions    Pepe Latham / [id number] : 1940    Admitted 2024 Discharged: 2024         DISCHARGE DIAGNOSIS:   Severe sepsis  Pseudomonas and Serratia urinary tract infection  Acute metabolic encephalopathy   coag negative bacteremia  IMTIAZ:  Atrial fibrillation  Chronic hypotension:  CHF  Elevated bilirubin:  Hx of prostate cancer  MDS           Take Home Medications     {Medication reconciliation information is now added to the patient's AVS automatically when it is printed.  There is no need to use this SmartLink in discharge instructions.  Highlight this text and delete it to clear this message}      General drug facts     If you have a very bad allergy, wear an allergy ID at all times.   It is important that you take the medication exactly as they are prescribed.   Keep your medication in the bottles provided by the pharmacist.  Keep a list of all your drugs (prescription, natural products, vitamins, OTC) with you. Give this list to your doctor.  Do not take other medications without consulting your doctor.    Do not share your drugs with others and do not take anyone else's drugs.   Keep all drugs out of the reach of children and pets.    Most drugs may be thrown away in household trash after mixing with coffee grounds or liliam litter and sealing in a plastic bag.    Keep a list Call your doctor for help with any side effects. If in the [place] you may also call the FDA at 6-157-FDA-7347    Talk with the doctor before starting any new drug, including OTC, natural products, or vitamins.        What to do at Home    1. Recommended diet: Regular     2. Recommended activity: activity as tolerated    3. If you experience any of the following symptoms then please call your primary care physician or return to the emergency room if you cannot get hold of your doctor:    4. Wound Care:     Remove the old dressing and note the amount and type of drainage on the dressing -

## 2024-02-05 NOTE — PALLIATIVE CARE DISCHARGE
Goals of Care/Treatment Preferences    The Palliative Medicine team was consulted as part of your/your loved one's care in the hospital. Our team is a supportive service; we strive to relieve suffering and improve quality of life.    We reviewed advance care planning information, which includes the following:    Primary Decision Maker: Meme Latham - Spouse - [phone]    Patient/Health Care Proxy Stated Goals: Rehabilitation    We reviewed / discussed your code status as:   Code Status: DNR           “DNR” means do NOT perform CPR in the event of cardiac arrest.        Medical Interventions: Limited additional interventions  Other Instructions:   Artificially Administered Nutrition: No feeding tube    Because of the importance of this information, we are providing you with a printed copy to share with other healthcare providers after this hospitalization is complete.    Thank you for including Palliative team in the care of Mr. Pepe Latham.    Yesi Soto, [facility]  Palliative Medicine [phone]

## 2024-02-05 NOTE — CARE COORDINATION
Transition of Care Plan:     RUR: 26%   Prior Level of Functioning: assistance with ADLs/IADLs  Disposition: SNF-[facility]  If SNF or IPR: Date FOC offered:   Date FOC received:   Accepting facility: [facility]  Follow up appointments: PCP, Specialists  DME needed: Pt has a w/c, RW and hospital bed.   Transportation at discharge: BLS vs family  IM/IMM Medicare/ letter given: provided 2/2/24  Is patient a [place] and connected with VA? N/a              If yes, was  transfer form completed and VA notified?   Caregiver Contact: Meme Latham  [phone]  Discharge Caregiver contacted prior to discharge? Yes  Care Conference needed? No  Barriers to discharge: Isolation Bed Availability         D/c orders placed. Per chart review, plan to d/c to [facility] today. CM called [facility] Liaison (Lilian, [phone]) informed that they don't have any male isolation beds available at this time. May not have anything available until mid next week. Informed that if pt/family is agreeable, [facility] does have an isolation bed available. CM attempted to contact spouse to discuss ([phone]). Spouse doesn't understand why pt is on isolation precautions, requested for CM to contact their daughter (Jeannie Luna, [phone]) who is handling all of this. CM called and spoke with daughter. At this time, they are requesting to wait for bed for [facility] once available. Report they were unaware pt had MRSA or was on precautions until they spoke with [facility] this week.     D/c Delay entered    Shantel Butler LCSW  [facility] Care Manager  [phone]    
   Transition of Care Plan:     RUR: 26%   Prior Level of Functioning: assistance with ADLs/IADLs  Disposition: SNF-[facility]  If SNF or IPR: Date FOC offered:   Date FOC received:   Accepting facility:   Date authorization started with reference number:   Date authorization received and expires:   Follow up appointments: PCP, Specialists  DME needed: Pt has a w/c, RW and hospital bed.   Transportation at discharge: BLS vs family  IM/IMM Medicare/ letter given: provided 2/2/24  Is patient a [place] and connected with VA?               If yes, was  transfer form completed and VA notified?   Caregiver Contact: Meme Latham  [phone]  Discharge Caregiver contacted prior to discharge?   Care Conference needed?   Barriers to discharge:         1:29 p.m. MISAEL met with pt, pt's spouse and daughter at bedside to discuss d/c for tomorrow.  2IM reviewed, signed and copy placed on chart.  Pt's daughter stated they will transport pt to [facility] tomorrow.  Lilian barone [facility] will be on call this weekend, [phone].      11:09 a.m. MISAEL placed [facility] on will call for tomorrow.     11:02 a.m. MISAEL informed by Kalpesh barone [facility] that they will not have a bed for pt until tomorrow due to him needing an isolation bed.     10:51 a.m. MISAEL spoke with Kalpesh who is working on bed assignment.  If they do not have a bed today, it will be tomorrow.     MISAEL informed in IDRs that pt is ready for d/c today.  MISAEL emailed Kalpesh, liaison for [facility], for bed assignment.  MISAEL called pt's spouse to discuss but did not get an answer.     Deanna Briones, ORA  Supervisee in Social Work  Care Management, [facility]  [phone]    
CM made attempt to contact pt's spouse Meme Latham [phone] to complete CM readmission assessment was unsuccessful. Phone was kept ringing. Floor Cm will follow up.    Prince Castro MSW    Ext [phone]    
CM was alerted that there are discharge orders for this patient. MISAEL spoke to admissions liaison for [facility], Lilian (phone: [phone]), and she stated that currently there are no male beds available. She stated that if a bed does become available she will alert CM.     D/C delay entered.     Citlalli Krueger, LCSW  [phone]  
Transition of Care Plan:    RUR: 26%   Prior Level of Functioning: assistance with ADLs/IADLs  Disposition: SNF-[facility]  If SNF or IPR: Date FOC offered:   Date FOC received:   Accepting facility:   Date authorization started with reference number:   Date authorization received and expires:   Follow up appointments: PCP, Specialists  DME needed: Pt has a w/c, RW and hospital bed.   Transportation at discharge: BLS  IM/IMM Medicare/ letter given: needed at d/c  Is patient a [place] and connected with VA?    If yes, was  transfer form completed and VA notified?   Caregiver Contact: Meme Latham  [phone]  Discharge Caregiver contacted prior to discharge?   Care Conference needed?   Barriers to discharge:     1:58 p.m. [facility] accepted [facility].  CM called pt's daughter Jeannie who reported the family would like to move forward with [facility].    12:04 p.m. CM met with pt and pt's daughter who requested referrals be sent to [facility] and [facility].  CM will send.     CM reviewed chart and noted therapy is recommending SNF at d/c.  CM met with pt and daughter at bedside to provide SNF list.  CM requested 3-5 choices.  CM will follow up.    Deanna Briones LMSW  Supervisee in Social Work  Care Management, [facility]  [phone]    
Spouse/Significant Other;Children   Patient's Healthcare Decision Maker is: Patient Declined (Legal Next of Kin Remains as Decision Maker)   PCP Verified by CM Yes   Last Visit to PCP Within last 3 months   Prior Functional Level Assistance with the following:;Bathing;Dressing;Toileting;Cooking;Housework;Shopping;Mobility   Current Functional Level Assistance with the following:;Bathing;Dressing;Toileting;Cooking;Housework;Shopping;Mobility   Can patient return to prior living arrangement Yes   Ability to make needs known: Good   Family able to assist with home care needs: Yes   Social/Functional History   Lives With Spouse   Type of Home House   Home Layout One level   Home Access Ramped entrance   Bathroom Equipment Grab bars in shower;Shower chair   Home Equipment Wheelchair-electric;Walker, 4 wheeled;Walker, rolling;Hospital bed   Receives Help From Family   ADL Assistance Needs assistance   Bath Maximum assistance   Dressing Maximum assistance   Grooming Maximum assistance   Feeding Independent   Toileting Needs assistance   Homemaking Assistance Needs assistance   Meal Prep Total   Laundry Total   Vacuuming Total   Cleaning Total   Gardening Total   Yard Work Total   Driving Total   Shopping Total    Total   Homemaking Responsibilities No   Meal Prep Responsibility No   Laundry Responsibility No   Cleaning Responsibility No   Bill Paying/Finance Responsibility No   Shopping Responsibility No   Dependent Care Responsibility No   Health Care Management No   Active  No   Patient's  Info Family   Mode of Transportation Car   Occupation Retired   Discharge Planning   Type of Residence House   Living Arrangements Spouse/Significant Other   Current Services Prior To Admission Durable Medical Equipment   Current DME Prior to Arrival Walker;Wheelchair;Hospital Bed   Potential Assistance Needed N/A   DME Ordered? No   Potential Assistance Purchasing Medications No   Type of Home Care Services 
screenings of the [facility] clients.    Advanced Care Plan:  []Surrogate Decision Maker of Care  []POA  [x]Communicated Code Status and copy sent. DNR    Other:                 JERROD Healy, CM  [id number]

## 2024-02-08 ENCOUNTER — OFFICE VISIT (OUTPATIENT)
Facility: CLINIC | Age: 84
End: 2024-02-08
Payer: MEDICARE

## 2024-02-08 VITALS
RESPIRATION RATE: 18 BRPM | SYSTOLIC BLOOD PRESSURE: 154 MMHG | TEMPERATURE: 98.6 F | WEIGHT: 169.2 LBS | OXYGEN SATURATION: 98 % | DIASTOLIC BLOOD PRESSURE: 76 MMHG | BODY MASS INDEX: 24.28 KG/M2 | HEART RATE: 67 BPM

## 2024-02-08 DIAGNOSIS — K72.00 SEPSIS DUE TO PSEUDOMONAS SPECIES WITH ACUTE LIVER FAILURE WITHOUT HEPATIC COMA OR SEPTIC SHOCK (HCC): Primary | ICD-10-CM

## 2024-02-08 DIAGNOSIS — G89.3 CANCER RELATED PAIN: ICD-10-CM

## 2024-02-08 DIAGNOSIS — I50.23 ACUTE ON CHRONIC SYSTOLIC HEART FAILURE (HCC): ICD-10-CM

## 2024-02-08 DIAGNOSIS — I95.9 HYPOTENSION, UNSPECIFIED HYPOTENSION TYPE: ICD-10-CM

## 2024-02-08 DIAGNOSIS — K75.81 LIVER CIRRHOSIS SECONDARY TO NASH (HCC): ICD-10-CM

## 2024-02-08 DIAGNOSIS — M62.81 MUSCLE WEAKNESS: ICD-10-CM

## 2024-02-08 DIAGNOSIS — C79.51 METASTASIS TO BONE (HCC): ICD-10-CM

## 2024-02-08 DIAGNOSIS — R65.20 SEPSIS DUE TO PSEUDOMONAS SPECIES WITH ACUTE LIVER FAILURE WITHOUT HEPATIC COMA OR SEPTIC SHOCK (HCC): Primary | ICD-10-CM

## 2024-02-08 DIAGNOSIS — N39.0 URINARY TRACT INFECTION WITHOUT HEMATURIA, SITE UNSPECIFIED: ICD-10-CM

## 2024-02-08 DIAGNOSIS — D46.Z MYELODYSPLASTIC SYNDROME, LOW GRADE (HCC): ICD-10-CM

## 2024-02-08 DIAGNOSIS — K74.60 LIVER CIRRHOSIS SECONDARY TO NASH (HCC): ICD-10-CM

## 2024-02-08 DIAGNOSIS — A41.52 SEPSIS DUE TO PSEUDOMONAS SPECIES WITH ACUTE LIVER FAILURE WITHOUT HEPATIC COMA OR SEPTIC SHOCK (HCC): Primary | ICD-10-CM

## 2024-02-08 DIAGNOSIS — I48.91 ATRIAL FIBRILLATION WITH RAPID VENTRICULAR RESPONSE (HCC): ICD-10-CM

## 2024-02-08 DIAGNOSIS — C61 PROSTATE CARCINOMA (HCC): ICD-10-CM

## 2024-02-08 DIAGNOSIS — D53.9 MACROCYTIC ANEMIA: ICD-10-CM

## 2024-02-08 PROCEDURE — 99309 SBSQ NF CARE MODERATE MDM 30: CPT | Performed by: NURSE PRACTITIONER

## 2024-02-08 PROCEDURE — G8484 FLU IMMUNIZE NO ADMIN: HCPCS | Performed by: NURSE PRACTITIONER

## 2024-02-08 PROCEDURE — 1124F ACP DISCUSS-NO DSCNMKR DOCD: CPT | Performed by: NURSE PRACTITIONER

## 2024-02-08 NOTE — PROGRESS NOTES
PLACE OF SERVICE:  [facility] [address] [place] [address]    SKILLED VISIT    2/8/2024    Chief Complaint:   Chief Complaint   Patient presents with    Follow-up         HPI : Pepe Latham is a 83 y.o. male here for follow up.    Previous history prior to admission:  83-year-old male who presented to ED with generalized weakness. He was  confused. Urine culture grew Pseudomonas and also Serratia sensitive to Zosyn and  also levofloxacin. 1/4 coag negative bacteremia. He was treated with Zosyn and  later Levaquin.  His confusion was thought to be from metabolic encephalopathy from infection.  Mental status is back to normal.  Bacteremia was thought to be contaminant. He has completed antibiotics.  He was also diagnosed with IMTIAZ. Creatinine had improved before discharge.  After discharge from hospital he is now admitted to [facility] for skilled  rehab.    Current visit:  Patient currently sitting up in wheelchair he is awake alert pleasant.  Vital signs reviewed blood pressures ranging 130-150 systolic.  He continues on metoprolol 75 mg daily digoxin.  He has a history of orthostatic hypotension is on midodrine 10 mg p.o. 3 times daily.  He denies any lightheadedness or dizziness.  He has recent atrial fibrillation again continue metoprolol he is on Eliquis for prophylaxis.  He is also on digoxin.  Also of note during hospitalization he was found to have new systolic heart failure with ejection fraction of 25 to 30%.  He is on Bumex 1 mg p.o. daily at this time.  Also paired with potassium chloride 20 mEq p.o. daily to avoid hypokalemia.  Patient has a history of prostate cancer he follows with Dr. Vaughn Harrington.  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  He is followed by palliative care Dr. Forbes and patient utilizes oxycodone for management of cancer related pain.  Currently denies any acute  - - -

## 2024-02-12 ENCOUNTER — OFFICE VISIT (OUTPATIENT)
Facility: CLINIC | Age: 84
End: 2024-02-12
Payer: MEDICARE

## 2024-02-12 VITALS
OXYGEN SATURATION: 97 % | BODY MASS INDEX: 24.28 KG/M2 | HEART RATE: 79 BPM | SYSTOLIC BLOOD PRESSURE: 141 MMHG | TEMPERATURE: 97.3 F | WEIGHT: 169.2 LBS | RESPIRATION RATE: 20 BRPM | DIASTOLIC BLOOD PRESSURE: 72 MMHG

## 2024-02-12 DIAGNOSIS — C61 PROSTATE CARCINOMA (HCC): ICD-10-CM

## 2024-02-12 DIAGNOSIS — G89.3 CANCER RELATED PAIN: ICD-10-CM

## 2024-02-12 DIAGNOSIS — R65.20 SEPSIS DUE TO PSEUDOMONAS SPECIES WITH ACUTE LIVER FAILURE WITHOUT HEPATIC COMA OR SEPTIC SHOCK (HCC): Primary | ICD-10-CM

## 2024-02-12 DIAGNOSIS — I95.9 HYPOTENSION, UNSPECIFIED HYPOTENSION TYPE: ICD-10-CM

## 2024-02-12 DIAGNOSIS — D46.Z MYELODYSPLASTIC SYNDROME, LOW GRADE (HCC): ICD-10-CM

## 2024-02-12 DIAGNOSIS — I50.23 ACUTE ON CHRONIC SYSTOLIC HEART FAILURE (HCC): ICD-10-CM

## 2024-02-12 DIAGNOSIS — K72.00 SEPSIS DUE TO PSEUDOMONAS SPECIES WITH ACUTE LIVER FAILURE WITHOUT HEPATIC COMA OR SEPTIC SHOCK (HCC): Primary | ICD-10-CM

## 2024-02-12 DIAGNOSIS — C79.51 METASTASIS TO BONE (HCC): ICD-10-CM

## 2024-02-12 DIAGNOSIS — M62.81 MUSCLE WEAKNESS: ICD-10-CM

## 2024-02-12 DIAGNOSIS — A41.52 SEPSIS DUE TO PSEUDOMONAS SPECIES WITH ACUTE LIVER FAILURE WITHOUT HEPATIC COMA OR SEPTIC SHOCK (HCC): Primary | ICD-10-CM

## 2024-02-12 DIAGNOSIS — K74.60 LIVER CIRRHOSIS SECONDARY TO NASH (HCC): ICD-10-CM

## 2024-02-12 DIAGNOSIS — D53.9 MACROCYTIC ANEMIA: ICD-10-CM

## 2024-02-12 DIAGNOSIS — N39.0 URINARY TRACT INFECTION WITHOUT HEMATURIA, SITE UNSPECIFIED: ICD-10-CM

## 2024-02-12 DIAGNOSIS — K75.81 LIVER CIRRHOSIS SECONDARY TO NASH (HCC): ICD-10-CM

## 2024-02-12 DIAGNOSIS — R42 VERTIGO: ICD-10-CM

## 2024-02-12 DIAGNOSIS — I48.91 ATRIAL FIBRILLATION WITH RAPID VENTRICULAR RESPONSE (HCC): ICD-10-CM

## 2024-02-12 PROCEDURE — G8484 FLU IMMUNIZE NO ADMIN: HCPCS | Performed by: NURSE PRACTITIONER

## 2024-02-12 PROCEDURE — 1124F ACP DISCUSS-NO DSCNMKR DOCD: CPT | Performed by: NURSE PRACTITIONER

## 2024-02-12 PROCEDURE — 99309 SBSQ NF CARE MODERATE MDM 30: CPT | Performed by: NURSE PRACTITIONER

## 2024-02-12 RX ORDER — MECLIZINE HCL 12.5 MG/1
12.5 TABLET ORAL 3 TIMES DAILY PRN
Qty: 30 TABLET | Refills: 0
Start: 2024-02-12 | End: 2024-02-22

## 2024-02-12 NOTE — PROGRESS NOTES
Drug Use       Drug Use Status  No              Sexual Activity       Sexually Active  Not Asked                   Family History:    Family History   Problem Relation Age of Onset    Hypertension Father            Vitals:   Vitals:    02/12/24 0740   BP: (!) 141/72   Pulse: 79   Resp: 20   Temp: 97.3 °F (36.3 °C)   SpO2: 97%           Exam:  Constitutional: No acute distress;   Eyes: Sclera clear, PERRLA;   Ears/nose/mouth/throat:mmm, OP clear, trachea midline;  Cardiovascular: RRR,nml S1 and S2, no rubs murmurs or gallops, no edema, no cyanosis;   Respiratory: Clear to auscultation, symmetric, no respiratory distress;  Gastrointestinal: Abdomen soft, NT, ND, no masses, normal bowel sounds;  Neurologic: Cranial nerves II through VII grossly intact, no speech or motor deficits A&O in time place and person  Skin: No rash, warm and dry;  Musculoskeletal: No erythema swelling or joint tenderness, extremities without cyanosis, neck supple, ROM intact spine and extremities;  Psychiatric: Not agitated.  Appropriate affect, mood, judgment and insight.  Genitourinary: No suprapubic tenderness or flank tenderness  Heme, lymph, immuno: No pallor;       Labs: Last labs reviewed from 2/9/2024 white blood cell count 10.6 hemoglobin 9.3 hematocrit 27.7 platelet 291 sodium 139 potassium 4 chloride 96 BUN 18.6 creatinine 0.93    Current Outpatient Medications   Medication Sig Dispense Refill    abiraterone acetate (ZYTIGA) 250 MG tablet TAKE FOUR TABLETS (1,000MG) BY MOUTH ONCE A DAY (GENERIC FOR ZYTIGA) 120 tablet 5    metoprolol succinate (TOPROL XL) 25 MG extended release tablet Take 3 tablets by mouth daily 90 tablet 0    ascorbic acid (VITAMIN C) 500 MG tablet Take 1 tablet by mouth 2 times daily      zinc sulfate (ZINCATE) 220 (50 Zn)  mg capsule - elemental zinc Take 1 capsule by mouth daily      digoxin (LANOXIN) 125 MCG tablet Take 1 tablet by mouth daily      dapagliflozin (FARXIGA) 10 MG tablet Take 1 tablet by

## 2024-02-14 ENCOUNTER — OFFICE VISIT (OUTPATIENT)
Facility: CLINIC | Age: 84
End: 2024-02-14
Payer: MEDICARE

## 2024-02-14 VITALS
OXYGEN SATURATION: 97 % | DIASTOLIC BLOOD PRESSURE: 70 MMHG | HEART RATE: 78 BPM | WEIGHT: 169.2 LBS | SYSTOLIC BLOOD PRESSURE: 106 MMHG | RESPIRATION RATE: 18 BRPM | TEMPERATURE: 97.2 F | BODY MASS INDEX: 24.28 KG/M2

## 2024-02-14 DIAGNOSIS — B37.49 CANDIDURIA: ICD-10-CM

## 2024-02-14 DIAGNOSIS — C61 PROSTATE CARCINOMA (HCC): ICD-10-CM

## 2024-02-14 DIAGNOSIS — K74.60 LIVER CIRRHOSIS SECONDARY TO NASH (HCC): ICD-10-CM

## 2024-02-14 DIAGNOSIS — N39.0 URINARY TRACT INFECTION WITHOUT HEMATURIA, SITE UNSPECIFIED: ICD-10-CM

## 2024-02-14 DIAGNOSIS — I48.91 ATRIAL FIBRILLATION WITH RAPID VENTRICULAR RESPONSE (HCC): ICD-10-CM

## 2024-02-14 DIAGNOSIS — A41.52 SEPSIS DUE TO PSEUDOMONAS SPECIES WITH ACUTE LIVER FAILURE WITHOUT HEPATIC COMA OR SEPTIC SHOCK (HCC): Primary | ICD-10-CM

## 2024-02-14 DIAGNOSIS — K72.00 SEPSIS DUE TO PSEUDOMONAS SPECIES WITH ACUTE LIVER FAILURE WITHOUT HEPATIC COMA OR SEPTIC SHOCK (HCC): Primary | ICD-10-CM

## 2024-02-14 DIAGNOSIS — R42 VERTIGO: ICD-10-CM

## 2024-02-14 DIAGNOSIS — D53.9 MACROCYTIC ANEMIA: ICD-10-CM

## 2024-02-14 DIAGNOSIS — G89.3 CANCER RELATED PAIN: ICD-10-CM

## 2024-02-14 DIAGNOSIS — K75.81 LIVER CIRRHOSIS SECONDARY TO NASH (HCC): ICD-10-CM

## 2024-02-14 DIAGNOSIS — I95.9 HYPOTENSION, UNSPECIFIED HYPOTENSION TYPE: ICD-10-CM

## 2024-02-14 DIAGNOSIS — C79.51 METASTASIS TO BONE (HCC): ICD-10-CM

## 2024-02-14 DIAGNOSIS — M62.81 MUSCLE WEAKNESS: ICD-10-CM

## 2024-02-14 DIAGNOSIS — R65.20 SEPSIS DUE TO PSEUDOMONAS SPECIES WITH ACUTE LIVER FAILURE WITHOUT HEPATIC COMA OR SEPTIC SHOCK (HCC): Primary | ICD-10-CM

## 2024-02-14 DIAGNOSIS — I50.23 ACUTE ON CHRONIC SYSTOLIC HEART FAILURE (HCC): ICD-10-CM

## 2024-02-14 DIAGNOSIS — D46.Z MYELODYSPLASTIC SYNDROME, LOW GRADE (HCC): ICD-10-CM

## 2024-02-14 PROCEDURE — 1124F ACP DISCUSS-NO DSCNMKR DOCD: CPT | Performed by: NURSE PRACTITIONER

## 2024-02-14 PROCEDURE — G8484 FLU IMMUNIZE NO ADMIN: HCPCS | Performed by: NURSE PRACTITIONER

## 2024-02-14 PROCEDURE — 99309 SBSQ NF CARE MODERATE MDM 30: CPT | Performed by: NURSE PRACTITIONER

## 2024-02-14 RX ORDER — FLUCONAZOLE 100 MG/1
200 TABLET ORAL DAILY
Qty: 28 TABLET | Refills: 0
Start: 2024-02-14 | End: 2024-02-28

## 2024-02-14 NOTE — PROGRESS NOTES
PLACE OF SERVICE:  [facility] [address] [place] [address]    SKILLED VISIT    2/14/2024    Chief Complaint:   Chief Complaint   Patient presents with    Follow-up         HPI : Pepe Latham is a 83 y.o. male here for follow up.    Previous history prior to admission:  83-year-old male who presented to ED with generalized weakness. He was  confused. Urine culture grew Pseudomonas and also Serratia sensitive to Zosyn and  also levofloxacin. 1/4 coag negative bacteremia. He was treated with Zosyn and  later Levaquin.  His confusion was thought to be from metabolic encephalopathy from infection.  Mental status is back to normal.  Bacteremia was thought to be contaminant. He has completed antibiotics.  He was also diagnosed with IMTIAZ. Creatinine had improved before discharge.  After discharge from hospital he is now admitted to [facility] for skilled  rehab.    Current visit:    Patient lying in bed this morning he is awake alert pleasant he is forgetful but is able to answer questions appropriately.  Vital signs reviewed blood pressures ranging 110-140 systolic.  He continues on metoprolol 75 mg daily and digoxin for atrial fibrillation. Rate is controlled.  He has a history of orthostatic hypotension is on midodrine 10 mg p.o. 3 times daily.  He denies any lightheadedness or dizziness.  He also reports he is no longer having any dizziness.  As he was previously started on meclizine.  He has recent atrial fibrillation again continue metoprolol he is on Eliquis for prophylaxis.  He is also on digoxin.  Also of note during hospitalization he was found to have new systolic heart failure with ejection fraction of 25 to 30%.  He is on Bumex 1 mg p.o. daily at this time.  Also paired with potassium chloride 20 mEq p.o. daily to avoid hypokalemia. Last labs at facility on 2/09/24 potassium is 4 BUN 18.6 creatinine 0.93.  Patient has a history of prostate cancer he

## 2024-02-16 ENCOUNTER — OFFICE VISIT (OUTPATIENT)
Facility: CLINIC | Age: 84
End: 2024-02-16

## 2024-02-16 VITALS
DIASTOLIC BLOOD PRESSURE: 70 MMHG | RESPIRATION RATE: 18 BRPM | OXYGEN SATURATION: 97 % | BODY MASS INDEX: 23.2 KG/M2 | HEART RATE: 74 BPM | WEIGHT: 161.7 LBS | SYSTOLIC BLOOD PRESSURE: 124 MMHG | TEMPERATURE: 98.2 F

## 2024-02-16 DIAGNOSIS — G89.3 CANCER RELATED PAIN: ICD-10-CM

## 2024-02-16 DIAGNOSIS — M62.81 MUSCLE WEAKNESS: ICD-10-CM

## 2024-02-16 DIAGNOSIS — D53.9 MACROCYTIC ANEMIA: ICD-10-CM

## 2024-02-16 DIAGNOSIS — I95.9 HYPOTENSION, UNSPECIFIED HYPOTENSION TYPE: ICD-10-CM

## 2024-02-16 DIAGNOSIS — I50.23 ACUTE ON CHRONIC SYSTOLIC HEART FAILURE (HCC): ICD-10-CM

## 2024-02-16 DIAGNOSIS — K75.81 LIVER CIRRHOSIS SECONDARY TO NASH (HCC): ICD-10-CM

## 2024-02-16 DIAGNOSIS — K72.00 SEPSIS DUE TO PSEUDOMONAS SPECIES WITH ACUTE LIVER FAILURE WITHOUT HEPATIC COMA OR SEPTIC SHOCK (HCC): Primary | ICD-10-CM

## 2024-02-16 DIAGNOSIS — D46.Z MYELODYSPLASTIC SYNDROME, LOW GRADE (HCC): ICD-10-CM

## 2024-02-16 DIAGNOSIS — B37.49 CANDIDURIA: ICD-10-CM

## 2024-02-16 DIAGNOSIS — C61 PROSTATE CARCINOMA (HCC): ICD-10-CM

## 2024-02-16 DIAGNOSIS — A41.52 SEPSIS DUE TO PSEUDOMONAS SPECIES WITH ACUTE LIVER FAILURE WITHOUT HEPATIC COMA OR SEPTIC SHOCK (HCC): Primary | ICD-10-CM

## 2024-02-16 DIAGNOSIS — K74.60 LIVER CIRRHOSIS SECONDARY TO NASH (HCC): ICD-10-CM

## 2024-02-16 DIAGNOSIS — R65.20 SEPSIS DUE TO PSEUDOMONAS SPECIES WITH ACUTE LIVER FAILURE WITHOUT HEPATIC COMA OR SEPTIC SHOCK (HCC): Primary | ICD-10-CM

## 2024-02-16 DIAGNOSIS — I48.91 ATRIAL FIBRILLATION WITH RAPID VENTRICULAR RESPONSE (HCC): ICD-10-CM

## 2024-02-16 DIAGNOSIS — C79.51 METASTASIS TO BONE (HCC): ICD-10-CM

## 2024-02-16 DIAGNOSIS — U07.1 COVID-19: ICD-10-CM

## 2024-02-16 DIAGNOSIS — N39.0 URINARY TRACT INFECTION WITHOUT HEMATURIA, SITE UNSPECIFIED: ICD-10-CM

## 2024-02-16 NOTE — PROGRESS NOTES
tablet Take 1 tablet by mouth daily      bumetanide (BUMEX) 1 MG tablet Take 1 tablet by mouth daily      omeprazole (PRILOSEC) 20 MG delayed release capsule Take 1 capsule by mouth Daily TAKE 1 CAPSULE BY MOUTH ONCE DAILY 30 MINUTES BEFORE MORNING MEAL       No current facility-administered medications for this visit.        Assessment/Plans:    Diagnosis Orders   1. Sepsis due to Pseudomonas species with acute liver failure without hepatic coma or septic shock ([facility])     Has completed antibiotic treatment  Educated family that antibiotic has completed  02/12/2024 -patient has not had any fever signs and symptoms of sepsis.  Does report dysuria-order CBC with differential on 2/13/2024 urinalysis with culture and sensitivity  02/16//2024 - no s/s of sepsis at this time   2. Urinary tract infection without hematuria, site unspecified     Has completed antibiotic treatment  Educated family that antibiotic has resolved  They expressed concerns and noted that his primary symptom is increased confusion  2/12/2024 -patient has not had any fever signs and symptoms of sepsis.  Does report dysuria-order CBC with differential on 2/13/2024 urinalysis with culture and sensitivity  02/14/20204 -reviewed return urinalysis only trace bacteria but many yeast, will treat yeast given symptoms  02/16/2024 - symptoms improved with fluconazole   3. Candiduria  fluconazole (DIFLUCAN) 100 MG tablet   Patient was having dysuria and urinalysis at facility now she has many yeast, > 100,000 CFU yeast  Urine does not culture yeast per laboratory  Continue fluconazole 200 mg p.o. daily x 14 days  Monitor symptoms - no dysuria today  White blood cell count was 4.9   4. Myelodysplastic syndrome, low grade ([facility])     Followed by Dr. Harrington with bone score  Hemoglobin stable 9.2 white blood cell count 4.9 and platelet 248   5. Atrial fibrillation with rapid ventricular response ([facility])     Heart rate is rate controlled with metoprolol 75 mg p.o.

## 2024-02-21 ENCOUNTER — OFFICE VISIT (OUTPATIENT)
Facility: CLINIC | Age: 84
End: 2024-02-21
Payer: MEDICARE

## 2024-02-21 VITALS
DIASTOLIC BLOOD PRESSURE: 89 MMHG | BODY MASS INDEX: 22.7 KG/M2 | RESPIRATION RATE: 18 BRPM | OXYGEN SATURATION: 97 % | WEIGHT: 158.2 LBS | TEMPERATURE: 97.1 F | HEART RATE: 83 BPM | SYSTOLIC BLOOD PRESSURE: 156 MMHG

## 2024-02-21 DIAGNOSIS — G89.3 CANCER RELATED PAIN: ICD-10-CM

## 2024-02-21 DIAGNOSIS — M62.81 MUSCLE WEAKNESS: ICD-10-CM

## 2024-02-21 DIAGNOSIS — I50.23 ACUTE ON CHRONIC SYSTOLIC HEART FAILURE (HCC): ICD-10-CM

## 2024-02-21 DIAGNOSIS — A41.52 SEPSIS DUE TO PSEUDOMONAS SPECIES WITH ACUTE LIVER FAILURE WITHOUT HEPATIC COMA OR SEPTIC SHOCK (HCC): Primary | ICD-10-CM

## 2024-02-21 DIAGNOSIS — R65.20 SEPSIS DUE TO PSEUDOMONAS SPECIES WITH ACUTE LIVER FAILURE WITHOUT HEPATIC COMA OR SEPTIC SHOCK (HCC): Primary | ICD-10-CM

## 2024-02-21 DIAGNOSIS — K72.00 SEPSIS DUE TO PSEUDOMONAS SPECIES WITH ACUTE LIVER FAILURE WITHOUT HEPATIC COMA OR SEPTIC SHOCK (HCC): Primary | ICD-10-CM

## 2024-02-21 DIAGNOSIS — C79.51 METASTASIS TO BONE (HCC): ICD-10-CM

## 2024-02-21 DIAGNOSIS — I95.9 HYPOTENSION, UNSPECIFIED HYPOTENSION TYPE: ICD-10-CM

## 2024-02-21 DIAGNOSIS — N39.0 URINARY TRACT INFECTION WITHOUT HEMATURIA, SITE UNSPECIFIED: ICD-10-CM

## 2024-02-21 DIAGNOSIS — I48.91 ATRIAL FIBRILLATION WITH RAPID VENTRICULAR RESPONSE (HCC): ICD-10-CM

## 2024-02-21 DIAGNOSIS — U07.1 COVID-19: ICD-10-CM

## 2024-02-21 DIAGNOSIS — D46.Z MYELODYSPLASTIC SYNDROME, LOW GRADE (HCC): ICD-10-CM

## 2024-02-21 DIAGNOSIS — B37.49 CANDIDURIA: ICD-10-CM

## 2024-02-21 DIAGNOSIS — K75.81 LIVER CIRRHOSIS SECONDARY TO NASH (HCC): ICD-10-CM

## 2024-02-21 DIAGNOSIS — C61 PROSTATE CARCINOMA (HCC): ICD-10-CM

## 2024-02-21 DIAGNOSIS — K74.60 LIVER CIRRHOSIS SECONDARY TO NASH (HCC): ICD-10-CM

## 2024-02-21 PROCEDURE — 99309 SBSQ NF CARE MODERATE MDM 30: CPT | Performed by: NURSE PRACTITIONER

## 2024-02-21 PROCEDURE — G8484 FLU IMMUNIZE NO ADMIN: HCPCS | Performed by: NURSE PRACTITIONER

## 2024-02-21 PROCEDURE — 1124F ACP DISCUSS-NO DSCNMKR DOCD: CPT | Performed by: NURSE PRACTITIONER

## 2024-02-21 NOTE — PROGRESS NOTES
tablet 3    predniSONE (DELTASONE) 5 MG tablet Take 1 tablet by mouth 2 times daily 60 tablet 5    ELIQUIS 5 MG TABS tablet Take 1 tablet by mouth 2 times daily      ezetimibe (ZETIA) 10 MG tablet Take 1 tablet by mouth daily      bumetanide (BUMEX) 1 MG tablet Take 1 tablet by mouth daily      omeprazole (PRILOSEC) 20 MG delayed release capsule Take 1 capsule by mouth Daily TAKE 1 CAPSULE BY MOUTH ONCE DAILY 30 MINUTES BEFORE MORNING MEAL       No current facility-administered medications for this visit.        Assessment/Plans:    Diagnosis Orders   1. Sepsis due to Pseudomonas species with acute liver failure without hepatic coma or septic shock ([facility])     Has completed antibiotic treatment  Educated family that antibiotic has completed  02/12/2024 -patient has not had any fever signs and symptoms of sepsis.  Does report dysuria-order CBC with differential on 2/13/2024 urinalysis with culture and sensitivity  02/16//2024 - no s/s of sepsis at this time   2. Urinary tract infection without hematuria, site unspecified     Has completed antibiotic treatment  Educated family that antibiotic has resolved  They expressed concerns and noted that his primary symptom is increased confusion  2/12/2024 -patient has not had any fever signs and symptoms of sepsis.  Does report dysuria-order CBC with differential on 2/13/2024 urinalysis with culture and sensitivity  02/14/20204 -reviewed return urinalysis only trace bacteria but many yeast, will treat yeast given symptoms  02/16/2024 - symptoms improved with fluconazole  02/21/2024 -reports return dysuria, will obtain CBC with differential and urinalysis with culture sensitivity   3. Candiduria  fluconazole (DIFLUCAN) 100 MG tablet   Patient was having dysuria and urinalysis at facility now she has many yeast, > 100,000 CFU yeast  Urine does not culture yeast per laboratory  Continue fluconazole 200 mg p.o. daily x 14 days until 2/28/2024  Monitor symptoms - no dysuria

## 2024-02-23 ENCOUNTER — OFFICE VISIT (OUTPATIENT)
Facility: CLINIC | Age: 84
End: 2024-02-23

## 2024-02-23 ENCOUNTER — APPOINTMENT (OUTPATIENT)
Facility: HOSPITAL | Age: 84
End: 2024-02-23
Payer: MEDICARE

## 2024-02-23 VITALS
HEART RATE: 64 BPM | TEMPERATURE: 98.2 F | SYSTOLIC BLOOD PRESSURE: 142 MMHG | DIASTOLIC BLOOD PRESSURE: 66 MMHG | WEIGHT: 158.2 LBS | RESPIRATION RATE: 18 BRPM | BODY MASS INDEX: 22.7 KG/M2 | OXYGEN SATURATION: 98 %

## 2024-02-23 DIAGNOSIS — I50.23 ACUTE ON CHRONIC SYSTOLIC HEART FAILURE (HCC): ICD-10-CM

## 2024-02-23 DIAGNOSIS — K74.60 LIVER CIRRHOSIS SECONDARY TO NASH (HCC): ICD-10-CM

## 2024-02-23 DIAGNOSIS — D53.9 MACROCYTIC ANEMIA: ICD-10-CM

## 2024-02-23 DIAGNOSIS — N39.0 URINARY TRACT INFECTION WITHOUT HEMATURIA, SITE UNSPECIFIED: ICD-10-CM

## 2024-02-23 DIAGNOSIS — C61 PROSTATE CARCINOMA (HCC): ICD-10-CM

## 2024-02-23 DIAGNOSIS — I95.9 HYPOTENSION, UNSPECIFIED HYPOTENSION TYPE: ICD-10-CM

## 2024-02-23 DIAGNOSIS — C79.51 METASTASIS TO BONE (HCC): ICD-10-CM

## 2024-02-23 DIAGNOSIS — I48.91 ATRIAL FIBRILLATION WITH RAPID VENTRICULAR RESPONSE (HCC): ICD-10-CM

## 2024-02-23 DIAGNOSIS — K72.00 SEPSIS DUE TO PSEUDOMONAS SPECIES WITH ACUTE LIVER FAILURE WITHOUT HEPATIC COMA OR SEPTIC SHOCK (HCC): Primary | ICD-10-CM

## 2024-02-23 DIAGNOSIS — A41.52 SEPSIS DUE TO PSEUDOMONAS SPECIES WITH ACUTE LIVER FAILURE WITHOUT HEPATIC COMA OR SEPTIC SHOCK (HCC): Primary | ICD-10-CM

## 2024-02-23 DIAGNOSIS — D46.Z MYELODYSPLASTIC SYNDROME, LOW GRADE (HCC): ICD-10-CM

## 2024-02-23 DIAGNOSIS — G89.3 CANCER RELATED PAIN: ICD-10-CM

## 2024-02-23 DIAGNOSIS — R65.20 SEPSIS DUE TO PSEUDOMONAS SPECIES WITH ACUTE LIVER FAILURE WITHOUT HEPATIC COMA OR SEPTIC SHOCK (HCC): Primary | ICD-10-CM

## 2024-02-23 DIAGNOSIS — K75.81 LIVER CIRRHOSIS SECONDARY TO NASH (HCC): ICD-10-CM

## 2024-02-23 DIAGNOSIS — M62.81 MUSCLE WEAKNESS: ICD-10-CM

## 2024-02-23 DIAGNOSIS — B37.49 CANDIDURIA: ICD-10-CM

## 2024-02-23 DIAGNOSIS — U07.1 COVID-19: ICD-10-CM

## 2024-02-23 NOTE — PROGRESS NOTES
with bony metastasis  Followed by Dr. Harrington  Continues on Abiraterone  Continues prednisone daily   11. Metastasis to bone (HCC)     No acute complaints of pain  Seen in community by palliative care  Can initiate oxycodone for pain management if needed  Follow by Dr. Garcia   12. Cancer related pain     No acute complaints of pain  Seen in community by palliative care Dr. Forbes  Can initiate oxycodone for pain management if needed   13. Muscle weakness     Continue to work with PT/OT   14. Covid 19    Not a good candidate for paxlovid d/t FARNSWORTH cirrhosis  Started molnupiravir 800mg PO BID x 5 days -this is completed  Robitussin 20 ml PO TID x 7 days  Prednisone 50mg po daily x 5 days, then resume home dose 5mg PO BID  02/23/24 symptoms resolved  Isolation precautions per facility protocol  Management symptoms, monitor respiratory status              AVTAR Pagan - NP

## 2024-02-26 ENCOUNTER — OFFICE VISIT (OUTPATIENT)
Facility: CLINIC | Age: 84
End: 2024-02-26
Payer: MEDICARE

## 2024-02-26 VITALS
OXYGEN SATURATION: 96 % | HEART RATE: 87 BPM | BODY MASS INDEX: 22.7 KG/M2 | RESPIRATION RATE: 18 BRPM | TEMPERATURE: 96.7 F | WEIGHT: 158.2 LBS | DIASTOLIC BLOOD PRESSURE: 79 MMHG | SYSTOLIC BLOOD PRESSURE: 132 MMHG

## 2024-02-26 DIAGNOSIS — D46.Z MYELODYSPLASTIC SYNDROME, LOW GRADE (HCC): ICD-10-CM

## 2024-02-26 DIAGNOSIS — I95.9 HYPOTENSION, UNSPECIFIED HYPOTENSION TYPE: ICD-10-CM

## 2024-02-26 DIAGNOSIS — B37.49 CANDIDURIA: ICD-10-CM

## 2024-02-26 DIAGNOSIS — C61 PROSTATE CARCINOMA (HCC): ICD-10-CM

## 2024-02-26 DIAGNOSIS — G89.3 CANCER RELATED PAIN: ICD-10-CM

## 2024-02-26 DIAGNOSIS — C79.51 METASTASIS TO BONE (HCC): ICD-10-CM

## 2024-02-26 DIAGNOSIS — D53.9 MACROCYTIC ANEMIA: ICD-10-CM

## 2024-02-26 DIAGNOSIS — I50.23 ACUTE ON CHRONIC SYSTOLIC HEART FAILURE (HCC): ICD-10-CM

## 2024-02-26 DIAGNOSIS — N39.0 URINARY TRACT INFECTION WITHOUT HEMATURIA, SITE UNSPECIFIED: ICD-10-CM

## 2024-02-26 DIAGNOSIS — K74.60 LIVER CIRRHOSIS SECONDARY TO NASH (HCC): ICD-10-CM

## 2024-02-26 DIAGNOSIS — A41.52 SEPSIS DUE TO PSEUDOMONAS SPECIES WITH ACUTE LIVER FAILURE WITHOUT HEPATIC COMA OR SEPTIC SHOCK (HCC): Primary | ICD-10-CM

## 2024-02-26 DIAGNOSIS — U07.1 COVID-19: ICD-10-CM

## 2024-02-26 DIAGNOSIS — K75.81 LIVER CIRRHOSIS SECONDARY TO NASH (HCC): ICD-10-CM

## 2024-02-26 DIAGNOSIS — R65.20 SEPSIS DUE TO PSEUDOMONAS SPECIES WITH ACUTE LIVER FAILURE WITHOUT HEPATIC COMA OR SEPTIC SHOCK (HCC): Primary | ICD-10-CM

## 2024-02-26 DIAGNOSIS — I48.91 ATRIAL FIBRILLATION WITH RAPID VENTRICULAR RESPONSE (HCC): ICD-10-CM

## 2024-02-26 DIAGNOSIS — K72.00 SEPSIS DUE TO PSEUDOMONAS SPECIES WITH ACUTE LIVER FAILURE WITHOUT HEPATIC COMA OR SEPTIC SHOCK (HCC): Primary | ICD-10-CM

## 2024-02-26 PROCEDURE — G8484 FLU IMMUNIZE NO ADMIN: HCPCS | Performed by: NURSE PRACTITIONER

## 2024-02-26 PROCEDURE — 99309 SBSQ NF CARE MODERATE MDM 30: CPT | Performed by: NURSE PRACTITIONER

## 2024-02-26 PROCEDURE — 1124F ACP DISCUSS-NO DSCNMKR DOCD: CPT | Performed by: NURSE PRACTITIONER

## 2024-02-27 NOTE — PROGRESS NOTES
symptoms of sepsis.  Does report dysuria-order CBC with differential on 2/13/2024 urinalysis with culture and sensitivity  02/14/20204 -reviewed return urinalysis only trace bacteria but many yeast, will treat yeast given symptoms  02/16/2024 - symptoms improved with fluconazole  02/21/2024 -reports return dysuria, will obtain CBC with differential and urinalysis with culture sensitivity  02/23/2024 - asymptomatic again, cbc w diff had slight WBC elevated 12.7 but likely due to recent steroids, preliminary urine not convincing will await culture  02/26/2024 - culture showed only 30,000 CFU no predominate organism, he has no dysuria today   3. Candiduria  fluconazole (DIFLUCAN) 100 MG tablet   Patient was having dysuria and urinalysis at facility now she has many yeast, > 100,000 CFU yeast  Urine does not culture yeast per laboratory  Continue fluconazole 200 mg p.o. daily x 14 days until 2/28/2024  Monitor symptoms - no dysuria today  White blood cell count was 12.7   4. Myelodysplastic syndrome, low grade (HCC)     Followed by Dr. Harrington with bone secours  Blood cell count 12.7 hemoglobin 10.7 hematocrit 33.5 platelet 299  Has infusion at cancer [place] on 03/01/2024  Periodically monitor   5. Atrial fibrillation with rapid ventricular response (HCC)     Heart rate is rate controlled with metoprolol 75 mg p.o. daily  Continue Eliquis for prophylaxis   6. Macrocytic anemia     Hemoglobin 10.7 on 2/12/2024  This is an improvement from hospital where it was 8.2  Periodically monitor   7. Acute on chronic systolic heart failure (HCC)     Ejection fraction found to be 25 to 30% during hospitalization  Continues on Bumex 1 mg p.o. daily  Is on dapagliflozin   Potassium 20 mEq p.o. twice daily for supplementation  Reviewed CMP from 2/9/24 - stable renal function/electrolytes   8. 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 Hypotension, unspecified

## 2024-03-01 ENCOUNTER — APPOINTMENT (OUTPATIENT)
Facility: HOSPITAL | Age: 84
End: 2024-03-01
Payer: MEDICARE

## 2024-03-01 ENCOUNTER — HOSPITAL ENCOUNTER (OUTPATIENT)
Facility: HOSPITAL | Age: 84
Setting detail: INFUSION SERIES
Discharge: HOME | End: 2024-03-01
Payer: MEDICARE

## 2024-03-01 ENCOUNTER — OFFICE VISIT (OUTPATIENT)
Age: 84
End: 2024-03-01
Payer: MEDICARE

## 2024-03-01 VITALS
SYSTOLIC BLOOD PRESSURE: 119 MMHG | HEART RATE: 76 BPM | DIASTOLIC BLOOD PRESSURE: 67 MMHG | TEMPERATURE: 97.7 F | OXYGEN SATURATION: 98 % | BODY MASS INDEX: 23.23 KG/M2 | HEIGHT: 70 IN | WEIGHT: 162.26 LBS

## 2024-03-01 VITALS
BODY MASS INDEX: 23.22 KG/M2 | HEIGHT: 70 IN | HEART RATE: 74 BPM | DIASTOLIC BLOOD PRESSURE: 82 MMHG | SYSTOLIC BLOOD PRESSURE: 151 MMHG | TEMPERATURE: 97.7 F | RESPIRATION RATE: 18 BRPM | WEIGHT: 162.2 LBS | OXYGEN SATURATION: 98 %

## 2024-03-01 DIAGNOSIS — Z51.11 ENCOUNTER FOR CHEMOTHERAPY MANAGEMENT: ICD-10-CM

## 2024-03-01 DIAGNOSIS — D46.Z MYELODYSPLASTIC SYNDROME, LOW GRADE (HCC): Primary | ICD-10-CM

## 2024-03-01 DIAGNOSIS — D46.Z MYELODYSPLASTIC SYNDROME, LOW GRADE (HCC): ICD-10-CM

## 2024-03-01 DIAGNOSIS — C61 PROSTATE CANCER (HCC): ICD-10-CM

## 2024-03-01 DIAGNOSIS — C79.51 PROSTATE CANCER METASTATIC TO BONE (HCC): Primary | ICD-10-CM

## 2024-03-01 DIAGNOSIS — D53.9 MACROCYTIC ANEMIA: ICD-10-CM

## 2024-03-01 DIAGNOSIS — C61 PROSTATE CANCER METASTATIC TO BONE (HCC): Primary | ICD-10-CM

## 2024-03-01 LAB
ALBUMIN SERPL-MCNC: 3.3 G/DL (ref 3.5–5)
ALBUMIN/GLOB SERPL: 0.9 (ref 1.1–2.2)
ALP SERPL-CCNC: 125 U/L (ref 45–117)
ALT SERPL-CCNC: 18 U/L (ref 12–78)
ANION GAP SERPL CALC-SCNC: 4 MMOL/L (ref 5–15)
AST SERPL-CCNC: 11 U/L (ref 15–37)
BASOPHILS # BLD: 0.1 K/UL (ref 0–0.1)
BASOPHILS NFR BLD: 1 % (ref 0–1)
BILIRUB SERPL-MCNC: 0.7 MG/DL (ref 0.2–1)
BUN SERPL-MCNC: 19 MG/DL (ref 6–20)
BUN/CREAT SERPL: 19 (ref 12–20)
CALCIUM SERPL-MCNC: 9.2 MG/DL (ref 8.5–10.1)
CHLORIDE SERPL-SCNC: 104 MMOL/L (ref 97–108)
CO2 SERPL-SCNC: 32 MMOL/L (ref 21–32)
CREAT SERPL-MCNC: 0.98 MG/DL (ref 0.7–1.3)
DIFFERENTIAL METHOD BLD: ABNORMAL
EOSINOPHIL # BLD: 0.1 K/UL (ref 0–0.4)
EOSINOPHIL NFR BLD: 1 % (ref 0–7)
ERYTHROCYTE [DISTWIDTH] IN BLOOD BY AUTOMATED COUNT: 18.6 % (ref 11.5–14.5)
GLOBULIN SER CALC-MCNC: 3.7 G/DL (ref 2–4)
GLUCOSE SERPL-MCNC: 172 MG/DL (ref 65–100)
HCT VFR BLD AUTO: 32.3 % (ref 36.6–50.3)
HGB BLD-MCNC: 10.1 G/DL (ref 12.1–17)
IMM GRANULOCYTES # BLD AUTO: 0.1 K/UL (ref 0–0.04)
IMM GRANULOCYTES NFR BLD AUTO: 1 % (ref 0–0.5)
LYMPHOCYTES # BLD: 2.4 K/UL (ref 0.8–3.5)
LYMPHOCYTES NFR BLD: 18 % (ref 12–49)
MCH RBC QN AUTO: 32.4 PG (ref 26–34)
MCHC RBC AUTO-ENTMCNC: 31.3 G/DL (ref 30–36.5)
MCV RBC AUTO: 103.5 FL (ref 80–99)
MONOCYTES # BLD: 0.7 K/UL (ref 0–1)
MONOCYTES NFR BLD: 5 % (ref 5–13)
NEUTS SEG # BLD: 9.9 K/UL (ref 1.8–8)
NEUTS SEG NFR BLD: 74 % (ref 32–75)
NRBC # BLD: 0.04 K/UL (ref 0–0.01)
NRBC BLD-RTO: 0.3 PER 100 WBC
PLATELET # BLD AUTO: 281 K/UL (ref 150–400)
PMV BLD AUTO: 11.6 FL (ref 8.9–12.9)
POTASSIUM SERPL-SCNC: 3.9 MMOL/L (ref 3.5–5.1)
PROT SERPL-MCNC: 7 G/DL (ref 6.4–8.2)
PSA SERPL-MCNC: 0 NG/ML (ref 0.01–4)
RBC # BLD AUTO: 3.12 M/UL (ref 4.1–5.7)
SODIUM SERPL-SCNC: 140 MMOL/L (ref 136–145)
WBC # BLD AUTO: 13.3 K/UL (ref 4.1–11.1)

## 2024-03-01 PROCEDURE — 80053 COMPREHEN METABOLIC PANEL: CPT

## 2024-03-01 PROCEDURE — 85025 COMPLETE CBC W/AUTO DIFF WBC: CPT

## 2024-03-01 PROCEDURE — 99214 OFFICE O/P EST MOD 30 MIN: CPT | Performed by: INTERNAL MEDICINE

## 2024-03-01 PROCEDURE — 96402 CHEMO HORMON ANTINEOPL SQ/IM: CPT

## 2024-03-01 PROCEDURE — 36415 COLL VENOUS BLD VENIPUNCTURE: CPT

## 2024-03-01 PROCEDURE — 84153 ASSAY OF PSA TOTAL: CPT

## 2024-03-01 PROCEDURE — 6360000002 HC RX W HCPCS

## 2024-03-01 RX ORDER — ACETAMINOPHEN 325 MG/1
650 TABLET ORAL
OUTPATIENT
Start: 2024-05-19

## 2024-03-01 RX ORDER — SODIUM CHLORIDE 9 MG/ML
INJECTION, SOLUTION INTRAVENOUS CONTINUOUS
OUTPATIENT
Start: 2024-05-19

## 2024-03-01 RX ORDER — ONDANSETRON 2 MG/ML
8 INJECTION INTRAMUSCULAR; INTRAVENOUS
OUTPATIENT
Start: 2024-05-19

## 2024-03-01 RX ORDER — EPINEPHRINE 1 MG/ML
0.3 INJECTION, SOLUTION INTRAMUSCULAR; SUBCUTANEOUS PRN
OUTPATIENT
Start: 2024-05-19

## 2024-03-01 RX ORDER — DIPHENHYDRAMINE HYDROCHLORIDE 50 MG/ML
50 INJECTION INTRAMUSCULAR; INTRAVENOUS
OUTPATIENT
Start: 2024-05-19

## 2024-03-01 RX ORDER — ALBUTEROL SULFATE 90 UG/1
4 AEROSOL, METERED RESPIRATORY (INHALATION) PRN
OUTPATIENT
Start: 2024-05-19

## 2024-03-01 RX ADMIN — LEUPROLIDE ACETATE 22.5 MG: KIT at 11:35

## 2024-03-01 ASSESSMENT — PAIN SCALES - GENERAL: PAINLEVEL_OUTOF10: 0

## 2024-03-01 NOTE — PROGRESS NOTES
Pepe Latham is a 83 y.o. male here for follow up appt for met prostate cancer.  Pt takes Abiraterone.   He has been in [facility]    1. Have you been to the ER, urgent care clinic since your last visit?  Hospitalized since your last visit?  1/29/24 - 2/5/24 severe sepsis    2. Have you seen or consulted any other health care providers outside of the [facility] since your last visit?  Include any pap smears or colon screening.  Podiatrist , [facility] for cardioversion on Wednesday  
pain    Oxycodone for pain control  Bowel regimen  Palliative care consult        Plan:       > Continue lupron  > Continue Abiraterone/Prednisone  > Oxycodone PRN  > Bowel regimen  > Palliative care  > Return in 3 months      Signed by: Vaughn Harrington MD                     March 1, 2024

## 2024-03-01 NOTE — PROGRESS NOTES
Pt arrived at [facility] via wheelchair and in no acute distress for Lupron injection and labs. Assessment complete. Pt reports increased weakness since recent hospitalization; currently in SNF. Labs drawn peripherally without difficulty.     Patient Vitals for the past 12 hrs:   Temp Pulse Resp BP SpO2   03/01/24 1124 97.7 °F (36.5 °C) 74 18 (!) 151/82 98 %     Recent Results (from the past 12 hour(s))   Comprehensive Metabolic Panel    Collection Time: 03/01/24 11:18 AM   Result Value Ref Range    Sodium 140 136 - 145 mmol/L    Potassium 3.9 3.5 - 5.1 mmol/L    Chloride 104 97 - 108 mmol/L    CO2 32 21 - 32 mmol/L    Anion Gap 4 (L) 5 - 15 mmol/L    Glucose 172 (H) 65 - 100 mg/dL    BUN 19 6 - 20 MG/DL    Creatinine 0.98 0.70 - 1.30 MG/DL    Bun/Cre Ratio 19 12 - 20      Est, Glom Filt Rate >60 >60 ml/min/1.73m2    Calcium 9.2 8.5 - 10.1 MG/DL    Total Bilirubin 0.7 0.2 - 1.0 MG/DL    ALT 18 12 - 78 U/L    AST 11 (L) 15 - 37 U/L    Alk Phosphatase 125 (H) 45 - 117 U/L    Total Protein 7.0 6.4 - 8.2 g/dL    Albumin 3.3 (L) 3.5 - 5.0 g/dL    Globulin 3.7 2.0 - 4.0 g/dL    Albumin/Globulin Ratio 0.9 (L) 1.1 - 2.2     CBC with Auto Differential    Collection Time: 03/01/24 11:18 AM   Result Value Ref Range    WBC 13.3 (H) 4.1 - 11.1 K/uL    RBC 3.12 (L) 4.10 - 5.70 M/uL    Hemoglobin 10.1 (L) 12.1 - 17.0 g/dL    Hematocrit 32.3 (L) 36.6 - 50.3 %    .5 (H) 80.0 - 99.0 FL    MCH 32.4 26.0 - 34.0 PG    MCHC 31.3 30.0 - 36.5 g/dL    RDW 18.6 (H) 11.5 - 14.5 %    Platelets 281 150 - 400 K/uL    MPV 11.6 8.9 - 12.9 FL    Nucleated RBCs 0.3 (H) 0  WBC    nRBC 0.04 (H) 0.00 - 0.01 K/uL    Neutrophils % 74 32 - 75 %    Lymphocytes % 18 12 - 49 %    Monocytes % 5 5 - 13 %    Eosinophils % 1 0 - 7 %    Basophils % 1 0 - 1 %    Immature Granulocytes 1 (H) 0.0 - 0.5 %    Neutrophils Absolute 9.9 (H) 1.8 - 8.0 K/UL    Lymphocytes Absolute 2.4 0.8 - 3.5 K/UL    Monocytes Absolute 0.7 0.0 - 1.0 K/UL    Eosinophils Absolute

## 2024-03-04 DIAGNOSIS — C79.51 PROSTATE CANCER METASTATIC TO BONE (HCC): Primary | ICD-10-CM

## 2024-03-04 DIAGNOSIS — C61 PROSTATE CANCER METASTATIC TO BONE (HCC): Primary | ICD-10-CM

## 2024-03-04 RX ORDER — ABIRATERONE ACETATE 250 MG/1
TABLET ORAL
Qty: 120 TABLET | Refills: 5 | Status: ACTIVE | OUTPATIENT
Start: 2024-03-04

## 2024-03-04 NOTE — TELEPHONE ENCOUNTER
Zytiga needs to go to [facility] for cost purposes.      Approved per VORB from   Requested Prescriptions     Pending Prescriptions Disp Refills    abiraterone acetate (ZYTIGA) 250 MG tablet 120 tablet 5     Sig: TAKE FOUR TABLETS (1,000MG) BY MOUTH ONCE A DAY (GENERIC FOR ZYTIGA)

## 2024-03-05 ENCOUNTER — OFFICE VISIT (OUTPATIENT)
Facility: CLINIC | Age: 84
End: 2024-03-05
Payer: MEDICARE

## 2024-03-05 VITALS
WEIGHT: 160.8 LBS | DIASTOLIC BLOOD PRESSURE: 62 MMHG | BODY MASS INDEX: 23.07 KG/M2 | TEMPERATURE: 98.6 F | HEART RATE: 82 BPM | SYSTOLIC BLOOD PRESSURE: 114 MMHG | OXYGEN SATURATION: 18 %

## 2024-03-05 DIAGNOSIS — I95.9 HYPOTENSION, UNSPECIFIED HYPOTENSION TYPE: ICD-10-CM

## 2024-03-05 DIAGNOSIS — A41.52 SEPSIS DUE TO PSEUDOMONAS SPECIES WITH ACUTE LIVER FAILURE WITHOUT HEPATIC COMA OR SEPTIC SHOCK (HCC): Primary | ICD-10-CM

## 2024-03-05 DIAGNOSIS — D46.Z MYELODYSPLASTIC SYNDROME, LOW GRADE (HCC): ICD-10-CM

## 2024-03-05 DIAGNOSIS — K75.81 LIVER CIRRHOSIS SECONDARY TO NASH (HCC): ICD-10-CM

## 2024-03-05 DIAGNOSIS — B37.49 CANDIDURIA: ICD-10-CM

## 2024-03-05 DIAGNOSIS — I48.91 ATRIAL FIBRILLATION WITH RAPID VENTRICULAR RESPONSE (HCC): ICD-10-CM

## 2024-03-05 DIAGNOSIS — K74.60 LIVER CIRRHOSIS SECONDARY TO NASH (HCC): ICD-10-CM

## 2024-03-05 DIAGNOSIS — I50.23 ACUTE ON CHRONIC SYSTOLIC HEART FAILURE (HCC): ICD-10-CM

## 2024-03-05 DIAGNOSIS — G89.3 CANCER RELATED PAIN: ICD-10-CM

## 2024-03-05 DIAGNOSIS — C61 PROSTATE CARCINOMA (HCC): ICD-10-CM

## 2024-03-05 DIAGNOSIS — C79.51 METASTASIS TO BONE (HCC): ICD-10-CM

## 2024-03-05 DIAGNOSIS — D53.9 MACROCYTIC ANEMIA: ICD-10-CM

## 2024-03-05 DIAGNOSIS — K72.00 SEPSIS DUE TO PSEUDOMONAS SPECIES WITH ACUTE LIVER FAILURE WITHOUT HEPATIC COMA OR SEPTIC SHOCK (HCC): Primary | ICD-10-CM

## 2024-03-05 DIAGNOSIS — M62.81 MUSCLE WEAKNESS: ICD-10-CM

## 2024-03-05 DIAGNOSIS — R65.20 SEPSIS DUE TO PSEUDOMONAS SPECIES WITH ACUTE LIVER FAILURE WITHOUT HEPATIC COMA OR SEPTIC SHOCK (HCC): Primary | ICD-10-CM

## 2024-03-05 DIAGNOSIS — N39.0 URINARY TRACT INFECTION WITHOUT HEMATURIA, SITE UNSPECIFIED: ICD-10-CM

## 2024-03-05 PROCEDURE — 99309 SBSQ NF CARE MODERATE MDM 30: CPT | Performed by: NURSE PRACTITIONER

## 2024-03-05 PROCEDURE — 1123F ACP DISCUSS/DSCN MKR DOCD: CPT | Performed by: NURSE PRACTITIONER

## 2024-03-05 PROCEDURE — G8484 FLU IMMUNIZE NO ADMIN: HCPCS | Performed by: NURSE PRACTITIONER

## 2024-03-05 NOTE — PROGRESS NOTES
capsule by mouth daily      digoxin (LANOXIN) 125 MCG tablet Take 1 tablet by mouth daily      dapagliflozin (FARXIGA) 10 MG tablet Take 1 tablet by mouth every morning      potassium chloride (KLOR-CON M) 20 MEQ extended release tablet Take 1 tablet by mouth 2 times daily      midodrine (PROAMATINE) 10 MG tablet Take 1 tablet by mouth every 8 (eight) hours 90 tablet 0    sertraline (ZOLOFT) 25 MG tablet Take 1 tablet by mouth daily 30 tablet 3    predniSONE (DELTASONE) 5 MG tablet Take 1 tablet by mouth 2 times daily 60 tablet 5    ELIQUIS 5 MG TABS tablet Take 1 tablet by mouth 2 times daily      ezetimibe (ZETIA) 10 MG tablet Take 1 tablet by mouth daily      bumetanide (BUMEX) 1 MG tablet Take 1 tablet by mouth daily      omeprazole (PRILOSEC) 20 MG delayed release capsule Take 1 capsule by mouth Daily TAKE 1 CAPSULE BY MOUTH ONCE DAILY 30 MINUTES BEFORE MORNING MEAL       No current facility-administered medications for this visit.        Assessment/Plans:    Diagnosis Orders   1. Sepsis due to Pseudomonas species with acute liver failure without hepatic coma or septic shock (HCC)     Has completed antibiotic treatment  Educated family that antibiotic has completed  02/12/2024 -patient has not had any fever signs and symptoms of sepsis.  Does report dysuria-order CBC with differential on 2/13/2024 urinalysis with culture and sensitivity  02/16//2024 - no s/s of sepsis at this time  02/24/2024 - only mild leukocytosis, suspect d/t prednisone, repeat CBC w/ diff on 02/27/24 was stable   2. Urinary tract infection without hematuria, site unspecified     Has completed antibiotic treatment  Educated family that antibiotic has resolved  They expressed concerns and noted that his primary symptom is increased confusion  2/12/2024 -patient has not had any fever signs and symptoms of sepsis.  Does report dysuria-order CBC with differential on 2/13/2024 urinalysis with culture and sensitivity  02/14/20204 -reviewed return

## 2024-03-07 ENCOUNTER — OFFICE VISIT (OUTPATIENT)
Facility: CLINIC | Age: 84
End: 2024-03-07

## 2024-03-07 VITALS
BODY MASS INDEX: 23.07 KG/M2 | DIASTOLIC BLOOD PRESSURE: 84 MMHG | HEART RATE: 70 BPM | OXYGEN SATURATION: 97 % | TEMPERATURE: 97.8 F | SYSTOLIC BLOOD PRESSURE: 158 MMHG | WEIGHT: 160.8 LBS | RESPIRATION RATE: 18 BRPM

## 2024-03-07 DIAGNOSIS — R65.20 SEPSIS DUE TO PSEUDOMONAS SPECIES WITH ACUTE LIVER FAILURE WITHOUT HEPATIC COMA OR SEPTIC SHOCK (HCC): Primary | ICD-10-CM

## 2024-03-07 DIAGNOSIS — A41.52 SEPSIS DUE TO PSEUDOMONAS SPECIES WITH ACUTE LIVER FAILURE WITHOUT HEPATIC COMA OR SEPTIC SHOCK (HCC): Primary | ICD-10-CM

## 2024-03-07 DIAGNOSIS — I50.23 ACUTE ON CHRONIC SYSTOLIC HEART FAILURE (HCC): ICD-10-CM

## 2024-03-07 DIAGNOSIS — K72.00 SEPSIS DUE TO PSEUDOMONAS SPECIES WITH ACUTE LIVER FAILURE WITHOUT HEPATIC COMA OR SEPTIC SHOCK (HCC): Primary | ICD-10-CM

## 2024-03-07 DIAGNOSIS — I95.9 HYPOTENSION, UNSPECIFIED HYPOTENSION TYPE: ICD-10-CM

## 2024-03-07 DIAGNOSIS — F41.9 ANXIETY AND DEPRESSION: ICD-10-CM

## 2024-03-07 DIAGNOSIS — K75.81 LIVER CIRRHOSIS SECONDARY TO NASH (HCC): ICD-10-CM

## 2024-03-07 DIAGNOSIS — M62.81 MUSCLE WEAKNESS: ICD-10-CM

## 2024-03-07 DIAGNOSIS — C79.51 METASTASIS TO BONE (HCC): ICD-10-CM

## 2024-03-07 DIAGNOSIS — I48.91 ATRIAL FIBRILLATION WITH RAPID VENTRICULAR RESPONSE (HCC): ICD-10-CM

## 2024-03-07 DIAGNOSIS — C61 PROSTATE CARCINOMA (HCC): ICD-10-CM

## 2024-03-07 DIAGNOSIS — D53.9 MACROCYTIC ANEMIA: ICD-10-CM

## 2024-03-07 DIAGNOSIS — B37.49 CANDIDURIA: ICD-10-CM

## 2024-03-07 DIAGNOSIS — G89.3 CANCER RELATED PAIN: ICD-10-CM

## 2024-03-07 DIAGNOSIS — K74.60 LIVER CIRRHOSIS SECONDARY TO NASH (HCC): ICD-10-CM

## 2024-03-07 DIAGNOSIS — F32.A ANXIETY AND DEPRESSION: ICD-10-CM

## 2024-03-07 DIAGNOSIS — D46.Z MYELODYSPLASTIC SYNDROME, LOW GRADE (HCC): ICD-10-CM

## 2024-03-07 DIAGNOSIS — N39.0 URINARY TRACT INFECTION WITHOUT HEMATURIA, SITE UNSPECIFIED: ICD-10-CM

## 2024-03-07 RX ORDER — EZETIMIBE 10 MG/1
10 TABLET ORAL DAILY
Qty: 30 TABLET | Refills: 1 | Status: SHIPPED | OUTPATIENT
Start: 2024-03-07 | End: 2024-05-06

## 2024-03-07 RX ORDER — POTASSIUM CHLORIDE 20 MEQ/1
20 TABLET, EXTENDED RELEASE ORAL 2 TIMES DAILY
Qty: 60 TABLET | Refills: 1 | Status: SHIPPED | OUTPATIENT
Start: 2024-03-07 | End: 2024-05-06

## 2024-03-07 RX ORDER — OMEPRAZOLE 20 MG/1
20 CAPSULE, DELAYED RELEASE ORAL DAILY
Qty: 30 CAPSULE | Refills: 1 | Status: SHIPPED | OUTPATIENT
Start: 2024-03-07

## 2024-03-07 RX ORDER — MIDODRINE HYDROCHLORIDE 10 MG/1
10 TABLET ORAL EVERY 8 HOURS
Qty: 90 TABLET | Refills: 1 | Status: SHIPPED | OUTPATIENT
Start: 2024-03-07 | End: 2024-05-06

## 2024-03-07 RX ORDER — DIGOXIN 125 MCG
125 TABLET ORAL DAILY
Qty: 30 TABLET | Refills: 1 | Status: SHIPPED | OUTPATIENT
Start: 2024-03-07 | End: 2024-05-06

## 2024-03-07 RX ORDER — DAPAGLIFLOZIN 5 MG/1
10 TABLET, FILM COATED ORAL EVERY MORNING
Qty: 60 TABLET | Refills: 1 | Status: SHIPPED | OUTPATIENT
Start: 2024-03-07

## 2024-03-07 RX ORDER — APIXABAN 5 MG/1
5 TABLET, FILM COATED ORAL 2 TIMES DAILY
Qty: 60 TABLET | Refills: 1 | Status: SHIPPED | OUTPATIENT
Start: 2024-03-07 | End: 2024-05-06

## 2024-03-07 RX ORDER — ASCORBIC ACID 500 MG
500 TABLET ORAL 2 TIMES DAILY
Qty: 60 TABLET | Refills: 1 | Status: SHIPPED | OUTPATIENT
Start: 2024-03-07 | End: 2024-05-06

## 2024-03-07 RX ORDER — BUMETANIDE 1 MG/1
1 TABLET ORAL DAILY
Qty: 30 TABLET | Refills: 1 | Status: SHIPPED | OUTPATIENT
Start: 2024-03-07

## 2024-03-07 RX ORDER — ZINC SULFATE 50(220)MG
50 CAPSULE ORAL DAILY
Qty: 30 CAPSULE | Refills: 1 | COMMUNITY
Start: 2024-03-07 | End: 2024-05-06

## 2024-03-07 RX ORDER — METOPROLOL SUCCINATE 25 MG/1
75 TABLET, EXTENDED RELEASE ORAL DAILY
Qty: 90 TABLET | Refills: 1 | Status: SHIPPED | OUTPATIENT
Start: 2024-03-07 | End: 2024-05-06

## 2024-03-07 RX ORDER — PREDNISONE 5 MG/1
5 TABLET ORAL 2 TIMES DAILY
Qty: 60 TABLET | Refills: 5 | Status: SHIPPED | OUTPATIENT
Start: 2024-03-07

## 2024-03-07 RX ORDER — SERTRALINE HYDROCHLORIDE 25 MG/1
25 TABLET, FILM COATED ORAL DAILY
Qty: 30 TABLET | Refills: 1 | Status: SHIPPED | OUTPATIENT
Start: 2024-03-07 | End: 2024-05-06

## 2024-03-07 NOTE — PROGRESS NOTES
[address] [facility] [place] [address]  Phone: [phone]  Fax: [phone]  Also available via [employer]     Place of Service:  [facility] [address] [place] [address]                                                                     Discharge Summary       Admit Dx: Generalized weakness, urinary tract infection and metabolic encephalopathy    Disposition: Home    Presentation:  83-year-old male who presented to ED with generalized weakness. He was  confused. Urine culture grew Pseudomonas and also Serratia sensitive to Zosyn and  also levofloxacin. 1/4 coag negative bacteremia. He was treated with Zosyn and  later Levaquin.  His confusion was thought to be from metabolic encephalopathy from infection.  Mental status is back to normal.  Bacteremia was thought to be contaminant. He has completed antibiotics.  He was also diagnosed with IMTIAZ. Creatinine had improved before discharge.  After discharge from hospital he is now admitted to [facility] for skilled  rehab.    Discharge time : > 30 mins    Brief SNF Course:  This is an 83 y.o. male who is awake alert oriented today during exam.  During stay patient was progressing he did unfortunately acquire COVID-19 during his stay.  He was treated with molnupiravir dexamethasone, Robitussin and isolation per facility protocol and improved significantly.  He has no remaining symptoms at this time.  Also during stay he initially was having some difficulty with dysuria he was found to have greater than 100,000 colony-forming units of yeast it did not culture the yeast but did start on fluconazole 200 mg p.o. daily for 14 days for Candida urea until 2/28/2024 at which time his symptoms significantly improved thereafter.  He was tested 1 more time thereafter for dysuria and showed less than 30,000 colony-forming units with no predominant organisms and he denied any other dysuria

## 2024-03-11 NOTE — ED NOTES
19: 43 1mg atropine given   19:55 1mg atropine given  2002: Epi drip started at 10 mcg/min.   2004: 50 bicarb given   2005: 1 g of calcium chloride given   2007: patient being paced at 60 bmp, 60 milamp   2007: MD says pupils are not reactive   2016: Levophed started at 10mcg/min   2019: 50 bicarb given   2021: Levophed titrated to 12 mcg/min  2026: Levophed titrated to 14 mcg/min  2032: Levophed titrated to 16 mcg/ min  20:37 levophed titrated to 18 mcg/min  2045: Levophed titrated to 20 mcg/min

## 2024-03-11 NOTE — PROGRESS NOTES
While charting visit to another ER patient, Mr Latham's nurse notified  of his passing.   returned to his room; his wife, son, daughter, and son-in-law were present.  Offered condolences and shared prayer.  Mrs Latham was tearful but appropriately so.  Provided nursing supervisor contact information as they do not know the  home they will use yet.    SUKUMAR Oswald, BCC, Staff [facility]     Paging Service  [phone])

## 2024-03-11 NOTE — PROGRESS NOTES
Spiritual Care Assessment/Progress Note  [facility]    Name: Pepe Latham MRN: [id number]    Age: 83 y.o.     Sex: male   Language: English     Date: 3/11/2024            Total Time Calculated: 215 min              Spiritual Assessment begun in [facility] EMERGENCY DEPT  Service Provided For:: Patient and family together  Referral/Consult From:: Other (comment) (unit secretary)  Encounter Overview/Reason : Grief, Loss, and Adjustments, Spiritual/Emotional Needs    Spiritual beliefs:      [x] Involved in a earline tradition/spiritual practice: Bahai     [] Supported by a earline community:      [] Claims no spiritual orientation:      [] Seeking spiritual identity:           [] Adheres to an individual form of spirituality:      [] Not able to assess:                Identified resources for coping and support system:   Support System: Spouse, Children, Family members       [x] Prayer                  [] Devotional reading               [] Music                  [] Guided Imagery     [] Pet visits                                        [] Other: (COMMENT)     Specific area/focus of visit   Encounter:    Crisis:    Spiritual/Emotional needs: Type: Emotional Distress, Spiritual Support  Ritual, Rites and Sacraments:    Grief, Loss, and Adjustments: Type: End of Life, Grief and loss, Death  Ethics/Mediation:    Behavioral Health:    Palliative Care:    Advance Care Planning:           Narrative:   Responded to page to ER to for compassionate withdrawal. When  arrived, spoke with son who indicated his mother was en route.  He shared that his father has had multiple medical issues.  He expressed feeling he knew this day would come and he feels he's been preparing himself.  When wife arrived, offered support.  Checked on family periodically.  Left to visit another patient's room in ER.   plans to return to check on patient.  Sonya Cedeño MPS, BCC, Staff   [facility]

## 2024-03-11 NOTE — ED NOTES
Pt withdrawn from machines and drips at this time, per family request, after much discussion with the MD.

## 2024-03-11 NOTE — PROGRESS NOTES
Patient signed out by Dr. Gerber Robles, came in as cardiac arrest, palliatively extubated, withdrawal of resuscitative efforts.    Called to the room due to decreasing heart rate, pulse no longer palpable.     On exam patient's pupils are fixed and dilated.  There are no spontaneous respirations.  Unable to palpate pulse in the carotid, no cardiac activity on the monitor, no cardiac activity auscultated the chest.  Time of death pronounced at 1:30 AM

## 2024-03-11 NOTE — ED PROVIDER NOTES
EMERGENCY DEPARTMENT HISTORY AND PHYSICAL EXAM      Date: 3/10/2024  Patient Name: Ashish Latham    History of Presenting Illness     Chief Complaint   Patient presents with    Cardiac Arrest     Pt arrives via EMS for cc of cardiac arrest. Pts family found him in the bathroom face down and unresponsive. EMS gave 3 doses epi in the field shocked twice and started an Epi drip. Pt was also intubated in the field. Pts had a pulse on arrival and was in the 30's.          HPI: History From: EMS, son, wife, History limited by: Unresponsive   Ashish Latham, 87 y.o. male presents to the ED with cc of cardiac arrest.  Per EMS, and later son, he went to go to the bathroom, and 10 minutes later, when son checked on him he was unresponsive and pulseless.  On EMS arrival, he was in V-fib, he received 2 shocks, 3 epinephrine pushes, and 300 of amiodarone.  They started an epinephrine drip.  He lost pulses again for about 5 minutes, had ROSC prior to arrival.  Per family, he has a history of prostate cancer and heart problems.          There are no other complaints, changes, or physical findings at this time.    PCP: Audra Keyes MD    No current facility-administered medications on file prior to encounter.     No current outpatient medications on file prior to encounter.       Past History     Past Medical History:  No past medical history on file.    Past Surgical History:  No past surgical history on file.    Family History:  No family history on file.    Social History:       Allergies:  Not on File      Physical Exam   Physical Exam  Constitutional:       Comments: Unresponsive   HENT:      Head: Normocephalic and atraumatic.   Eyes:      Comments: Pupils are fixed, nonreactive to light   Cardiovascular:      Comments: Irregular bradycardia  Pulmonary:      Comments: No spontaneous respirations, lungs with symmetric breath sounds on bagging  Abdominal:      General: There is no distension.   Musculoskeletal:

## 2024-06-12 ENCOUNTER — TELEPHONE (OUTPATIENT)
Age: 84
End: 2024-06-12

## 2024-06-12 NOTE — TELEPHONE ENCOUNTER
Fam Gaytan  Oncology Social Work Encounter    [x] Med-Onc [facility] [] Med-Onc [facility] [] Med-Onc [facility] [] Rad-Onc [facility] [] Rad-Onc [facility] [] Rad-Onc [facility] [] Rad-Onc [facility] [] [facility]    Patient: Pepe Latham    Encounter Type:    [] Initial  Encounter  [] Patient Initiated  [] Referral  [] Distress/PHQ Screening  [x] Other:      Concern(s)/Barrier(s) to Care:     Narrative: pt wife inquiring on date of dx.  Dates shared with spouse who stated she was filing [employer].  SW offered condolences and provided active listening and life review.     Referral/Handouts:     Plan:
